# Patient Record
Sex: FEMALE | Race: WHITE | Employment: UNEMPLOYED | URBAN - METROPOLITAN AREA
[De-identification: names, ages, dates, MRNs, and addresses within clinical notes are randomized per-mention and may not be internally consistent; named-entity substitution may affect disease eponyms.]

---

## 2017-04-28 ENCOUNTER — GENERIC CONVERSION - ENCOUNTER (OUTPATIENT)
Dept: OTHER | Facility: OTHER | Age: 42
End: 2017-04-28

## 2017-08-21 ENCOUNTER — ALLSCRIPTS OFFICE VISIT (OUTPATIENT)
Dept: OTHER | Facility: OTHER | Age: 42
End: 2017-08-21

## 2017-11-30 DIAGNOSIS — E03.9 HYPOTHYROIDISM: ICD-10-CM

## 2017-11-30 DIAGNOSIS — Q90.9 DOWN'S SYNDROME: ICD-10-CM

## 2017-11-30 DIAGNOSIS — E66.9 OBESITY: ICD-10-CM

## 2017-11-30 DIAGNOSIS — E78.5 HYPERLIPIDEMIA: ICD-10-CM

## 2017-12-14 ENCOUNTER — ALLSCRIPTS OFFICE VISIT (OUTPATIENT)
Dept: OTHER | Facility: OTHER | Age: 42
End: 2017-12-14

## 2017-12-22 NOTE — PROGRESS NOTES
Assessment   1  Acute respiratory infection (519 8) (J22)    Plan   Health Maintenance    · PELVIC EXAM; Status:Permanent Deferral - Medical Deferral;     Discussion/Summary      Start zpak  of fluids and rest       Chief Complaint   Ongoing cough for the past week  History of Present Illness   HPI: 1 week of cough fever or chills robitussin      Review of Systems        Constitutional: no fever,-- not feeling poorly,-- no chills-- and-- not feeling tired  ENT: no earache,-- no nosebleeds,-- no hearing loss-- and-- no nasal discharge  Cardiovascular: the heart rate was not slow,-- no chest pain,-- the heart rate was not fast-- and-- no palpitations  Respiratory: no shortness of breath,-- no cough,-- no wheezing-- and-- no shortness of breath during exertion  Gastrointestinal: no abdominal pain,-- no nausea,-- no constipation-- and-- no diarrhea  Genitourinary: no dysuria  Musculoskeletal: no arthralgias-- and-- no myalgias  Integumentary: no rashes,-- no itching,-- no skin lesions-- and-- no skin wound  Neurological: no headache,-- no numbness,-- no confusion-- and-- no dizziness  Active Problems   1  Allergic rhinitis (477 9) (J30 9)   2  Cholelithiasis (574 20) (K80 20)   3  Dyslipidemia (272 4) (E78 5)   4  Dysphagia (787 20) (R13 10)   5  Encounter for screening mammogram for malignant neoplasm of breast (V76 12)     (Z12 31)   6  History of syncope (V15 89) (Z87 898)   7  Hypothyroidism (244 9) (E03 9)   8  Need for prophylactic vaccination and inoculation against influenza (V04 81) (Z23)   9  History of Need for vaccination for DTP (V06 1) (Z23)   10  Obesity (278 00) (E66 9)   11  Osteoporosis (733 00) (M81 0)   12  PPD screening test (V74 1) (Z11 1)   13  Screening for ischemic heart disease (V81 0) (Z13 6)   14  Trisomy 21, Down syndrome (758 0) (Q90 9)    Past Medical History   Active Problems And Past Medical History Reviewed:     The active problems and past medical history were reviewed and updated today  Surgical History   Surgical History Reviewed: The surgical history was reviewed and updated today  Social History    · Never A Smoker   · Never Drank Alcohol   · Non-smoker (V49 89) (Z78 9)  The social history was reviewed and updated today  The social history was reviewed and is unchanged  Family History   Family History Reviewed: The family history was reviewed and updated today  Current Meds    1  Acetaminophen 500 MG Oral Tablet; take 1 tablet every 8 hours prn pain; Therapy: 48WAB8031 to Recorded   2  Atorvastatin Calcium 10 MG Oral Tablet; TAKE 1 TABLET DAILY AT BEDTIME; Therapy: 68MLJ0249 to ([place]) Recorded   3  Benzonatate 200 MG Oral Capsule; TAKE 1 CAPSULE Every 6 hours; Therapy: 16EVQ6932 to Recorded   4  CertaVite/Antioxidants TABS; TAKE 1 TABLET DAILY; Therapy: (Recorded:05Jan2015) to Recorded   5  Flonase 50 MCG/ACT SUSP; INSTILL 2 SPRAY Bedtime PRN; Therapy: 27RVF4202 to (Evaluate:05May2015) Recorded   6  Levothyroxine Sodium 50 MCG Oral Tablet; TAKE 1 TABLET DAILY; Therapy: 27BGP2046 to (Evaluate:06Jan2013)  Requested for: 00GDE3575; Last     Rx:20Gjz5325 Ordered   7  Loratadine 5 MG/5ML SYRP; TAKE 1 TEASPOONFUL ONCE A DAY; Therapy: 20KBE8486 to Recorded   8  Ondansetron 4 MG Oral Tablet Disintegrating; TAKE 1 TABLET Every 8 hours PRN; Therapy: 43RPB1812 to Recorded   9  Robitussin Cold Cough+ Chest  MG/5ML Oral Liquid; TAKE 1 TEASPOONFUL     EVERY 4 TO 6 HOURS AS NEEDED; Therapy: 04FIP1402 to Recorded   10  Triamcinolone Acetonide 0 1 % External Cream; APPLY 2-3 TIMES DAILY TO AFFECTED      AREA(S); Therapy: 26PLZ6800 to Recorded   11  Vitamin D3 1000 UNIT Oral Tablet; TAKE 1 TABLET DAILY; Therapy: 23VZQ3754 to Recorded     The medication list was reviewed and updated today  Allergies   1  No Known Drug Allergies  2   Seasonal    Vitals    Recorded: 73ZOI8329 02:35PM   Temperature 98 1 F, Oral   Heart Rate 66   Systolic 112   Diastolic 64   Height Unobtainable Yes   Weight Unobtainable Yes   O2 Saturation 93     Physical Exam        Constitutional      General appearance: No acute distress, well appearing and well nourished  Eyes      Conjunctiva and lids: No swelling, erythema or discharge  Pupils and irises: Equal, round and reactive to light  Ears, Nose, Mouth, and Throat      External inspection of ears and nose: Normal        Otoscopic examination: Tympanic membranes translucent with normal light reflex  Canals patent without erythema  Nasal mucosa, septum, and turbinates: Normal without edema or erythema  Oropharynx: Normal with no erythema, edema, exudate or lesions  Pulmonary      Respiratory effort: No increased work of breathing or signs of respiratory distress  Auscultation of lungs: Clear to auscultation  Cardiovascular      Palpation of heart: Normal PMI, no thrills  Auscultation of heart: Normal rate and rhythm, normal S1 and S2, without murmurs  Examination of extremities for edema and/or varicosities: Normal        Carotid pulses: Normal        Abdomen      Abdomen: Non-tender, no masses  Liver and spleen: No hepatomegaly or splenomegaly  Lymphatic      Palpation of lymph nodes in neck: No lymphadenopathy  Musculoskeletal      Gait and station: Normal        Digits and nails: Normal without clubbing or cyanosis  Inspection/palpation of joints, bones, and muscles: Normal        Skin      Skin and subcutaneous tissue: Normal without rashes or lesions  Neurologic      Cranial nerves: Cranial nerves 2-12 intact  Reflexes: 2+ and symmetric  Sensation: No sensory loss         Psychiatric      Orientation to person, place, and time: Normal        Mood and affect: Normal           Signatures    Electronically signed by : Bisi Ford; Dec 19 2017 8:55AM EST                       (Author)     Electronically signed by : AMNA Stratton ; Dec 21 2017 10:08AM EST                       (Review)

## 2018-01-09 NOTE — PROGRESS NOTES
Assessment    1  Encounter for preventive health examination (V70 0) (Z00 00)   2  Osteoporosis (733 00) (M81 0)    Plan   Dyslipidemia, Hypothyroidism    · (1) CBC/PLT/DIFF; Status:Active; Requested for:56Vzc5628;    · (1) COMPREHENSIVE METABOLIC PANEL; Status:Active; Requested for:52Clu6020;    · (1) LIPID PANEL, FASTING; Status:Active; Requested for:85Xwt8779;    · (1) TSH; Status:Active; Requested for:19Aug2016;   PPD screening test    · PPD    * DXA BONE DENSITY SPINE HIP AND PELVIS; Status:Hold For - Scheduling; Requested for:19Aug2016;   Perform:[facility]; WGP:39MOF6988;FHBVNDV;    For:Osteoporosis; Ordered By:Reyes, Lea;      Discussion/Summary  Impression: Subsequent Annual Wellness Visit, with preventive exam as well as age and risk appropriate counseling completed  Cardiovascular screening and counseling: screening is current  Diabetes screening and counseling: due for blood glucose and Dx - V77 1 Screen for DM  Colorectal cancer screening and counseling: screening not indicated  Breast cancer screening and counseling: screening is current  Cervical cancer screening and counseling: due for a cervical pap smear  Osteoporosis screening and counseling: due for DXA axial skeleton  Abdominal aortic aneurysm screening and counseling: screening not indicated  Glaucoma screening and counseling: screening not indicated  HIV screening and counseling: screening is current  Immunizations: influenza vaccination is recommended annually and Tdap vaccination up to date  Patient Discussion: plan discussed with the patient, follow-up visit needed in one year  Chief Complaint  Patient presents today for a Medicare wellness  History of Present Illness  HPI: Last pap Dec 2013  [facility]   Welcome to [employer] and Wellness Visits: The patient is being seen for the subsequent annual wellness visit     Medicare Screening and Risk Factors   Hospitalizations: she has been previously hospitalizied  Once per lifetime medicare screening tests: ECG has not been done  Medicare Screening Tests Risk Questions   Abdominal aortic aneurysm risk assessment: none indicated  Osteoporosis risk assessment:  and female gender  HIV risk assessment: none indicated  Drug and Alcohol Use: The patient has never smoked cigarettes  The patient reports never drinking alcohol  Diet and Physical Activity: Current diet includes ? 1800 jennyfer daily  The patient does not exercise  Mood Disorder and Cognitive Impairment Screening: (cannot assess depression, anxiety due to Down Syndrome  + cognitive impairment)   Functional Ability/Level of Safety: Hearing is normal bilaterally and a hearing aid is not used  The patient is currently able to do activities of daily living with limitations, able to do instrumental activities of daily living with limitations, able to participate in social activities with limitations and unable to drive  Activities of daily living details: needs help using the phone, transportation help needed, needs help shopping, meal preparation help needed, needs help doing housework, needs help doing laundry, needs help managing medications and needs help managing money  Fall risk factors:  cognitive impairment, but The patient fell 0 times in the past 12 months , no polypharmacy, no alcohol use, no mobility impairment, no antidepressant use, no deconditioning, no postural hypotension, no sedative use, no visual impairment, no urinary incontinence, no antihypertensive use, up and go test was normal and no previous fall  Home safety risk factors:  no unfamiliar surroundings, no loose rugs, no poor household lighting, no uneven floors, no household clutter and handrails on the stairs  Advance Directives: Advance directives: no living will     Co-Managers and Medical Equipment/Suppliers: See Patient Care Team      Patient Care Team    Care Team Member Role Specialty Office Number   [facility]   [phone]         [address] [place] [address]  Obstetrics/Gynecology      Review of Systems  UNOBTAINABLE        Active Problems    1  Allergic rhinitis (477 9) (J30 9)   2  Cholelithiasis (574 20) (K80 20)   3  Dyslipidemia (272 4) (E78 5)   4  Dysphagia (787 20) (R13 10)   5  History of syncope (V15 89) (Z87 898)   6  Hypothyroidism (244 9) (E03 9)   7  Need for prophylactic vaccination and inoculation against influenza (V04 81) (Z23)   8  History of Need for vaccination for DTP (V06 1) (Z23)   9  Obesity (278 00) (E66 9)   10  PPD screening test (V74 1) (Z11 1)   11  Screening for ischemic heart disease (V81 0) (Z13 6)   12  Trisomy 21, Down syndrome (758 0) (Q90 9)    Past Medical History    · History of Acute suppurative otitis media without spontaneous rupture of ear drum,  unspecified laterality   · History of Community acquired pneumonia (486) (J18 9)   · History of Cough (786 2) (R05)   · History of dermatitis (V13 3) (Z87 2)   · History of exposure (V15 9) (Z91 89)   · History of fever (V13 89) (B16 634)   · History of influenza (V12 09) (Z87 09)   · History of nausea (V12 79) (S31 333)   · History of syncope (V15 89) (A40 139)   · History of upper respiratory infection (V12 09) (Z87 09)   · History of viral gastroenteritis (V12 09) (Z86 19)   · History of Infected insect bite (919 5,E906 4) (W57 XXXA)   · History of Leukopenia (288 50) (D72 819)   · Need for prophylactic vaccination and inoculation against influenza (V04 81) (Z23)   · History of Need for vaccination for DTP (V06 1) (Z23)   · History of Pain (780 96) (R52)   · History of Pneumonia (V12 61)    The active problems and past medical history were reviewed and updated today  Surgical History    · History of Tonsillectomy With Adenoidectomy    The surgical history was reviewed and updated today         Family History  Family History    · Family history of History Unobtainable    The family history was reviewed and updated today  Social History    · Never A Smoker   · Never Drank Alcohol  The social history was reviewed and updated today  Current Meds   1  Acetaminophen 500 MG Oral Tablet; take 1 tablet every 8 hours prn pain; Therapy: 49IHU1773 to Recorded   2  Atorvastatin Calcium 10 MG Oral Tablet; TAKE 1 TABLET DAILY AT BEDTIME; Therapy: 83MOT5562 to ([place]) Recorded   3  Benzonatate 200 MG Oral Capsule; TAKE 1 CAPSULE Every 6 hours; Therapy: 57IFR7451 to Recorded   4  CertaVite/Antioxidants TABS; TAKE 1 TABLET DAILY; Therapy: (Recorded:05Jan2015) to Recorded   5  Flonase 50 MCG/ACT SUSP; INSTILL 2 SPRAY Bedtime PRN; Therapy: 46ZFE3796 to (Evaluate:05May2015) Recorded   6  Levothyroxine Sodium 50 MCG Oral Tablet; TAKE 1 TABLET DAILY; Therapy: 92ADC2289 to (Evaluate:06Jan2013)  Requested for: 92GLQ3194; Last   Rx:64Viq1386 Ordered   7  Loratadine 5 MG/5ML SYRP; TAKE 1 TEASPOONFUL ONCE A DAY; Therapy: 19ARL8088 to Recorded   8  Ondansetron 4 MG Oral Tablet Dispersible; TAKE 1 TABLET Every 8 hours PRN; Therapy: 74BPM8654 to Recorded   9  Robitussin Cold Cough+ Chest  MG/5ML Oral Liquid; TAKE 1 TEASPOONFUL   EVERY 4 TO 6 HOURS AS NEEDED; Therapy: 76NYJ4755 to Recorded   10  Triamcinolone Acetonide 0 1 % External Cream; APPLY 2-3 TIMES DAILY TO    AFFECTED AREA(S); Therapy: 57LKP2384 to Recorded   11  Vitamin D3 1000 UNIT Oral Tablet; TAKE 1 TABLET DAILY; Therapy: 74WXC0269 to Recorded    The medication list was reviewed and updated today  Allergies    1  No Known Drug Allergies    2   Seasonal    Immunizations   1 2 3 4    Influenza  25-Oct-2012 05-Oct-2013 04-Oct-2014 16-Oct-2015    PPD  31-Jul-2012 29-Jul-2014      Tdap  13-Dec-2012        Vitals  Signs    Systolic: 592  Diastolic: 62  Heart Rate: 85  Respiration: 16  Temperature: 98 3 F  O2 Saturation: 95  Height: 4 ft 6 2 in  Weight: 181 lb 0 6 oz  BMI Calculated: 43 33  BSA Calculated: 1 66    Physical Exam    Constitutional   General appearance: No acute distress, well appearing and well nourished  morbidly obese  Eyes   Conjunctiva and lids: No swelling, erythema or discharge  Ears, Nose, Mouth, and Throat   Otoscopic examination: Abnormal   The right external canal had a cerumen impaction  The left external canal had a cerumen impaction  Oropharynx: Normal with no erythema, edema, exudate or lesions  Neck   Neck: Supple, symmetric, trachea midline, no masses  Thyroid: Normal, no thyromegaly  Pulmonary   Respiratory effort: No increased work of breathing or signs of respiratory distress  Auscultation of lungs: Clear to auscultation  Cardiovascular   Auscultation of heart: Normal rate and rhythm, normal S1 and S2, no murmurs  Abdomen   Abdomen: Non-tender, no masses  Liver and spleen: No hepatomegaly or splenomegaly  Lymphatic   Palpation of lymph nodes in neck: No lymphadenopathy      Musculoskeletal   Gait and station: Normal        Future Appointments    Date/Time Provider Specialty Site   08/22/2016 03:30 PM MAB, Nurse Schedule  [facility]     Signatures   Electronically signed by : AMNA Avilez ; Aug 19 2016 11:43PM EST                       (Author)

## 2018-01-15 ENCOUNTER — ALLSCRIPTS OFFICE VISIT (OUTPATIENT)
Dept: OTHER | Facility: OTHER | Age: 43
End: 2018-01-15

## 2018-01-16 NOTE — RESULT NOTES
Verified Results  (1) LIPID PANEL, FASTING 66Rjc8507 07:39AM [facility]     Test Name Result Flag Reference   CHOLESTEROL, TOTAL 167 mg/dL  125-200   HDL CHOLESTEROL 52 mg/dL  > OR = 46   TRIGLICERIDES 389 mg/dL  <150   LDL-CHOLESTEROL 91 mg/dL (calc)  <130   Desirable range <100 mg/dL for patients with CHD or  diabetes and <70 mg/dL for diabetic patients with  known heart disease  CHOL/HDLC RATIO 3 2 (calc)  < OR = 5 0   NON HDL CHOLESTEROL 115 mg/dL (calc)     Target for non-HDL cholesterol is 30 mg/dL higher than   LDL cholesterol target  (1) COMPREHENSIVE METABOLIC PANEL 92SMJ4264 61:95WX "[employer]"     Test Name Result Flag Reference   GLUCOSE 98 mg/dL  65-99   Fasting reference interval   UREA NITROGEN (BUN) 14 mg/dL  7-25   CREATININE 0 75 mg/dL  0 50-1 10   eGFR NON-AFR   AMERICAN 98 mL/min/1 73m2  > OR = 60   eGFR AFRICAN AMERICAN 113 mL/min/1 73m2  > OR = 60   BUN/CREATININE RATIO   7-54   NOT APPLICABLE (calc)   ALT 19 U/L  6-29   ALBUMIN 3 2 g/dL L 3 6-5 1   GLOBULIN 3 6 g/dL (calc)  1 9-3 7   ALBUMIN/GLOBULIN RATIO 0 9 (calc) L 1 0-2 5   BILIRUBIN, TOTAL 0 8 mg/dL  0 2-1 2   ALKALINE PHOSPHATASE 76 U/L     AST 22 U/L  10-30   SODIUM 140 mmol/L  135-146   POTASSIUM 4 2 mmol/L  3 5-5 3   CHLORIDE 103 mmol/L     CARBON DIOXIDE 30 mmol/L  20-31   CALCIUM 8 8 mg/dL  8 6-10 2   PROTEIN, TOTAL 6 8 g/dL  6 1-8 1     (1) CBC/PLT/DIFF 52Asy8303 07:39AM "[employer]"     Test Name Result Flag Reference   WHITE BLOOD CELL COUNT 4 3 Thousand/uL  3 8-10 8   RED BLOOD CELL COUNT 4 26 Million/uL  3 80-5 10   HEMOGLOBIN 14 3 g/dL  11 7-15 5   HEMATOCRIT 44 2 %  35 0-45 0    6 fL H 80 0-100 0   MCH 33 5 pg H 27 0-33 0   EOSINOPHILS 0 5 %     BASOPHILS 0 8 %     ABSOLUTE MONOCYTES 439 cells/uL  200-950   ABSOLUTE EOSINOPHILS 22 cells/uL     ABSOLUTE BASOPHILS 34 cells/uL  0-200   NEUTROPHILS 57 0 %     LYMPHOCYTES 31 5 %     MONOCYTES 10 2 %     MCHC 32 4 g/dL  32 0-36 0   RDW 14 7 % 11 0-15 0   PLATELET COUNT 061 Thousand/uL  140-400   MPV 9 1 fL  7 5-11 5   ABSOLUTE NEUTROPHILS 2451 cells/uL  6197-2205   ABSOLUTE LYMPHOCYTES 1355 cells/uL  850-3900     (Q) TSH, 3RD GENERATION [id number] 07:39AM [facility]   REPORT COMMENT:  FASTING:YES     Test Name Result Flag Reference   TSH 2 20 mIU/L     Reference Range                         > or = 20 Years  0 40-4 50                              Pregnancy Ranges            First trimester    0 26-2 66            Second trimester   0 55-2 73            Third trimester    0 43-2 91       Plan  Dyslipidemia, Hypothyroidism    · (1) CBC/PLT/DIFF; Status:Complete;   Done: 44Mxt9728 07:39AM   · (1) COMPREHENSIVE METABOLIC PANEL; Status:Complete;   Done: 42HYC0873  07:39AM   · (1) LIPID PANEL, FASTING; Status:Complete;   Done: 19HQF7316 07:39AM   · (1) TSH; Status:Active; Requested for:44Crl3953;   PPD screening test    · PPD    Discussion/Summary   SRINIVAS BLOOD TESTS ARE WITHIN NORMAL       Signatures   Electronically signed by : AMNA Willard ; Aug 21 2016  6:05PM EST                       (Author)

## 2018-01-16 NOTE — PROGRESS NOTES
Assessment   1  Trisomy 21, Down syndrome (758 0) (Q90 9)   2  Visit for pre-operative examination (V72 84) (V50 697)    Plan   Screening for cervical cancer    · PELVIC EXAM; Status:Temporary Deferral;    1/15/2020    Discussion/Summary   Surgical Clearance: She is at a LOW risk from a cardiovascular standpoint at this time without any additional cardiac testing  Reevaluation needed, if she should present with symptoms prior to surgery/procedure  Cleared for dental cleaning  Chief Complaint   Medical clearance  History of Present Illness   Pre-Op Visit (Brief): The patient is being seen for a preoperative visit  Surgical Risk Assessment: Pertinent Past Medical History: cad stent  Lifestyle Factors: denies alcohol use  Symptoms: no symptoms  Pertinent Family History: no pertinent family history  Living Situation: home is secure and supportive  HPI: preop clearance      Review of Systems        Constitutional: No fever, no chills, feels well, no tiredness, no recent weight gain or weight loss  Eyes: No complaints of eye pain, no red eyes, no eyesight problems, no discharge, no dry eyes, no itching of eyes  ENT: no complaints of earache, no loss of hearing, no nose bleeds, no nasal discharge, no sore throat, no hoarseness  Cardiovascular: No complaints of slow heart rate, no fast heart rate, no chest pain, no palpitations, no leg claudication, no lower extremity edema  Respiratory: No complaints of shortness of breath, no wheezing, no cough, no SOB on exertion, no orthopnea, no PND  Gastrointestinal: No complaints of abdominal pain, no constipation, no nausea or vomiting, no diarrhea, no bloody stools  Integumentary: No complaints of skin rash or lesions, no itching, no skin wounds, no breast pain or lump        Neurological: No complaints of headache, no confusion, no convulsions, no numbness, no dizziness or fainting, no tingling, no limb weakness, no difficulty walking  Psychiatric: Not suicidal, no sleep disturbance, no anxiety or depression, no change in personality, no emotional problems  Endocrine: No complaints of proptosis, no hot flashes, no muscle weakness, no deepening of the voice, no feelings of weakness  Active Problems   1  Acute respiratory infection (519 8) (J22)   2  Allergic rhinitis (477 9) (J30 9)   3  Cholelithiasis (574 20) (K80 20)   4  Dyslipidemia (272 4) (E78 5)   5  Dysphagia (787 20) (R13 10)   6  Encounter for screening mammogram for malignant neoplasm of breast (V76 12)     (Z12 31)   7  History of syncope (V15 89) (Z87 898)   8  Hypothyroidism (244 9) (E03 9)   9  Need for prophylactic vaccination and inoculation against influenza (V04 81) (Z23)   10  History of Need for vaccination for DTP (V06 1) (Z23)   11  Obesity (278 00) (E66 9)   12  Osteoporosis (733 00) (M81 0)   13  PPD screening test (V74 1) (Z11 1)   14  Screening for ischemic heart disease (V81 0) (Z13 6)   15   Trisomy 21, Down syndrome (758 0) (Q90 9)    Past Medical History    · History of Acute suppurative otitis media without spontaneous rupture of ear drum,    unspecified laterality   · History of Community acquired pneumonia (486) (J18 9)   · History of Cough (786 2) (R05)   · History of dermatitis (V13 3) (Z87 2)   · History of exposure (V15 9) (Z91 89)   · History of fever (V13 89) (Z87 898)   · History of influenza (V12 09) (Z87 09)   · History of nausea (V12 79) (F32 594)   · History of syncope (V15 89) (V16 101)   · History of upper respiratory infection (V12 09) (Z87 09)   · History of viral gastroenteritis (V12 09) (Z86 19)   · History of Infected insect bite (919 5,E906 4) (W57 XXXA)   · History of Leukopenia (288 50) (D72 819)   · Need for prophylactic vaccination and inoculation against influenza (V04 81) (Z23)   · History of Need for vaccination for DTP (V06 1) (Z23)   · History of Pain (780 96) (R52)   · History of Pneumonia (V12 61) The active problems and past medical history were reviewed and updated today  Surgical History    · History of Tonsillectomy With Adenoidectomy     The surgical history was reviewed and updated today  Family History   Family History    · Family history of History Unobtainable     The family history was reviewed and updated today  Social History    · Never A Smoker   · Never Drank Alcohol   · Non-smoker (V49 89) (Z78 9)  The social history was reviewed and updated today  The social history was reviewed and is unchanged  Current Meds    1  Acetaminophen 500 MG Oral Tablet; take 1 tablet every 8 hours prn pain; Therapy: 71ZUW9816 to Recorded   2  Atorvastatin Calcium 10 MG Oral Tablet; TAKE 1 TABLET DAILY AT BEDTIME; Therapy: 75MGI5076 to (052 [id number] 46 74) Recorded   3  Benzonatate 200 MG Oral Capsule; TAKE 1 CAPSULE Every 6 hours; Therapy: 48QII0648 to Recorded   4  CertaVite/Antioxidants TABS; TAKE 1 TABLET DAILY; Therapy: (Recorded:05Jan2015) to Recorded   5  Flonase 50 MCG/ACT SUSP; INSTILL 2 SPRAY Bedtime PRN; Therapy: 65VUP1114 to (Evaluate:05May2015) Recorded   6  Levothyroxine Sodium 50 MCG Oral Tablet; TAKE 1 TABLET DAILY; Therapy: 40QSQ1452 to (Evaluate:06Jan2013)  Requested for: 69ZUV5461; Last     Rx:42Nqj9881 Ordered   7  Loratadine 5 MG/5ML SYRP; TAKE 1 TEASPOONFUL ONCE A DAY; Therapy: 64NJD4715 to Recorded   8  Ondansetron 4 MG Oral Tablet Disintegrating; TAKE 1 TABLET Every 8 hours PRN; Therapy: 36PUN8894 to Recorded   9  Robitussin Cold Cough+ Chest  MG/5ML Oral Liquid; TAKE 1 TEASPOONFUL     EVERY 4 TO 6 HOURS AS NEEDED; Therapy: 13AMF4598 to Recorded   10  Triamcinolone Acetonide 0 1 % External Cream; APPLY 2-3 TIMES DAILY TO      AFFECTED AREA(S); Therapy: 57YXY6791 to Recorded   11  Vitamin D3 1000 UNIT Oral Tablet; TAKE 1 TABLET DAILY;       Therapy: 85TSM1537 to Recorded     The medication list was reviewed and updated today  Allergies   1  No Known Drug Allergies  2  Seasonal    Vitals    Recorded: 49CMY0901 02:08PM   Temperature 98 5 F   Heart Rate 83   Respiration 16   Systolic 105   Diastolic 70   Height 4 ft 6 2 in   Weight 179 lb 0 2 oz   BMI Calculated 42 84   BSA Calculated 1 65   O2 Saturation 97     Physical Exam        Constitutional      General appearance: No acute distress, well appearing and well nourished  Eyes      Conjunctiva and lids: No swelling, erythema or discharge  Pupils and irises: Equal, round and reactive to light  Ears, Nose, Mouth, and Throat      External inspection of ears and nose: Normal        Otoscopic examination: Tympanic membranes translucent with normal light reflex  Canals patent without erythema  Oropharynx: Normal with no erythema, edema, exudate or lesions  Pulmonary      Respiratory effort: No increased work of breathing or signs of respiratory distress  Auscultation of lungs: Clear to auscultation  Cardiovascular      Auscultation of heart: Normal rate and rhythm, normal S1 and S2, without murmurs  Examination of extremities for edema and/or varicosities: Normal        Lymphatic      Palpation of lymph nodes in neck: No lymphadenopathy  Musculoskeletal      Gait and station: Normal        Digits and nails: Normal without clubbing or cyanosis  End of Encounter Meds   1  Flonase 50 MCG/ACT SUSP (Fluticasone Propionate); INSTILL 2 SPRAY Bedtime PRN; Therapy: 69EZJ0640 to (Evaluate:60Sdf9126) Recorded   2  Loratadine 5 MG/5ML SYRP; TAKE 1 TEASPOONFUL ONCE A DAY; Therapy: 78DLH9762 to Recorded  3  Atorvastatin Calcium 10 MG Oral Tablet; TAKE 1 TABLET DAILY AT BEDTIME; Therapy: 68CMA3885 to (Bonnie Odom) Recorded  4  CertaVite/Antioxidants TABS; TAKE 1 TABLET DAILY; Therapy: (Recorded:50Iwg1136) to Recorded   5  Vitamin D3 1000 UNIT Oral Tablet; TAKE 1 TABLET DAILY; Therapy: 23CCJ8551 to Recorded  6  Levothyroxine Sodium 50 MCG Oral Tablet; TAKE 1 TABLET DAILY; Therapy: 11RIA0031 to (Evaluate:06Jan2013)  Requested for: 15MDT2299; Last     Rx:04Sep2012 Ordered  7  Benzonatate 200 MG Oral Capsule; TAKE 1 CAPSULE Every 6 hours; Therapy: 05AEH1603 to Recorded  8  Ondansetron 4 MG Oral Tablet Disintegrating; TAKE 1 TABLET Every 8 hours PRN; Therapy: 64NAU9774 to Recorded  9  Acetaminophen 500 MG Oral Tablet; take 1 tablet every 8 hours prn pain; Therapy: 53ZXD7653 to Recorded  10  Robitussin Cold Cough+ Chest  MG/5ML Oral Liquid; TAKE 1 TEASPOONFUL      EVERY 4 TO 6 HOURS AS NEEDED; Therapy: 57STU2029 to Recorded   11  Triamcinolone Acetonide 0 1 % External Cream; APPLY 2-3 TIMES DAILY TO      AFFECTED AREA(S);       Therapy: 36MQU3841 to Recorded    Signatures    Electronically signed by : Dayne Silva; Jon 15 2018  4:05PM EST                       (Author)     Electronically signed by : AMNA Ferrera ; Jon 15 2018 11:43PM EST                       (Review)

## 2018-01-16 NOTE — PROGRESS NOTES
Assessment    1  Encounter for preventive health examination (V70 0) (Z00 00)   2  Trisomy 21, Down syndrome (758 0) (Q90 9)    Discussion/Summary  health maintenance visit Currently, she eats a healthy diet and eats an adequate diet  Breast cancer screening: the risks and benefits of breast cancer screening were discussed and mammogram is current  Colorectal cancer screening: colorectal cancer screening is not indicated  Osteoporosis screening: bone mineral density testing is not indicated  The immunizations are up to date  Advice and education were given regarding nutrition, aerobic exercise, calcium supplements, vitamin D supplements, self skin examination, helmet use and seat belt use  Patient discussion: discussed with the patient  Continue to eat healthy and exercise  Chief Complaint  wellness      History of Present Illness  HM, Adult Female: The patient is being seen for a health maintenance evaluation  General Health: The patient's health since the last visit is described as good  She has regular dental visits  She denies vision problems  She denies hearing loss  Immunizations status: up to date  Lifestyle:  She does not have a healthy diet  She does not have any weight concerns  She exercises regularly  She does not use tobacco  She denies alcohol use  She denies drug use  Reproductive health: the patient is premenopausal   she reports normal menses  Screening: cancer screening reviewed and current  metabolic screening reviewed and current  risk screening reviewed and current  Review of Systems    Constitutional: no fever, not feeling poorly, no chills and not feeling tired  Eyes: no eye pain, no eyesight problems, eyes not red and no purulent discharge from the eyes  ENT: no earache, no nosebleeds, no hearing loss and no nasal discharge  Cardiovascular: the heart rate was not slow, no chest pain, the heart rate was not fast and no palpitations     Respiratory: no shortness of breath, no cough, no wheezing and no shortness of breath during exertion  Gastrointestinal: no abdominal pain, no nausea, no constipation and no diarrhea  Genitourinary: no dysuria  Musculoskeletal: no arthralgias, no joint swelling, no myalgias and no joint stiffness  Integumentary: no rashes, no itching, no skin lesions and no skin wound  Neurological: no headache, no numbness, no confusion and no dizziness  Psychiatric: no anxiety, no sleep disturbances and no depression  Endocrine: no muscle weakness  Active Problems    1  Allergic rhinitis (477 9) (J30 9)   2  Cholelithiasis (574 20) (K80 20)   3  Dyslipidemia (272 4) (E78 5)   4  Dysphagia (787 20) (R13 10)   5  Encounter for screening mammogram for malignant neoplasm of breast (V76 12)   (Z12 31)   6  History of syncope (V15 89) (Z87 898)   7  Hypothyroidism (244 9) (E03 9)   8  Need for prophylactic vaccination and inoculation against influenza (V04 81) (Z23)   9  History of Need for vaccination for DTP (V06 1) (Z23)   10  Obesity (278 00) (E66 9)   11  Osteoporosis (733 00) (M81 0)   12  PPD screening test (V74 1) (Z11 1)   13  Screening for ischemic heart disease (V81 0) (Z13 6)   14   Trisomy 21, Down syndrome (758 0) (Q90 9)    Past Medical History    · History of Acute suppurative otitis media without spontaneous rupture of ear drum,  unspecified laterality   · History of Community acquired pneumonia (486) (J18 9)   · History of Cough (786 2) (R05)   · History of dermatitis (V13 3) (Z87 2)   · History of exposure (V15 9) (Z91 89)   · History of fever (V13 89) (E80 136)   · History of influenza (V12 09) (Z87 09)   · History of nausea (V12 79) (W98 132)   · History of syncope (V15 89) (L56 486)   · History of upper respiratory infection (V12 09) (Z87 09)   · History of viral gastroenteritis (V12 09) (Z86 19)   · History of Infected insect bite (919 5,E906 4) (W57 XXXA)   · History of Leukopenia (288 50) (D72 819)   · Need for prophylactic vaccination and inoculation against influenza (V04 81) (Z23)   · History of Need for vaccination for DTP (V06 1) (Z23)   · History of Pain (780 96) (R52)   · History of Pneumonia (V12 61)    Surgical History    · History of Tonsillectomy With Adenoidectomy    Family History  Family History    · Family history of History Unobtainable    Social History    · Never A Smoker   · Never Drank Alcohol   · Non-smoker (V49 89) (Z78 9)    Current Meds   1  Acetaminophen 500 MG Oral Tablet; take 1 tablet every 8 hours prn pain; Therapy: 32TKA0714 to Recorded   2  Atorvastatin Calcium 10 MG Oral Tablet; TAKE 1 TABLET DAILY AT BEDTIME; Therapy: 69YDU6042 to (052 948 46 74) Recorded   3  Benzonatate 200 MG Oral Capsule; TAKE 1 CAPSULE Every 6 hours; Therapy: 38QJP6003 to Recorded   4  CertaVite/Antioxidants TABS; TAKE 1 TABLET DAILY; Therapy: (Recorded:05Jan2015) to Recorded   5  Flonase 50 MCG/ACT SUSP; INSTILL 2 SPRAY Bedtime PRN; Therapy: 32BFG4923 to (Evaluate:05May2015) Recorded   6  Levothyroxine Sodium 50 MCG Oral Tablet; TAKE 1 TABLET DAILY; Therapy: 77PYR3390 to (Evaluate:06Jan2013)  Requested for: 90DVW1993; Last   Rx:33Wdx2288 Ordered   7  Loratadine 5 MG/5ML SYRP; TAKE 1 TEASPOONFUL ONCE A DAY; Therapy: 34EJO0050 to Recorded   8  Ondansetron 4 MG Oral Tablet Disintegrating; TAKE 1 TABLET Every 8 hours PRN; Therapy: 28BTM2396 to Recorded   9  Robitussin Cold Cough+ Chest  MG/5ML Oral Liquid; TAKE 1 TEASPOONFUL   EVERY 4 TO 6 HOURS AS NEEDED; Therapy: 22NDR0056 to Recorded   10  Triamcinolone Acetonide 0 1 % External Cream; APPLY 2-3 TIMES DAILY TO    AFFECTED AREA(S); Therapy: 38EJU3741 to Recorded   11  Vitamin D3 1000 UNIT Oral Tablet; TAKE 1 TABLET DAILY; Therapy: 98YLK7675 to Recorded    Allergies    1  No Known Drug Allergies    2   Seasonal    Vitals   Recorded: 17Lou5344 03:35PM   Heart Rate 73   Respiration 16   Systolic 959   Diastolic 72   Height 4 ft 6 2 in Weight 182 lb 0 2 oz   BMI Calculated 43 56   BSA Calculated 1 67   O2 Saturation 98     Physical Exam    Constitutional   General appearance: No acute distress, well appearing and well nourished  Eyes   Conjunctiva and lids: No swelling, erythema or discharge  Pupils and irises: Equal, round and reactive to light  Ears, Nose, Mouth, and Throat   External inspection of ears and nose: Normal     Otoscopic examination: Tympanic membranes translucent with normal light reflex  Canals patent without erythema  Oropharynx: Normal with no erythema, edema, exudate or lesions  Pulmonary   Respiratory effort: No increased work of breathing or signs of respiratory distress  Auscultation of lungs: Clear to auscultation  Cardiovascular   Palpation of heart: Normal PMI, no thrills  Auscultation of heart: Normal rate and rhythm, normal S1 and S2, without murmurs  Examination of extremities for edema and/or varicosities: Normal     Abdomen   Abdomen: Non-tender, no masses  Liver and spleen: No hepatomegaly or splenomegaly  Lymphatic   Palpation of lymph nodes in neck: No lymphadenopathy  Musculoskeletal   Gait and station: Normal     Digits and nails: Normal without clubbing or cyanosis  Inspection/palpation of joints, bones, and muscles: Normal     Skin   Skin and subcutaneous tissue: Normal without rashes or lesions      Psychiatric   Orientation to person, place, and time: Normal     Mood and affect: Normal        Signatures   Electronically signed by : Constanza Astorga; Sep 14 2017  9:07AM EST                       (Author)    Electronically signed by : AMNA Lock ; Sep 15 2017 10:10PM EST                       (Review)

## 2018-01-22 VITALS
RESPIRATION RATE: 16 BRPM | WEIGHT: 182.01 LBS | DIASTOLIC BLOOD PRESSURE: 72 MMHG | SYSTOLIC BLOOD PRESSURE: 120 MMHG | OXYGEN SATURATION: 98 % | HEIGHT: 55 IN | HEART RATE: 73 BPM | BODY MASS INDEX: 42.12 KG/M2

## 2018-01-22 VITALS
OXYGEN SATURATION: 93 % | HEART RATE: 66 BPM | SYSTOLIC BLOOD PRESSURE: 102 MMHG | TEMPERATURE: 98.1 F | DIASTOLIC BLOOD PRESSURE: 64 MMHG

## 2018-01-23 VITALS
SYSTOLIC BLOOD PRESSURE: 106 MMHG | HEIGHT: 55 IN | BODY MASS INDEX: 41.43 KG/M2 | TEMPERATURE: 98.5 F | RESPIRATION RATE: 16 BRPM | WEIGHT: 179.01 LBS | DIASTOLIC BLOOD PRESSURE: 70 MMHG | HEART RATE: 83 BPM | OXYGEN SATURATION: 97 %

## 2018-01-23 NOTE — CONSULTS
Chief Complaint  Medical clearance  History of Present Illness  Pre-Op Visit (Brief): The patient is being seen for a preoperative visit  Surgical Risk Assessment: Pertinent Past Medical History: cad stent  Lifestyle Factors: denies alcohol use  Symptoms: no symptoms  Pertinent Family History: no pertinent family history  Living Situation: home is secure and supportive  HPI: preop clearance      Review of Systems    Constitutional: No fever, no chills, feels well, no tiredness, no recent weight gain or weight loss  Eyes: No complaints of eye pain, no red eyes, no eyesight problems, no discharge, no dry eyes, no itching of eyes  ENT: no complaints of earache, no loss of hearing, no nose bleeds, no nasal discharge, no sore throat, no hoarseness  Cardiovascular: No complaints of slow heart rate, no fast heart rate, no chest pain, no palpitations, no leg claudication, no lower extremity edema  Respiratory: No complaints of shortness of breath, no wheezing, no cough, no SOB on exertion, no orthopnea, no PND  Gastrointestinal: No complaints of abdominal pain, no constipation, no nausea or vomiting, no diarrhea, no bloody stools  Integumentary: No complaints of skin rash or lesions, no itching, no skin wounds, no breast pain or lump  Neurological: No complaints of headache, no confusion, no convulsions, no numbness, no dizziness or fainting, no tingling, no limb weakness, no difficulty walking  Psychiatric: Not suicidal, no sleep disturbance, no anxiety or depression, no change in personality, no emotional problems  Endocrine: No complaints of proptosis, no hot flashes, no muscle weakness, no deepening of the voice, no feelings of weakness  Active Problems    1  Acute respiratory infection (519 8) (J22)   2  Allergic rhinitis (477 9) (J30 9)   3  Cholelithiasis (574 20) (K80 20)   4  Dyslipidemia (272 4) (E78 5)   5  Dysphagia (787 20) (R13 10)   6   Encounter for screening mammogram for malignant neoplasm of breast (V76 12)   (Z12 31)   7  History of syncope (V15 89) (Z87 898)   8  Hypothyroidism (244 9) (E03 9)   9  Need for prophylactic vaccination and inoculation against influenza (V04 81) (Z23)   10  History of Need for vaccination for DTP (V06 1) (Z23)   11  Obesity (278 00) (E66 9)   12  Osteoporosis (733 00) (M81 0)   13  PPD screening test (V74 1) (Z11 1)   14  Screening for ischemic heart disease (V81 0) (Z13 6)   15  Trisomy 21, Down syndrome (758 0) (Q90 9)    Past Medical History    · History of Acute suppurative otitis media without spontaneous rupture of ear drum,  unspecified laterality   · History of Community acquired pneumonia (486) (J18 9)   · History of Cough (786 2) (R05)   · History of dermatitis (V13 3) (Z87 2)   · History of exposure (V15 9) (Z91 89)   · History of fever (V13 89) (Q23 058)   · History of influenza (V12 09) (Z87 09)   · History of nausea (V12 79) (I80 750)   · History of syncope (V15 89) (D04 361)   · History of upper respiratory infection (V12 09) (Z87 09)   · History of viral gastroenteritis (V12 09) (Z86 19)   · History of Infected insect bite (919 5,E906 4) (W57 XXXA)   · History of Leukopenia (288 50) (D72 819)   · Need for prophylactic vaccination and inoculation against influenza (V04 81) (Z23)   · History of Need for vaccination for DTP (V06 1) (Z23)   · History of Pain (780 96) (R52)   · History of Pneumonia (V12 61)    The active problems and past medical history were reviewed and updated today  Surgical History    · History of Tonsillectomy With Adenoidectomy    The surgical history was reviewed and updated today  Family History    · Family history of History Unobtainable    The family history was reviewed and updated today  Social History    · Never A Smoker   · Never Drank Alcohol   · Non-smoker (V49 89) (Z78 9)  The social history was reviewed and updated today  The social history was reviewed and is unchanged        Current Meds   1  Acetaminophen 500 MG Oral Tablet; take 1 tablet every 8 hours prn pain; Therapy: 61RMM2796 to Recorded   2  Atorvastatin Calcium 10 MG Oral Tablet; TAKE 1 TABLET DAILY AT BEDTIME; Therapy: 23MBG2087 to (Tor Spencer) Recorded   3  Benzonatate 200 MG Oral Capsule; TAKE 1 CAPSULE Every 6 hours; Therapy: 41YKM3466 to Recorded   4  CertaVite/Antioxidants TABS; TAKE 1 TABLET DAILY; Therapy: (Recorded:05Jan2015) to Recorded   5  Flonase 50 MCG/ACT SUSP; INSTILL 2 SPRAY Bedtime PRN; Therapy: 96UAA8935 to (Evaluate:05May2015) Recorded   6  Levothyroxine Sodium 50 MCG Oral Tablet; TAKE 1 TABLET DAILY; Therapy: 01KVB0043 to (Evaluate:06Jan2013)  Requested for: 88RFG4916; Last   Rx:[id number] Ordered   7  Loratadine 5 MG/5ML SYRP; TAKE 1 TEASPOONFUL ONCE A DAY; Therapy: 18VEK2129 to Recorded   8  Ondansetron 4 MG Oral Tablet Disintegrating; TAKE 1 TABLET Every 8 hours PRN; Therapy: 40CPX9510 to Recorded   9  Robitussin Cold Cough+ Chest  MG/5ML Oral Liquid; TAKE 1 TEASPOONFUL   EVERY 4 TO 6 HOURS AS NEEDED; Therapy: 64LVV5617 to Recorded   10  Triamcinolone Acetonide 0 1 % External Cream; APPLY 2-3 TIMES DAILY TO AFFECTED    AREA(S); Therapy: 40WHE1485 to Recorded   11  Vitamin D3 1000 UNIT Oral Tablet; TAKE 1 TABLET DAILY; Therapy: 37OSN3780 to Recorded    The medication list was reviewed and updated today  Allergies    1  No Known Drug Allergies    2  Seasonal    Vitals  Signs    Temperature: 98 5 F  Heart Rate: 83  Respiration: 16  Systolic: 873  Diastolic: 70  Height: 4 ft 6 2 in  Weight: 179 lb 0 2 oz  BMI Calculated: 42 84  BSA Calculated: 1 65  O2 Saturation: 97    Physical Exam    Constitutional   General appearance: No acute distress, well appearing and well nourished  Eyes   Conjunctiva and lids: No swelling, erythema or discharge  Pupils and irises: Equal, round and reactive to light      Ears, Nose, Mouth, and Throat   External inspection of ears and nose: Normal     Otoscopic examination: Tympanic membranes translucent with normal light reflex  Canals patent without erythema  Oropharynx: Normal with no erythema, edema, exudate or lesions  Pulmonary   Respiratory effort: No increased work of breathing or signs of respiratory distress  Auscultation of lungs: Clear to auscultation  Cardiovascular   Auscultation of heart: Normal rate and rhythm, normal S1 and S2, without murmurs  Examination of extremities for edema and/or varicosities: Normal     Lymphatic   Palpation of lymph nodes in neck: No lymphadenopathy  Musculoskeletal   Gait and station: Normal     Digits and nails: Normal without clubbing or cyanosis  Assessment    1  Trisomy 21, Down syndrome (758 0) (Q90 9)   2  Visit for pre-operative examination (V72 84) (B34 391)    Plan  Screening for cervical cancer    · PELVIC EXAM; Status:Temporary Deferral;    1/15/2020    Discussion/Summary  Surgical Clearance: She is at a LOW risk from a cardiovascular standpoint at this time without any additional cardiac testing  Reevaluation needed, if she should present with symptoms prior to surgery/procedure  Cleared for dental cleaning  End of Encounter Meds    1  Flonase 50 MCG/ACT SUSP (Fluticasone Propionate); INSTILL 2 SPRAY Bedtime PRN; Therapy: 57GAZ6482 to (Evaluate:05May2015) Recorded   2  Loratadine 5 MG/5ML SYRP; TAKE 1 TEASPOONFUL ONCE A DAY; Therapy: 45UWU2851 to Recorded    3  Atorvastatin Calcium 10 MG Oral Tablet; TAKE 1 TABLET DAILY AT BEDTIME; Therapy: [id number] to ([phone]) Recorded    4  CertaVite/Antioxidants TABS; TAKE 1 TABLET DAILY; Therapy: (Recorded:05Jan2015) to Recorded   5  Vitamin D3 1000 UNIT Oral Tablet; TAKE 1 TABLET DAILY; Therapy: 36LHX7475 to Recorded    6  Levothyroxine Sodium 50 MCG Oral Tablet; TAKE 1 TABLET DAILY; Therapy: 59UOH9107 to (Evaluate:06Jan2013)  Requested for: 88AKS0884; Last   Rx:04Sep2012 Ordered    7   Benzonatate 200 MG Oral Capsule; TAKE 1 CAPSULE Every 6 hours; Therapy: 83ALS0176 to Recorded    8  Ondansetron 4 MG Oral Tablet Disintegrating; TAKE 1 TABLET Every 8 hours PRN; Therapy: 94DTB9932 to Recorded    9  Acetaminophen 500 MG Oral Tablet; take 1 tablet every 8 hours prn pain; Therapy: 62TUB6071 to Recorded    10  Robitussin Cold Cough+ Chest  MG/5ML Oral Liquid; TAKE 1 TEASPOONFUL    EVERY 4 TO 6 HOURS AS NEEDED; Therapy: 56XUC6560 to Recorded   11  Triamcinolone Acetonide 0 1 % External Cream; APPLY 2-3 TIMES DAILY TO AFFECTED    AREA(S);     Therapy: [id number] to Recorded    Signatures   Electronically signed by : Argyle Severin; Jon 15 2018  4:05PM EST                       (Author)    Electronically signed by : AMNA Yeh ; Jon 15 2018 11:43PM EST                       (Review)

## 2018-02-26 ENCOUNTER — OFFICE VISIT (OUTPATIENT)
Dept: INTERNAL MEDICINE CLINIC | Facility: CLINIC | Age: 43
End: 2018-02-26
Payer: MEDICARE

## 2018-02-26 VITALS
SYSTOLIC BLOOD PRESSURE: 120 MMHG | DIASTOLIC BLOOD PRESSURE: 82 MMHG | OXYGEN SATURATION: 99 % | HEIGHT: 55 IN | HEART RATE: 74 BPM | WEIGHT: 178.2 LBS | RESPIRATION RATE: 16 BRPM | BODY MASS INDEX: 41.24 KG/M2 | TEMPERATURE: 98.7 F

## 2018-02-26 DIAGNOSIS — J20.8 VIRAL BRONCHITIS: Primary | ICD-10-CM

## 2018-02-26 DIAGNOSIS — R05.3 COUGH, PERSISTENT: ICD-10-CM

## 2018-02-26 PROCEDURE — 99213 OFFICE O/P EST LOW 20 MIN: CPT | Performed by: NURSE PRACTITIONER

## 2018-02-26 RX ORDER — LEVOTHYROXINE SODIUM 0.05 MG/1
1 TABLET ORAL DAILY
COMMUNITY
Start: 2012-03-16 | End: 2021-11-26 | Stop reason: SDUPTHER

## 2018-02-26 RX ORDER — TRIAMCINOLONE ACETONIDE 1 MG/G
CREAM TOPICAL 3 TIMES DAILY
COMMUNITY
End: 2018-08-27 | Stop reason: SDUPTHER

## 2018-02-26 RX ORDER — ACETAMINOPHEN 500 MG
1 TABLET ORAL EVERY 8 HOURS PRN
COMMUNITY
Start: 2015-01-05 | End: 2022-05-25 | Stop reason: SDUPTHER

## 2018-02-26 RX ORDER — POTASSIUM CHLORIDE 10 MEQ
5 TABLET, EXTENDED RELEASE ORAL DAILY
COMMUNITY
Start: 2015-04-06 | End: 2018-02-26

## 2018-02-26 RX ORDER — BIOTIN 1 MG
1 TABLET ORAL DAILY
COMMUNITY
Start: 2015-01-05 | End: 2018-02-26

## 2018-02-26 RX ORDER — ATORVASTATIN CALCIUM 10 MG/1
1 TABLET, FILM COATED ORAL
COMMUNITY
Start: 2015-01-05 | End: 2021-11-26 | Stop reason: SDUPTHER

## 2018-02-26 RX ORDER — BENZONATATE 200 MG/1
1 CAPSULE ORAL EVERY 6 HOURS
COMMUNITY
Start: 2015-01-05 | End: 2019-02-25 | Stop reason: SDUPTHER

## 2018-02-26 RX ORDER — MOMETASONE FUROATE 50 UG/1
SPRAY, METERED NASAL
COMMUNITY
Start: 2010-09-01 | End: 2018-02-26

## 2018-02-26 RX ORDER — TRIAMCINOLONE ACETONIDE 1 MG/G
CREAM TOPICAL 3 TIMES DAILY
COMMUNITY
Start: 2015-01-05 | End: 2018-02-26

## 2018-02-26 RX ORDER — FLUTICASONE PROPIONATE 50 MCG
SPRAY, SUSPENSION (ML) NASAL
COMMUNITY
Start: 2013-08-05 | End: 2022-06-30 | Stop reason: SDUPTHER

## 2018-02-26 RX ORDER — ONDANSETRON 4 MG/1
1 TABLET, ORALLY DISINTEGRATING ORAL EVERY 8 HOURS PRN
COMMUNITY
Start: 2015-01-05 | End: 2021-04-23

## 2018-02-26 RX ORDER — GUAIFENESIN DEXTROMETHORPHAN HYDROBROMIDE ORAL SOLUTION 10; 100 MG/5ML; MG/5ML
5 SOLUTION ORAL
COMMUNITY
Start: 2015-01-05 | End: 2018-02-26

## 2018-02-26 RX ORDER — NYSTATIN 100000 [USP'U]/G
POWDER TOPICAL
COMMUNITY
Start: 2015-01-02 | End: 2022-05-25 | Stop reason: SDUPTHER

## 2018-02-26 RX ORDER — LORATADINE ORAL 5 MG/5ML
5 SOLUTION ORAL
COMMUNITY
End: 2022-06-30 | Stop reason: SDUPTHER

## 2018-02-26 NOTE — PROGRESS NOTES
Assessment/Plan:    Most likely upper airway cough syndrome from recent URI  Will check chest xray to rule out pneumonia  Continue Robitussin for cough  Plenty of fluids      Diagnoses and all orders for this visit:    Viral bronchitis  -     XR chest pa & lateral; Future    Cough, persistent  -     XR chest pa & lateral; Future    Other orders  -     Discontinue: Cholecalciferol (VITAMIN D3) 1000 units CAPS; Take 1 tablet by mouth daily  -     Discontinue: triamcinolone (KENALOG) 0 1 % cream; Apply topically 3 (three) times a day  -     Discontinue: dextromethorphan-guaiFENesin (ROBITUSSIN COLD COUGH+ CHEST)  mg/5 mL oral liquid; Take 5 mL by mouth  -     ondansetron (ZOFRAN-ODT) 4 mg disintegrating tablet; Take 1 tablet by mouth every 8 (eight) hours as needed  -     Discontinue: Loratadine 5 MG/5ML SOLN; Take 5 mL by mouth daily  -     levothyroxine 50 mcg tablet; Take 1 tablet by mouth daily  -     fluticasone (FLONASE) 50 mcg/act nasal spray; into each nostril  -     Discontinue: Multiple Vitamins-Minerals (CERTAVITE/ANTIOXIDANTS PO); Take 1 tablet by mouth daily  -     benzonatate (TESSALON) 200 MG capsule; Take 1 capsule by mouth every 6 (six) hours  -     atorvastatin (LIPITOR) 10 mg tablet; Take 1 tablet by mouth  -     acetaminophen (TYLENOL) 500 mg tablet; Take 1 tablet by mouth every 8 (eight) hours as needed  -     Cholecalciferol 1000 units CHEW; Chew 1 tablet  -     Dextromethorphan-Guaifenesin  MG CAPS; Take by mouth  -     loratadine (CLARITIN) 5 mg/5 mL syrup; Take 5 mg by mouth  -     Discontinue: mometasone (NASONEX) 50 mcg/act nasal spray; into each nostril  -     Multiple Vitamin (MULTIVITAMIN IRON-FREE PO); Take 1 tablet by mouth  -     nystatin (MYCOSTATIN) powder; Apply topically  -     triamcinolone (KENALOG) 0 1 % cream; Apply topically Three times a day          Subjective:      Patient ID: Piyush Davila is a 37 y o  female      Here for ongoing persistent cough  Was seen here on 12/14, Dx with URI- zpac given but it didn't help  Still having a deep cough  Taking robitussin with some improvement  Denies fever, shortness of breath, wheezing, sore throat  No hx of asthma noted            The following portions of the patient's history were reviewed and updated as appropriate: allergies, current medications, past family history, past medical history, past social history, past surgical history and problem list     Review of Systems   Constitutional: Negative  HENT: Positive for rhinorrhea and sneezing  Negative for sore throat  Eyes: Negative for discharge  Respiratory: Positive for cough  Negative for chest tightness, shortness of breath and wheezing  Cardiovascular: Negative for chest pain and leg swelling  Gastrointestinal: Negative for abdominal pain, diarrhea, nausea and vomiting  Genitourinary: Negative for difficulty urinating  Musculoskeletal: Negative for myalgias  Skin: Negative  Neurological: Negative for dizziness, light-headedness and headaches  Objective:      /82 (BP Location: Left arm, Patient Position: Sitting, Cuff Size: Large)   Pulse 74   Temp 98 7 °F (37 1 °C)   Resp 16   Ht 4' 6 2" (1 377 m)   Wt 80 8 kg (178 lb 3 2 oz)   SpO2 99%   BMI 42 65 kg/m²          Physical Exam   Constitutional: She appears well-developed and well-nourished  HENT:   Right Ear: External ear normal    Left Ear: External ear normal    Eyes: Pupils are equal, round, and reactive to light  Cardiovascular: Normal rate, regular rhythm and normal heart sounds  Pulmonary/Chest: Effort normal and breath sounds normal  No respiratory distress  She has no wheezes  Abdominal: Soft  Bowel sounds are normal    Lymphadenopathy:     She has no cervical adenopathy  Neurological: She is alert  Psychiatric: She has a normal mood and affect  Her behavior is normal    Vitals reviewed

## 2018-02-26 NOTE — PATIENT INSTRUCTIONS

## 2018-02-27 ENCOUNTER — TRANSCRIBE ORDERS (OUTPATIENT)
Dept: ADMINISTRATIVE | Age: 43
End: 2018-02-27

## 2018-02-27 ENCOUNTER — APPOINTMENT (OUTPATIENT)
Dept: RADIOLOGY | Age: 43
End: 2018-02-27
Payer: MEDICARE

## 2018-02-27 DIAGNOSIS — R05.9 COUGH: Primary | ICD-10-CM

## 2018-02-27 DIAGNOSIS — R05.9 COUGH: ICD-10-CM

## 2018-02-27 PROCEDURE — 71046 X-RAY EXAM CHEST 2 VIEWS: CPT

## 2018-03-12 ENCOUNTER — TELEPHONE (OUTPATIENT)
Dept: OTHER | Facility: HOSPITAL | Age: 43
End: 2018-03-12

## 2018-03-12 NOTE — TELEPHONE ENCOUNTER
Pt's nurse called in stating the pt has been on cough medicine since 2/21 and still has a cough - it does sound better but still bronchial; wants to know if she should keep giving her the robitussin or should she stop?  Please advise

## 2018-03-15 ENCOUNTER — OFFICE VISIT (OUTPATIENT)
Dept: INTERNAL MEDICINE CLINIC | Facility: CLINIC | Age: 43
End: 2018-03-15
Payer: MEDICARE

## 2018-03-15 VITALS
HEIGHT: 55 IN | OXYGEN SATURATION: 98 % | BODY MASS INDEX: 42.35 KG/M2 | SYSTOLIC BLOOD PRESSURE: 112 MMHG | DIASTOLIC BLOOD PRESSURE: 70 MMHG | WEIGHT: 183 LBS | HEART RATE: 68 BPM | TEMPERATURE: 98 F

## 2018-03-15 DIAGNOSIS — R05.3 CHRONIC COUGH: Primary | ICD-10-CM

## 2018-03-15 PROCEDURE — 99213 OFFICE O/P EST LOW 20 MIN: CPT | Performed by: NURSE PRACTITIONER

## 2018-03-15 RX ORDER — PREDNISONE 20 MG/1
20 TABLET ORAL DAILY
Qty: 5 TABLET | Refills: 0 | Status: SHIPPED | OUTPATIENT
Start: 2018-03-15 | End: 2019-03-19 | Stop reason: ALTCHOICE

## 2018-03-16 NOTE — PROGRESS NOTES
Assessment/Plan:       Diagnoses and all orders for this visit:    Chronic cough  -     predniSONE 20 mg tablet; Take 1 tablet (20 mg total) by mouth daily        flonase and fluids    Subjective:      Patient ID: Gentry Nassar is a 37 y o  female  Patient had a URI in December and has been having a dry cough on and off for weeks  She feels fine no other complaints        The following portions of the patient's history were reviewed and updated as appropriate: allergies, current medications, past family history, past medical history, past social history, past surgical history and problem list     Review of Systems   Constitutional: Negative  HENT: Negative  Eyes: Negative  Respiratory: Positive for cough  Cardiovascular: Negative  Gastrointestinal: Negative  Musculoskeletal: Negative  Neurological: Negative  Objective:      /70 (BP Location: Left arm, Patient Position: Sitting, Cuff Size: Large)   Pulse 68   Temp 98 °F (36 7 °C) (Oral)   Ht 4' 6 2" (1 377 m)   Wt 83 kg (183 lb)   SpO2 98%   BMI 43 80 kg/m²          Physical Exam   Constitutional: She is oriented to person, place, and time  She appears well-developed and well-nourished  HENT:   Head: Normocephalic and atraumatic  Eyes: Conjunctivae are normal  Pupils are equal, round, and reactive to light  Neck: Normal range of motion  Neck supple  Cardiovascular: Normal rate and regular rhythm  Pulmonary/Chest: Effort normal and breath sounds normal    Abdominal: Soft  Bowel sounds are normal    Musculoskeletal: Normal range of motion  Neurological: She is alert and oriented to person, place, and time  Skin: Skin is warm and dry

## 2018-08-23 ENCOUNTER — OFFICE VISIT (OUTPATIENT)
Dept: INTERNAL MEDICINE CLINIC | Facility: CLINIC | Age: 43
End: 2018-08-23
Payer: MEDICARE

## 2018-08-23 VITALS
SYSTOLIC BLOOD PRESSURE: 112 MMHG | HEART RATE: 86 BPM | WEIGHT: 191 LBS | OXYGEN SATURATION: 92 % | DIASTOLIC BLOOD PRESSURE: 70 MMHG | HEIGHT: 55 IN | BODY MASS INDEX: 44.2 KG/M2

## 2018-08-23 DIAGNOSIS — Z00.00 HEALTHCARE MAINTENANCE: Primary | ICD-10-CM

## 2018-08-23 PROCEDURE — G0439 PPPS, SUBSEQ VISIT: HCPCS | Performed by: NURSE PRACTITIONER

## 2018-08-27 ENCOUNTER — OFFICE VISIT (OUTPATIENT)
Dept: INTERNAL MEDICINE CLINIC | Facility: CLINIC | Age: 43
End: 2018-08-27
Payer: MEDICARE

## 2018-08-27 ENCOUNTER — CLINICAL SUPPORT (OUTPATIENT)
Dept: INTERNAL MEDICINE CLINIC | Facility: CLINIC | Age: 43
End: 2018-08-27
Payer: MEDICARE

## 2018-08-27 VITALS
WEIGHT: 191 LBS | HEART RATE: 91 BPM | HEIGHT: 55 IN | DIASTOLIC BLOOD PRESSURE: 80 MMHG | SYSTOLIC BLOOD PRESSURE: 118 MMHG | TEMPERATURE: 99.6 F | BODY MASS INDEX: 44.2 KG/M2 | OXYGEN SATURATION: 96 %

## 2018-08-27 DIAGNOSIS — E78.2 MIXED HYPERLIPIDEMIA: ICD-10-CM

## 2018-08-27 DIAGNOSIS — Z23 NEED FOR TUBERCULOSIS VACCINATION: Primary | ICD-10-CM

## 2018-08-27 DIAGNOSIS — Q90.9 TRISOMY 21, DOWN SYNDROME: ICD-10-CM

## 2018-08-27 DIAGNOSIS — R21 RASH: Primary | ICD-10-CM

## 2018-08-27 PROCEDURE — 86580 TB INTRADERMAL TEST: CPT

## 2018-08-27 PROCEDURE — 99213 OFFICE O/P EST LOW 20 MIN: CPT | Performed by: NURSE PRACTITIONER

## 2018-08-27 RX ORDER — TRIAMCINOLONE ACETONIDE 1 MG/G
CREAM TOPICAL 2 TIMES DAILY
Qty: 30 G | Refills: 0 | Status: SHIPPED | OUTPATIENT
Start: 2018-08-27 | End: 2019-03-19 | Stop reason: ALTCHOICE

## 2018-08-27 NOTE — PROGRESS NOTES
Assessment/Plan     Healthy female exam     1  Healthcare Maintenance    Patient Counseling:  --Nutrition: Stressed importance of moderation in sodium/caffeine intake, saturated fat and cholesterol, caloric balance, sufficient intake of fresh fruits, vegetables, fiber, calcium, iron, and 1 mg of folate supplement per day (for females capable of pregnancy)  --Exercise: Stressed the importance of regular exercise  --Injury prevention: Discussed safety belts, safety helmets, smoke detector, smoking near bedding or upholstery  --Dental health: Discussed importance of regular tooth brushing, flossing, and dental visits  --Immunizations reviewed  Discussed the patient's BMI with her caregiver  The BMI is above average; BMI management plan is completed  Follow up in one year  Subjective     Norma Navas is a 37 y o  female and is here for a comprehensive physical exam  She is here with her caregiver as she is disabled  Caregiver reports no changes in her health  She did gain weight  They have forms to be filled out  The following portions of the patient's history were reviewed and updated as appropriate: allergies, current medications, past family history, past medical history, past social history, past surgical history and problem list     Review of Systems  Do you have pain that bothers you in your daily life? no  ROS: negative  Family History   Problem Relation Age of Onset    Family history unknown: Yes     Past Medical History:   Diagnosis Date    Community acquired pneumonia     Last Assessed:1/22/2013    Leukopenia     Last Assessed:3/5/2014    Osteoporosis     Trisomy 21, Down syndrome      Social History     Social History    Marital status: Single     Spouse name: N/A    Number of children: N/A    Years of education: N/A     Occupational History    Not on file       Social History Main Topics    Smoking status: Never Smoker    Smokeless tobacco: Never Used    Alcohol use No    Drug use: Unknown    Sexual activity: Not on file     Other Topics Concern    Not on file     Social History Narrative    No narrative on file       Current Outpatient Prescriptions:     acetaminophen (TYLENOL) 500 mg tablet, Take 1 tablet by mouth every 8 (eight) hours as needed, Disp: , Rfl:     atorvastatin (LIPITOR) 10 mg tablet, Take 1 tablet by mouth, Disp: , Rfl:     benzonatate (TESSALON) 200 MG capsule, Take 1 capsule by mouth every 6 (six) hours, Disp: , Rfl:     Cholecalciferol 1000 units CHEW, Chew 1 tablet, Disp: , Rfl:     Dextromethorphan-Guaifenesin  MG CAPS, Take by mouth, Disp: , Rfl:     fluticasone (FLONASE) 50 mcg/act nasal spray, into each nostril, Disp: , Rfl:     levothyroxine 50 mcg tablet, Take 1 tablet by mouth daily, Disp: , Rfl:     loratadine (CLARITIN) 5 mg/5 mL syrup, Take 5 mg by mouth, Disp: , Rfl:     Multiple Vitamin (MULTIVITAMIN IRON-FREE PO), Take 1 tablet by mouth, Disp: , Rfl:     nystatin (MYCOSTATIN) powder, Apply topically, Disp: , Rfl:     ondansetron (ZOFRAN-ODT) 4 mg disintegrating tablet, Take 1 tablet by mouth every 8 (eight) hours as needed, Disp: , Rfl:     predniSONE 20 mg tablet, Take 1 tablet (20 mg total) by mouth daily, Disp: 5 tablet, Rfl: 0    triamcinolone (KENALOG) 0 1 % cream, Apply topically Three times a day, Disp: , Rfl:   Allergies   Allergen Reactions    Other     Pollen Extract          Objective     /70 (BP Location: Left arm, Patient Position: Sitting, Cuff Size: Large)   Pulse 86   Ht 4' 7" (1 397 m)   Wt 86 6 kg (191 lb)   SpO2 92%   BMI 44 39 kg/m²     General Appearance:    Alert, cooperative, no distress, appears stated age   Head:    Normocephalic   Eyes:    PERRL, conjunctiva/corneas clear,    Ears:    Normal TM's and external ear canals, both ears   Nose:   Nares normal, septum midline, mucosa normal, no drainage    or sinus tenderness       Neck:   Supple, symmetrical, trachea midline, no adenopathy; thyroid:     Back:     Symmetric, no curvature, ROM normal, no CVA tenderness   Lungs:     Clear to auscultation bilaterally, respirations unlabored   Chest Wall:    No tenderness or deformity    Heart:    Regular rate and rhythm, S1 and S2 normal, no murmur, rub   or gallop   Breast Exam:    No tenderness, masses, or nipple abnormality   Abdomen:     Soft, non-tender, bowel sounds active all four quadrants,     no masses, no organomegaly           Extremities:   Extremities normal, atraumatic, no cyanosis or edema   Pulses:   2+ and symmetric all extremities   Skin:   Skin color, texture, turgor normal, no rashes or lesions   Lymph nodes:   Cervical, supraclavicular, and axillary nodes normal   Neurologic:   CNII-XII intact, normal strength, sensation and reflexes     throughout

## 2018-08-29 ENCOUNTER — CLINICAL SUPPORT (OUTPATIENT)
Dept: INTERNAL MEDICINE CLINIC | Facility: CLINIC | Age: 43
End: 2018-08-29
Payer: MEDICARE

## 2018-08-29 DIAGNOSIS — Z11.1 ENCOUNTER FOR PPD SKIN TEST READING: Primary | ICD-10-CM

## 2018-08-29 PROCEDURE — 86580 TB INTRADERMAL TEST: CPT

## 2018-08-29 NOTE — PROGRESS NOTES
Assessment/Plan:    No problem-specific Assessment & Plan notes found for this encounter  Diagnoses and all orders for this visit:    Rash  -     triamcinolone (KENALOG) 0 1 % cream; Apply topically 2 (two) times a day  -     Comprehensive metabolic panel; Future  -     CBC and differential; Future  -     Lipid panel; Future    Trisomy 21, Down syndrome  -     Comprehensive metabolic panel; Future  -     CBC and differential; Future  -     Lipid panel; Future    Mixed hyperlipidemia  -     Comprehensive metabolic panel; Future  -     CBC and differential; Future  -     Lipid panel; Future          Subjective:      Patient ID: Antionette Calles is a 37 y o  female  Pt co rash of lower legs today  No itch        The following portions of the patient's history were reviewed and updated as appropriate: allergies, current medications, past family history, past medical history, past social history, past surgical history and problem list     Review of Systems    Family History   Problem Relation Age of Onset    Family history unknown: Yes     Past Medical History:   Diagnosis Date    Community acquired pneumonia     Last Assessed:1/22/2013    Leukopenia     Last Assessed:3/5/2014    Osteoporosis     Trisomy 21, Down syndrome      Social History     Social History    Marital status: Single     Spouse name: N/A    Number of children: N/A    Years of education: N/A     Occupational History    Not on file       Social History Main Topics    Smoking status: Never Smoker    Smokeless tobacco: Never Used    Alcohol use No    Drug use: Unknown    Sexual activity: Not on file     Other Topics Concern    Not on file     Social History Narrative    No narrative on file       Current Outpatient Prescriptions:     acetaminophen (TYLENOL) 500 mg tablet, Take 1 tablet by mouth every 8 (eight) hours as needed, Disp: , Rfl:     atorvastatin (LIPITOR) 10 mg tablet, Take 1 tablet by mouth, Disp: , Rfl:    benzonatate (TESSALON) 200 MG capsule, Take 1 capsule by mouth every 6 (six) hours, Disp: , Rfl:     Cholecalciferol 1000 units CHEW, Chew 1 tablet, Disp: , Rfl:     Dextromethorphan-Guaifenesin  MG CAPS, Take by mouth, Disp: , Rfl:     fluticasone (FLONASE) 50 mcg/act nasal spray, into each nostril, Disp: , Rfl:     levothyroxine 50 mcg tablet, Take 1 tablet by mouth daily, Disp: , Rfl:     loratadine (CLARITIN) 5 mg/5 mL syrup, Take 5 mg by mouth, Disp: , Rfl:     Multiple Vitamin (MULTIVITAMIN IRON-FREE PO), Take 1 tablet by mouth, Disp: , Rfl:     nystatin (MYCOSTATIN) powder, Apply topically, Disp: , Rfl:     ondansetron (ZOFRAN-ODT) 4 mg disintegrating tablet, Take 1 tablet by mouth every 8 (eight) hours as needed, Disp: , Rfl:     predniSONE 20 mg tablet, Take 1 tablet (20 mg total) by mouth daily, Disp: 5 tablet, Rfl: 0    triamcinolone (KENALOG) 0 1 % cream, Apply topically 2 (two) times a day, Disp: 30 g, Rfl: 0  Allergies   Allergen Reactions    Other     Pollen Extract          Objective:      /80   Pulse 91   Temp 99 6 °F (37 6 °C)   Ht 4' 7" (1 397 m)   Wt 86 6 kg (191 lb)   SpO2 96%   BMI 44 39 kg/m²          Physical Exam   Skin: Rash noted

## 2018-09-02 LAB
ALBUMIN SERPL-MCNC: 3.4 G/DL (ref 3.6–5.1)
ALBUMIN/GLOB SERPL: 0.9 (CALC) (ref 1–2.5)
ALP SERPL-CCNC: 86 U/L (ref 33–115)
ALT SERPL-CCNC: 19 U/L (ref 6–29)
AST SERPL-CCNC: 23 U/L (ref 10–35)
BASOPHILS # BLD AUTO: 70 CELLS/UL (ref 0–200)
BASOPHILS NFR BLD AUTO: 1.6 %
BILIRUB SERPL-MCNC: 0.6 MG/DL (ref 0.2–1.2)
BUN SERPL-MCNC: 12 MG/DL (ref 7–25)
BUN/CREAT SERPL: ABNORMAL (CALC) (ref 6–22)
CALCIUM SERPL-MCNC: 9 MG/DL (ref 8.6–10.2)
CHLORIDE SERPL-SCNC: 101 MMOL/L (ref 98–110)
CHOLEST SERPL-MCNC: 169 MG/DL
CHOLEST/HDLC SERPL: 3.2 (CALC)
CO2 SERPL-SCNC: 29 MMOL/L (ref 20–32)
CREAT SERPL-MCNC: 0.79 MG/DL (ref 0.5–1.1)
EOSINOPHIL # BLD AUTO: 22 CELLS/UL (ref 15–500)
EOSINOPHIL NFR BLD AUTO: 0.5 %
ERYTHROCYTE [DISTWIDTH] IN BLOOD BY AUTOMATED COUNT: 12.7 % (ref 11–15)
GLOBULIN SER CALC-MCNC: 3.6 G/DL (CALC) (ref 1.9–3.7)
GLUCOSE SERPL-MCNC: 110 MG/DL (ref 65–99)
HCT VFR BLD AUTO: 45.1 % (ref 35–45)
HDLC SERPL-MCNC: 53 MG/DL
HGB BLD-MCNC: 15.5 G/DL (ref 11.7–15.5)
LDLC SERPL CALC-MCNC: 94 MG/DL (CALC)
LYMPHOCYTES # BLD AUTO: 1320 CELLS/UL (ref 850–3900)
LYMPHOCYTES NFR BLD AUTO: 30 %
MCH RBC QN AUTO: 35.1 PG (ref 27–33)
MCHC RBC AUTO-ENTMCNC: 34.4 G/DL (ref 32–36)
MCV RBC AUTO: 102 FL (ref 80–100)
MONOCYTES # BLD AUTO: 427 CELLS/UL (ref 200–950)
MONOCYTES NFR BLD AUTO: 9.7 %
NEUTROPHILS # BLD AUTO: 2561 CELLS/UL (ref 1500–7800)
NEUTROPHILS NFR BLD AUTO: 58.2 %
NONHDLC SERPL-MCNC: 116 MG/DL (CALC)
PLATELET # BLD AUTO: 212 THOUSAND/UL (ref 140–400)
PMV BLD REES-ECKER: 10.4 FL (ref 7.5–12.5)
POTASSIUM SERPL-SCNC: 4.7 MMOL/L (ref 3.5–5.3)
PROT SERPL-MCNC: 7 G/DL (ref 6.1–8.1)
RBC # BLD AUTO: 4.42 MILLION/UL (ref 3.8–5.1)
SL AMB EGFR AFRICAN AMERICAN: 105 ML/MIN/1.73M2
SL AMB EGFR NON AFRICAN AMERICAN: 90 ML/MIN/1.73M2
SODIUM SERPL-SCNC: 138 MMOL/L (ref 135–146)
TRIGL SERPL-MCNC: 119 MG/DL
WBC # BLD AUTO: 4.4 THOUSAND/UL (ref 3.8–10.8)

## 2018-11-13 ENCOUNTER — IMMUNIZATION (OUTPATIENT)
Dept: INTERNAL MEDICINE CLINIC | Facility: CLINIC | Age: 43
End: 2018-11-13
Payer: MEDICARE

## 2018-11-13 DIAGNOSIS — Z23 NEED FOR INFLUENZA VACCINATION: Primary | ICD-10-CM

## 2018-11-13 PROCEDURE — G0008 ADMIN INFLUENZA VIRUS VAC: HCPCS

## 2018-11-13 PROCEDURE — 90682 RIV4 VACC RECOMBINANT DNA IM: CPT

## 2019-01-17 ENCOUNTER — OFFICE VISIT (OUTPATIENT)
Dept: INTERNAL MEDICINE CLINIC | Facility: CLINIC | Age: 44
End: 2019-01-17
Payer: MEDICARE

## 2019-01-17 VITALS
WEIGHT: 202.6 LBS | TEMPERATURE: 98.1 F | BODY MASS INDEX: 46.89 KG/M2 | SYSTOLIC BLOOD PRESSURE: 114 MMHG | DIASTOLIC BLOOD PRESSURE: 76 MMHG | HEIGHT: 55 IN

## 2019-01-17 DIAGNOSIS — R21 RASH: Primary | ICD-10-CM

## 2019-01-17 PROCEDURE — 99213 OFFICE O/P EST LOW 20 MIN: CPT | Performed by: NURSE PRACTITIONER

## 2019-01-17 RX ORDER — AMMONIUM LACTATE 12 G/100G
LOTION TOPICAL 2 TIMES DAILY PRN
Qty: 400 G | Refills: 0 | Status: SHIPPED | OUTPATIENT
Start: 2019-01-17 | End: 2019-02-13 | Stop reason: SDUPTHER

## 2019-01-17 RX ORDER — FENOPROFEN CALCIUM 200 MG
CAPSULE ORAL 2 TIMES DAILY
Qty: 118 ML | Refills: 0 | Status: SHIPPED | OUTPATIENT
Start: 2019-01-17 | End: 2020-09-03 | Stop reason: ALTCHOICE

## 2019-01-17 NOTE — PROGRESS NOTES
Assessment/Plan:     Diagnoses and all orders for this visit:    Rash  -     ammonium lactate (LAC-HYDRIN) 12 % lotion; Apply topically 2 (two) times a day as needed for dry skin  -     hydrocortisone 1 % lotion; Apply topically 2 (two) times a day          Subjective:      Patient ID: Radha Trujillo is a 53 y o  female  Three days of red irritate rash around her mouth and hands  Appears very dry    Patient has down syndrome so questions answered by care provider        The following portions of the patient's history were reviewed and updated as appropriate: allergies, current medications, past family history, past medical history, past social history, past surgical history and problem list     Review of Systems   Constitutional: Negative  HENT: Negative  Eyes: Negative  Respiratory: Negative  Cardiovascular: Negative  Gastrointestinal: Negative  Musculoskeletal: Negative  Skin: Positive for rash  Neurological: Negative  Objective:      /76   Temp 98 1 °F (36 7 °C)   Ht 4' 7" (1 397 m)   Wt 91 9 kg (202 lb 9 6 oz)   BMI 47 09 kg/m²          Physical Exam   Constitutional: She is oriented to person, place, and time  She appears well-developed and well-nourished  HENT:   Head: Normocephalic and atraumatic  Eyes: Pupils are equal, round, and reactive to light  Conjunctivae are normal    Neck: Normal range of motion  Neck supple  Cardiovascular: Normal rate and regular rhythm  Pulmonary/Chest: Effort normal and breath sounds normal    Abdominal: Soft  Bowel sounds are normal    Musculoskeletal: Normal range of motion  Neurological: She is alert and oriented to person, place, and time  Skin: Skin is warm and dry

## 2019-02-13 ENCOUNTER — TELEPHONE (OUTPATIENT)
Dept: INTERNAL MEDICINE CLINIC | Facility: CLINIC | Age: 44
End: 2019-02-13

## 2019-02-13 DIAGNOSIS — R21 RASH: ICD-10-CM

## 2019-02-13 RX ORDER — AMMONIUM LACTATE 12 G/100G
LOTION TOPICAL 2 TIMES DAILY
Qty: 400 G | Refills: 1 | Status: SHIPPED | OUTPATIENT
Start: 2019-02-13 | End: 2022-06-30 | Stop reason: SDUPTHER

## 2019-02-13 NOTE — TELEPHONE ENCOUNTER
This is one of Gena's patients  Maral Phillips from [facility] services is requesting a refill of the patient's Ammonium lactate  The patient has very dry skin on her hands and her face  The currant script is for PRN  Maral Phillips is asking that the order be made for daily usage  Please advise  Thank you  Please call Maral Phillips at [phone] to let her know the script was called in to [facility]

## 2019-02-19 ENCOUNTER — OFFICE VISIT (OUTPATIENT)
Dept: INTERNAL MEDICINE CLINIC | Facility: CLINIC | Age: 44
End: 2019-02-19
Payer: MEDICARE

## 2019-02-19 VITALS
BODY MASS INDEX: 45.96 KG/M2 | TEMPERATURE: 98.6 F | WEIGHT: 198.6 LBS | HEIGHT: 55 IN | HEART RATE: 82 BPM | OXYGEN SATURATION: 97 % | SYSTOLIC BLOOD PRESSURE: 102 MMHG | DIASTOLIC BLOOD PRESSURE: 64 MMHG

## 2019-02-19 DIAGNOSIS — J06.9 UPPER RESPIRATORY TRACT INFECTION, UNSPECIFIED TYPE: Primary | ICD-10-CM

## 2019-02-19 PROCEDURE — 99213 OFFICE O/P EST LOW 20 MIN: CPT | Performed by: NURSE PRACTITIONER

## 2019-02-19 RX ORDER — AZITHROMYCIN 250 MG/1
TABLET, FILM COATED ORAL
Qty: 6 TABLET | Refills: 0 | Status: SHIPPED | OUTPATIENT
Start: 2019-02-19 | End: 2019-02-23

## 2019-02-19 NOTE — PROGRESS NOTES
Assessment/Plan:     Diagnoses and all orders for this visit:    Upper respiratory tract infection, unspecified type  -     azithromycin (ZITHROMAX) 250 mg tablet; Take 2 tablets today then 1 tablet daily x 4 days  -     guaiFENesin (ROBITUSSIN) 100 MG/5ML oral liquid; Take 10 mL (200 mg total) by mouth every 4 (four) hours as needed for cough        Drink plenty of fluids and rest  May take over the counter mucinex and cough syrup/cough drops  Call if worsening  Subjective:      Patient ID: Murray Rondon is a 53 y o  female  Patient co cold symptoms for 2 days  Frequent cough and runny nose  Fatigue  No fever or chills    She is a special needs patient and info is gathered by her caregiver      The following portions of the patient's history were reviewed and updated as appropriate: allergies, current medications, past family history, past medical history, past social history, past surgical history and problem list     Review of Systems   Constitutional: Positive for fatigue  HENT: Positive for postnasal drip and rhinorrhea  Eyes: Negative  Respiratory: Positive for cough  Cardiovascular: Negative  Gastrointestinal: Negative  Musculoskeletal: Negative  Neurological: Negative  Objective:      /64   Pulse 82   Temp 98 6 °F (37 °C)   Ht 4' 7" (1 397 m)   Wt 90 1 kg (198 lb 9 6 oz)   SpO2 97%   BMI 46 16 kg/m²          Physical Exam   Constitutional: She is oriented to person, place, and time  She appears well-developed and well-nourished  HENT:   Head: Normocephalic and atraumatic  Eyes: Pupils are equal, round, and reactive to light  Conjunctivae are normal    Neck: Normal range of motion  Neck supple  Cardiovascular: Normal rate and regular rhythm  Pulmonary/Chest: Effort normal and breath sounds normal    Abdominal: Soft  Bowel sounds are normal    Musculoskeletal: Normal range of motion     Neurological: She is alert and oriented to person, place, and time    Skin: Skin is warm and dry

## 2019-02-25 ENCOUNTER — TELEPHONE (OUTPATIENT)
Dept: INTERNAL MEDICINE CLINIC | Facility: CLINIC | Age: 44
End: 2019-02-25

## 2019-02-25 DIAGNOSIS — R05.9 COUGH: Primary | ICD-10-CM

## 2019-02-25 RX ORDER — BENZONATATE 200 MG/1
200 CAPSULE ORAL 3 TIMES DAILY PRN
Qty: 21 CAPSULE | Refills: 0 | Status: SHIPPED | OUTPATIENT
Start: 2019-02-25 | End: 2019-03-19 | Stop reason: ALTCHOICE

## 2019-02-25 NOTE — TELEPHONE ENCOUNTER
Hanna Aviles, pt's caretaker, said that the pt saw you on 2/19 but her cough is not better  She finished the cough medicine   What do you suggest?

## 2019-03-19 ENCOUNTER — APPOINTMENT (OUTPATIENT)
Dept: RADIOLOGY | Age: 44
End: 2019-03-19
Payer: MEDICARE

## 2019-03-19 ENCOUNTER — OFFICE VISIT (OUTPATIENT)
Dept: INTERNAL MEDICINE CLINIC | Facility: CLINIC | Age: 44
End: 2019-03-19
Payer: MEDICARE

## 2019-03-19 VITALS
HEIGHT: 55 IN | WEIGHT: 194.4 LBS | DIASTOLIC BLOOD PRESSURE: 78 MMHG | RESPIRATION RATE: 16 BRPM | TEMPERATURE: 98.8 F | SYSTOLIC BLOOD PRESSURE: 118 MMHG | HEART RATE: 77 BPM | BODY MASS INDEX: 44.99 KG/M2 | OXYGEN SATURATION: 97 %

## 2019-03-19 DIAGNOSIS — M25.562 ACUTE PAIN OF LEFT KNEE: ICD-10-CM

## 2019-03-19 DIAGNOSIS — M25.562 ACUTE PAIN OF LEFT KNEE: Primary | ICD-10-CM

## 2019-03-19 PROCEDURE — 73564 X-RAY EXAM KNEE 4 OR MORE: CPT

## 2019-03-19 PROCEDURE — 99213 OFFICE O/P EST LOW 20 MIN: CPT | Performed by: NURSE PRACTITIONER

## 2019-03-19 RX ORDER — IBUPROFEN 400 MG/1
400 TABLET ORAL 2 TIMES DAILY PRN
Qty: 10 TABLET | Refills: 0 | Status: SHIPPED | OUTPATIENT
Start: 2019-03-19 | End: 2019-04-03 | Stop reason: SDUPTHER

## 2019-03-19 NOTE — PROGRESS NOTES
Assessment/Plan:    Acute pain of left knee  Start nsaid for five days with food  Xray of knee       Diagnoses and all orders for this visit:    Acute pain of left knee  -     XR knee 4+ vw left injury; Future  -     ibuprofen (MOTRIN) 400 mg tablet; Take 1 tablet (400 mg total) by mouth 2 (two) times a day as needed for mild pain    Other orders  -     Discontinue: Cholecalciferol 1000 units tablet; Take 1,000 Units by mouth          Subjective:      Patient ID: Judge Mc is a 53 y o  female  Patient is here for left knee pain on and off for a week  Worse with walking  She has down's syndrome so she is a poor historian  Caregiver says she may have fallen but nobody saw the incident    No bruising or cuts on the leg      The following portions of the patient's history were reviewed and updated as appropriate: allergies, current medications, past family history, past medical history, past social history, past surgical history and problem list     Review of Systems   Constitutional: Negative  HENT: Negative  Eyes: Negative  Respiratory: Negative  Cardiovascular: Negative  Gastrointestinal: Negative  Musculoskeletal: Positive for arthralgias  Neurological: Negative  Objective:      /78   Pulse 77   Temp 98 8 °F (37 1 °C) (Oral)   Resp 16   Ht 4' 7" (1 397 m)   Wt 88 2 kg (194 lb 6 4 oz)   SpO2 97%   BMI 45 18 kg/m²          Physical Exam   Constitutional: She is oriented to person, place, and time  She appears well-developed and well-nourished  HENT:   Head: Normocephalic and atraumatic  Eyes: Pupils are equal, round, and reactive to light  Conjunctivae are normal    Neck: Normal range of motion  Neck supple  Cardiovascular: Normal rate and regular rhythm  Pulmonary/Chest: Effort normal and breath sounds normal    Abdominal: Soft  Bowel sounds are normal    Musculoskeletal: Normal range of motion          Left knee: Normal    Neurological: She is alert and oriented to person, place, and time  Skin: Skin is warm and dry

## 2019-03-20 DIAGNOSIS — M25.562 ACUTE PAIN OF LEFT KNEE: Primary | ICD-10-CM

## 2019-03-21 ENCOUNTER — CONSULT (OUTPATIENT)
Dept: OBGYN CLINIC | Facility: OTHER | Age: 44
End: 2019-03-21
Payer: MEDICARE

## 2019-03-21 ENCOUNTER — APPOINTMENT (OUTPATIENT)
Dept: RADIOLOGY | Facility: OTHER | Age: 44
End: 2019-03-21
Payer: MEDICARE

## 2019-03-21 ENCOUNTER — HOSPITAL ENCOUNTER (OUTPATIENT)
Dept: RADIOLOGY | Age: 44
Discharge: HOME | End: 2019-03-21
Payer: MEDICARE

## 2019-03-21 VITALS — WEIGHT: 198 LBS | HEIGHT: 55 IN | BODY MASS INDEX: 45.82 KG/M2

## 2019-03-21 DIAGNOSIS — M25.562 ACUTE PAIN OF LEFT KNEE: Primary | ICD-10-CM

## 2019-03-21 DIAGNOSIS — M25.552 PAIN IN LEFT HIP: ICD-10-CM

## 2019-03-21 DIAGNOSIS — M25.562 ACUTE PAIN OF LEFT KNEE: ICD-10-CM

## 2019-03-21 PROCEDURE — 73502 X-RAY EXAM HIP UNI 2-3 VIEWS: CPT

## 2019-03-21 PROCEDURE — 73700 CT LOWER EXTREMITY W/O DYE: CPT

## 2019-03-21 PROCEDURE — 99204 OFFICE O/P NEW MOD 45 MIN: CPT | Performed by: FAMILY MEDICINE

## 2019-03-21 RX ORDER — BENZONATATE 200 MG/1
200 CAPSULE ORAL 3 TIMES DAILY PRN
COMMUNITY
End: 2022-07-19 | Stop reason: SDUPTHER

## 2019-03-21 NOTE — PROGRESS NOTES
1  Acute pain of left knee  Ambulatory referral to Orthopedic Surgery    CT knee left wo contrast    CANCELED: CT knee left wo contrast   2  Pain in left hip  XR hip/pelv 2-3 vws left if performed     Orders Placed This Encounter   Procedures    XR hip/pelv 2-3 vws left if performed    CT knee left wo contrast        Imaging Studies (I personally reviewed images in PACS and report):  X-ray left knee 03/19/2019:   Soft tissue swelling pretibial   No evidence of fracture  X-ray AP pelvis left hip 03/21/2019:  No acute osseous abnormality  Mild osteoarthritis  Evidence of pincer lesion    IMPRESSION:  Left knee pain  Poor historian secondary to history of Down syndrome  Questionable traumatic event    Differential diagnosis:  Occult fracture  Soft tissue vs bone contusion  Patellofemoral pain syndrome      Return for Follow-up after CT scan  Patient Instructions   Explained caregiver that since there is a questionable history of traumatic event I recommended stat CT scan today to be performed to evaluate for occult or hidden fracture  CHIEF COMPLAINT:  Consultation Left knee pain    HPI:  Lisa Espana is a 53 y o  female  who presents for        Visit 03/21/2019:  Consultation ordered by Sneha Iglesias left knee pain intermittent for 2 weeks  Patient points to left knee diffusely as source of intermittent pain exacerbated by walking  She does have history of Down syndrome and is a poor historian  She was recently seen by her primary care physician office had x-ray ordered which showed no acute osseous abnormality  She presents today with her caregiver who confirms her history  Review of Systems   Constitutional: Negative for chills, fever and unexpected weight change  HENT: Negative for hearing loss, nosebleeds and sore throat  Eyes: Negative for pain, redness and visual disturbance  Respiratory: Negative for cough, shortness of breath and wheezing      Cardiovascular: Negative for chest pain, palpitations and leg swelling  Gastrointestinal: Negative for abdominal distention, nausea and vomiting  Endocrine: Negative for polydipsia and polyuria  Genitourinary: Negative for dysuria and hematuria  Skin: Negative for rash and wound  Neurological: Negative for dizziness, numbness and headaches  Psychiatric/Behavioral: Negative for decreased concentration and suicidal ideas  Following history reviewed and update:    Past Medical History:   Diagnosis Date    Community acquired pneumonia     Last Assessed:1/22/2013    Leukopenia     Last Assessed:3/5/2014    Osteoporosis     Trisomy 21, Down syndrome      Past Surgical History:   Procedure Laterality Date    TONSILLECTOMY AND ADENOIDECTOMY       Social History   Social History     Substance and Sexual Activity   Alcohol Use No     Social History     Substance and Sexual Activity   Drug Use Not on file     Social History     Tobacco Use   Smoking Status Never Smoker   Smokeless Tobacco Never Used     Family History   Family history unknown: Yes     Allergies   Allergen Reactions    Other     Pollen Extract           Physical Exam  Ht 4' 7" (1 397 m)   Wt 89 8 kg (198 lb) Comment: verbal by caregiver, scale is broken  BMI 46 02 kg/m²     Constitutional:  see vital signs  Gen: well-developed, normocephalic/atraumatic, well-groomed  Eyes: No inflammation or discharge of conjunctiva or lids; sclera clear   Pharynx: no inflammation, lesion, or mass of lips  Neck: supple, no masses, non-distended  MSK: no inflammation, lesion, mass, or clubbing of nails and digits except for other than mentioned below  SKIN: no visible rashes or skin lesions  Pulmonary/Chest: Effort normal  No respiratory distress     NEURO: cranial nerves grossly intact  PSYCH:  Alert; mood normal, no depression, anxiety, or agitation    Ortho Exam    LEFT KNEE:  Erythema: no  Swelling: no  Increased Warmth: no  Tenderness: + diffuse tenderness including bony tenderness medial and lateral tibial plateau  Flexion: intact  Extension: intact  Lachman's: negative  Drawer: negative  Varus laxity: negative  Valgus laxity: negative  Ramon: negative     Left hip exam:  Logroll:  Negative  FROILAN:  Negative  Fadir:  Negative    Procedures

## 2019-03-21 NOTE — PATIENT INSTRUCTIONS
Explained caregiver that since there is a questionable history of traumatic event I recommended stat CT scan today to be performed to evaluate for occult or hidden fracture

## 2019-04-01 ENCOUNTER — TELEPHONE (OUTPATIENT)
Dept: INTERNAL MEDICINE CLINIC | Facility: CLINIC | Age: 44
End: 2019-04-01

## 2019-04-03 ENCOUNTER — OFFICE VISIT (OUTPATIENT)
Dept: INTERNAL MEDICINE CLINIC | Facility: CLINIC | Age: 44
End: 2019-04-03
Payer: MEDICARE

## 2019-04-03 VITALS
HEIGHT: 55 IN | HEART RATE: 85 BPM | DIASTOLIC BLOOD PRESSURE: 64 MMHG | RESPIRATION RATE: 18 BRPM | OXYGEN SATURATION: 99 % | SYSTOLIC BLOOD PRESSURE: 118 MMHG | WEIGHT: 196.4 LBS | BODY MASS INDEX: 45.45 KG/M2 | TEMPERATURE: 98.4 F

## 2019-04-03 DIAGNOSIS — M25.562 ACUTE PAIN OF LEFT KNEE: ICD-10-CM

## 2019-04-03 PROCEDURE — 99214 OFFICE O/P EST MOD 30 MIN: CPT | Performed by: NURSE PRACTITIONER

## 2019-04-03 RX ORDER — IBUPROFEN 400 MG/1
400 TABLET ORAL 2 TIMES DAILY
Qty: 14 TABLET | Refills: 0 | Status: SHIPPED | OUTPATIENT
Start: 2019-04-03 | End: 2019-06-24 | Stop reason: SDUPTHER

## 2019-04-04 ENCOUNTER — OFFICE VISIT (OUTPATIENT)
Dept: OBGYN CLINIC | Facility: OTHER | Age: 44
End: 2019-04-04
Payer: MEDICARE

## 2019-04-04 VITALS
HEIGHT: 55 IN | SYSTOLIC BLOOD PRESSURE: 119 MMHG | HEART RATE: 73 BPM | BODY MASS INDEX: 46.05 KG/M2 | DIASTOLIC BLOOD PRESSURE: 83 MMHG | WEIGHT: 199 LBS

## 2019-04-04 DIAGNOSIS — S83.002A PATELLAR SUBLUXATION, LEFT, INITIAL ENCOUNTER: ICD-10-CM

## 2019-04-04 DIAGNOSIS — M22.2X2 PATELLOFEMORAL PAIN SYNDROME OF LEFT KNEE: Primary | ICD-10-CM

## 2019-04-04 DIAGNOSIS — M22.42 CHONDROMALACIA, PATELLA, LEFT: ICD-10-CM

## 2019-04-04 PROCEDURE — 99214 OFFICE O/P EST MOD 30 MIN: CPT | Performed by: FAMILY MEDICINE

## 2019-04-10 ENCOUNTER — EVALUATION (OUTPATIENT)
Dept: PHYSICAL THERAPY | Age: 44
End: 2019-04-10
Payer: MEDICARE

## 2019-04-10 DIAGNOSIS — M22.2X2 PATELLOFEMORAL PAIN SYNDROME OF LEFT KNEE: ICD-10-CM

## 2019-04-10 DIAGNOSIS — S83.002A PATELLAR SUBLUXATION, LEFT, INITIAL ENCOUNTER: ICD-10-CM

## 2019-04-10 DIAGNOSIS — M22.42 CHONDROMALACIA, PATELLA, LEFT: ICD-10-CM

## 2019-04-10 PROCEDURE — 97162 PT EVAL MOD COMPLEX 30 MIN: CPT | Performed by: PHYSICAL THERAPIST

## 2019-04-15 ENCOUNTER — OFFICE VISIT (OUTPATIENT)
Dept: PHYSICAL THERAPY | Age: 44
End: 2019-04-15
Payer: MEDICARE

## 2019-04-15 DIAGNOSIS — M22.2X2 PATELLOFEMORAL PAIN SYNDROME OF LEFT KNEE: Primary | ICD-10-CM

## 2019-04-15 DIAGNOSIS — S83.002A PATELLAR SUBLUXATION, LEFT, INITIAL ENCOUNTER: ICD-10-CM

## 2019-04-15 DIAGNOSIS — M22.42 CHONDROMALACIA, PATELLA, LEFT: ICD-10-CM

## 2019-04-15 PROCEDURE — 97140 MANUAL THERAPY 1/> REGIONS: CPT | Performed by: PHYSICAL THERAPIST

## 2019-04-15 PROCEDURE — 97110 THERAPEUTIC EXERCISES: CPT | Performed by: PHYSICAL THERAPIST

## 2019-04-18 ENCOUNTER — OFFICE VISIT (OUTPATIENT)
Dept: PHYSICAL THERAPY | Age: 44
End: 2019-04-18
Payer: MEDICARE

## 2019-04-18 DIAGNOSIS — S83.002A PATELLAR SUBLUXATION, LEFT, INITIAL ENCOUNTER: ICD-10-CM

## 2019-04-18 DIAGNOSIS — M22.42 CHONDROMALACIA, PATELLA, LEFT: ICD-10-CM

## 2019-04-18 DIAGNOSIS — M22.2X2 PATELLOFEMORAL PAIN SYNDROME OF LEFT KNEE: Primary | ICD-10-CM

## 2019-04-18 PROCEDURE — 97140 MANUAL THERAPY 1/> REGIONS: CPT | Performed by: PHYSICAL THERAPIST

## 2019-04-18 PROCEDURE — 97110 THERAPEUTIC EXERCISES: CPT | Performed by: PHYSICAL THERAPIST

## 2019-04-22 ENCOUNTER — OFFICE VISIT (OUTPATIENT)
Dept: PHYSICAL THERAPY | Age: 44
End: 2019-04-22
Payer: MEDICARE

## 2019-04-22 DIAGNOSIS — S83.002A PATELLAR SUBLUXATION, LEFT, INITIAL ENCOUNTER: ICD-10-CM

## 2019-04-22 DIAGNOSIS — M22.2X2 PATELLOFEMORAL PAIN SYNDROME OF LEFT KNEE: Primary | ICD-10-CM

## 2019-04-22 DIAGNOSIS — M22.42 CHONDROMALACIA, PATELLA, LEFT: ICD-10-CM

## 2019-04-22 PROCEDURE — 97140 MANUAL THERAPY 1/> REGIONS: CPT | Performed by: PHYSICAL THERAPIST

## 2019-04-22 PROCEDURE — 97110 THERAPEUTIC EXERCISES: CPT | Performed by: PHYSICAL THERAPIST

## 2019-04-24 ENCOUNTER — OFFICE VISIT (OUTPATIENT)
Dept: PHYSICAL THERAPY | Age: 44
End: 2019-04-24
Payer: MEDICARE

## 2019-04-24 DIAGNOSIS — M22.2X2 PATELLOFEMORAL PAIN SYNDROME OF LEFT KNEE: Primary | ICD-10-CM

## 2019-04-24 PROCEDURE — 97110 THERAPEUTIC EXERCISES: CPT

## 2019-04-29 ENCOUNTER — OFFICE VISIT (OUTPATIENT)
Dept: PHYSICAL THERAPY | Age: 44
End: 2019-04-29
Payer: MEDICARE

## 2019-04-29 DIAGNOSIS — S83.002A PATELLAR SUBLUXATION, LEFT, INITIAL ENCOUNTER: ICD-10-CM

## 2019-04-29 DIAGNOSIS — M22.42 CHONDROMALACIA, PATELLA, LEFT: ICD-10-CM

## 2019-04-29 DIAGNOSIS — M22.2X2 PATELLOFEMORAL PAIN SYNDROME OF LEFT KNEE: Primary | ICD-10-CM

## 2019-04-29 PROCEDURE — 97110 THERAPEUTIC EXERCISES: CPT | Performed by: PHYSICAL THERAPIST

## 2019-04-29 PROCEDURE — 97140 MANUAL THERAPY 1/> REGIONS: CPT | Performed by: PHYSICAL THERAPIST

## 2019-06-24 ENCOUNTER — OFFICE VISIT (OUTPATIENT)
Dept: INTERNAL MEDICINE CLINIC | Facility: CLINIC | Age: 44
End: 2019-06-24
Payer: MEDICARE

## 2019-06-24 VITALS
HEIGHT: 55 IN | DIASTOLIC BLOOD PRESSURE: 70 MMHG | TEMPERATURE: 98.3 F | BODY MASS INDEX: 44.81 KG/M2 | OXYGEN SATURATION: 96 % | WEIGHT: 193.6 LBS | SYSTOLIC BLOOD PRESSURE: 120 MMHG | HEART RATE: 91 BPM

## 2019-06-24 DIAGNOSIS — M54.2 NECK PAIN: Primary | ICD-10-CM

## 2019-06-24 PROCEDURE — 99213 OFFICE O/P EST LOW 20 MIN: CPT | Performed by: NURSE PRACTITIONER

## 2019-06-24 RX ORDER — IBUPROFEN 400 MG/1
400 TABLET ORAL EVERY 8 HOURS PRN
Qty: 14 TABLET | Refills: 0 | Status: SHIPPED | OUTPATIENT
Start: 2019-06-24 | End: 2022-05-25 | Stop reason: SDUPTHER

## 2019-06-27 PROBLEM — M54.2 NECK PAIN: Status: ACTIVE | Noted: 2019-06-27

## 2019-08-28 ENCOUNTER — OFFICE VISIT (OUTPATIENT)
Dept: INTERNAL MEDICINE CLINIC | Facility: CLINIC | Age: 44
End: 2019-08-28
Payer: MEDICARE

## 2019-08-28 VITALS
DIASTOLIC BLOOD PRESSURE: 60 MMHG | SYSTOLIC BLOOD PRESSURE: 110 MMHG | RESPIRATION RATE: 18 BRPM | HEART RATE: 80 BPM | WEIGHT: 199 LBS | BODY MASS INDEX: 46.05 KG/M2 | HEIGHT: 55 IN

## 2019-08-28 DIAGNOSIS — H61.23 BILATERAL IMPACTED CERUMEN: Primary | ICD-10-CM

## 2019-08-28 DIAGNOSIS — Z00.00 MEDICARE ANNUAL WELLNESS VISIT, SUBSEQUENT: ICD-10-CM

## 2019-08-28 PROCEDURE — G0439 PPPS, SUBSEQ VISIT: HCPCS | Performed by: NURSE PRACTITIONER

## 2019-08-28 NOTE — PATIENT INSTRUCTIONS
Obesity   AMBULATORY CARE:   Obesity  is when your body mass index (BMI) is greater than 30  Your healthcare provider will use your height and weight to measure your BMI  The risks of obesity include  many health problems, such as injuries or physical disability  You may need tests to check for the following:  · Diabetes     · High blood pressure or high cholesterol     · Heart disease     · Gallbladder or liver disease     · Cancer of the colon, breast, prostate, liver, or kidney     · Sleep apnea     · Arthritis or gout  Seek care immediately if:   · You have a severe headache, confusion, or difficulty speaking  · You have weakness on one side of your body  · You have chest pain, sweating, or shortness of breath  Contact your healthcare provider if:   · You have symptoms of gallbladder or liver disease, such as pain in your upper abdomen  · You have knee or hip pain and discomfort while walking  · You have symptoms of diabetes, such as intense hunger and thirst, and frequent urination  · You have symptoms of sleep apnea, such as snoring or daytime sleepiness  · You have questions or concerns about your condition or care  Treatment for obesity  focuses on helping you lose weight to improve your health  Even a small decrease in BMI can reduce the risk for many health problems  Your healthcare provider will help you set a weight-loss goal   · Lifestyle changes  are the first step in treating obesity  These include making healthy food choices and getting regular physical activity  Your healthcare provider may suggest a weight-loss program that involves coaching, education, and therapy  · Medicine  may help you lose weight when it is used with a healthy diet and physical activity  · Surgery  can help you lose weight if you are very obese and have other health problems  There are several types of weight-loss surgery  Ask your healthcare provider for more information    Be successful losing weight:   · Set small, realistic goals  An example of a small goal is to walk for 20 minutes 5 days a week  Anther goal is to lose 5% of your body weight  · Tell friends, family members, and coworkers about your goals  and ask for their support  Ask a friend to lose weight with you, or join a weight-loss support group  · Identify foods or triggers that may cause you to overeat , and find ways to avoid them  Remove tempting high-calorie foods from your home and workplace  Place a bowl of fresh fruit on your kitchen counter  If stress causes you to eat, then find other ways to cope with stress  · Keep a diary to track what you eat and drink  Also write down how many minutes of physical activity you do each day  Weigh yourself once a week and record it in your diary  Eating changes: You will need to eat 500 to 1,000 fewer calories each day than you currently eat to lose 1 to 2 pounds a week  The following changes will help you cut calories:  · Eat smaller portions  Use small plates, no larger than 9 inches in diameter  Fill your plate half full of fruits and vegetables  Measure your food using measuring cups until you know what a serving size looks like  · Eat 3 meals and 1 or 2 snacks each day  Plan your meals in advance  [place] and eat at home most of the time  Eat slowly  · Eat fruits and vegetables at every meal   They are low in calories and high in fiber, which makes you feel full  Do not add butter, margarine, or cream sauce to vegetables  Use herbs to season steamed vegetables  · Eat less fat and fewer fried foods  Eat more baked or grilled chicken and fish  These protein sources are lower in calories and fat than red meat  Limit fast food  Dress your salads with olive oil and vinegar instead of bottled dressing  · Limit the amount of sugar you eat  Do not drink sugary beverages  Limit alcohol  Activity changes:  Physical activity is good for your body in many ways   It helps you burn calories and build strong muscles  It decreases stress and depression, and improves your mood  It can also help you sleep better  Talk to your healthcare provider before you begin an exercise program   · Exercise for at least 30 minutes 5 days a week  Start slowly  Set aside time each day for physical activity that you enjoy and that is convenient for you  It is best to do both weight training and an activity that increases your heart rate, such as walking, bicycling, or swimming  · Find ways to be more active  Do yard work and housecleaning  Walk up the stairs instead of using elevators  Spend your leisure time going to events that require walking, such as outdoor festivals or fairs  This extra physical activity can help you lose weight and keep it off  Follow up with your healthcare provider as directed: You may need to meet with a dietitian  Write down your questions so you remember to ask them during your visits  © 2017 [address] [employer] Information is for End User's use only and may not be sold, redistributed or otherwise used for commercial purposes  All illustrations and images included in CareNotes® are the copyrighted property of [employer]  or Zac Shah  The above information is an  only  It is not intended as medical advice for individual conditions or treatments  Talk to your doctor, nurse or pharmacist before following any medical regimen to see if it is safe and effective for you  Urinary Incontinence   WHAT YOU NEED TO KNOW:   What is urinary incontinence? Urinary incontinence (UI) is when you lose control of your bladder  What causes UI? UI occurs because your bladder cannot store or empty urine properly  The following are the most common types of UI:  · Stress incontinence  is when you leak urine due to increased bladder pressure  This may happen when you cough, sneeze, or exercise       · Urge incontinence  is when you feel the need to urinate right away and leak urine accidentally  · Mixed incontinence  is when you have both stress and urge UI  What are the signs and symptoms of UI?   · You feel like your bladder does not empty completely when you urinate  · You urinate often and need to urinate immediately  · You leak urine when you sleep, or you wake up with the urge to urinate  · You leak urine when you cough, sneeze, exercise, or laugh  How is UI diagnosed? Your healthcare provider will ask how often you leak urine and whether you have stress or urge symptoms  Tell him which medicines you take, how often you urinate, and how much liquid you drink each day  You may need any of the following tests:  · Urine tests  may show infection or kidney function  · A pelvic exam  may be done to check for blockages  A pelvic exam will also show if your bladder, uterus, or other organs have moved out of place  · An x-ray, ultrasound, or CT  may show problems with parts of your urinary system  You may be given contrast liquid to help your organs show up better in the pictures  Tell the healthcare provider if you have ever had an allergic reaction to contrast liquid  Do not enter the MRI room with anything metal  Metal can cause serious injury  Tell the healthcare provider if you have any metal in or on your body  · A bladder scan  will show how much urine is left in your bladder after you urinate  You will be asked to urinate and then healthcare providers will use a small ultrasound machine to check the urine left in your bladder  · Cystometry  is used to check the function of your urinary system  Your healthcare provider checks the pressure in your bladder while filling it with fluid  Your bladder pressure may also be tested when your bladder is full and while you urinate  How is UI treated? · Medicines  can help strengthen your bladder control      · Electrical stimulation  is used to send a small amount of electrical energy to your pelvic floor muscles  This helps control your bladder function  Electrodes may be placed outside your body or in your rectum  For women, the electrodes may be placed in the vagina  · A bulking agent  may be injected into the wall of your urethra to make it thicker  This helps keep your urethra closed and decreases urine leakage  · Devices  such as a clamp, pessary, or tampon may help stop urine leaks  Ask your healthcare provider for more information about these and other devices  · Surgery  may be needed if other treatments do not work  Several types of surgery can help improve your bladder control  Ask your healthcare provider for more information about the surgery you may need  How can I manage my symptoms? · Do pelvic muscle exercises often  Your pelvic muscles help you stop urinating  Squeeze these muscles tight for 5 seconds, then relax for 5 seconds  Gradually work up to squeezing for 10 seconds  Do 3 sets of 15 repetitions a day, or as directed  This will help strengthen your pelvic muscles and improve bladder control  · A catheter  may be used to help empty your bladder  A catheter is a tiny, plastic tube that is put into your bladder to drain your urine  Your healthcare provider may tell you to use a catheter to prevent your bladder from getting too full and leaking urine  · Keep a UI record  Write down how often you leak urine and how much you leak  Make a note of what you were doing when you leaked urine  · Train your bladder  Go to the bathroom at set times, such as every 2 hours, even if you do not feel the urge to go  You can also try to hold your urine when you feel the urge to go  For example, hold your urine for 5 minutes when you feel the urge to go  As that becomes easier, hold your urine for 10 minutes  · Drink liquids as directed  Ask your healthcare provider how much liquid to drink each day and which liquids are best for you   You may need to limit the amount of liquid you drink to help control your urine leakage  Limit or do not have drinks that contain caffeine or alcohol  Do not drink any liquid right before you go to bed  · Prevent constipation  Eat a variety of high-fiber foods  Good examples are high-fiber cereals, beans, vegetables, and whole-grain breads  Prune juice may help make your bowel movement softer  Walking is the best way to trigger your intestines to have a bowel movement  · Exercise regularly and maintain a healthy weight  Ask your healthcare provider how much you should weigh and about the best exercise plan for you  Weight loss and exercise will decrease pressure on your bladder and help you control your leakage  Ask him to help you create a weight loss plan if you are overweight  When should I seek immediate care? · You have severe pain  · You are confused or cannot think clearly  When should I contact my healthcare provider? · You have a fever  · You see blood in your urine  · You have pain when you urinate  · You have new or worse pain, even after treatment  · Your mouth feels dry or you have vision changes  · Your urine is cloudy or smells bad  · You have questions or concerns about your condition or care  CARE AGREEMENT:   You have the right to help plan your care  Learn about your health condition and how it may be treated  Discuss treatment options with your caregivers to decide what care you want to receive  You always have the right to refuse treatment  The above information is an  only  It is not intended as medical advice for individual conditions or treatments  Talk to your doctor, nurse or pharmacist before following any medical regimen to see if it is safe and effective for you  © 2017 [address] [employer] Information is for End User's use only and may not be sold, redistributed or otherwise used for commercial purposes   All illustrations and images included in CareNotes® are the copyrighted property of A D A M , Inc  or Zac Shah  Cigarette Smoking and Your Health   AMBULATORY CARE:   Risks to your health if you smoke:  Nicotine and other chemicals found in tobacco damage every cell in your body  Even if you are a light smoker, you have an increased risk for cancer, heart disease, and lung disease  If you are pregnant or have diabetes, smoking increases your risk for complications  Benefits to your health if you stop smoking:   · You decrease respiratory symptoms such as coughing, wheezing, and shortness of breath  · You reduce your risk for cancers of the lung, mouth, throat, kidney, bladder, pancreas, stomach, and cervix  If you already have cancer, you increase the benefits of chemotherapy  You also reduce your risk for cancer returning or a second cancer from developing  · You reduce your risk for heart disease, blood clots, heart attack, and stroke  · You reduce your risk for lung infections, and diseases such as pneumonia, asthma, chronic bronchitis, and emphysema  · Your circulation improves  More oxygen can be delivered to your body  If you have diabetes, you lower your risk for complications, such as kidney, artery, and eye diseases  You also lower your risk for nerve damage  Nerve damage can lead to amputations, poor vision, and blindness  · You improve your body's ability to heal and to fight infections  Benefits to the health of others if you stop smoking:  Tobacco is harmful to nonsmokers who breathe in your secondhand smoke  The following are ways the health of others around you may improve when you stop smoking:  · You lower the risks for lung cancer and heart disease in nonsmoking adults  · If you are pregnant, you lower the risk for miscarriage, early delivery, low birth weight, and stillbirth  You also lower your baby's risk for SIDS, obesity, developmental delay, and neurobehavioral problems, such as ADHD  · If you have children, you lower their risk for ear infections, colds, pneumonia, bronchitis, and asthma  For more information and support to stop smoking:   · [employer]  [place]  Phone: 2- 099 - 348-1188  Web Address: www smokefree  gov  Follow up with your healthcare provider as directed:  Write down your questions so you remember to ask them during your visits  © 2017 [address] [employer] Information is for End User's use only and may not be sold, redistributed or otherwise used for commercial purposes  All illustrations and images included in CareNotes® are the copyrighted property of A D A M , Inc  or Zac Shah  The above information is an  only  It is not intended as medical advice for individual conditions or treatments  Talk to your doctor, nurse or pharmacist before following any medical regimen to see if it is safe and effective for you  Fall Prevention   AMBULATORY CARE:   Fall prevention  includes ways to make your home and other areas safer  It also includes ways you can move more carefully to prevent a fall  Health conditions that cause changes in your blood pressure, vision, or muscle strength and coordination may increase your risk for falls  Medicines may also increase your risk for falls if they make you dizzy, weak, or sleepy  Call 911 or have someone else call if:   · You have fallen and are unconscious  · You have fallen and cannot move part of your body  Contact your healthcare provider if:   · You have fallen and have pain or a headache  · You have questions or concerns about your condition or care  Fall prevention tips:   · Stand or sit up slowly  This may help you keep your balance and prevent falls  · Use assistive devices as directed  Your healthcare provider may suggest that you use a cane or walker to help you keep your balance  You may need to have grab bars put in your bathroom near the toilet or in the shower      · Wear shoes that fit well and have soles that   Wear shoes both inside and outside  Use slippers with good   Do not wear shoes with high heels  · Wear a personal alarm  This is a device that allows you to call 911 if you fall and need help  Ask your healthcare provider for more information  · Stay active  Exercise can help strengthen your muscles and improve your balance  Your healthcare provider may recommend water aerobics or walking  He or she may also recommend physical therapy to improve your coordination  Never start an exercise program without talking to your healthcare provider first      · Manage your medical conditions  Keep all appointments with your healthcare providers  Visit your eye doctor as directed  Home safety tips:   · Add items to prevent falls in the bathroom  Put nonslip strips on your bath or shower floor to prevent you from slipping  Use a bath mat if you do not have carpet in the bathroom  This will prevent you from falling when you step out of the bath or shower  Use a shower seat so you do not need to stand while you shower  Sit on the toilet or a chair in your bathroom to dry yourself and put on clothing  This will prevent you from losing your balance from drying or dressing yourself while you are standing  · Keep paths clear  Remove books, shoes, and other objects from walkways and stairs  Place cords for telephones and lamps out of the way so that you do not need to walk over them  Tape them down if you cannot move them  Remove small rugs  If you cannot remove a rug, secure it with double-sided tape  This will prevent you from tripping  · Install bright lights in your home  Use night lights to help light paths to the bathroom or kitchen  Always turn on the light before you start walking  · Keep items you use often on shelves within reach  Do not use a step stool to help you reach an item  · Paint or place reflective tape on the edges of your stairs    This will help you see the stairs better  Follow up with your healthcare provider as directed:  Write down your questions so you remember to ask them during your visits  © 2017 [address] [employer] Information is for End User's use only and may not be sold, redistributed or otherwise used for commercial purposes  All illustrations and images included in CareNotes® are the copyrighted property of A [employer] M , Inc  or Zac Shah  The above information is an  only  It is not intended as medical advice for individual conditions or treatments  Talk to your doctor, nurse or pharmacist before following any medical regimen to see if it is safe and effective for you  Advance Directives   WHAT YOU NEED TO KNOW:   What are advance directives? Advance directives are legal documents that state your wishes and plans for medical care  These plans are made ahead of time in case you lose your ability to make decisions for yourself  Advance directives can apply to any medical decision, such as the treatments you want, and if you want to donate organs  What are the types of advance directives? There are many types of advance directives, and each state has rules about how to use them  You may choose a combination of any of the following:  · Living will: This is a written record of the treatment you want  You can also choose which treatments you do not want, which to limit, and which to stop at a certain time  This includes surgery, medicine, IV fluid, and tube feedings  · Durable power of  for healthcare [facility]): This is a written record that states who you want to make healthcare choices for you when you are unable to make them for yourself  This person, called a proxy, is usually a family member or a friend  You may choose more than 1 proxy  · Do not resuscitate (DNR) order:  A DNR order is used in case your heart stops beating or you stop breathing   It is a request not to have certain forms of treatment, such as CPR  A DNR order may be included in other types of advance directives  · Medical directive: This covers the care that you want if you are in a coma, near death, or unable to make decisions for yourself  You can list the treatments you want for each condition  Treatment may include pain medicine, surgery, blood transfusions, dialysis, IV or tube feedings, and a ventilator (breathing machine)  · Values history: This document has questions about your views, beliefs, and how you feel and think about life  This information can help others choose the care that you would choose  Why are advance directives important? An advance directive helps you control your care  Although spoken wishes may be used, it is better to have your wishes written down  Spoken wishes can be misunderstood, or not followed  Treatments may be given even if you do not want them  An advance directive may make it easier for your family to make difficult choices about your care  How do I decide what to put in my advance directives? · Make informed decisions:  Make sure you fully understand treatments or care you may receive  Think about the benefits and problems your decisions could cause for you or your family  Talk to healthcare providers if you have concerns or questions before you write down your wishes  You may also want to talk with your [employer] or , or a   Check your state laws to make sure that what you put in your advance directive is legal      · Sign all forms:  Sign and date your advance directive when you have finished  You may also need 2 witnesses to sign the forms  Witnesses cannot be your doctor or his staff, your spouse, heirs or beneficiaries, people you owe money to, or your chosen proxy  Talk to your family, proxy, and healthcare providers about your advance directive  Give each person a copy, and keep one for yourself in a place you can get to easily   Do not keep it hidden or locked away  · Review and revise your plans: You can revise your advance directive at any time, as long as you are able to make decisions  Review your plan every year, and when there are changes in your life, or your health  When you make changes, let your family, proxy, and healthcare providers know  Give each a new copy  Where can I find more information? · [employer]  [address] , [place] [address]  Phone: 0- [phone] - [phone]  Phone: 0- [phone] - [phone]  Web Address: http://www  aafp org  · [address] [facility])  [address] [address] [facility], [address] [facility] , [address] [place] [address]  Phone: [phone]  Phone: [phone]  Web Address: Gema rocha  CARE AGREEMENT:   You have the right to help plan your care  To help with this plan, you must learn about your health condition and treatment options  You must also learn about advance directives and how they are used  Work with your healthcare providers to decide what care will be used to treat you  You always have the right to refuse treatment  The above information is an  only  It is not intended as medical advice for individual conditions or treatments  Talk to your doctor, nurse or pharmacist before following any medical regimen to see if it is safe and effective for you  © 2017 [address]  Information is for End User's use only and may not be sold, redistributed or otherwise used for commercial purposes  All illustrations and images included in CareNotes® are the copyrighted property of A D A M , Inc  or Zac Shah  Obesity   AMBULATORY CARE:   Obesity  is when your body mass index (BMI) is greater than 30  Your healthcare provider will use your height and weight to measure your BMI  The risks of obesity include  many health problems, such as injuries or physical disability   You may need tests to check for the following:  · Diabetes     · High blood pressure or high cholesterol     · Heart disease     · Gallbladder or liver disease     · Cancer of the colon, breast, prostate, liver, or kidney     · Sleep apnea     · Arthritis or gout  Seek care immediately if:   · You have a severe headache, confusion, or difficulty speaking  · You have weakness on one side of your body  · You have chest pain, sweating, or shortness of breath  Contact your healthcare provider if:   · You have symptoms of gallbladder or liver disease, such as pain in your upper abdomen  · You have knee or hip pain and discomfort while walking  · You have symptoms of diabetes, such as intense hunger and thirst, and frequent urination  · You have symptoms of sleep apnea, such as snoring or daytime sleepiness  · You have questions or concerns about your condition or care  Treatment for obesity  focuses on helping you lose weight to improve your health  Even a small decrease in BMI can reduce the risk for many health problems  Your healthcare provider will help you set a weight-loss goal   · Lifestyle changes  are the first step in treating obesity  These include making healthy food choices and getting regular physical activity  Your healthcare provider may suggest a weight-loss program that involves coaching, education, and therapy  · Medicine  may help you lose weight when it is used with a healthy diet and physical activity  · Surgery  can help you lose weight if you are very obese and have other health problems  There are several types of weight-loss surgery  Ask your healthcare provider for more information  Be successful losing weight:   · Set small, realistic goals  An example of a small goal is to walk for 20 minutes 5 days a week  Anther goal is to lose 5% of your body weight  · Tell friends, family members, and coworkers about your goals  and ask for their support   Ask a friend to lose weight with you, or join a weight-loss support group  · Identify foods or triggers that may cause you to overeat , and find ways to avoid them  Remove tempting high-calorie foods from your home and workplace  Place a bowl of fresh fruit on your kitchen counter  If stress causes you to eat, then find other ways to cope with stress  · Keep a diary to track what you eat and drink  Also write down how many minutes of physical activity you do each day  Weigh yourself once a week and record it in your diary  Eating changes: You will need to eat 500 to 1,000 fewer calories each day than you currently eat to lose 1 to 2 pounds a week  The following changes will help you cut calories:  · Eat smaller portions  Use small plates, no larger than 9 inches in diameter  Fill your plate half full of fruits and vegetables  Measure your food using measuring cups until you know what a serving size looks like  · Eat 3 meals and 1 or 2 snacks each day  Plan your meals in advance  Clora Soares and eat at home most of the time  Eat slowly  · Eat fruits and vegetables at every meal   They are low in calories and high in fiber, which makes you feel full  Do not add butter, margarine, or cream sauce to vegetables  Use herbs to season steamed vegetables  · Eat less fat and fewer fried foods  Eat more baked or grilled chicken and fish  These protein sources are lower in calories and fat than red meat  Limit fast food  Dress your salads with olive oil and vinegar instead of bottled dressing  · Limit the amount of sugar you eat  Do not drink sugary beverages  Limit alcohol  Activity changes:  Physical activity is good for your body in many ways  It helps you burn calories and build strong muscles  It decreases stress and depression, and improves your mood  It can also help you sleep better  Talk to your healthcare provider before you begin an exercise program   · Exercise for at least 30 minutes 5 days a week  Start slowly   Set aside time each day for physical activity that you enjoy and that is convenient for you  It is best to do both weight training and an activity that increases your heart rate, such as walking, bicycling, or swimming  · Find ways to be more active  Do yard work and housecleaning  Walk up the stairs instead of using elevators  Spend your leisure time going to events that require walking, such as outdoor festivals or fairs  This extra physical activity can help you lose weight and keep it off  Follow up with your healthcare provider as directed: You may need to meet with a dietitian  Write down your questions so you remember to ask them during your visits  © 2017 [address] [employer] Information is for End User's use only and may not be sold, redistributed or otherwise used for commercial purposes  All illustrations and images included in CareNotes® are the copyrighted property of [employer]  or Zac Shah  The above information is an  only  It is not intended as medical advice for individual conditions or treatments  Talk to your doctor, nurse or pharmacist before following any medical regimen to see if it is safe and effective for you  Urinary Incontinence   WHAT YOU NEED TO KNOW:   What is urinary incontinence? Urinary incontinence (UI) is when you lose control of your bladder  What causes UI? UI occurs because your bladder cannot store or empty urine properly  The following are the most common types of UI:  · Stress incontinence  is when you leak urine due to increased bladder pressure  This may happen when you cough, sneeze, or exercise  · Urge incontinence  is when you feel the need to urinate right away and leak urine accidentally  · Mixed incontinence  is when you have both stress and urge UI  What are the signs and symptoms of UI?   · You feel like your bladder does not empty completely when you urinate  · You urinate often and need to urinate immediately      · You leak urine when you sleep, or you wake up with the urge to urinate  · You leak urine when you cough, sneeze, exercise, or laugh  How is UI diagnosed? Your healthcare provider will ask how often you leak urine and whether you have stress or urge symptoms  Tell him which medicines you take, how often you urinate, and how much liquid you drink each day  You may need any of the following tests:  · Urine tests  may show infection or kidney function  · A pelvic exam  may be done to check for blockages  A pelvic exam will also show if your bladder, uterus, or other organs have moved out of place  · An x-ray, ultrasound, or CT  may show problems with parts of your urinary system  You may be given contrast liquid to help your organs show up better in the pictures  Tell the healthcare provider if you have ever had an allergic reaction to contrast liquid  Do not enter the MRI room with anything metal  Metal can cause serious injury  Tell the healthcare provider if you have any metal in or on your body  · A bladder scan  will show how much urine is left in your bladder after you urinate  You will be asked to urinate and then healthcare providers will use a small ultrasound machine to check the urine left in your bladder  · Cystometry  is used to check the function of your urinary system  Your healthcare provider checks the pressure in your bladder while filling it with fluid  Your bladder pressure may also be tested when your bladder is full and while you urinate  How is UI treated? · Medicines  can help strengthen your bladder control  · Electrical stimulation  is used to send a small amount of electrical energy to your pelvic floor muscles  This helps control your bladder function  Electrodes may be placed outside your body or in your rectum  For women, the electrodes may be placed in the vagina  · A bulking agent  may be injected into the wall of your urethra to make it thicker   This helps keep your urethra closed and decreases urine leakage  · Devices  such as a clamp, pessary, or tampon may help stop urine leaks  Ask your healthcare provider for more information about these and other devices  · Surgery  may be needed if other treatments do not work  Several types of surgery can help improve your bladder control  Ask your healthcare provider for more information about the surgery you may need  How can I manage my symptoms? · Do pelvic muscle exercises often  Your pelvic muscles help you stop urinating  Squeeze these muscles tight for 5 seconds, then relax for 5 seconds  Gradually work up to squeezing for 10 seconds  Do 3 sets of 15 repetitions a day, or as directed  This will help strengthen your pelvic muscles and improve bladder control  · A catheter  may be used to help empty your bladder  A catheter is a tiny, plastic tube that is put into your bladder to drain your urine  Your healthcare provider may tell you to use a catheter to prevent your bladder from getting too full and leaking urine  · Keep a UI record  Write down how often you leak urine and how much you leak  Make a note of what you were doing when you leaked urine  · Train your bladder  Go to the bathroom at set times, such as every 2 hours, even if you do not feel the urge to go  You can also try to hold your urine when you feel the urge to go  For example, hold your urine for 5 minutes when you feel the urge to go  As that becomes easier, hold your urine for 10 minutes  · Drink liquids as directed  Ask your healthcare provider how much liquid to drink each day and which liquids are best for you  You may need to limit the amount of liquid you drink to help control your urine leakage  Limit or do not have drinks that contain caffeine or alcohol  Do not drink any liquid right before you go to bed  · Prevent constipation  Eat a variety of high-fiber foods   Good examples are high-fiber cereals, beans, vegetables, and whole-grain breads  Prune juice may help make your bowel movement softer  Walking is the best way to trigger your intestines to have a bowel movement  · Exercise regularly and maintain a healthy weight  Ask your healthcare provider how much you should weigh and about the best exercise plan for you  Weight loss and exercise will decrease pressure on your bladder and help you control your leakage  Ask him to help you create a weight loss plan if you are overweight  When should I seek immediate care? · You have severe pain  · You are confused or cannot think clearly  When should I contact my healthcare provider? · You have a fever  · You see blood in your urine  · You have pain when you urinate  · You have new or worse pain, even after treatment  · Your mouth feels dry or you have vision changes  · Your urine is cloudy or smells bad  · You have questions or concerns about your condition or care  CARE AGREEMENT:   You have the right to help plan your care  Learn about your health condition and how it may be treated  Discuss treatment options with your caregivers to decide what care you want to receive  You always have the right to refuse treatment  The above information is an  only  It is not intended as medical advice for individual conditions or treatments  Talk to your doctor, nurse or pharmacist before following any medical regimen to see if it is safe and effective for you  © 2017 [address]  Information is for End User's use only and may not be sold, redistributed or otherwise used for commercial purposes  All illustrations and images included in CareNotes® are the copyrighted property of [employer]  or Zac Alyssa  Cigarette Smoking and Your Health   AMBULATORY CARE:   Risks to your health if you smoke:  Nicotine and other chemicals found in tobacco damage every cell in your body   Even if you are a light smoker, you have an increased risk for cancer, heart disease, and lung disease  If you are pregnant or have diabetes, smoking increases your risk for complications  Benefits to your health if you stop smoking:   · You decrease respiratory symptoms such as coughing, wheezing, and shortness of breath  · You reduce your risk for cancers of the lung, mouth, throat, kidney, bladder, pancreas, stomach, and cervix  If you already have cancer, you increase the benefits of chemotherapy  You also reduce your risk for cancer returning or a second cancer from developing  · You reduce your risk for heart disease, blood clots, heart attack, and stroke  · You reduce your risk for lung infections, and diseases such as pneumonia, asthma, chronic bronchitis, and emphysema  · Your circulation improves  More oxygen can be delivered to your body  If you have diabetes, you lower your risk for complications, such as kidney, artery, and eye diseases  You also lower your risk for nerve damage  Nerve damage can lead to amputations, poor vision, and blindness  · You improve your body's ability to heal and to fight infections  Benefits to the health of others if you stop smoking:  Tobacco is harmful to nonsmokers who breathe in your secondhand smoke  The following are ways the health of others around you may improve when you stop smoking:  · You lower the risks for lung cancer and heart disease in nonsmoking adults  · If you are pregnant, you lower the risk for miscarriage, early delivery, low birth weight, and stillbirth  You also lower your baby's risk for SIDS, obesity, developmental delay, and neurobehavioral problems, such as ADHD  · If you have children, you lower their risk for ear infections, colds, pneumonia, bronchitis, and asthma  For more information and support to stop smoking:   · [employer]  Phone: 4- 841 - [phone]  Web Address: www [employer]  Follow up with your healthcare provider as directed: Write down your questions so you remember to ask them during your visits  © 2017 [address] Information is for End User's use only and may not be sold, redistributed or otherwise used for commercial purposes  All illustrations and images included in CareNotes® are the copyrighted property of [employer]  or Zac Shah  The above information is an  only  It is not intended as medical advice for individual conditions or treatments  Talk to your doctor, nurse or pharmacist before following any medical regimen to see if it is safe and effective for you  Fall Prevention   AMBULATORY CARE:   Fall prevention  includes ways to make your home and other areas safer  It also includes ways you can move more carefully to prevent a fall  Health conditions that cause changes in your blood pressure, vision, or muscle strength and coordination may increase your risk for falls  Medicines may also increase your risk for falls if they make you dizzy, weak, or sleepy  Call 911 or have someone else call if:   · You have fallen and are unconscious  · You have fallen and cannot move part of your body  Contact your healthcare provider if:   · You have fallen and have pain or a headache  · You have questions or concerns about your condition or care  Fall prevention tips:   · Stand or sit up slowly  This may help you keep your balance and prevent falls  · Use assistive devices as directed  Your healthcare provider may suggest that you use a cane or walker to help you keep your balance  You may need to have grab bars put in your bathroom near the toilet or in the shower  · Wear shoes that fit well and have soles that   Wear shoes both inside and outside  Use slippers with good   Do not wear shoes with high heels  · Wear a personal alarm  This is a device that allows you to call 911 if you fall and need help   Ask your healthcare provider for more information  · Stay active  Exercise can help strengthen your muscles and improve your balance  Your healthcare provider may recommend water aerobics or walking  He or she may also recommend physical therapy to improve your coordination  Never start an exercise program without talking to your healthcare provider first      · Manage your medical conditions  Keep all appointments with your healthcare providers  Visit your eye doctor as directed  Home safety tips:   · Add items to prevent falls in the bathroom  Put nonslip strips on your bath or shower floor to prevent you from slipping  Use a bath mat if you do not have carpet in the bathroom  This will prevent you from falling when you step out of the bath or shower  Use a shower seat so you do not need to stand while you shower  Sit on the toilet or a chair in your bathroom to dry yourself and put on clothing  This will prevent you from losing your balance from drying or dressing yourself while you are standing  · Keep paths clear  Remove books, shoes, and other objects from walkways and stairs  Place cords for telephones and lamps out of the way so that you do not need to walk over them  Tape them down if you cannot move them  Remove small rugs  If you cannot remove a rug, secure it with double-sided tape  This will prevent you from tripping  · Install bright lights in your home  Use night lights to help light paths to the bathroom or kitchen  Always turn on the light before you start walking  · Keep items you use often on shelves within reach  Do not use a step stool to help you reach an item  · Paint or place reflective tape on the edges of your stairs  This will help you see the stairs better  Follow up with your healthcare provider as directed:  Write down your questions so you remember to ask them during your visits     © 2017 [employer] Information is for End User's use only and may not be sold, redistributed or otherwise used for commercial purposes  All illustrations and images included in CareNotes® are the copyrighted property of [employer]  or Zac Shah  The above information is an  only  It is not intended as medical advice for individual conditions or treatments  Talk to your doctor, nurse or pharmacist before following any medical regimen to see if it is safe and effective for you  Advance Directives   WHAT YOU NEED TO KNOW:   What are advance directives? Advance directives are legal documents that state your wishes and plans for medical care  These plans are made ahead of time in case you lose your ability to make decisions for yourself  Advance directives can apply to any medical decision, such as the treatments you want, and if you want to donate organs  What are the types of advance directives? There are many types of advance directives, and each state has rules about how to use them  You may choose a combination of any of the following:  · Living will: This is a written record of the treatment you want  You can also choose which treatments you do not want, which to limit, and which to stop at a certain time  This includes surgery, medicine, IV fluid, and tube feedings  · Durable power of  for healthcare [place] [facility]): This is a written record that states who you want to make healthcare choices for you when you are unable to make them for yourself  This person, called a proxy, is usually a family member or a friend  You may choose more than 1 proxy  · Do not resuscitate (DNR) order:  A DNR order is used in case your heart stops beating or you stop breathing  It is a request not to have certain forms of treatment, such as CPR  A DNR order may be included in other types of advance directives  · Medical directive: This covers the care that you want if you are in a coma, near death, or unable to make decisions for yourself   You can list the treatments you want for each condition  Treatment may include pain medicine, surgery, blood transfusions, dialysis, IV or tube feedings, and a ventilator (breathing machine)  · Values history: This document has questions about your views, beliefs, and how you feel and think about life  This information can help others choose the care that you would choose  Why are advance directives important? An advance directive helps you control your care  Although spoken wishes may be used, it is better to have your wishes written down  Spoken wishes can be misunderstood, or not followed  Treatments may be given even if you do not want them  An advance directive may make it easier for your family to make difficult choices about your care  How do I decide what to put in my advance directives? · Make informed decisions:  Make sure you fully understand treatments or care you may receive  Think about the benefits and problems your decisions could cause for you or your family  Talk to healthcare providers if you have concerns or questions before you write down your wishes  You may also want to talk with your [employer] or , or a   Check your state laws to make sure that what you put in your advance directive is legal      · Sign all forms:  Sign and date your advance directive when you have finished  You may also need 2 witnesses to sign the forms  Witnesses cannot be your doctor or his staff, your spouse, heirs or beneficiaries, people you owe money to, or your chosen proxy  Talk to your family, proxy, and healthcare providers about your advance directive  Give each person a copy, and keep one for yourself in a place you can get to easily  Do not keep it hidden or locked away  · Review and revise your plans: You can revise your advance directive at any time, as long as you are able to make decisions  Review your plan every year, and when there are changes in your life, or your health   When you make changes, let your family, proxy, and healthcare providers know  Give each a new copy  Where can I find more information? · [employer]  [address] , [place] [address]  Phone: 2- 290 - [phone]  Phone: [phone]  Web Address: http://www  aa org  · [address] [facility])  [address], [address]  Phone: [phone]  Phone: [phone]  Web Address: Gema rocha  CARE AGREEMENT:   You have the right to help plan your care  To help with this plan, you must learn about your health condition and treatment options  You must also learn about advance directives and how they are used  Work with your healthcare providers to decide what care will be used to treat you  You always have the right to refuse treatment  The above information is an  only  It is not intended as medical advice for individual conditions or treatments  Talk to your doctor, nurse or pharmacist before following any medical regimen to see if it is safe and effective for you  © 2017 [address]  Information is for End User's use only and may not be sold, redistributed or otherwise used for commercial purposes  All illustrations and images included in CareNotes® are the copyrighted property of A [employer] M [employer] Inc  or Zac Shah

## 2019-08-28 NOTE — PROGRESS NOTES
Assessment and Plan:     Problem List Items Addressed This Visit        Nervous and Auditory    Bilateral impacted cerumen - Primary     Debrox ear drops         Relevant Medications    carbamide peroxide (DEBROX) 6 5 % otic solution      Other Visit Diagnoses     Medicare annual wellness visit, subsequent             History of Present Illness:     Patient presents for Medicare Annual Wellness visit    Patient Care Team:  Verónica andrade PCP - General (Family Medicine)     Problem List:     Patient Active Problem List   Diagnosis    Allergic rhinitis    Down syndrome    Hyperlipidemia    Hypothyroidism    Osteoporosis    Periodontitis    Trisomy 21, Down syndrome    Acute pain of left knee    Neck pain    Bilateral impacted cerumen      Past Medical and Surgical History:     Past Medical History:   Diagnosis Date    Community acquired pneumonia     Last Assessed:1/22/2013    Leukopenia     Last Assessed:3/5/2014    Osteoporosis     Trisomy 21, Down syndrome      Past Surgical History:   Procedure Laterality Date    TONSILLECTOMY AND ADENOIDECTOMY        Family History:     Family History   Family history unknown: Yes      Social History:     Social History     Tobacco Use   Smoking Status Never Smoker   Smokeless Tobacco Never Used     Social History     Substance and Sexual Activity   Alcohol Use No     Social History     Substance and Sexual Activity   Drug Use Not on file      Medications and Allergies:     Current Outpatient Medications   Medication Sig Dispense Refill    acetaminophen (TYLENOL) 500 mg tablet Take 1 tablet by mouth every 8 (eight) hours as needed      ammonium lactate (LAC-HYDRIN) 12 % lotion Apply topically 2 (two) times a day 400 g 1    atorvastatin (LIPITOR) 10 mg tablet Take 1 tablet by mouth      benzonatate (TESSALON) 200 MG capsule Take 200 mg by mouth 3 (three) times a day as needed for cough      carbamide peroxide (DEBROX) 6 5 % otic solution Administer 5 drops into both ears 2 (two) times a day 15 mL 0    Cholecalciferol 1000 units CHEW Chew 1 tablet      Elastic Bandages & Supports (KNEE WRAP/PATELLAR STABLIZING) MISC by Does not apply route daily 1 each 0    fluticasone (FLONASE) 50 mcg/act nasal spray into each nostril      guaiFENesin (ROBITUSSIN) 100 MG/5ML oral liquid Take 10 mL (200 mg total) by mouth every 4 (four) hours as needed for cough 120 mL 0    Heating Pad PADS by Does not apply route 2 (two) times a day 1 each 0    hydrocortisone 1 % lotion Apply topically 2 (two) times a day 118 mL 0    ibuprofen (MOTRIN) 400 mg tablet Take 1 tablet (400 mg total) by mouth every 8 (eight) hours as needed for mild pain (with food) 14 tablet 0    levothyroxine 50 mcg tablet Take 1 tablet by mouth daily      loratadine (CLARITIN) 5 mg/5 mL syrup Take 5 mg by mouth      Multiple Vitamin (MULTIVITAMIN IRON-FREE PO) Take 1 tablet by mouth      nystatin (MYCOSTATIN) powder Apply topically      ondansetron (ZOFRAN-ODT) 4 mg disintegrating tablet Take 1 tablet by mouth every 8 (eight) hours as needed       No current facility-administered medications for this visit  Allergies   Allergen Reactions    Other     Pollen Extract       Immunizations:     Immunization History   Administered Date(s) Administered    INFLUENZA 11/13/2018    Influenza Quadrivalent Preservative Free 3 years and older IM 10/04/2014, 10/16/2015, 11/03/2016, 11/24/2017    Influenza TIV (IM) 10/25/2012, 10/05/2013    Influenza, recombinant, quadrivalent,injectable, preservative free 11/13/2018    Tdap 12/13/2012    Tuberculin Skin Test-PPD Intradermal 07/31/2012, 07/29/2014, 08/19/2016, 08/27/2018      Medicare Screening Tests and Risk Assessments:     Jayshree Mathews is here for her Subsequent Wellness visit  Last Medicare Wellness visit information reviewed, patient interviewed, no change since last AWV  Health Risk Assessment:  Patient rates overall health as good   Patient feels that their physical health rating is Same  Eyesight was rated as Same  Hearing was rated as Same  Patient feels that their emotional and mental health rating is Same  Pain experienced by patient in the last 7 days has been None  Patient states that she has experienced no weight loss or gain in last 6 months  Emotional/Mental Health:  Patient has not been feeling nervous/anxious  PHQ-9 Depression Screening:    Frequency of the following problems over the past two weeks:      1  Little interest or pleasure in doing things: 0 - not at all      2  Feeling down, depressed, or hopeless: 0 - not at all  PHQ-2 Score: 0          Bladder/Bowel:  Patient has not leaked urine accidently in the last six months  Patient reports no loss of bowel control  Immunizations:  Patient has had a flu vaccination within the last year  Patient has not received a pneumonia shot  Patient has not received a shingles shot  Patient has received tetanus/diphtheria shot  Home Safety:  Patient does not have trouble with stairs inside or outside of their home  Patient currently reports that there are no safety hazards present in home, working smoke alarms, working carbon monoxide detectors  Preventative Screenings:   Breast cancer screening performed, no colon cancer screen completed, cholesterol screen completed, glaucoma eye exam completed,     Nutrition:  Current diet: Regular and No Added Salt with servings of the following:    Medications:  Patient is currently taking over-the-counter supplements  Patient is not able to manage medications  (Additional Comments: Managed by caregiver)Lifestyle Choices:  Patient reports no tobacco use  Patient has not smoked or used tobacco in the past   Patient reports no alcohol use  Patient does not drive a vehicle  Patient wears seat belt          Activities of Daily Living:  Can get out of bed by his or her self, able to dress self, unable to make own meals, unable to do own shopping, unable to bathe self, unable to do laundry/housekeeping, unable to manage own money and other related tasks    Previous Hospitalizations:  No hospitalization or ED visit in past 12 months        Advanced Directives:  Patient has decided on a power of   Patient has completed advanced directive  Preventative Screening/Counseling:      Cardiovascular:      General: Screening Current          Diabetes:      General: Screening Current          Colorectal Cancer:      General: Screening Not Indicated          Breast Cancer:      General: Screening Current          Cervical Cancer:      General: Screening Current          Osteoporosis:      General: Screening Not Indicated          AAA:      General: Screening Not Indicated          HIV:      General: Screening Not Indicated          Advanced Directives:   Patient has living will for healthcare, has durable POA for healthcare, patient has an advanced directive

## 2019-09-26 ENCOUNTER — OFFICE VISIT (OUTPATIENT)
Dept: INTERNAL MEDICINE CLINIC | Facility: CLINIC | Age: 44
End: 2019-09-26
Payer: MEDICARE

## 2019-09-26 VITALS
OXYGEN SATURATION: 97 % | HEART RATE: 79 BPM | TEMPERATURE: 97.8 F | RESPIRATION RATE: 16 BRPM | WEIGHT: 198 LBS | DIASTOLIC BLOOD PRESSURE: 76 MMHG | HEIGHT: 55 IN | SYSTOLIC BLOOD PRESSURE: 112 MMHG | BODY MASS INDEX: 45.82 KG/M2

## 2019-09-26 DIAGNOSIS — Z23 NEED FOR INFLUENZA VACCINATION: Primary | ICD-10-CM

## 2019-09-26 DIAGNOSIS — Z01.818 PREOPERATIVE CLEARANCE: ICD-10-CM

## 2019-09-26 DIAGNOSIS — E66.01 CLASS 3 SEVERE OBESITY DUE TO EXCESS CALORIES WITHOUT SERIOUS COMORBIDITY WITH BODY MASS INDEX (BMI) OF 40.0 TO 44.9 IN ADULT (HCC): ICD-10-CM

## 2019-09-26 DIAGNOSIS — K08.9 TOOTH DISORDER: ICD-10-CM

## 2019-09-26 PROCEDURE — 99213 OFFICE O/P EST LOW 20 MIN: CPT | Performed by: NURSE PRACTITIONER

## 2019-09-26 PROCEDURE — 90686 IIV4 VACC NO PRSV 0.5 ML IM: CPT

## 2019-09-26 PROCEDURE — G0008 ADMIN INFLUENZA VIRUS VAC: HCPCS

## 2019-09-26 RX ORDER — TRIAMCINOLONE ACETONIDE 1 MG/G
CREAM TOPICAL 3 TIMES DAILY PRN
COMMUNITY
End: 2020-09-03 | Stop reason: ALTCHOICE

## 2019-09-26 NOTE — PROGRESS NOTES
Assessment/Plan:    Preoperative clearance  Cleared for dental cleaning  No bleeding disorder not on any blood thinner    Class 3 severe obesity due to excess calories without serious comorbidity with body mass index (BMI) of 40 0 to 44 9 in adult ([facility] )  BMI Counseling: Body mass index is 46 02 kg/m²  The BMI is above normal  Nutrition recommendations include reducing portion sizes, decreasing overall calorie intake, 3-5 servings of fruits/vegetables daily and reducing fast food intake  Diagnoses and all orders for this visit:    Need for influenza vaccination  -     influenza vaccine, [id number], quadrivalent, 0 5 mL, preservative-free, for adult and pediatric patients 6 mos+ (AFLURIA, FLUARIX, FLULAVAL, FLUZONE)    Preoperative clearance    Tooth disorder    Class 3 severe obesity due to excess calories without serious comorbidity with body mass index (BMI) of 40 0 to 44 9 in adult [facility])    Other orders  -     triamcinolone (KENALOG) 0 1 % cream; Apply topically Three times daily as needed          Subjective:      Patient ID: Parker Rojas is a 53 y o  female  Patient is here for preop clearance for dental work      The following portions of the patient's history were reviewed and updated as appropriate: allergies, current medications, past family history, past medical history, past social history, past surgical history and problem list     Review of Systems   Constitutional: Negative  HENT: Negative  Eyes: Negative  Respiratory: Negative  Cardiovascular: Negative  Gastrointestinal: Negative  Musculoskeletal: Negative  Neurological: Negative  Objective:      /76   Pulse 79   Temp 97 8 °F (36 6 °C) (Tympanic)   Resp 16   Ht 4' 7" (1 397 m)   Wt 89 8 kg (198 lb)   SpO2 97%   BMI 46 02 kg/m²          Physical Exam   Constitutional: She is oriented to person, place, and time  She appears well-developed and well-nourished     HENT:   Head: Normocephalic and atraumatic  Right Ear: External ear normal    Left Ear: External ear normal    Nose: Nose normal    Mouth/Throat: Oropharynx is clear and moist    Eyes: Pupils are equal, round, and reactive to light  Conjunctivae are normal    Neck: Normal range of motion  Neck supple  Cardiovascular: Normal rate and regular rhythm  Pulmonary/Chest: Effort normal and breath sounds normal    Abdominal: Soft  Bowel sounds are normal    Musculoskeletal: Normal range of motion  Neurological: She is alert and oriented to person, place, and time  Skin: Skin is warm and dry  Nursing note and vitals reviewed

## 2019-09-29 PROBLEM — Z01.818 PREOPERATIVE CLEARANCE: Status: ACTIVE | Noted: 2019-09-29

## 2019-09-30 PROBLEM — E66.813 CLASS 3 SEVERE OBESITY DUE TO EXCESS CALORIES WITHOUT SERIOUS COMORBIDITY WITH BODY MASS INDEX (BMI) OF 40.0 TO 44.9 IN ADULT (HCC): Status: ACTIVE | Noted: 2019-09-30

## 2019-09-30 PROBLEM — E66.01 CLASS 3 SEVERE OBESITY DUE TO EXCESS CALORIES WITHOUT SERIOUS COMORBIDITY WITH BODY MASS INDEX (BMI) OF 40.0 TO 44.9 IN ADULT (HCC): Status: ACTIVE | Noted: 2019-09-30

## 2019-09-30 NOTE — ASSESSMENT & PLAN NOTE
BMI Counseling: Body mass index is 46 02 kg/m²  The BMI is above normal  Nutrition recommendations include reducing portion sizes, decreasing overall calorie intake, 3-5 servings of fruits/vegetables daily and reducing fast food intake

## 2020-09-03 ENCOUNTER — OFFICE VISIT (OUTPATIENT)
Dept: INTERNAL MEDICINE CLINIC | Facility: CLINIC | Age: 45
End: 2020-09-03
Payer: MEDICARE

## 2020-09-03 VITALS
HEIGHT: 55 IN | HEART RATE: 95 BPM | WEIGHT: 187 LBS | DIASTOLIC BLOOD PRESSURE: 78 MMHG | RESPIRATION RATE: 18 BRPM | BODY MASS INDEX: 43.28 KG/M2 | SYSTOLIC BLOOD PRESSURE: 118 MMHG | OXYGEN SATURATION: 97 %

## 2020-09-03 DIAGNOSIS — Z23 NEED FOR VACCINATION: ICD-10-CM

## 2020-09-03 DIAGNOSIS — E03.9 HYPOTHYROIDISM, UNSPECIFIED TYPE: ICD-10-CM

## 2020-09-03 DIAGNOSIS — E66.01 MORBID OBESITY WITH BODY MASS INDEX (BMI) OF 40.0 TO 49.9 (HCC): ICD-10-CM

## 2020-09-03 DIAGNOSIS — Q90.9 DOWN'S SYNDROME: ICD-10-CM

## 2020-09-03 DIAGNOSIS — Z00.00 HEALTHCARE MAINTENANCE: Primary | ICD-10-CM

## 2020-09-03 PROCEDURE — G0439 PPPS, SUBSEQ VISIT: HCPCS | Performed by: NURSE PRACTITIONER

## 2020-09-03 PROCEDURE — 90686 IIV4 VACC NO PRSV 0.5 ML IM: CPT

## 2020-09-03 PROCEDURE — G0008 ADMIN INFLUENZA VIRUS VAC: HCPCS

## 2020-09-03 NOTE — PROGRESS NOTES
Assessment and Plan:     Problem List Items Addressed This Visit        Endocrine    Hypothyroidism     Recheck tsh          Relevant Orders    TSH, 3rd generation with Free T4 reflex       Other    Morbid obesity with body mass index (BMI) of 40 0 to 49 9 ([facility])     BMI Counseling: Body mass index is 43 46 kg/m²  The BMI is above normal  Nutrition recommendations include reducing portion sizes, decreasing overall calorie intake and 3-5 servings of fruits/vegetables daily  Exercise recommendations include exercising 3-5 times per week  Relevant Orders    Comprehensive metabolic panel    CBC and differential    Lipid panel      Other Visit Diagnoses     Healthcare maintenance    -  Primary    Down's syndrome        Need for vaccination        Relevant Orders    influenza vaccine, quadrivalent, 0 5 mL, preservative-free, for adult and pediatric patients 6 mos+ (AFLURIA, FLUARIX, FLULAVAL, FLUZONE) (Completed)           Preventive health issues were discussed with patient, and age appropriate screening tests were ordered as noted in patient's After Visit Summary  Personalized health advice and appropriate referrals for health education or preventive services given if needed, as noted in patient's After Visit Summary       History of Present Illness:     Patient presents for Medicare Annual Wellness visit    Patient Care Team:  STEFANIA Roberto as PCP - General (Family Medicine)     Problem List:     Patient Active Problem List   Diagnosis    Allergic rhinitis    Down syndrome    Hyperlipidemia    Hypothyroidism    Osteoporosis    Periodontitis    Trisomy 21, Down syndrome    Acute pain of left knee    Neck pain    Bilateral impacted cerumen    Preoperative clearance    Class 3 severe obesity due to excess calories without serious comorbidity with body mass index (BMI) of 40 0 to 44 9 in adult ([facility] )    Morbid obesity with body mass index (BMI) of 40 0 to 49 9 [facility])      Past Medical and Surgical History:     Past Medical History:   Diagnosis Date    Community acquired pneumonia     Last Assessed:1/22/2013    Leukopenia     Last Assessed:3/5/2014    Osteoporosis     Trisomy 21, Down syndrome      Past Surgical History:   Procedure Laterality Date    TONSILLECTOMY AND ADENOIDECTOMY        Family History:     Family History   Family history unknown: Yes      Social History:        Social History     Socioeconomic History    Marital status: Single     Spouse name: Not on file    Number of children: Not on file    Years of education: Not on file    Highest education level: Not on file   Occupational History    Not on file   Social Needs    Financial resource strain: Not on file    Food insecurity     Worry: Not on file     Inability: Not on file    Transportation needs     Medical: Not on file     Non-medical: Not on file   Tobacco Use    Smoking status: Never Smoker    Smokeless tobacco: Never Used   Substance and Sexual Activity    Alcohol use: No    Drug use: Not on file    Sexual activity: Not on file   Lifestyle    Physical activity     Days per week: Not on file     Minutes per session: Not on file    Stress: Not on file   Relationships    Social connections     Talks on phone: Not on file     Gets together: Not on file     Attends Mosque service: Not on file     Active member of club or organization: Not on file     Attends meetings of clubs or organizations: Not on file     Relationship status: Not on file    Intimate partner violence     Fear of current or ex partner: Not on file     Emotionally abused: Not on file     Physically abused: Not on file     Forced sexual activity: Not on file   Other Topics Concern    Not on file   Social History Narrative    Not on file      Medications and Allergies:     Current Outpatient Medications   Medication Sig Dispense Refill    acetaminophen (TYLENOL) 500 mg tablet Take 1 tablet by mouth every 8 (eight) hours as needed      ammonium lactate (LAC-HYDRIN) 12 % lotion Apply topically 2 (two) times a day 400 g 1    atorvastatin (LIPITOR) 10 mg tablet Take 1 tablet by mouth      Cholecalciferol 1000 units CHEW Chew 1 tablet      fluticasone (FLONASE) 50 mcg/act nasal spray into each nostril      ibuprofen (MOTRIN) 400 mg tablet Take 1 tablet (400 mg total) by mouth every 8 (eight) hours as needed for mild pain (with food) 14 tablet 0    levothyroxine 50 mcg tablet Take 1 tablet by mouth daily      Multiple Vitamin (MULTIVITAMIN IRON-FREE PO) Take 1 tablet by mouth      nystatin (MYCOSTATIN) powder Apply topically      ondansetron (ZOFRAN-ODT) 4 mg disintegrating tablet Take 1 tablet by mouth every 8 (eight) hours as needed      benzonatate (TESSALON) 200 MG capsule Take 200 mg by mouth 3 (three) times a day as needed for cough      guaiFENesin (ROBITUSSIN) 100 MG/5ML oral liquid Take 10 mL (200 mg total) by mouth every 4 (four) hours as needed for cough (Patient not taking: Reported on 9/3/2020) 120 mL 0    Heating Pad PADS by Does not apply route 2 (two) times a day (Patient not taking: Reported on 9/3/2020) 1 each 0    loratadine (CLARITIN) 5 mg/5 mL syrup Take 5 mg by mouth       No current facility-administered medications for this visit        Allergies   Allergen Reactions    Other     Pollen Extract       Immunizations:     Immunization History   Administered Date(s) Administered    INFLUENZA 11/13/2018    Influenza Quadrivalent Preservative Free 3 years and older IM 10/04/2014, 10/16/2015, 11/03/2016, 11/24/2017    Influenza TIV (IM) 10/25/2012, 10/05/2013    Influenza, injectable, quadrivalent, preservative free 0 5 mL 09/26/2019, 09/03/2020    Influenza, recombinant, quadrivalent,injectable, preservative free 11/13/2018    Tdap 12/13/2012    Tuberculin Skin Test-PPD Intradermal 07/31/2012, 07/29/2014, 08/19/2016, 08/27/2018      Health Maintenance:         Topic Date Due    Cervical Cancer Screening 01/07/1994    MAMMOGRAM  06/10/2021    Hepatitis C Screening  Completed     There are no preventive care reminders to display for this patient  Medicare Health Risk Assessment:     /78   Pulse 95   Resp 18   Ht 4' 7" (1 397 m)   Wt 84 8 kg (187 lb)   SpO2 97%   BMI 43 46 kg/m²      Rea Mitchell is here for her Subsequent Wellness visit  Last Medicare Wellness visit information reviewed, patient interviewed, no change since last AWV  Health Risk Assessment:   Patient rates overall health as good  Patient feels that their physical health rating is same  Eyesight was rated as same  Hearing was rated as same  Patient feels that their emotional and mental health rating is same  Pain experienced in the last 7 days has been none  Patient states that she has experienced no weight loss or gain in last 6 months  Depression Screening:   PHQ-2 Score: 0      Fall Risk Screening: In the past year, patient has experienced: no history of falling in past year      Urinary Incontinence Screening:   Patient has not leaked urine accidently in the last six months  Home Safety:  Patient does not have trouble with stairs inside or outside of their home  Patient has working smoke alarms and has working carbon monoxide detector  Home safety hazards include: none  Nutrition:   Current diet is Regular  Medications:   Patient is not able to manage medications  Activities of Daily Living (ADLs)/Instrumental Activities of Daily Living (IADLs):   Walk and transfer into and out of bed and chair?: Yes  Dress and groom yourself?: Yes    Bathe or shower yourself?: Yes    Feed yourself? Yes  Do your laundry/housekeeping?: No  Manage your money, pay your bills and track your expenses?: No  Make your own meals?: No    Do your own shopping?: No    Previous Hospitalizations:   Any hospitalizations or ED visits within the last 12 months?: No      Advance Care Planning:   Living will: Yes    Durable POA for healthcare:  Yes Advanced directive: Yes      PREVENTIVE SCREENINGS      Cardiovascular Screening:    General: Screening Not Indicated and History Lipid Disorder    Due for: Lipid Panel      Diabetes Screening:       Due for: Blood Glucose      Breast Cancer Screening:     General: Screening Current      Cervical Cancer Screening:    General: Patient Declines      Osteoporosis Screening:    General: Screening Not Indicated and History Osteoporosis      Abdominal Aortic Aneurysm (AAA) Screening:        General: Screening Not Indicated      Lung Cancer Screening:     General: Screening Not Indicated      Hepatitis C Screening:    General: Screening Current      STEFANIA Roberto

## 2020-09-07 PROBLEM — E66.01 MORBID OBESITY WITH BODY MASS INDEX (BMI) OF 40.0 TO 49.9 (HCC): Status: ACTIVE | Noted: 2020-09-07

## 2020-09-07 NOTE — ASSESSMENT & PLAN NOTE
BMI Counseling: Body mass index is 43 46 kg/m²  The BMI is above normal  Nutrition recommendations include reducing portion sizes, decreasing overall calorie intake and 3-5 servings of fruits/vegetables daily  Exercise recommendations include exercising 3-5 times per week

## 2020-09-15 ENCOUNTER — CLINICAL SUPPORT (OUTPATIENT)
Dept: INTERNAL MEDICINE CLINIC | Facility: CLINIC | Age: 45
End: 2020-09-15
Payer: MEDICARE

## 2020-09-15 DIAGNOSIS — Z11.1 SCREENING FOR TUBERCULOSIS: Primary | ICD-10-CM

## 2020-09-15 PROCEDURE — 86580 TB INTRADERMAL TEST: CPT

## 2020-09-17 ENCOUNTER — CLINICAL SUPPORT (OUTPATIENT)
Dept: INTERNAL MEDICINE CLINIC | Facility: CLINIC | Age: 45
End: 2020-09-17

## 2020-09-17 DIAGNOSIS — Z11.1 SCREENING FOR TUBERCULOSIS: Primary | ICD-10-CM

## 2020-09-17 LAB
INDURATION: 0 MM
TB SKIN TEST: NEGATIVE

## 2020-09-17 NOTE — PROGRESS NOTES
The patient arrived for her PPD read  The left arm where the PPD was given has no induration or redness

## 2020-10-08 ENCOUNTER — TELEPHONE (OUTPATIENT)
Dept: INTERNAL MEDICINE CLINIC | Facility: CLINIC | Age: 45
End: 2020-10-08

## 2021-02-04 ENCOUNTER — TELEPHONE (OUTPATIENT)
Dept: INTERNAL MEDICINE CLINIC | Facility: CLINIC | Age: 46
End: 2021-02-04

## 2021-02-04 NOTE — TELEPHONE ENCOUNTER
Tino Oliver from [employer] called and said they are holding a covid vaccine clinic next week  They want to know if patient can or cannot get the vaccine  Please advise

## 2021-04-23 ENCOUNTER — TELEPHONE (OUTPATIENT)
Dept: INTERNAL MEDICINE CLINIC | Facility: CLINIC | Age: 46
End: 2021-04-23

## 2021-05-04 ENCOUNTER — APPOINTMENT (OUTPATIENT)
Dept: LAB | Facility: HOSPITAL | Age: 46
End: 2021-05-04
Payer: MEDICARE

## 2021-05-04 DIAGNOSIS — E03.9 HYPOTHYROIDISM, UNSPECIFIED TYPE: ICD-10-CM

## 2021-05-04 DIAGNOSIS — E66.01 MORBID OBESITY WITH BODY MASS INDEX (BMI) OF 40.0 TO 49.9 (HCC): ICD-10-CM

## 2021-05-04 LAB
ALBUMIN SERPL BCP-MCNC: 2.7 G/DL (ref 3.5–5)
ALP SERPL-CCNC: 110 U/L (ref 46–116)
ALT SERPL W P-5'-P-CCNC: 32 U/L (ref 12–78)
ANION GAP SERPL CALCULATED.3IONS-SCNC: 7 MMOL/L (ref 4–13)
AST SERPL W P-5'-P-CCNC: 22 U/L (ref 5–45)
BASOPHILS # BLD AUTO: 0.07 THOUSANDS/ΜL (ref 0–0.1)
BASOPHILS NFR BLD AUTO: 1 % (ref 0–1)
BILIRUB SERPL-MCNC: 0.68 MG/DL (ref 0.2–1)
BUN SERPL-MCNC: 13 MG/DL (ref 5–25)
CALCIUM ALBUM COR SERPL-MCNC: 10.2 MG/DL (ref 8.3–10.1)
CALCIUM SERPL-MCNC: 9.2 MG/DL (ref 8.3–10.1)
CHLORIDE SERPL-SCNC: 105 MMOL/L (ref 100–108)
CHOLEST SERPL-MCNC: 175 MG/DL (ref 50–200)
CO2 SERPL-SCNC: 26 MMOL/L (ref 21–32)
CREAT SERPL-MCNC: 0.71 MG/DL (ref 0.6–1.3)
EOSINOPHIL # BLD AUTO: 0.04 THOUSAND/ΜL (ref 0–0.61)
EOSINOPHIL NFR BLD AUTO: 1 % (ref 0–6)
ERYTHROCYTE [DISTWIDTH] IN BLOOD BY AUTOMATED COUNT: 14.1 % (ref 11.6–15.1)
GFR SERPL CREATININE-BSD FRML MDRD: 101 ML/MIN/1.73SQ M
GLUCOSE P FAST SERPL-MCNC: 111 MG/DL (ref 65–99)
HCT VFR BLD AUTO: 50.1 % (ref 34.8–46.1)
HDLC SERPL-MCNC: 52 MG/DL
HGB BLD-MCNC: 16.7 G/DL (ref 11.5–15.4)
IMM GRANULOCYTES # BLD AUTO: 0.02 THOUSAND/UL (ref 0–0.2)
IMM GRANULOCYTES NFR BLD AUTO: 0 % (ref 0–2)
LDLC SERPL CALC-MCNC: 93 MG/DL (ref 0–100)
LYMPHOCYTES # BLD AUTO: 1.4 THOUSANDS/ΜL (ref 0.6–4.47)
LYMPHOCYTES NFR BLD AUTO: 26 % (ref 14–44)
MCH RBC QN AUTO: 35.5 PG (ref 26.8–34.3)
MCHC RBC AUTO-ENTMCNC: 33.3 G/DL (ref 31.4–37.4)
MCV RBC AUTO: 106 FL (ref 82–98)
MONOCYTES # BLD AUTO: 0.52 THOUSAND/ΜL (ref 0.17–1.22)
MONOCYTES NFR BLD AUTO: 10 % (ref 4–12)
NEUTROPHILS # BLD AUTO: 3.3 THOUSANDS/ΜL (ref 1.85–7.62)
NEUTS SEG NFR BLD AUTO: 62 % (ref 43–75)
NONHDLC SERPL-MCNC: 123 MG/DL
NRBC BLD AUTO-RTO: 0 /100 WBCS
PLATELET # BLD AUTO: 198 THOUSANDS/UL (ref 149–390)
PMV BLD AUTO: 11 FL (ref 8.9–12.7)
POTASSIUM SERPL-SCNC: 3.8 MMOL/L (ref 3.5–5.3)
PROT SERPL-MCNC: 7.7 G/DL (ref 6.4–8.2)
RBC # BLD AUTO: 4.71 MILLION/UL (ref 3.81–5.12)
SODIUM SERPL-SCNC: 138 MMOL/L (ref 136–145)
TRIGL SERPL-MCNC: 152 MG/DL
TSH SERPL DL<=0.05 MIU/L-ACNC: 2.65 UIU/ML (ref 0.36–3.74)
WBC # BLD AUTO: 5.35 THOUSAND/UL (ref 4.31–10.16)

## 2021-05-04 PROCEDURE — 84443 ASSAY THYROID STIM HORMONE: CPT

## 2021-05-04 PROCEDURE — 80061 LIPID PANEL: CPT

## 2021-05-04 PROCEDURE — 80053 COMPREHEN METABOLIC PANEL: CPT

## 2021-05-04 PROCEDURE — 85025 COMPLETE CBC W/AUTO DIFF WBC: CPT

## 2021-05-04 PROCEDURE — 36415 COLL VENOUS BLD VENIPUNCTURE: CPT

## 2021-05-19 ENCOUNTER — CONSULT (OUTPATIENT)
Dept: INTERNAL MEDICINE CLINIC | Facility: CLINIC | Age: 46
End: 2021-05-19
Payer: MEDICARE

## 2021-05-19 VITALS
HEIGHT: 55 IN | OXYGEN SATURATION: 97 % | TEMPERATURE: 98.1 F | WEIGHT: 188.4 LBS | HEART RATE: 100 BPM | DIASTOLIC BLOOD PRESSURE: 90 MMHG | BODY MASS INDEX: 43.6 KG/M2 | SYSTOLIC BLOOD PRESSURE: 135 MMHG

## 2021-05-19 DIAGNOSIS — Z11.4 SCREENING FOR HIV (HUMAN IMMUNODEFICIENCY VIRUS): ICD-10-CM

## 2021-05-19 DIAGNOSIS — Z01.818 PREOPERATIVE CLEARANCE: ICD-10-CM

## 2021-05-19 DIAGNOSIS — F51.01 PRIMARY INSOMNIA: ICD-10-CM

## 2021-05-19 DIAGNOSIS — E55.9 VITAMIN D DEFICIENCY: ICD-10-CM

## 2021-05-19 DIAGNOSIS — R89.9 ABNORMAL LABORATORY TEST: Primary | ICD-10-CM

## 2021-05-19 DIAGNOSIS — Z12.4 SCREENING FOR CERVICAL CANCER: ICD-10-CM

## 2021-05-19 DIAGNOSIS — E45 RETARDED DEVELOPMENT FOLLOWING PROTEIN-CALORIE MALNUTRITION (HCC): ICD-10-CM

## 2021-05-19 PROCEDURE — 99213 OFFICE O/P EST LOW 20 MIN: CPT | Performed by: GENERAL ACUTE CARE HOSPITAL

## 2021-05-19 RX ORDER — LANOLIN ALCOHOL/MO/W.PET/CERES
3 CREAM (GRAM) TOPICAL
Qty: 90 TABLET | Refills: 1 | Status: SHIPPED | OUTPATIENT
Start: 2021-05-19 | End: 2021-12-10 | Stop reason: SDUPTHER

## 2021-05-19 NOTE — ASSESSMENT & PLAN NOTE
Elevated MCV, elevated Hb  Could be from sleep apnea  Will check B12 and folate  Midly elevated Ca, recheck in 3 months

## 2021-05-19 NOTE — PROGRESS NOTES
Assessment/Plan:    Preoperative clearance  Pt is at stable condition for procedure  No extra workup needed prior to procedure  Abnormal laboratory test  Elevated MCV, elevated Hb  Could be from sleep apnea  Will check B12 and folate  Midly elevated Ca, recheck in 3 months  Primary insomnia  Try melatonin at bedtime  1hour before bedtime  Diagnoses and all orders for this visit:    Abnormal laboratory test  -     Folate; Future  -     Methylmalonic acid, serum; Future  -     Vitamin D 25 hydroxy; Future  -     PTH, intact; Future  -     Calcium; Future  -     Albumin; Future    Screening for HIV (human immunodeficiency virus)    Screening for cervical cancer    Vitamin D deficiency  -     Vitamin D 25 hydroxy; Future    Primary insomnia  -     melatonin 3 mg; Take 1 tablet (3 mg total) by mouth daily at bedtime    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)   -     Folate; Future    Preoperative clearance    Other orders  -     Cancel: HIV 1/2 Antigen/Antibody (4th Generation) w Reflex [facility]; Future  -     Cancel: Ambulatory referral to Obstetrics / Gynecology; Future          Subjective:      Patient ID: Karishma Fisher is a 48 y o  female  HPI    Fore pre-op eval    Dental procedure under sedation on 5/25  Doing well  No new concern from caregiver Deanna Liza  She mentioned pt has chronic insomnia  Likely has sleep apnea but refused to wear mask  The following portions of the patient's history were reviewed and updated as appropriate: allergies, past family history, past social history and past surgical history  Review of Systems   Unable to perform ROS: Patient nonverbal   Skin: Negative for rash  Objective:      /90   Pulse 100   Temp 98 1 °F (36 7 °C) (Temporal)   Ht 4' 7" (1 397 m)   Wt 85 5 kg (188 lb 6 4 oz)   SpO2 97%   BMI 43 79 kg/m²          Physical Exam  Constitutional:       General: She is not in acute distress       Appearance: She is well-developed  HENT:      Head: Normocephalic and atraumatic  Right Ear: External ear normal       Left Ear: External ear normal    Eyes:      General: No scleral icterus  Conjunctiva/sclera: Conjunctivae normal    Neck:      Musculoskeletal: Normal range of motion and neck supple  Thyroid: No thyromegaly  Trachea: No tracheal deviation  Cardiovascular:      Rate and Rhythm: Normal rate and regular rhythm  Heart sounds: Normal heart sounds  Pulmonary:      Effort: Pulmonary effort is normal  No respiratory distress  Breath sounds: Normal breath sounds  No wheezing or rales  Abdominal:      General: Bowel sounds are normal       Palpations: Abdomen is soft  Tenderness: There is no abdominal tenderness  There is no guarding or rebound  Lymphadenopathy:      Cervical: No cervical adenopathy  Neurological:      Mental Status: She is alert and oriented to person, place, and time  Psychiatric:         Behavior: Behavior normal          Thought Content:  Thought content normal          Judgment: Judgment normal

## 2021-08-27 ENCOUNTER — TELEPHONE (OUTPATIENT)
Dept: LAB | Facility: HOSPITAL | Age: 46
End: 2021-08-27

## 2021-11-12 ENCOUNTER — TELEPHONE (OUTPATIENT)
Dept: OTHER | Facility: OTHER | Age: 46
End: 2021-11-12

## 2021-11-12 DIAGNOSIS — R30.0 DYSURIA: Primary | ICD-10-CM

## 2021-11-12 RX ORDER — CIPROFLOXACIN 500 MG/1
500 TABLET, FILM COATED ORAL EVERY 12 HOURS SCHEDULED
Qty: 14 TABLET | Refills: 0 | Status: SHIPPED | OUTPATIENT
Start: 2021-11-12 | End: 2021-11-19

## 2021-11-22 ENCOUNTER — TELEPHONE (OUTPATIENT)
Dept: INTERNAL MEDICINE CLINIC | Facility: CLINIC | Age: 46
End: 2021-11-22

## 2021-11-23 ENCOUNTER — APPOINTMENT (OUTPATIENT)
Dept: LAB | Facility: MEDICAL CENTER | Age: 46
End: 2021-11-23
Payer: MEDICARE

## 2021-11-23 ENCOUNTER — TELEPHONE (OUTPATIENT)
Dept: INTERNAL MEDICINE CLINIC | Facility: CLINIC | Age: 46
End: 2021-11-23

## 2021-11-23 DIAGNOSIS — R30.0 DYSURIA: ICD-10-CM

## 2021-11-23 LAB
BILIRUB UR QL STRIP: NEGATIVE
CLARITY UR: NORMAL
COLOR UR: YELLOW
GLUCOSE UR STRIP-MCNC: NEGATIVE MG/DL
HGB UR QL STRIP.AUTO: NEGATIVE
KETONES UR STRIP-MCNC: NEGATIVE MG/DL
LEUKOCYTE ESTERASE UR QL STRIP: NEGATIVE
NITRITE UR QL STRIP: NEGATIVE
PH UR STRIP.AUTO: 6.5 [PH]
PROT UR STRIP-MCNC: NEGATIVE MG/DL
SP GR UR STRIP.AUTO: 1.02 (ref 1–1.03)
UROBILINOGEN UR QL STRIP.AUTO: 0.2 E.U./DL

## 2021-11-23 PROCEDURE — 81003 URINALYSIS AUTO W/O SCOPE: CPT

## 2021-11-26 ENCOUNTER — TELEPHONE (OUTPATIENT)
Dept: INTERNAL MEDICINE CLINIC | Facility: CLINIC | Age: 46
End: 2021-11-26

## 2021-12-10 DIAGNOSIS — F51.01 PRIMARY INSOMNIA: ICD-10-CM

## 2021-12-10 RX ORDER — LANOLIN ALCOHOL/MO/W.PET/CERES
3 CREAM (GRAM) TOPICAL
Qty: 90 TABLET | Refills: 1 | Status: SHIPPED | OUTPATIENT
Start: 2021-12-10 | End: 2022-01-04 | Stop reason: SDUPTHER

## 2022-01-04 DIAGNOSIS — F51.01 PRIMARY INSOMNIA: ICD-10-CM

## 2022-01-04 DIAGNOSIS — E03.9 HYPOTHYROIDISM, UNSPECIFIED TYPE: ICD-10-CM

## 2022-01-04 DIAGNOSIS — E78.3 MIXED HYPERGLYCERIDEMIA: ICD-10-CM

## 2022-01-06 RX ORDER — LEVOTHYROXINE SODIUM 0.05 MG/1
50 TABLET ORAL DAILY
Qty: 31 TABLET | Refills: 5 | Status: SHIPPED | OUTPATIENT
Start: 2022-01-06 | End: 2022-06-30 | Stop reason: SDUPTHER

## 2022-01-06 RX ORDER — LANOLIN ALCOHOL/MO/W.PET/CERES
3 CREAM (GRAM) TOPICAL
Qty: 31 TABLET | Refills: 5 | Status: SHIPPED | OUTPATIENT
Start: 2022-01-06 | End: 2022-07-06 | Stop reason: SDUPTHER

## 2022-01-06 RX ORDER — ATORVASTATIN CALCIUM 10 MG/1
10 TABLET, FILM COATED ORAL DAILY
Qty: 31 TABLET | Refills: 5 | Status: SHIPPED | OUTPATIENT
Start: 2022-01-06 | End: 2022-07-06 | Stop reason: SDUPTHER

## 2022-01-31 ENCOUNTER — TELEPHONE (OUTPATIENT)
Dept: OTHER | Facility: OTHER | Age: 47
End: 2022-01-31

## 2022-01-31 DIAGNOSIS — E03.9 HYPOTHYROIDISM, UNSPECIFIED TYPE: ICD-10-CM

## 2022-01-31 DIAGNOSIS — M81.8 OTHER OSTEOPOROSIS, UNSPECIFIED PATHOLOGICAL FRACTURE PRESENCE: ICD-10-CM

## 2022-01-31 DIAGNOSIS — Q90.9 TRISOMY 21, DOWN SYNDROME: ICD-10-CM

## 2022-01-31 DIAGNOSIS — Q90.9 TRISOMY 21, DOWN SYNDROME: Primary | ICD-10-CM

## 2022-01-31 NOTE — TELEPHONE ENCOUNTER
Patient medication Multiple Vitamin (Multivitamin Iron-Free) TABS  was sent to the wrong pharmacy to needs to be sent to the [address]

## 2022-02-10 ENCOUNTER — OFFICE VISIT (OUTPATIENT)
Dept: INTERNAL MEDICINE CLINIC | Facility: CLINIC | Age: 47
End: 2022-02-10
Payer: MEDICARE

## 2022-02-10 DIAGNOSIS — Q90.9 DOWN SYNDROME: ICD-10-CM

## 2022-02-10 DIAGNOSIS — Z00.00 MEDICARE ANNUAL WELLNESS VISIT, SUBSEQUENT: ICD-10-CM

## 2022-02-10 DIAGNOSIS — R26.2 AMBULATORY DYSFUNCTION: Primary | ICD-10-CM

## 2022-02-10 PROCEDURE — G0439 PPPS, SUBSEQ VISIT: HCPCS | Performed by: NURSE PRACTITIONER

## 2022-02-10 NOTE — PROGRESS NOTES
Virtual Regular Visit    Verification of patient location:    Patient is located in the following state in which I hold an active license [place]      Assessment/Plan:    Problem List Items Addressed This Visit     None               Reason for visit is   Chief Complaint   Patient presents with   [address] Virtual Regular Visit        Encounter provider Grayling Phoenix, CRNP    Provider located at [address] [facility] [id number]      Recent Visits  No visits were found meeting these conditions  Showing recent visits within past 7 days and meeting all other requirements  Today's Visits  Date Type Provider Dept   02/10/22 Office Visit Grayling Phoenix, [place] today's visits and meeting all other requirements  Future Appointments  No visits were found meeting these conditions  Showing future appointments within next 150 days and meeting all other requirements       The patient was identified by name and date of birth  Oscar Farmer was informed that this is a telemedicine visit and that the visit is being conducted through [facility] and patient was informed this is a secure, HIPAA-complaint platform  She agrees to proceed     My office door was closed  No one else was in the room  She acknowledged consent and understanding of privacy and security of the video platform  The patient has agreed to participate and understands they can discontinue the visit at any time  Patient is aware this is a billable service  Subjective  Oscar Farmer is a 50 y o  female          Patient ishere for difficulty walking  Walks with walker and wheelchair         Past Medical History:   Diagnosis Date    Community acquired pneumonia     Last Assessed:1/22/2013    Leukopenia     Last Assessed:3/5/2014    Osteoporosis     Trisomy 21, Down syndrome        Past Surgical History:   Procedure Laterality Date    TONSILLECTOMY AND ADENOIDECTOMY         Current Outpatient Medications   Medication Sig Dispense Refill    acetaminophen (TYLENOL) 500 mg tablet Take 1 tablet by mouth every 8 (eight) hours as needed      ammonium lactate (LAC-HYDRIN) 12 % lotion Apply topically 2 (two) times a day 400 g 1    atorvastatin (LIPITOR) 10 mg tablet Take 1 tablet (10 mg total) by mouth daily 31 tablet 5    benzonatate (TESSALON) 200 MG capsule Take 200 mg by mouth 3 (three) times a day as needed for cough      Cholecalciferol 25 MCG (1000 UT) CHEW Chew 1 tablet (1,000 Units total) in the morning 100 tablet 1    fluticasone (FLONASE) 50 mcg/act nasal spray into each nostril      Heating Pad PADS by Does not apply route 2 (two) times a day (Patient not taking: Reported on 9/3/2020) 1 each 0    ibuprofen (MOTRIN) 400 mg tablet Take 1 tablet (400 mg total) by mouth every 8 (eight) hours as needed for mild pain (with food) 14 tablet 0    levothyroxine 50 mcg tablet Take 1 tablet (50 mcg total) by mouth daily 31 tablet 5    loratadine (CLARITIN) 5 mg/5 mL syrup Take 5 mg by mouth      melatonin 3 mg Take 1 tablet (3 mg total) by mouth daily at bedtime 31 tablet 5    Multiple Vitamin (Multivitamin Iron-Free) TABS Take 1 tablet by mouth in the morning 100 tablet 0    nystatin (MYCOSTATIN) powder Apply topically       No current facility-administered medications for this visit  Allergies   Allergen Reactions    Other     Pollen Extract        Review of Systems   Constitutional: Negative  HENT: Negative  Eyes: Negative  Respiratory: Negative  Cardiovascular: Negative  Gastrointestinal: Negative  Musculoskeletal: Negative  Neurological: Negative  Video Exam    There were no vitals filed for this visit  Physical Exam  Constitutional:       Appearance: Normal appearance  She is obese  Neurological:      Mental Status: She is alert            I spent 25 minutes directly with the patient during this visit    VIRTUAL VISIT DISCLAIMER      Lisa Espana verbally agrees to participate in [employer]  Pt is aware that [employer] could be limited without vital signs or the ability to perform a full hands-on physical exam  Aminta De La Torre understands she or the provider may request at any time to terminate the video visit and request the patient to seek care or treatment in person

## 2022-02-10 NOTE — PATIENT INSTRUCTIONS
Medicare Preventive Visit Patient Instructions  Thank you for completing your Welcome to Medicare Visit or Medicare Annual Wellness Visit today  Your next wellness visit will be due in one year (2/11/2023)  The screening/preventive services that you may require over the next 5-10 years are detailed below  Some tests may not apply to you based off risk factors and/or age  Screening tests ordered at today's visit but not completed yet may show as past due  Also, please note that scanned in results may not display below  Preventive Screenings:  Service Recommendations Previous Testing/Comments   Colorectal Cancer Screening  * Colonoscopy    * Fecal Occult Blood Test (FOBT)/Fecal Immunochemical Test (FIT)  * Fecal DNA/Cologuard Test  * Flexible Sigmoidoscopy Age: 54-65 years old   Colonoscopy: every 10 years (may be performed more frequently if at higher risk)  OR  FOBT/FIT: every 1 year  OR  Cologuard: every 3 years  OR  Sigmoidoscopy: every 5 years  Screening may be recommended earlier than age 48 if at higher risk for colorectal cancer  Also, an individualized decision between you and your healthcare provider will decide whether screening between the ages of 74-80 would be appropriate  Colonoscopy: Not on file  FOBT/FIT: Not on file  Cologuard: Not on file  Sigmoidoscopy: Not on file          Breast Cancer Screening Age: 36 years old  Frequency: every 1-2 years  Not required if history of left and right mastectomy Mammogram: 06/16/2021        Cervical Cancer Screening Between the ages of 21-29, pap smear recommended once every 3 years  Between the ages of 33-67, can perform pap smear with HPV co-testing every 5 years     Recommendations may differ for women with a history of total hysterectomy, cervical cancer, or abnormal pap smears in past  Pap Smear: Not on file        Hepatitis C Screening Once for adults born between [facility]  More frequently in patients at high risk for Hepatitis C Hep C Antibody: 01/23/2015        Diabetes Screening 1-2 times per year if you're at risk for diabetes or have pre-diabetes Fasting glucose: 111 mg/dL   A1C: No results in last 5 years        Cholesterol Screening Once every 5 years if you don't have a lipid disorder  May order more often based on risk factors  Lipid panel: 05/04/2021          Other Preventive Screenings Covered by Medicare:  1  Abdominal Aortic Aneurysm (AAA) Screening: covered once if your at risk  You're considered to be at risk if you have a family history of AAA  2  Lung Cancer Screening: covers low dose CT scan once per year if you meet all of the following conditions: (1) Age 50-69; (2) No signs or symptoms of lung cancer; (3) Current smoker or have quit smoking within the last 15 years; (4) You have a tobacco smoking history of at least 30 pack years (packs per day multiplied by number of years you smoked); (5) You get a written order from a healthcare provider  3  Glaucoma Screening: covered annually if you're considered high risk: (1) You have diabetes OR (2) Family history of glaucoma OR (3)  aged 48 and older OR (3)  American aged 72 and older  3  Osteoporosis Screening: covered every 2 years if you meet one of the following conditions: (1) You're estrogen deficient and at risk for osteoporosis based off medical history and other findings; (2) Have a vertebral abnormality; (3) On glucocorticoid therapy for more than 3 months; (4) Have primary hyperparathyroidism; (5) On osteoporosis medications and need to assess response to drug therapy  · Last bone density test (DXA Scan): 09/17/2013  5  HIV Screening: covered annually if you're between the age of 12-76  Also covered annually if you are younger than 13 and older than 72 with risk factors for HIV infection  For pregnant patients, it is covered up to 3 times per pregnancy      Immunizations:  Immunization Recommendations   Influenza Vaccine Annual influenza vaccination during flu season is recommended for all persons aged >= 6 months who do not have contraindications   Pneumococcal Vaccine (Prevnar and Pneumovax)  * Prevnar = PCV13  * Pneumovax = PPSV23   Adults 25-60 years old: 1-3 doses may be recommended based on certain risk factors  Adults 72 years old: Prevnar (PCV13) vaccine recommended followed by Pneumovax (PPSV23) vaccine  If already received PPSV23 since turning 65, then PCV13 recommended at least one year after PPSV23 dose  Hepatitis B Vaccine 3 dose series if at intermediate or high risk (ex: diabetes, end stage renal disease, liver disease)   Tetanus (Td) Vaccine - COST NOT COVERED BY MEDICARE PART B Following completion of primary series, a booster dose should be given every 10 years to maintain immunity against tetanus  Td may also be given as tetanus wound prophylaxis  Tdap Vaccine - COST NOT COVERED BY MEDICARE PART B Recommended at least once for all adults  For pregnant patients, recommended with each pregnancy  Shingles Vaccine (Shingrix) - COST NOT COVERED BY MEDICARE PART B  2 shot series recommended in those aged 48 and above     Health Maintenance Due:      Topic Date Due    HIV Screening  Never done    Breast Cancer Screening: Mammogram  06/16/2022    Hepatitis C Screening  Completed     Immunizations Due:      Topic Date Due    COVID-19 Vaccine (1) Never done    Influenza Vaccine (1) 09/01/2021     Advance Directives   What are advance directives? Advance directives are legal documents that state your wishes and plans for medical care  These plans are made ahead of time in case you lose your ability to make decisions for yourself  Advance directives can apply to any medical decision, such as the treatments you want, and if you want to donate organs  What are the types of advance directives? There are many types of advance directives, and each state has rules about how to use them   You may choose a combination of any of the following:  · Living will:  This is a written record of the treatment you want  You can also choose which treatments you do not want, which to limit, and which to stop at a certain time  This includes surgery, medicine, IV fluid, and tube feedings  · Durable power of  for healthcare [facility]): This is a written record that states who you want to make healthcare choices for you when you are unable to make them for yourself  This person, called a proxy, is usually a family member or a friend  You may choose more than 1 proxy  · Do not resuscitate (DNR) order:  A DNR order is used in case your heart stops beating or you stop breathing  It is a request not to have certain forms of treatment, such as CPR  A DNR order may be included in other types of advance directives  · Medical directive: This covers the care that you want if you are in a coma, near death, or unable to make decisions for yourself  You can list the treatments you want for each condition  Treatment may include pain medicine, surgery, blood transfusions, dialysis, IV or tube feedings, and a ventilator (breathing machine)  · Values history: This document has questions about your views, beliefs, and how you feel and think about life  This information can help others choose the care that you would choose  Why are advance directives important? An advance directive helps you control your care  Although spoken wishes may be used, it is better to have your wishes written down  Spoken wishes can be misunderstood, or not followed  Treatments may be given even if you do not want them  An advance directive may make it easier for your family to make difficult choices about your care  Weight Management   Why it is important to manage your weight:  Being overweight increases your risk of health conditions such as heart disease, high blood pressure, type 2 diabetes, and certain types of cancer   It can also increase your risk for osteoarthritis, sleep apnea, and other respiratory problems  Aim for a slow, steady weight loss  Even a small amount of weight loss can lower your risk of health problems  How to lose weight safely:  A safe and healthy way to lose weight is to eat fewer calories and get regular exercise  You can lose up about 1 pound a week by decreasing the number of calories you eat by 500 calories each day  Healthy meal plan for weight management:  A healthy meal plan includes a variety of foods, contains fewer calories, and helps you stay healthy  A healthy meal plan includes the following:  · Eat whole-grain foods more often  A healthy meal plan should contain fiber  Fiber is the part of grains, fruits, and vegetables that is not broken down by your body  Whole-grain foods are healthy and provide extra fiber in your diet  Some examples of whole-grain foods are whole-wheat breads and pastas, oatmeal, brown rice, and bulgur  · Eat a variety of vegetables every day  Include dark, leafy greens such as spinach, kale, akilah greens, and mustard greens  Eat yellow and orange vegetables such as carrots, sweet potatoes, and winter squash  · Eat a variety of fruits every day  Choose fresh or canned fruit (canned in its own juice or light syrup) instead of juice  Fruit juice has very little or no fiber  · Eat low-fat dairy foods  Drink fat-free (skim) milk or 1% milk  Eat fat-free yogurt and low-fat cottage cheese  Try low-fat cheeses such as mozzarella and other reduced-fat cheeses  · Choose meat and other protein foods that are low in fat  Choose beans or other legumes such as split peas or lentils  Choose fish, skinless poultry (chicken or turkey), or lean cuts of red meat (beef or pork)  Before you cook meat or poultry, cut off any visible fat  · Use less fat and oil  Try baking foods instead of frying them  Add less fat, such as margarine, sour cream, regular salad dressing and mayonnaise to foods  Eat fewer high-fat foods   Some examples of high-fat foods include french fries, doughnuts, ice cream, and cakes  · Eat fewer sweets  Limit foods and drinks that are high in sugar  This includes candy, cookies, regular soda, and sweetened drinks  Exercise:  Exercise at least 30 minutes per day on most days of the week  Some examples of exercise include walking, biking, dancing, and swimming  You can also fit in more physical activity by taking the stairs instead of the elevator or parking farther away from stores  Ask your healthcare provider about the best exercise plan for you  © Copyright [employer] 2018 Information is for End User's use only and may not be sold, redistributed or otherwise used for commercial purposes  All illustrations and images included in CareNotes® are the copyrighted property of [employer]  or [facility] Preventive Visit Patient Instructions  Thank you for completing your Welcome to Medicare Visit or Medicare Annual Wellness Visit today  Your next wellness visit will be due in one year (2/11/2023)  The screening/preventive services that you may require over the next 5-10 years are detailed below  Some tests may not apply to you based off risk factors and/or age  Screening tests ordered at today's visit but not completed yet may show as past due  Also, please note that scanned in results may not display below  Preventive Screenings:  Service Recommendations Previous Testing/Comments   Colorectal Cancer Screening  * Colonoscopy    * Fecal Occult Blood Test (FOBT)/Fecal Immunochemical Test (FIT)  * Fecal DNA/Cologuard Test  * Flexible Sigmoidoscopy Age: 54-65 years old   Colonoscopy: every 10 years (may be performed more frequently if at higher risk)  OR  FOBT/FIT: every 1 year  OR  Cologuard: every 3 years  OR  Sigmoidoscopy: every 5 years  Screening may be recommended earlier than age 48 if at higher risk for colorectal cancer   Also, an individualized decision between you and your healthcare provider will decide whether screening between the ages of 74-80 would be appropriate  Colonoscopy: Not on file  FOBT/FIT: Not on file  Cologuard: Not on file  Sigmoidoscopy: Not on file          Breast Cancer Screening Age: 36 years old  Frequency: every 1-2 years  Not required if history of left and right mastectomy Mammogram: 06/16/2021        Cervical Cancer Screening Between the ages of 21-29, pap smear recommended once every 3 years  Between the ages of 33-67, can perform pap smear with HPV co-testing every 5 years  Recommendations may differ for women with a history of total hysterectomy, cervical cancer, or abnormal pap smears in past  Pap Smear: Not on file        Hepatitis C Screening Once for adults born between 1945 and 1965  More frequently in patients at high risk for Hepatitis C Hep C Antibody: 01/23/2015        Diabetes Screening 1-2 times per year if you're at risk for diabetes or have pre-diabetes Fasting glucose: 111 mg/dL   A1C: No results in last 5 years        Cholesterol Screening Once every 5 years if you don't have a lipid disorder  May order more often based on risk factors  Lipid panel: 05/04/2021          Other Preventive Screenings Covered by Medicare:  6  Abdominal Aortic Aneurysm (AAA) Screening: covered once if your at risk  You're considered to be at risk if you have a family history of AAA  7  Lung Cancer Screening: covers low dose CT scan once per year if you meet all of the following conditions: (1) Age 50-69; (2) No signs or symptoms of lung cancer; (3) Current smoker or have quit smoking within the last 15 years; (4) You have a tobacco smoking history of at least 30 pack years (packs per day multiplied by number of years you smoked); (5) You get a written order from a healthcare provider    8  Glaucoma Screening: covered annually if you're considered high risk: (1) You have diabetes OR (2) Family history of glaucoma OR (3)  aged 48 and older OR (3)  American aged 72 and older  9  Osteoporosis Screening: covered every 2 years if you meet one of the following conditions: (1) You're estrogen deficient and at risk for osteoporosis based off medical history and other findings; (2) Have a vertebral abnormality; (3) On glucocorticoid therapy for more than 3 months; (4) Have primary hyperparathyroidism; (5) On osteoporosis medications and need to assess response to drug therapy  · Last bone density test (DXA Scan): 09/17/2013   10  HIV Screening: covered annually if you're between the age of 15-65  Also covered annually if you are younger than 13 and older than 72 with risk factors for HIV infection  For pregnant patients, it is covered up to 3 times per pregnancy  Immunizations:  Immunization Recommendations   Influenza Vaccine Annual influenza vaccination during flu season is recommended for all persons aged >= 6 months who do not have contraindications   Pneumococcal Vaccine (Prevnar and Pneumovax)  * Prevnar = PCV13  * Pneumovax = PPSV23   Adults 25-60 years old: 1-3 doses may be recommended based on certain risk factors  Adults 72 years old: Prevnar (PCV13) vaccine recommended followed by Pneumovax (PPSV23) vaccine  If already received PPSV23 since turning 65, then PCV13 recommended at least one year after PPSV23 dose  Hepatitis B Vaccine 3 dose series if at intermediate or high risk (ex: diabetes, end stage renal disease, liver disease)   Tetanus (Td) Vaccine - COST NOT COVERED BY MEDICARE PART B Following completion of primary series, a booster dose should be given every 10 years to maintain immunity against tetanus  Td may also be given as tetanus wound prophylaxis  Tdap Vaccine - COST NOT COVERED BY MEDICARE PART B Recommended at least once for all adults  For pregnant patients, recommended with each pregnancy     Shingles Vaccine (Shingrix) - COST NOT COVERED BY MEDICARE PART B  2 shot series recommended in those aged 48 and above     Health Maintenance Due:      Topic Date Due    HIV Screening  Never done    Breast Cancer Screening: Mammogram  06/16/2022    Hepatitis C Screening  Completed     Immunizations Due:      Topic Date Due    COVID-19 Vaccine (1) Never done    Influenza Vaccine (1) 09/01/2021     Advance Directives   What are advance directives? Advance directives are legal documents that state your wishes and plans for medical care  These plans are made ahead of time in case you lose your ability to make decisions for yourself  Advance directives can apply to any medical decision, such as the treatments you want, and if you want to donate organs  What are the types of advance directives? There are many types of advance directives, and each state has rules about how to use them  You may choose a combination of any of the following:  · Living will: This is a written record of the treatment you want  You can also choose which treatments you do not want, which to limit, and which to stop at a certain time  This includes surgery, medicine, IV fluid, and tube feedings  · Durable power of  for healthcare [facility]): This is a written record that states who you want to make healthcare choices for you when you are unable to make them for yourself  This person, called a proxy, is usually a family member or a friend  You may choose more than 1 proxy  · Do not resuscitate (DNR) order:  A DNR order is used in case your heart stops beating or you stop breathing  It is a request not to have certain forms of treatment, such as CPR  A DNR order may be included in other types of advance directives  · Medical directive: This covers the care that you want if you are in a coma, near death, or unable to make decisions for yourself  You can list the treatments you want for each condition  Treatment may include pain medicine, surgery, blood transfusions, dialysis, IV or tube feedings, and a ventilator (breathing machine)  · Values history:   This document has questions about your views, beliefs, and how you feel and think about life  This information can help others choose the care that you would choose  Why are advance directives important? An advance directive helps you control your care  Although spoken wishes may be used, it is better to have your wishes written down  Spoken wishes can be misunderstood, or not followed  Treatments may be given even if you do not want them  An advance directive may make it easier for your family to make difficult choices about your care  Weight Management   Why it is important to manage your weight:  Being overweight increases your risk of health conditions such as heart disease, high blood pressure, type 2 diabetes, and certain types of cancer  It can also increase your risk for osteoarthritis, sleep apnea, and other respiratory problems  Aim for a slow, steady weight loss  Even a small amount of weight loss can lower your risk of health problems  How to lose weight safely:  A safe and healthy way to lose weight is to eat fewer calories and get regular exercise  You can lose up about 1 pound a week by decreasing the number of calories you eat by 500 calories each day  Healthy meal plan for weight management:  A healthy meal plan includes a variety of foods, contains fewer calories, and helps you stay healthy  A healthy meal plan includes the following:  · Eat whole-grain foods more often  A healthy meal plan should contain fiber  Fiber is the part of grains, fruits, and vegetables that is not broken down by your body  Whole-grain foods are healthy and provide extra fiber in your diet  Some examples of whole-grain foods are whole-wheat breads and pastas, oatmeal, brown rice, and bulgur  · Eat a variety of vegetables every day  Include dark, leafy greens such as spinach, kale, akil[place] greens, and mustard greens  Eat yellow and orange vegetables such as carrots, sweet potatoes, and winter squash  · Eat a variety of fruits every day  Choose fresh or canned fruit (canned in its own juice or light syrup) instead of juice  Fruit juice has very little or no fiber  · Eat low-fat dairy foods  Drink fat-free (skim) milk or 1% milk  Eat fat-free yogurt and low-fat cottage cheese  Try low-fat cheeses such as mozzarella and other reduced-fat cheeses  · Choose meat and other protein foods that are low in fat  Choose beans or other legumes such as split peas or lentils  Choose fish, skinless poultry (chicken or turkey), or lean cuts of red meat (beef or pork)  Before you cook meat or poultry, cut off any visible fat  · Use less fat and oil  Try baking foods instead of frying them  Add less fat, such as margarine, sour cream, regular salad dressing and mayonnaise to foods  Eat fewer high-fat foods  Some examples of high-fat foods include french fries, doughnuts, ice cream, and cakes  · Eat fewer sweets  Limit foods and drinks that are high in sugar  This includes candy, cookies, regular soda, and sweetened drinks  Exercise:  Exercise at least 30 minutes per day on most days of the week  Some examples of exercise include walking, biking, dancing, and swimming  You can also fit in more physical activity by taking the stairs instead of the elevator or parking farther away from stores  Ask your healthcare provider about the best exercise plan for you  © Copyright [employer] 2018 Information is for End User's use only and may not be sold, redistributed or otherwise used for commercial purposes   All illustrations and images included in CareNotes® are the copyrighted property of [employer]  or [address] [place] [address]

## 2022-02-10 NOTE — PROGRESS NOTES
Virtual AWV Consent    Verification of patient location:    Patient is located in the following state in which I hold an active license [place]    Reason for visit is awv    Encounter provider STEFANIA De La Garza    Provider located at [address] [place] [phone]      Recent Visits  No visits were found meeting these conditions  Showing recent visits within past 7 days and meeting all other requirements  Today's Visits  Date Type Provider Dept   02/10/22 Office Visit Lidya De La Garza today's visits and meeting all other requirements  Future Appointments  No visits were found meeting these conditions  Showing future appointments within next 150 days and meeting all other requirements       After connecting through [employer], the patient was identified by name and date of birth  Marybabs Rasmussen was informed that this is a telemedicine visit and that the visit is being conducted through [address] and patient was informed this is a secure, HIPAA-complaint platform  She agrees to proceed  My office door was closed  No one else was in the room  She acknowledged consent and understanding of privacy and security of the video platform  Mary Rasmussen verbally agrees to participate in [employer]  Pt is aware that [employer] could be limited without vital signs or the ability to perform a full hands-on physical exam  Aminta De La Torre understands she or the provider may request at any time to terminate the video visit and request the patient to seek care or treatment in person  Patient is aware this is a billable service     Assessment and Plan:     Problem List Items Addressed This Visit        Other    Down syndrome    Relevant Orders    Ambulatory Referral to [address]      Other Visit Diagnoses     Ambulatory dysfunction    -  Primary    Relevant Orders    Ambulatory Referral to [address] Preventive health issues were discussed with patient, and age appropriate screening tests were ordered as noted in patient's After Visit Summary  Personalized health advice and appropriate referrals for health education or preventive services given if needed, as noted in patient's After Visit Summary  History of Present Illness:     Patient presents for Medicare Annual Wellness visit    Since her ankle injury last September she was hospitalized for 56 days and since then has been home with her sister  She has developed agoraphobia and is very nervous about leaving the home  She used to use a wheelchair but now has what joint and to a walker    She needs a renewal of physical therapy referral to start practicing stairs    Patient Care Team:  STEFANIA Lyons as PCP - General (Family Medicine)     Problem List:     Patient Active Problem List   Diagnosis    Allergic rhinitis    Down syndrome    Hyperlipidemia    Hypothyroidism    Osteoporosis    Periodontitis    Trisomy 21, Down syndrome    Acute pain of left knee    Neck pain    Bilateral impacted cerumen    Preoperative clearance    Class 3 severe obesity due to excess calories without serious comorbidity with body mass index (BMI) of 40 0 to 44 9 in adult ([address] )    Morbid obesity with body mass index (BMI) of 40 0 to 49 9 ([facility])    Abnormal laboratory test    Primary insomnia      Past Medical and Surgical History:     Past Medical History:   Diagnosis Date    Community acquired pneumonia     Last Assessed:1/22/2013    Leukopenia     Last Assessed:3/5/2014    Osteoporosis     Trisomy 21, Down syndrome      Past Surgical History:   Procedure Laterality Date    TONSILLECTOMY AND ADENOIDECTOMY        Family History:     Family History   Family history unknown: Yes      Social History:     Social History     Socioeconomic History    Marital status: Single     Spouse name: Not on file    Number of children: Not on file    Years of education: Not on file    Highest education level: Not on file   Occupational History    Not on file   Tobacco Use    Smoking status: Never Smoker    Smokeless tobacco: Never Used   Substance and Sexual Activity    Alcohol use: No    Drug use: Not on file    Sexual activity: Not on file   Other Topics Concern    Not on file   Social History Narrative    Not on file     Social Determinants of Health     Financial Resource Strain: Not on file   Food Insecurity: Not on file   Transportation Needs: Not on file   Physical Activity: Not on file   Stress: Not on file   Social Connections: Not on file   Intimate Partner Violence: Not on file   Housing Stability: Not on file      Medications and Allergies:     Current Outpatient Medications   Medication Sig Dispense Refill    acetaminophen (TYLENOL) 500 mg tablet Take 1 tablet by mouth every 8 (eight) hours as needed      ammonium lactate (LAC-HYDRIN) 12 % lotion Apply topically 2 (two) times a day 400 g 1    atorvastatin (LIPITOR) 10 mg tablet Take 1 tablet (10 mg total) by mouth daily 31 tablet 5    benzonatate (TESSALON) 200 MG capsule Take 200 mg by mouth 3 (three) times a day as needed for cough      Cholecalciferol 25 MCG (1000 UT) CHEW Chew 1 tablet (1,000 Units total) in the morning 100 tablet 1    fluticasone (FLONASE) 50 mcg/act nasal spray into each nostril      Heating Pad PADS by Does not apply route 2 (two) times a day (Patient not taking: Reported on 9/3/2020) 1 each 0    ibuprofen (MOTRIN) 400 mg tablet Take 1 tablet (400 mg total) by mouth every 8 (eight) hours as needed for mild pain (with food) 14 tablet 0    levothyroxine 50 mcg tablet Take 1 tablet (50 mcg total) by mouth daily 31 tablet 5    loratadine (CLARITIN) 5 mg/5 mL syrup Take 5 mg by mouth      melatonin 3 mg Take 1 tablet (3 mg total) by mouth daily at bedtime 31 tablet 5    Multiple Vitamin (Multivitamin Iron-Free) TABS Take 1 tablet by mouth in the morning 100 tablet 0  nystatin (MYCOSTATIN) powder Apply topically       No current facility-administered medications for this visit  Allergies   Allergen Reactions    Other     Pollen Extract       Immunizations:     Immunization History   Administered Date(s) Administered    INFLUENZA 11/13/2018    Influenza Quadrivalent Preservative Free 3 years and older IM 10/04/2014, 10/16/2015, 11/03/2016, 11/24/2017    Influenza, injectable, quadrivalent, preservative free 0 5 mL 09/26/2019, 09/03/2020    Influenza, recombinant, quadrivalent,injectable, preservative free 11/13/2018    Influenza, seasonal, injectable 10/25/2012, 10/05/2013    Tdap 12/13/2012    Tuberculin Skin Test-PPD Intradermal 07/31/2012, 07/29/2014, 08/19/2016, 08/27/2018, 09/15/2020      Health Maintenance:         Topic Date Due    HIV Screening  Never done    Breast Cancer Screening: Mammogram  06/16/2022    Hepatitis C Screening  Completed         Topic Date Due    COVID-19 Vaccine (1) Never done    Influenza Vaccine (1) 09/01/2021      Medicare Health Risk Assessment:     There were no vitals taken for this visit  Wilian is here for her Subsequent Wellness visit  Last Medicare Wellness visit information reviewed, patient interviewed, no change since last AWV  Historian  Patient cannot answer questions due to cognitive impairment, intelluctual disability, or expressive limitations  Information provided by: family  Health Risk Assessment:   Patient rates overall health as good  Patient feels that their physical health rating is same  Patient is satisfied with their life  Eyesight was rated as same  Hearing was rated as same  Patient feels that their emotional and mental health rating is same  Patients states they are never, rarely angry  Patient states they are sometimes unusually tired/fatigued  Pain experienced in the last 7 days has been some  Patient states that she has experienced no weight loss or gain in last 6 months       Fall Risk Screening: In the past year, patient has experienced: no history of falling in past year      Urinary Incontinence Screening:   Patient has leaked urine accidently in the last six months  Home Safety:  Patient has trouble with stairs inside or outside of their home  Patient has working smoke alarms and has working carbon monoxide detector  Home safety hazards include: none  Nutrition:   Current diet is Regular  Medications:   Patient is not currently taking any over-the-counter supplements  Patient is not able to manage medications  Activities of Daily Living (ADLs)/Instrumental Activities of Daily Living (IADLs):   Walk and transfer into and out of bed and chair?: Yes  Dress and groom yourself?: Yes    Bathe or shower yourself?: No    Feed yourself?  Yes  Do your laundry/housekeeping?: No  Manage your money, pay your bills and track your expenses?: No  Make your own meals?: No    Do your own shopping?: No    Previous Hospitalizations:   Any hospitalizations or ED visits within the last 12 months?: Yes    How many hospitalizations have you had in the last year?: 1-2    Advance Care Planning:   Living will: No      PREVENTIVE SCREENINGS      Cardiovascular Screening:    General: Screening Not Indicated and History Lipid Disorder      Diabetes Screening:     General: Screening Current      Breast Cancer Screening:     General: Screening Current      Osteoporosis Screening:    General: Screening Not Indicated and History Osteoporosis      Lung Cancer Screening:     General: Screening Not Indicated      Hepatitis C Screening:    General: Screening Current    Screening, Brief Intervention, and Referral to Treatment (SBIRT)    Screening      Single Item Drug Screening:  How often have you used an illegal drug (including marijuana) or a prescription medication for non-medical reasons in the past year? never    Single Item Drug Screen Score: 0  Interpretation: Negative screen for possible drug use disorder  BMI Counseling: There is no height or weight on file to calculate BMI  The BMI is above normal  Nutrition recommendations include consuming healthier snacks, decreasing soda and/or juice intake and moderation in carbohydrate intake  Exercise recommendations include exercising 3-5 times per week        STEFANIA Cadena

## 2022-02-11 ENCOUNTER — PATIENT OUTREACH (OUTPATIENT)
Dept: INTERNAL MEDICINE CLINIC | Facility: CLINIC | Age: 47
End: 2022-02-11

## 2022-02-11 NOTE — PROGRESS NOTES
Spoke with Barbara Luz and provided her phone numbers for [facility] area on aging, [address] and [employer] [employer] for covid booster vaccine  She was appreciative

## 2022-02-11 NOTE — PROGRESS NOTES
Caregiver from group home answered and reported Anselm Sandhoff is staying with her sister right now she provided phone number   Nikosrobbin Evangelina [phone]

## 2022-02-24 ENCOUNTER — TELEPHONE (OUTPATIENT)
Dept: INTERNAL MEDICINE CLINIC | Facility: CLINIC | Age: 47
End: 2022-02-24

## 2022-02-24 NOTE — TELEPHONE ENCOUNTER
Payton Alarcon patient's sister called in asked if you would be able to do her yearly physical with a form fill as virtual?     Patient has an injury/incident out and now she did not want to go out or go to doctor offices  Payton Dorian is aware that you are currently out of the office and that you will return 3/1     Please advise

## 2022-03-01 NOTE — TELEPHONE ENCOUNTER
[employer] to drop off forms, verbalized understanding  Awaiting forms to complete form fill process  Done

## 2022-03-01 NOTE — TELEPHONE ENCOUNTER
We did her medicare wellness via video 2/10 so they can just drop the form off and I can fill it out for her

## 2022-03-04 NOTE — TELEPHONE ENCOUNTER
Brother Logan Romero dropped off physical form today  Put form in PCP bin to be completed  If there are any questions with form call Claudia Jaimes ([phone])    Awaiting completed form then will call Norman to

## 2022-03-31 ENCOUNTER — TELEPHONE (OUTPATIENT)
Dept: INTERNAL MEDICINE CLINIC | Facility: CLINIC | Age: 47
End: 2022-03-31

## 2022-03-31 NOTE — TELEPHONE ENCOUNTER
Lm for the patients sister to call our office  We are unable to do the form until the patient has a in office appointment

## 2022-04-06 DIAGNOSIS — Z78.9 HEALTH MAINTENANCE ALTERATION: Primary | ICD-10-CM

## 2022-05-25 DIAGNOSIS — M54.2 NECK PAIN: ICD-10-CM

## 2022-05-25 RX ORDER — ACETAMINOPHEN 500 MG
500 TABLET ORAL EVERY 8 HOURS PRN
Qty: 30 TABLET | Refills: 0 | Status: SHIPPED | OUTPATIENT
Start: 2022-05-25 | End: 2022-06-30 | Stop reason: SDUPTHER

## 2022-05-25 RX ORDER — NYSTATIN 100000 [USP'U]/G
POWDER TOPICAL 2 TIMES DAILY
Qty: 15 G | Refills: 0 | Status: SHIPPED | OUTPATIENT
Start: 2022-05-25 | End: 2022-06-30 | Stop reason: SDUPTHER

## 2022-05-25 RX ORDER — IBUPROFEN 400 MG/1
400 TABLET ORAL EVERY 8 HOURS PRN
Qty: 14 TABLET | Refills: 0 | Status: SHIPPED | OUTPATIENT
Start: 2022-05-25

## 2022-05-25 NOTE — TELEPHONE ENCOUNTER
Please review requested medication refills, medications previously ordered by historical provider and require reorder for refill

## 2022-06-08 ENCOUNTER — TELEPHONE (OUTPATIENT)
Dept: INTERNAL MEDICINE CLINIC | Facility: CLINIC | Age: 47
End: 2022-06-08

## 2022-06-08 DIAGNOSIS — G89.29 CHRONIC PAIN OF BOTH KNEES: Primary | ICD-10-CM

## 2022-06-08 DIAGNOSIS — M25.561 CHRONIC PAIN OF BOTH KNEES: Primary | ICD-10-CM

## 2022-06-08 DIAGNOSIS — M25.562 CHRONIC PAIN OF BOTH KNEES: Primary | ICD-10-CM

## 2022-06-08 NOTE — TELEPHONE ENCOUNTER
Olga Orozco @ [facility] is requesting a script for Bilateral Knee pain      Please fax script to: [phone]  Phone: [phone]

## 2022-06-09 NOTE — TELEPHONE ENCOUNTER
Keeley Chappell calling in requesting an order for PT at home for [address]  to be entered for same diagnosis as for the Ortho, once entered please fax to number below    Fax: [phone]

## 2022-06-15 ENCOUNTER — TELEPHONE (OUTPATIENT)
Dept: OTHER | Facility: OTHER | Age: 47
End: 2022-06-15

## 2022-06-15 DIAGNOSIS — R19.7 DIARRHEA, UNSPECIFIED TYPE: ICD-10-CM

## 2022-06-15 DIAGNOSIS — G89.29 CHRONIC PAIN OF BOTH KNEES: Primary | ICD-10-CM

## 2022-06-15 DIAGNOSIS — M25.562 CHRONIC PAIN OF BOTH KNEES: Primary | ICD-10-CM

## 2022-06-15 DIAGNOSIS — M25.561 CHRONIC PAIN OF BOTH KNEES: Primary | ICD-10-CM

## 2022-06-15 RX ORDER — LOPERAMIDE HYDROCHLORIDE 2 MG/1
2 TABLET ORAL 4 TIMES DAILY PRN
Qty: 30 TABLET | Refills: 0 | Status: SHIPPED | OUTPATIENT
Start: 2022-06-15 | End: 2022-06-17 | Stop reason: SDUPTHER

## 2022-06-15 NOTE — TELEPHONE ENCOUNTER
Please send order for imodium to [facility]  Mother is asking is if Aspercreme or its equivalent can be ordered as Ant Ribera has on going knee pain

## 2022-06-15 NOTE — TELEPHONE ENCOUNTER
Rvwd recommendations with Marii Arcehr from [facility], [facility]

## 2022-06-15 NOTE — TELEPHONE ENCOUNTER
Arnulfo Carrington called back vitals are /72, Temp 97 8, O2 97, pulse 84, resp 20    Please advise

## 2022-06-15 NOTE — TELEPHONE ENCOUNTER
The patient had severe diarrhea yesterday, today it is not as bad  She will not allow them to clean her, she will not get out of bed  She states that she is in pain but cant say where  Jackeline Randhawa is calling back with a full set of vitals

## 2022-06-17 DIAGNOSIS — M25.561 CHRONIC PAIN OF BOTH KNEES: ICD-10-CM

## 2022-06-17 DIAGNOSIS — R19.7 DIARRHEA, UNSPECIFIED TYPE: ICD-10-CM

## 2022-06-17 DIAGNOSIS — G89.29 CHRONIC PAIN OF BOTH KNEES: ICD-10-CM

## 2022-06-17 DIAGNOSIS — M25.562 CHRONIC PAIN OF BOTH KNEES: ICD-10-CM

## 2022-06-17 RX ORDER — LOPERAMIDE HYDROCHLORIDE 2 MG/1
2 TABLET ORAL 4 TIMES DAILY PRN
Qty: 30 TABLET | Refills: 0 | Status: SHIPPED | OUTPATIENT
Start: 2022-06-17

## 2022-06-30 ENCOUNTER — TELEPHONE (OUTPATIENT)
Dept: INTERNAL MEDICINE CLINIC | Facility: CLINIC | Age: 47
End: 2022-06-30

## 2022-06-30 DIAGNOSIS — M54.2 NECK PAIN: ICD-10-CM

## 2022-06-30 DIAGNOSIS — J30.1 SEASONAL ALLERGIC RHINITIS DUE TO POLLEN: Primary | ICD-10-CM

## 2022-06-30 DIAGNOSIS — E03.9 HYPOTHYROIDISM, UNSPECIFIED TYPE: ICD-10-CM

## 2022-06-30 DIAGNOSIS — R21 RASH: ICD-10-CM

## 2022-06-30 RX ORDER — FLUTICASONE PROPIONATE 50 MCG
2 SPRAY, SUSPENSION (ML) NASAL DAILY
Qty: 11.1 ML | Refills: 0 | Status: SHIPPED | OUTPATIENT
Start: 2022-06-30 | End: 2022-07-11

## 2022-06-30 RX ORDER — NYSTATIN 100000 [USP'U]/G
POWDER TOPICAL 2 TIMES DAILY
Qty: 15 G | Refills: 0 | Status: SHIPPED | OUTPATIENT
Start: 2022-06-30 | End: 2022-07-19 | Stop reason: SDUPTHER

## 2022-06-30 RX ORDER — AMMONIUM LACTATE 12 G/100G
LOTION TOPICAL 2 TIMES DAILY
Qty: 400 G | Refills: 0 | Status: SHIPPED | OUTPATIENT
Start: 2022-06-30 | End: 2022-07-19 | Stop reason: SDUPTHER

## 2022-06-30 RX ORDER — LORATADINE ORAL 5 MG/5ML
5 SOLUTION ORAL DAILY
Qty: 150 ML | Refills: 0 | Status: SHIPPED | OUTPATIENT
Start: 2022-06-30 | End: 2022-07-11 | Stop reason: SDUPTHER

## 2022-06-30 RX ORDER — LEVOTHYROXINE SODIUM 0.05 MG/1
50 TABLET ORAL DAILY
Qty: 31 TABLET | Refills: 0 | Status: SHIPPED | OUTPATIENT
Start: 2022-06-30 | End: 2022-07-19 | Stop reason: SDUPTHER

## 2022-06-30 RX ORDER — ACETAMINOPHEN 500 MG
500 TABLET ORAL EVERY 8 HOURS PRN
Qty: 30 TABLET | Refills: 0 | Status: SHIPPED | OUTPATIENT
Start: 2022-06-30

## 2022-06-30 NOTE — TELEPHONE ENCOUNTER
Facility is requesting a call back to discuss a script for wipes  __________________________  Name ammonium lactate (LAC-HYDRIN) 12 % lotion  Dose/Frequency Apply topically 2 (two) times a day,  Normal  Quantity 400 g  Verified pharmacy   [x]  Verified ordering Provider   [x]  Verified enough for 3 days  [x]  __________________________________Medication Refill Request     Name fluticasone (FLONASE) 50 mcg/act nasal sprayMedication Refill Request     Name flonase  Dose/Frequency into each nostril  Quantity n/a  Verified pharmacy   [x]  Verified ordering Provider   [x]  Verified enough for 3 days  [x]  __________________________________    Dose/Frequency loratadine (CLARITIN) 5 mg/5 mL syrup  Quantity 5 mg  Verified pharmacy   [x]  Verified ordering Provider   [x]  Verified enough for 3 days  [x]    ___________________________________  Medication Refill Request     Name acetaminophen (TYLENOL) 500 mg tablet  Dose/Frequency Take 1 tablet (500 mg total) by mouth every 8 (eight) hours as needed   Quantity 30 tablets  Verified pharmacy   [x]  Verified ordering Provider   [x]  Verified enough for 3 days  [x]  ___________________________________  Medication Refill Request     Name gualifenesen syrap  Dose/Frequency When needed  Quantity unknown  Verified pharmacy   [x]  Verified ordering Provider   [x]  Verified enough for 3 days  []  -this medication is not listed in patients refill list-  ___________________________________    Medication Refill Request     Name levothyroxine 50 mcg tablet  Dose/Frequency 1 x daily  Quantity 31 tablets  Verified pharmacy   [x]  Verified ordering Provider   [x]  Verified enough for 3 days  [x]  ___________________________________  Medication Refill Request     Name nystatin (MYCOSTATIN) powder  Dose/Frequency as needed  Quantity 15 g  Verified pharmacy   [x]  Verified ordering Provider   [x]  Verified enough for 3 days  [x]

## 2022-07-06 DIAGNOSIS — E78.3 MIXED HYPERGLYCERIDEMIA: ICD-10-CM

## 2022-07-06 DIAGNOSIS — F51.01 PRIMARY INSOMNIA: ICD-10-CM

## 2022-07-06 RX ORDER — LANOLIN ALCOHOL/MO/W.PET/CERES
3 CREAM (GRAM) TOPICAL
Qty: 31 TABLET | Refills: 5 | Status: SHIPPED | OUTPATIENT
Start: 2022-07-06 | End: 2022-07-19 | Stop reason: SDUPTHER

## 2022-07-06 RX ORDER — ATORVASTATIN CALCIUM 10 MG/1
10 TABLET, FILM COATED ORAL DAILY
Qty: 31 TABLET | Refills: 0 | Status: SHIPPED | OUTPATIENT
Start: 2022-07-06 | End: 2022-07-19 | Stop reason: SDUPTHER

## 2022-07-08 ENCOUNTER — TELEPHONE (OUTPATIENT)
Dept: OBGYN CLINIC | Facility: HOSPITAL | Age: 47
End: 2022-07-08

## 2022-07-08 ENCOUNTER — TELEPHONE (OUTPATIENT)
Dept: OBGYN CLINIC | Facility: OTHER | Age: 47
End: 2022-07-08
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
## 2022-07-08 NOTE — TELEPHONE ENCOUNTER
Jennifer Vargas from [employer] called to confirm if her boss called in regards to this, advised she has and we are waiting for a return call from the office
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
## 2022-07-11 ENCOUNTER — TELEPHONE (OUTPATIENT)
Dept: OTHER | Facility: OTHER | Age: 47
End: 2022-07-11

## 2022-07-11 DIAGNOSIS — R32 URINARY INCONTINENCE, UNSPECIFIED TYPE: Primary | ICD-10-CM

## 2022-07-11 DIAGNOSIS — J30.1 SEASONAL ALLERGIC RHINITIS DUE TO POLLEN: ICD-10-CM

## 2022-07-11 RX ORDER — LORATADINE ORAL 5 MG/5ML
5 SOLUTION ORAL DAILY PRN
Qty: 150 ML | Refills: 0 | Status: SHIPPED | OUTPATIENT
Start: 2022-07-11

## 2022-07-11 RX ORDER — FLUTICASONE PROPIONATE 50 MCG
2 SPRAY, SUSPENSION (ML) NASAL DAILY PRN
Qty: 11.1 ML | Refills: 0 | Status: SHIPPED | OUTPATIENT
Start: 2022-07-11

## 2022-07-11 NOTE — TELEPHONE ENCOUNTER
She needs a order for Adult Disposable wipes sent [address] [place] [address] [phone]    Also, a previous medications requested should have been labeled PRN, but are labeled Daily        fluticasone (Flonase) 50 mcg/act nasal spray  loratadine (CLARITIN) 5 mg/5 mL syrup

## 2022-07-11 NOTE — TELEPHONE ENCOUNTER
Hi everyone  I completed a letter detailing what had occurred when the patient came in to the Ortho office last week  I am not sure who to reply to so I included everyone listed so far on this task  The letter is located in the Letters section of this patient's chart - please take a look and send a copy to the interested parties  Thank you!

## 2022-07-11 NOTE — TELEPHONE ENCOUNTER
Astrid Hankins, I created the letter format of my previous note  I'll respond in the other task so that the office staff is aware  Thanks!

## 2022-07-12 NOTE — TELEPHONE ENCOUNTER
Called and spoke to Marco Antonio Sanches @ [address] [place] [address]  Unfortunately we do not use e-mails   She provided me with a Fax# ([phone]) asked I faxed letter to her attention    Letter has been faxed to fax# provided   Attention to Marco Antonio Sanches    Confirmation that fax went through was received

## 2022-07-19 DIAGNOSIS — E03.9 HYPOTHYROIDISM, UNSPECIFIED TYPE: ICD-10-CM

## 2022-07-19 DIAGNOSIS — F51.01 PRIMARY INSOMNIA: ICD-10-CM

## 2022-07-19 DIAGNOSIS — M54.2 NECK PAIN: ICD-10-CM

## 2022-07-19 DIAGNOSIS — R05.9 COUGH: Primary | ICD-10-CM

## 2022-07-19 DIAGNOSIS — R21 RASH: ICD-10-CM

## 2022-07-19 DIAGNOSIS — E78.3 MIXED HYPERGLYCERIDEMIA: ICD-10-CM

## 2022-07-19 NOTE — TELEPHONE ENCOUNTER
Medication Refill Request     Name ammonium lactate (LAC-HYDRIN)  Dose/Frequency   12 % lotion Apply topically 2 (two) times a day       Quantity 400 g  Verified pharmacy   [x]  Verified ordering Provider   [x]  Does patient have enough for the next 3 days? Yes [] No [x]    Medication Refill Request     Name atorvastatin (LIPITOR)  Dose/Frequency   10 mg tablet Take 1 tablet (10 mg total) by mouth daily     Quantity 31  Verified pharmacy   [x]  Verified ordering Provider   [x]  Does patient have enough for the next 3 days? Yes [] No [x]    Medication Refill Request     Name  benzonatate (TESSALON)   Dose/Frequency   200 MG capsule Take 200 mg by mouth 3 (three) times a day as needed for cough       Quantity   Verified pharmacy   [x]  Verified ordering Provider   []  Does patient have enough for the next 3 days? Yes [] No [x]    Medication Refill Request     Name levothyroxine   Dose/Frequency   50 mcg tablet Take 1 tablet (50 mcg total) by mouth daily       Quantity 31  Verified pharmacy   [x]  Verified ordering Provider   [x]  Does patient have enough for the next 3 days? Yes [] No [x]    Medication Refill Request     Name melatonin  Dose/Frequency   3 mg Take 1 tablet (3 mg total) by mouth daily at bedtime     Quantity 31  Verified pharmacy   [x]  Verified ordering Provider   [x]  Does patient have enough for the next 3 days? Yes [] No [x]    Medication Refill Request     Name  Multiple Vitamin (Multivitamin Iron-Free) TABS  Dose/Frequency Take 1 tablet by mouth every morning  Quantity 31  Verified pharmacy   [x]  Verified ordering Provider   [x]  Does patient have enough for the next 3 days? Yes [] No [x]    Medication Refill Request     Name nystatin (MYCOSTATIN) powder  Dose/Frequency Apply topically 2 (two) times a day  Quantity 15g  Verified pharmacy   [x]  Verified ordering Provider   [x]  Does patient have enough for the next 3 days?  Yes [] No [x]

## 2022-07-19 NOTE — TELEPHONE ENCOUNTER
Patient needs new orders on a few of these scripts  They did mention one in particular Benzonatate 200 MG Capsule  She said it is a PRN and she needs that new order  It is a Historical Provider  Also, her Multiple  Vitamins - Minerals  Tabs are a Historical Provider  She also is taking Vitamin D3 1000 unit tablet supplement at 8am daily  It is not listed on her Medication list and she takes it  She will need a script for that also  The rest are from Dr Gustavo Hicks  Pt  Is in a group home and they need to match up all her scripts for the [facility]

## 2022-07-20 RX ORDER — LANOLIN ALCOHOL/MO/W.PET/CERES
3 CREAM (GRAM) TOPICAL
Qty: 31 TABLET | Refills: 0 | Status: SHIPPED | OUTPATIENT
Start: 2022-07-20 | End: 2022-09-02

## 2022-07-20 RX ORDER — ATORVASTATIN CALCIUM 10 MG/1
10 TABLET, FILM COATED ORAL DAILY
Qty: 31 TABLET | Refills: 0 | Status: SHIPPED | OUTPATIENT
Start: 2022-07-20 | End: 2022-09-02

## 2022-07-20 RX ORDER — MULTIVIT-MIN/FA/LYCOPEN/LUTEIN .4-300-25
1 TABLET ORAL DAILY
Qty: 31 TABLET | Refills: 0 | Status: SHIPPED | OUTPATIENT
Start: 2022-07-20 | End: 2022-09-02

## 2022-07-20 RX ORDER — NYSTATIN 100000 [USP'U]/G
POWDER TOPICAL 2 TIMES DAILY
Qty: 15 G | Refills: 0 | Status: SHIPPED | OUTPATIENT
Start: 2022-07-20 | End: 2022-09-07

## 2022-07-20 RX ORDER — BENZONATATE 200 MG/1
200 CAPSULE ORAL 3 TIMES DAILY PRN
Qty: 20 CAPSULE | Refills: 0 | Status: SHIPPED | OUTPATIENT
Start: 2022-07-20

## 2022-07-20 RX ORDER — AMMONIUM LACTATE 12 G/100G
LOTION TOPICAL 2 TIMES DAILY
Qty: 400 G | Refills: 0 | Status: SHIPPED | OUTPATIENT
Start: 2022-07-20

## 2022-07-20 RX ORDER — LEVOTHYROXINE SODIUM 0.05 MG/1
50 TABLET ORAL DAILY
Qty: 31 TABLET | Refills: 0 | Status: SHIPPED | OUTPATIENT
Start: 2022-07-20 | End: 2022-09-02

## 2022-08-02 ENCOUNTER — TELEPHONE (OUTPATIENT)
Dept: LAB | Facility: HOSPITAL | Age: 47
End: 2022-08-02

## 2022-08-02 ENCOUNTER — TELEPHONE (OUTPATIENT)
Dept: OTHER | Facility: OTHER | Age: 47
End: 2022-08-02

## 2022-08-02 DIAGNOSIS — E03.9 HYPOTHYROIDISM, UNSPECIFIED TYPE: ICD-10-CM

## 2022-08-02 DIAGNOSIS — E78.2 MIXED HYPERLIPIDEMIA: ICD-10-CM

## 2022-08-02 DIAGNOSIS — Q90.9 TRISOMY 21, DOWN SYNDROME: Primary | ICD-10-CM

## 2022-08-02 DIAGNOSIS — E66.01 CLASS 3 SEVERE OBESITY DUE TO EXCESS CALORIES WITHOUT SERIOUS COMORBIDITY WITH BODY MASS INDEX (BMI) OF 40.0 TO 44.9 IN ADULT (HCC): ICD-10-CM

## 2022-08-02 NOTE — TELEPHONE ENCOUNTER
Called spoke with Dagmar Evans, all testing is ordered, vit B and Vit D are not covered by insurance  She will contact [facility] now

## 2022-08-02 NOTE — TELEPHONE ENCOUNTER
Caregiver calling regarding order for BW; stated that patient just returned to group home and needs to have the following BW ordered and wants it to be set up through [facility] mobile       Patient needs:TSH, CBC, CMP, Lipid, Vit D and Vit B

## 2022-08-09 ENCOUNTER — APPOINTMENT (OUTPATIENT)
Dept: LAB | Facility: HOSPITAL | Age: 47
End: 2022-08-09
Attending: GENERAL ACUTE CARE HOSPITAL
Payer: MEDICARE

## 2022-08-09 DIAGNOSIS — Q90.9 TRISOMY 21, DOWN SYNDROME: ICD-10-CM

## 2022-08-09 DIAGNOSIS — M81.6 LOCALIZED OSTEOPOROSIS WITHOUT CURRENT PATHOLOGICAL FRACTURE: Primary | ICD-10-CM

## 2022-08-09 DIAGNOSIS — E03.9 HYPOTHYROIDISM, UNSPECIFIED TYPE: ICD-10-CM

## 2022-08-09 DIAGNOSIS — E78.2 MIXED HYPERLIPIDEMIA: ICD-10-CM

## 2022-08-09 DIAGNOSIS — E21.3 HYPERPARATHYROIDISM (HCC): ICD-10-CM

## 2022-08-09 DIAGNOSIS — E66.01 CLASS 3 SEVERE OBESITY DUE TO EXCESS CALORIES WITHOUT SERIOUS COMORBIDITY WITH BODY MASS INDEX (BMI) OF 40.0 TO 44.9 IN ADULT (HCC): ICD-10-CM

## 2022-08-09 LAB
ALBUMIN SERPL BCP-MCNC: 2.7 G/DL (ref 3.5–5)
ALP SERPL-CCNC: 83 U/L (ref 46–116)
ALT SERPL W P-5'-P-CCNC: 28 U/L (ref 12–78)
ANION GAP SERPL CALCULATED.3IONS-SCNC: 7 MMOL/L (ref 4–13)
AST SERPL W P-5'-P-CCNC: 28 U/L (ref 5–45)
BASOPHILS # BLD AUTO: 0.05 THOUSANDS/ΜL (ref 0–0.1)
BASOPHILS NFR BLD AUTO: 1 % (ref 0–1)
BILIRUB SERPL-MCNC: 0.65 MG/DL (ref 0.2–1)
BUN SERPL-MCNC: 9 MG/DL (ref 5–25)
CALCIUM ALBUM COR SERPL-MCNC: 10.5 MG/DL (ref 8.3–10.1)
CALCIUM SERPL-MCNC: 9.5 MG/DL (ref 8.3–10.1)
CHLORIDE SERPL-SCNC: 108 MMOL/L (ref 96–108)
CHOLEST SERPL-MCNC: 136 MG/DL
CO2 SERPL-SCNC: 25 MMOL/L (ref 21–32)
CREAT SERPL-MCNC: 0.64 MG/DL (ref 0.6–1.3)
EOSINOPHIL # BLD AUTO: 0.04 THOUSAND/ΜL (ref 0–0.61)
EOSINOPHIL NFR BLD AUTO: 1 % (ref 0–6)
ERYTHROCYTE [DISTWIDTH] IN BLOOD BY AUTOMATED COUNT: 15.1 % (ref 11.6–15.1)
GFR SERPL CREATININE-BSD FRML MDRD: 105 ML/MIN/1.73SQ M
GLUCOSE SERPL-MCNC: 86 MG/DL (ref 65–140)
HCT VFR BLD AUTO: 43.7 % (ref 34.8–46.1)
HDLC SERPL-MCNC: 51 MG/DL
HGB BLD-MCNC: 14.5 G/DL (ref 11.5–15.4)
IMM GRANULOCYTES # BLD AUTO: 0.02 THOUSAND/UL (ref 0–0.2)
IMM GRANULOCYTES NFR BLD AUTO: 0 % (ref 0–2)
LDLC SERPL CALC-MCNC: 65 MG/DL (ref 0–100)
LYMPHOCYTES # BLD AUTO: 1.44 THOUSANDS/ΜL (ref 0.6–4.47)
LYMPHOCYTES NFR BLD AUTO: 28 % (ref 14–44)
MCH RBC QN AUTO: 34.4 PG (ref 26.8–34.3)
MCHC RBC AUTO-ENTMCNC: 33.2 G/DL (ref 31.4–37.4)
MCV RBC AUTO: 104 FL (ref 82–98)
MONOCYTES # BLD AUTO: 0.52 THOUSAND/ΜL (ref 0.17–1.22)
MONOCYTES NFR BLD AUTO: 10 % (ref 4–12)
NEUTROPHILS # BLD AUTO: 3.07 THOUSANDS/ΜL (ref 1.85–7.62)
NEUTS SEG NFR BLD AUTO: 60 % (ref 43–75)
NRBC BLD AUTO-RTO: 0 /100 WBCS
PLATELET # BLD AUTO: 184 THOUSANDS/UL (ref 149–390)
PMV BLD AUTO: 11.3 FL (ref 8.9–12.7)
POTASSIUM SERPL-SCNC: 3.6 MMOL/L (ref 3.5–5.3)
PROT SERPL-MCNC: 6.8 G/DL (ref 6.4–8.4)
RBC # BLD AUTO: 4.21 MILLION/UL (ref 3.81–5.12)
SODIUM SERPL-SCNC: 140 MMOL/L (ref 135–147)
TRIGL SERPL-MCNC: 99 MG/DL
TSH SERPL DL<=0.05 MIU/L-ACNC: 1.32 UIU/ML (ref 0.45–4.5)
WBC # BLD AUTO: 5.14 THOUSAND/UL (ref 4.31–10.16)

## 2022-08-09 PROCEDURE — 80061 LIPID PANEL: CPT

## 2022-08-09 PROCEDURE — 36415 COLL VENOUS BLD VENIPUNCTURE: CPT

## 2022-08-09 PROCEDURE — 85025 COMPLETE CBC W/AUTO DIFF WBC: CPT

## 2022-08-09 PROCEDURE — 80053 COMPREHEN METABOLIC PANEL: CPT

## 2022-08-09 PROCEDURE — 84443 ASSAY THYROID STIM HORMONE: CPT

## 2022-08-09 NOTE — PROGRESS NOTES
Mildly hypercalcemia with normal PTH, indicating primary hyperparathyroidism  On chart review, a previous DEXA showed osteoporosis, not on treatment  Refer to endo for further management

## 2022-08-12 ENCOUNTER — HOSPITAL ENCOUNTER (OUTPATIENT)
Dept: RADIOLOGY | Age: 47
Discharge: HOME | End: 2022-08-12
Payer: MEDICARE

## 2022-08-12 VITALS — WEIGHT: 188 LBS | BODY MASS INDEX: 43.51 KG/M2 | HEIGHT: 55 IN

## 2022-08-12 DIAGNOSIS — Z12.31 ENCOUNTER FOR SCREENING MAMMOGRAM FOR MALIGNANT NEOPLASM OF BREAST: ICD-10-CM

## 2022-08-12 PROCEDURE — 77067 SCR MAMMO BI INCL CAD: CPT

## 2022-08-15 ENCOUNTER — TELEPHONE (OUTPATIENT)
Dept: OTHER | Facility: OTHER | Age: 47
End: 2022-08-15

## 2022-08-15 NOTE — TELEPHONE ENCOUNTER
Patient's caretaker calling in regarding call from lab results on 8/9  Informed her of result note by Dr Anuj Fuentes on 8/9 and provided her with follow up phone number for endocrinologist who she is scheduled for in January   She verbalized understanding and would like to know if a copy of the results could be sent to her home at   [address] [facility]

## 2022-08-18 ENCOUNTER — TELEMEDICINE (OUTPATIENT)
Dept: INTERNAL MEDICINE CLINIC | Facility: CLINIC | Age: 47
End: 2022-08-18
Payer: MEDICARE

## 2022-08-18 DIAGNOSIS — F43.24 ADJUSTMENT DISORDER WITH DISTURBANCE OF CONDUCT: ICD-10-CM

## 2022-08-18 DIAGNOSIS — Z78.9 DEFICIT IN ACTIVITIES OF DAILY LIVING (ADL): ICD-10-CM

## 2022-08-18 DIAGNOSIS — Q90.9 TRISOMY 21, DOWN SYNDROME: Primary | ICD-10-CM

## 2022-08-18 DIAGNOSIS — F69 BEHAVIOR CONCERN IN ADULT: ICD-10-CM

## 2022-08-18 PROCEDURE — 99213 OFFICE O/P EST LOW 20 MIN: CPT | Performed by: NURSE PRACTITIONER

## 2022-08-18 NOTE — PROGRESS NOTES
Virtual Regular Visit    Verification of patient location:    Patient is located in the following state in which I hold an active license [place]      Assessment/Plan:    Problem List Items Addressed This Visit        Other    Trisomy 21, Down syndrome - Primary      Other Visit Diagnoses     Behavior concern in adult        Lives in group home        Deficit in activities of daily living (ADL)        Adjustment disorder with disturbance of conduct            The case discussed with patient's family and care team using patient centered shared decision making  The patient was counseled regarding instructions for management,-- risk factor reductions,-- prognosis,-- impressions,-- risks and benefits of treatment options,-- importance of compliance with treatment  I have reviewed the instructions with the patient, answering all questions to her satisfaction  Agree with psychiatry appointment  Cont to encourage patient's participation in her care  Cont to encourage patient's participation in outside activities, physcial therapy etc  unfortunately if continues to decline and is unable to meet own needs, may need higher level of care  Treatment plan to be determined  Will follow closely       Reason for visit is   Chief Complaint   Patient presents with    Virtual Regular Visit     Depression - does not want to get out of bed or leave her room  6/1/22 she returned to the group home  At times will not get out of bed to use the bathroom and is incontinent   Virtual Regular Visit        Encounter provider Angie Mas [facility]    Provider located at [address] [place] [phone]      Recent Visits  No visits were found meeting these conditions    Showing recent visits within past 7 days and meeting all other requirements  Today's Visits  Date Type Provider Dept   08/18/22 Telemedicine Lidya Mas today's visits and meeting all other requirements  Future Appointments  No visits were found meeting these conditions  Showing future appointments within next 150 days and meeting all other requirements       The patient was identified by name and date of birth   Jesusita was informed that this is a telemedicine visit and that the visit is being conducted through [address] [employer] and patient was informed that this is a secure, HIPAA-compliant platform  She agrees to proceed     My office door was closed  No one else was in the room  She acknowledged consent and understanding of privacy and security of the video platform  The patient has agreed to participate and understands they can discontinue the visit at any time  Patient is aware this is a billable service  Subjective   Jesusita is a 50 y o  female presents for follow up  She is a resident of a group home  She has downs syndrome and is non verbal  Visit facilitated by caregiver, Julieta Roche  Sister Farheen Orosco present during call  Patient resting comfortably in bed during televisit  She waved to me when I addressed her   Chief concern of family/group home staff is progressive functional decline since hospitalization in Sept 2021  Patient spent extended period 9/1-10/29/21 at [facility] for orthoped problem, ambulatory dysfunction  Per sister, she required physical restraints at time while in the hospital d/t acting out  hospitalization and associated experience has led to Rolando Stewart becoming fearful, distrustful of any caregiver, health care facility  She was discharge to family  Family declined recommended STR  She continued to decline int  care of family  Refused to leave home  Stay in her bedroom primarily  Stopped care for herself  Began having fecal and urinary incontinence  She returned to group home on June 1st  Staff reports same behaviors  She refuses to leave room   She refuses to care for herself or participates in ADLs, PT, etc  Given regulations, she will need a higher level of care (SNF) if behaviors do not improve       She has an appointment with Leann Cui is now    HPI     Past Medical History:   Diagnosis Date    Community acquired pneumonia     Last Assessed:1/22/2013    Leukopenia     Last Assessed:3/5/2014    Osteoporosis     Trisomy 21, Down syndrome        Past Surgical History:   Procedure Laterality Date    TONSILLECTOMY AND ADENOIDECTOMY         Current Outpatient Medications   Medication Sig Dispense Refill    acetaminophen (TYLENOL) 500 mg tablet Take 1 tablet (500 mg total) by mouth every 8 (eight) hours as needed for mild pain 30 tablet 0    ammonium lactate (LAC-HYDRIN) 12 % lotion Apply topically 2 (two) times a day 400 g 0    atorvastatin (LIPITOR) 10 mg tablet Take 1 tablet (10 mg total) by mouth daily 31 tablet 0    Cholecalciferol 25 MCG (1000 UT) CHEW Chew 1 tablet (1,000 Units total) in the morning 100 tablet 1    Cholecalciferol 25 MCG (1000 UT) tablet Take 1 tablet (1,000 Units total) by mouth daily 31 tablet 5    Diclofenac Sodium (VOLTAREN) 1 % Apply 2 g topically 4 (four) times a day 150 g 2    fluticasone (Flonase) 50 mcg/act nasal spray 2 sprays into each nostril daily as needed for rhinitis 11 1 mL 0    ibuprofen (MOTRIN) 400 mg tablet Take 1 tablet (400 mg total) by mouth every 8 (eight) hours as needed for mild pain (with food) 14 tablet 0    Incontinence Supply Disposable (RA Adult Wipes) MISC Use daily 128 each 2    levothyroxine 50 mcg tablet Take 1 tablet (50 mcg total) by mouth daily 31 tablet 0    loratadine (CLARITIN) 5 mg/5 mL syrup Take 5 mL (5 mg total) by mouth daily as needed for allergies 150 mL 0    melatonin 3 mg Take 1 tablet (3 mg total) by mouth daily at bedtime 31 tablet 0    Multiple Vitamin (Multivitamin Iron-Free) TABS Take 1 tablet by mouth every morning 31 tablet 5    Multiple Vitamins-Minerals (CertaVite Senior/Antioxidant) TABS Take 1 tablet by mouth in the morning 31 tablet 0    nystatin (MYCOSTATIN) powder Apply topically 2 (two) times a day 15 g 0    senna-docusate sodium (SENOKOT S) 8 6-50 mg per tablet Take 1 tablet by mouth every evening      benzonatate (TESSALON) 200 MG capsule Take 1 capsule (200 mg total) by mouth 3 (three) times a day as needed for cough (Patient not taking: Reported on 8/18/2022) 20 capsule 0    Heating Pad PADS by Does not apply route 2 (two) times a day (Patient not taking: Reported on 8/18/2022) 1 each 0    loperamide (IMODIUM A-D) 2 MG tablet Take 1 tablet (2 mg total) by mouth 4 (four) times a day as needed for diarrhea (Patient not taking: Reported on 8/18/2022) 30 tablet 0    loperamide (IMODIUM) 2 mg capsule  (Patient not taking: Reported on 8/18/2022)       No current facility-administered medications for this visit  Allergies   Allergen Reactions    Pollen Extract Sneezing       Review of Systems   Unable to perform ROS: Patient nonverbal (refer to hpi)   Psychiatric/Behavioral: Positive for behavioral problems  Video Exam    There were no vitals filed for this visit  Physical Exam  Constitutional:       General: She is not in acute distress  Appearance: Normal appearance  Neurological:      Mental Status: She is alert            I spent 20 minutes directly with the patient during this visit

## 2022-08-23 ENCOUNTER — TELEPHONE (OUTPATIENT)
Dept: OTHER | Facility: OTHER | Age: 47
End: 2022-08-23

## 2022-08-23 DIAGNOSIS — R32 URINARY INCONTINENCE, UNSPECIFIED TYPE: ICD-10-CM

## 2022-08-23 NOTE — TELEPHONE ENCOUNTER
Received call from Darren Devlin, caregiver from [employer] reporting a problem with adult wipes since they are using larger amount of quantity  [facility] had packages with 100 count and patient currently receives packages of  48 count  Is it possible to get new prescription for her adult wipes through [facility] pharmacy? Please follow up with Darren Devlin or her agency  Also, is it possible to update her chart for current provider?

## 2022-09-02 DIAGNOSIS — F51.01 PRIMARY INSOMNIA: ICD-10-CM

## 2022-09-02 DIAGNOSIS — E78.3 MIXED HYPERGLYCERIDEMIA: ICD-10-CM

## 2022-09-02 DIAGNOSIS — E03.9 HYPOTHYROIDISM, UNSPECIFIED TYPE: ICD-10-CM

## 2022-09-02 RX ORDER — LEVOTHYROXINE SODIUM 0.05 MG/1
TABLET ORAL
Qty: 30 TABLET | Refills: 0 | Status: SHIPPED | OUTPATIENT
Start: 2022-09-02 | End: 2022-10-24 | Stop reason: SDUPTHER

## 2022-09-02 RX ORDER — LANOLIN ALCOHOL/MO/W.PET/CERES
CREAM (GRAM) TOPICAL
Qty: 30 TABLET | Refills: 0 | Status: SHIPPED | OUTPATIENT
Start: 2022-09-02 | End: 2022-10-24 | Stop reason: SDUPTHER

## 2022-09-02 RX ORDER — ATORVASTATIN CALCIUM 10 MG/1
TABLET, FILM COATED ORAL
Qty: 30 TABLET | Refills: 0 | Status: SHIPPED | OUTPATIENT
Start: 2022-09-02 | End: 2022-10-24 | Stop reason: SDUPTHER

## 2022-09-02 RX ORDER — MULTIVIT-MIN/FA/LYCOPEN/LUTEIN .4-300-25
TABLET ORAL
Qty: 30 TABLET | Refills: 0 | Status: SHIPPED | OUTPATIENT
Start: 2022-09-02 | End: 2022-10-24 | Stop reason: SDUPTHER

## 2022-09-07 DIAGNOSIS — M54.2 NECK PAIN: ICD-10-CM

## 2022-09-07 RX ORDER — NYSTATIN 100000 [USP'U]/G
POWDER TOPICAL
Qty: 15 G | Refills: 0 | Status: SHIPPED | OUTPATIENT
Start: 2022-09-07

## 2022-09-19 ENCOUNTER — TELEMEDICINE (OUTPATIENT)
Dept: INTERNAL MEDICINE CLINIC | Facility: CLINIC | Age: 47
End: 2022-09-19
Payer: MEDICARE

## 2022-09-19 DIAGNOSIS — Q90.9 TRISOMY 21, DOWN SYNDROME: ICD-10-CM

## 2022-09-19 DIAGNOSIS — Z20.822 CLOSE EXPOSURE TO COVID-19 VIRUS: ICD-10-CM

## 2022-09-19 DIAGNOSIS — R19.7 DIARRHEA, UNSPECIFIED TYPE: Primary | ICD-10-CM

## 2022-09-19 PROCEDURE — 99213 OFFICE O/P EST LOW 20 MIN: CPT | Performed by: NURSE PRACTITIONER

## 2022-09-19 NOTE — PROGRESS NOTES
COVID-19 Outpatient Progress Note    Assessment/Plan:    Problem List Items Addressed This Visit        Other    Trisomy 24, Down syndrome      Other Visit Diagnoses     Diarrhea, unspecified type    -  Primary    Close exposure to COVID-19 virus        Lives in group home             Disposition:     Discussed vitamin D, vitamin C, and/or zinc supplementation with patient  Suspect covid infection  Patient refusing swabbing/testing per staff  House has been under isolation  Staff will cont supportive care-call if s/s worsen or new symptoms develop    I have spent 10 minutes directly with the patient  Greater than 50% of this time was spent in counseling/coordination of care regarding: diagnostic results, prognosis, instructions for management, patient and family education, importance of treatment compliance, risk factor reductions and impressions  Depression Screening: Unable to be performed due to medical contraindication  Encounter provider: Ro Bergman [address] [place] [address]     Provider located at: [address] [place] [phone]     Recent Visits  No visits were found meeting these conditions  Showing recent visits within past 7 days and meeting all other requirements  Future Appointments  No visits were found meeting these conditions  Showing future appointments within next 150 days and meeting all other requirements     This virtual check-in was done via [employer] and patient was informed that this is a secure, HIPAA-compliant platform  She agrees to proceed  This virtual check-in was done via Visit facilitated by caregiver, Karina Cervantes  Pt non verbal and patient was informed that this is not a secure, HIPAA-compliant platform  She agrees to proceed  Patient agrees to participate in a virtual check in via telephone or video visit instead of presenting to the office to address urgent/immediate medical needs   Patient is aware this is a billable service  She acknowledged consent and understanding of privacy and security of the video platform  The patient has agreed to participate and understands they can discontinue the visit at any time  After connecting through [facility], the patient was identified by name and date of birth  Negin Chapman was informed that this was a telemedicine visit and that the exam was being conducted confidentially over secure lines  My office door was closed  No one else was in the room  Negin Chapman acknowledged consent and understanding of privacy and security of the telemedicine visit  I informed the patient that I have reviewed her record in Epic and presented the opportunity for her to ask any questions regarding the visit today  The patient agreed to participate  Verification of patient location:  Patient is located in the following state in which I hold an active license: [place]    Subjective:   Negin Chapman is a 50 y o  female who is concerned about COVID-19  Patient's symptoms include diarrhea   Patient denies fever, chills, malaise, congestion, rhinorrhea, cough, shortness of breath, abdominal pain and vomiting      - Date of symptom onset: 9/18/2022  - Date of exposure: 9/14/2022      COVID-19 vaccination status: Fully vaccinated (primary series)    Exposure:   Contact with a person who is under investigation (PUI) for or who is positive for COVID-19 within the last 14 days?: Yes    Hospitalized recently for fever and/or lower respiratory symptoms?: No      Currently a healthcare worker that is involved in direct patient care?: No      Works in a special setting where the risk of COVID-19 transmission may be high? (this may include long-term care, correctional and penitentiary facilities; homeless shelters; assisted-living facilities and group homes ): No      Resident in a special setting where the risk of COVID-19 transmission may be high? (this may include long-term care, correctional and penitentiary facilities; homeless shelters; assisted-living facilities and group homes ): No      House mate positive for covid  Staff unable to swab her as she was very poorly cooperative    Diarrhea only symptoms  Sleeping well  Good appetite    Lab Results   Component Value Date    [facility] Not Detected 10/28/2021       Review of Systems   Unable to perform ROS: Patient nonverbal (ROS per caregiver)   Constitutional: Negative for chills and fever  HENT: Negative for congestion and rhinorrhea  Respiratory: Negative for cough and shortness of breath  Gastrointestinal: Positive for diarrhea  Negative for abdominal pain and vomiting       Current Outpatient Medications on File Prior to Visit   Medication Sig    acetaminophen (TYLENOL) 500 mg tablet Take 1 tablet (500 mg total) by mouth every 8 (eight) hours as needed for mild pain    ammonium lactate (LAC-HYDRIN) 12 % lotion Apply topically 2 (two) times a day    atorvastatin (LIPITOR) 10 mg tablet TAKE 1 TABLET BY MOUTH DAILY @ BEDTIME AT 8P (CHOLESTEROL)    benzonatate (TESSALON) 200 MG capsule Take 1 capsule (200 mg total) by mouth 3 (three) times a day as needed for cough (Patient not taking: Reported on 8/18/2022)    Cholecalciferol 25 MCG (1000 UT) CHEW Chew 1 tablet (1,000 Units total) in the morning    Cholecalciferol 25 MCG (1000 UT) tablet Take 1 tablet (1,000 Units total) by mouth daily    Diclofenac Sodium (VOLTAREN) 1 % Apply 2 g topically 4 (four) times a day    fluticasone (Flonase) 50 mcg/act nasal spray 2 sprays into each nostril daily as needed for rhinitis    Heating Pad PADS by Does not apply route 2 (two) times a day (Patient not taking: Reported on 8/18/2022)    ibuprofen (MOTRIN) 400 mg tablet Take 1 tablet (400 mg total) by mouth every 8 (eight) hours as needed for mild pain (with food)    Incontinence Supply Disposable (RA Adult Wipes) MISC Use daily    levothyroxine 50 mcg tablet TAKE 1 TABLET BY MOUTH DAILY @ 7A (HYPOTHYROIDISM)  loperamide (IMODIUM A-D) 2 MG tablet Take 1 tablet (2 mg total) by mouth 4 (four) times a day as needed for diarrhea (Patient not taking: Reported on 8/18/2022)    loperamide (IMODIUM) 2 mg capsule  (Patient not taking: Reported on 8/18/2022)    loratadine (CLARITIN) 5 mg/5 mL syrup Take 5 mL (5 mg total) by mouth daily as needed for allergies    melatonin 3 mg TAKE 1 TABLET BY MOUTH ONCE DAILY AT BEDTIME AT 8PM (INSOMNIA)    Multiple Vitamin (Multivitamin Iron-Free) TABS Take 1 tablet by mouth every morning    Multiple Vitamins-Minerals (CertaVite Senior/Antioxidant) TABS TAKE 1 TABLET BY MOUTH DAILY @ 8A (SUPPLEMENT)    nystatin powder APPLY TOP  TO AFF  AREA(S) 2X DAILY @ 8AM & 8PM (INFECTION)    senna-docusate sodium (SENOKOT S) 8 6-50 mg per tablet Take 1 tablet by mouth every evening       Objective: There were no vitals taken for this visit  Physical Exam  Constitutional:       General: She is not in acute distress  Appearance: Normal appearance  Neurological:      Mental Status: She is alert         Abigail Schrader, [address] [place] [address]

## 2022-09-22 ENCOUNTER — TELEMEDICINE (OUTPATIENT)
Dept: INTERNAL MEDICINE CLINIC | Facility: CLINIC | Age: 47
End: 2022-09-22
Payer: MEDICARE

## 2022-09-22 DIAGNOSIS — S31.109A WOUND, OPEN, ABDOMINAL WALL, ANTERIOR, INITIAL ENCOUNTER: ICD-10-CM

## 2022-09-22 DIAGNOSIS — E65 ABDOMINAL PANNUS: ICD-10-CM

## 2022-09-22 DIAGNOSIS — Q90.9 DOWN SYNDROME: ICD-10-CM

## 2022-09-22 DIAGNOSIS — Z78.9 DEFICIT IN ACTIVITIES OF DAILY LIVING (ADL): ICD-10-CM

## 2022-09-22 DIAGNOSIS — R23.8 SKIN BREAKDOWN: Primary | ICD-10-CM

## 2022-09-22 PROCEDURE — 99213 OFFICE O/P EST LOW 20 MIN: CPT | Performed by: NURSE PRACTITIONER

## 2022-09-22 RX ORDER — LORAZEPAM 0.5 MG/1
TABLET ORAL
COMMUNITY
Start: 2022-08-30

## 2022-09-22 NOTE — PROGRESS NOTES
Virtual Regular Visit    Verification of patient location:    Patient is located in the following state in which I hold an active license [place]      Assessment/Plan:    Problem List Items Addressed This Visit        Other    Down syndrome    Relevant Orders    [facility]      Other Visit Diagnoses     Skin breakdown    -  Primary    Relevant Orders    Referral to [address] [place] [address]'s A    Wound, open, abdominal wall, anterior, initial encounter        Relevant Orders    Referral to [address] [place], [place] [address]A    Abdominal pannus        Relevant Orders    Referral to [address] [place] [address]    Deficit in activities of daily living (ADL)        Relevant Orders    Referral to [address] [place] [address]    Lives in group home        Relevant Orders    Referral to [address] [place], [place] [address]      The case discussed with patient's caregiver using patient centered shared decision making  The patient was counseled regarding instructions for management,-- risk factor reductions,-- prognosis,-- impressions,-- risks and benefits of treatment options,-- importance of compliance with treatment  I have reviewed the instructions with the patient, answering all questions to her satisfaction  had lengthy discussion with Melody Gan  Patient requires a proper wound exam, culture, etc be it at the house with [facility], in our office, or at wound center  She refuses to leave house/room  I am concerned that wound will progress  Need proper care  I place referral to [facility]NA for wound care, home safety eval  It would be in the patient's best interest to consider higher level of care as current staff are unable to meet her medical needs by virtue of their training  Will contact Melody Gan at group home once wound evaluated to determine next step            Reason for visit is   Chief Complaint   Patient presents with    Virtual Regular Visit        Encounter provider STEFANIA Gipson    Provider located at [address] [place] [phone]      Recent Visits  Date Type Provider Dept   09/19/22 Telemedicine Lidya Lombardi recent visits within past 7 days and meeting all other requirements  Today's Visits  Date Type Provider Dept   09/22/22 Telemedicine Lidya Lombardi today's visits and meeting all other requirements  Future Appointments  No visits were found meeting these conditions  Showing future appointments within next 150 days and meeting all other requirements       The patient was identified by name and date of birth  Nino Burgos was informed that this is a telemedicine visit and that the visit is being conducted through [address] [place] [address] Now and patient was informed that this is a secure, HIPAA-compliant platform  She agrees to proceed     My office door was closed  No one else was in the room  She acknowledged consent and understanding of privacy and security of the video platform  The patient has agreed to participate and understands they can discontinue the visit at any time  Patient is aware this is a billable service       Subjective  Nino Burgos is a 50 y o  female presents for wound eval        Pt seen by video visit  Visit facilitated by group home caregiver, Efrain Warren    History obtained from caregiver and unobtainable from patient due to non verbal     Pt is intellectually disabled  Refuses to leave bed/bedroom  Staff unable to take pt out of home for same reason    Staff is not trained to perform wound care         Past Medical History:   Diagnosis Date    Community acquired pneumonia     Last Assessed:1/22/2013    Leukopenia     Last Assessed:3/5/2014    Osteoporosis     Trisomy 21, Down syndrome        Past Surgical History:   Procedure Laterality Date    TONSILLECTOMY AND ADENOIDECTOMY         Current Outpatient Medications   Medication Sig Dispense Refill    LORazepam (ATIVAN) 0 5 mg tablet Take 1/2 of a pill as needed for anxiety daily 30 minutes prior to appointments        acetaminophen (TYLENOL) 500 mg tablet Take 1 tablet (500 mg total) by mouth every 8 (eight) hours as needed for mild pain 30 tablet 0    ammonium lactate (LAC-HYDRIN) 12 % lotion Apply topically 2 (two) times a day 400 g 0    atorvastatin (LIPITOR) 10 mg tablet TAKE 1 TABLET BY MOUTH DAILY @ BEDTIME AT 8P (CHOLESTEROL) 30 tablet 0    benzonatate (TESSALON) 200 MG capsule Take 1 capsule (200 mg total) by mouth 3 (three) times a day as needed for cough (Patient not taking: Reported on 8/18/2022) 20 capsule 0    Cholecalciferol 25 MCG (1000 UT) CHEW Chew 1 tablet (1,000 Units total) in the morning 100 tablet 1    Cholecalciferol 25 MCG (1000 UT) tablet Take 1 tablet (1,000 Units total) by mouth daily 31 tablet 5    Diclofenac Sodium (VOLTAREN) 1 % Apply 2 g topically 4 (four) times a day 150 g 2    fluticasone (Flonase) 50 mcg/act nasal spray 2 sprays into each nostril daily as needed for rhinitis 11 1 mL 0    Heating Pad PADS by Does not apply route 2 (two) times a day (Patient not taking: Reported on 8/18/2022) 1 each 0    ibuprofen (MOTRIN) 400 mg tablet Take 1 tablet (400 mg total) by mouth every 8 (eight) hours as needed for mild pain (with food) 14 tablet 0    Incontinence Supply Disposable (RA Adult Wipes) MISC Use daily 200 each 2    levothyroxine 50 mcg tablet TAKE 1 TABLET BY MOUTH DAILY @ 7A (HYPOTHYROIDISM) 30 tablet 0    loperamide (IMODIUM A-D) 2 MG tablet Take 1 tablet (2 mg total) by mouth 4 (four) times a day as needed for diarrhea (Patient not taking: Reported on 8/18/2022) 30 tablet 0    loperamide (IMODIUM) 2 mg capsule  (Patient not taking: Reported on 8/18/2022)      loratadine (CLARITIN) 5 mg/5 mL syrup Take 5 mL (5 mg total) by mouth daily as needed for allergies 150 mL 0    melatonin 3 mg TAKE 1 TABLET BY MOUTH ONCE DAILY AT BEDTIME AT 8PM (INSOMNIA) 30 tablet 0    Multiple Vitamin (Multivitamin Iron-Free) TABS Take 1 tablet by mouth every morning 31 tablet 5    Multiple Vitamins-Minerals (CertaVite Senior/Antioxidant) TABS TAKE 1 TABLET BY MOUTH DAILY @ 8A (SUPPLEMENT) 30 tablet 0    nystatin powder APPLY TOP  TO AFF  AREA(S) 2X DAILY @ 8AM & 8PM (INFECTION) 15 g 0    senna-docusate sodium (SENOKOT S) 8 6-50 mg per tablet Take 1 tablet by mouth every evening       No current facility-administered medications for this visit  Allergies   Allergen Reactions    Pollen Extract Sneezing       Review of Systems   Constitutional: Negative for fever  Skin: Positive for wound  Video Exam    There were no vitals filed for this visit  Physical Exam  Constitutional:       Appearance: Normal appearance  Abdominal:          Comments: Staff exposed wounds by lifting pannus  Very difficult to assess d/t poor video quality    Pt poorly cooperative during exam   Neurological:      Mental Status: She is alert            I spent 15 minutes directly with the patient during this visit

## 2022-09-23 ENCOUNTER — HOME HEALTH ADMISSION (OUTPATIENT)
Dept: HOME HEALTH SERVICES | Facility: HOME HEALTHCARE | Age: 47
End: 2022-09-23

## 2022-09-26 ENCOUNTER — HOME CARE VISIT (OUTPATIENT)
Dept: HOME HEALTH SERVICES | Facility: HOME HEALTHCARE | Age: 47
End: 2022-09-26

## 2022-09-26 NOTE — CASE COMMUNICATION
FYI only, Delay in [facility] due to nurse availabilty   New M102 date 9/27/22

## 2022-09-27 ENCOUNTER — TELEPHONE (OUTPATIENT)
Dept: INTERNAL MEDICINE CLINIC | Facility: CLINIC | Age: 47
End: 2022-09-27

## 2022-09-27 ENCOUNTER — HOME CARE VISIT (OUTPATIENT)
Dept: HOME HEALTH SERVICES | Facility: HOME HEALTHCARE | Age: 47
End: 2022-09-27

## 2022-09-27 DIAGNOSIS — Z78.9 DEFICIT IN ACTIVITIES OF DAILY LIVING (ADL): ICD-10-CM

## 2022-09-27 DIAGNOSIS — R23.8 SKIN IRRITATION: Primary | ICD-10-CM

## 2022-09-27 DIAGNOSIS — Q90.9 DOWN SYNDROME: ICD-10-CM

## 2022-09-27 NOTE — CASE COMMUNICATION
Notification of Assess not [address] CORNELL has assessed your patient for Home Health services and has determined the patient is not eligible for service due to the following patient with no skilled service needs  No wounds noted on abdomen folds or buttock  However patient needs script for desitin or barrier cream sent so group home can apply to body folds PRN  Also if  at office can assit facility with placement to  skilled nursing  Per staff at group home she is aging and getting too advanced for their level of care  Per staff they feel family isnt willing to listen to their concerns about this and wont listen to what they feel she needs right now  Please advise if  can assist  Carol Ann steele at your office and left message about same listed above       Thank you  Juan M HADLEYA

## 2022-09-27 NOTE — TELEPHONE ENCOUNTER
[facility] calling in, pt has no open wounds - they will not be signing her on to their care    she will need a script for a barrier cream such as Desitin to apply to any folds on her body      Also asking if you are able to put in a referral to our social workers, she needs a higher level of care than the group home is able to provide, pt's family not being receptive dry

## 2022-09-28 DIAGNOSIS — R23.8 SKIN IRRITATION: ICD-10-CM

## 2022-09-30 ENCOUNTER — PATIENT OUTREACH (OUTPATIENT)
Dept: INTERNAL MEDICINE CLINIC | Facility: CLINIC | Age: 47
End: 2022-09-30

## 2022-09-30 NOTE — PROGRESS NOTES
OPCM SW received referral to assist patient in receiving additional care  Chart review completed and phone call placed to patients caregiver Deanna Bhat at the group home  SW was asked to call caregiver at  [phone]  Message left, awaiting call back to discuss patient needs further

## 2022-10-06 ENCOUNTER — PATIENT OUTREACH (OUTPATIENT)
Dept: INTERNAL MEDICINE CLINIC | Facility: CLINIC | Age: 47
End: 2022-10-06

## 2022-10-06 NOTE — PROGRESS NOTES
OPCM SW placed 2nd outreach phone call  Patient's caregiver is not available, SW was able to speak with Pebbleskatya Marquis at the group home  Patient is a 50yo female with downs syndrome  Patient is reported to be non-verbal       Katerina Xavier reports that patient used to take showers and dress self  Patient enjoyed washing her own hair  Patient would be walking around the home, turning lights on and off, and being a "little busy body"  Patient was interactive with staff and physically active  Katerina Xavier also reports that patient is deaf  Others will say that she is hard of hearing or it is selective  Patient was hospitalized after hurting herself last September  Since returning from the hospital and rehab, and then the sister's home, Katerina Xavier reports that patient has not been the same  Patient now does not get out of bed  Patient is overweight which makes bathing and dressing patient more difficult as she no longer assists with this care  Patient does get up to walk with the Behavioral Specialist   Patient does not get out of med to ambulate at any other time  Katerina Xavier reports that patient can walk, however, chooses not to  Patient sits in her bed all day  Katerina Xavier believes that patient needs more therapy that is being offered by the home health care  Family is involved and visits with patient regularly  Phone call placed to patient's brotherRichard  Brother was not available and voicemail left with reason for call and contact information

## 2022-10-10 ENCOUNTER — PATIENT OUTREACH (OUTPATIENT)
Dept: INTERNAL MEDICINE CLINIC | Facility: CLINIC | Age: 47
End: 2022-10-10

## 2022-10-10 NOTE — PROGRESS NOTES
[facility] SW received return call from brother, Kristin Pichardo, who reports that at time of d/c from hospital, inpatient rehab was suggested  Family had taken patient to sister's home on d/c with home PT/OT as patient was not able to return to the group home until more interdependent  Patient did well while at sister's home  When returning to the group home, with the same PT/OT agency, patient seemed to regress with her progress  When a new therapist comes, patient does not do as well as they are not able to build enough rapport with patient for her to be comfortable with that person  Brother reports that patient is becoming weak due to being in bed most of the time and acknowledges the need for some kind of continued rehab  Patient does use a rolling walker, wheelchair, bedside commode, and has a tub/shower bench  Brother does acknowledge that patient was physically active prior to her fall and hospitalization  Brother reports that patient seems to recognize her bed as a safe place and doesn't even leave her room if patient does get up on her feet  Patient does now have a behavioral specialist working with her 1 to 2 times a week  Patient has also had a virtual visit with a psychiatrist who has added Ativan for her potential anxiety in going out  Patient did have a dentist appointment today and group home staff were able to get her into the office before going into "panic mode" as the brother describes  There is a follow up psychiatrist appointment scheduled for this Thursday  Brother reports that patient has been able to shower  He believe that staff takes her to the shower in a wheelchair  Brother reports that patient is hesitant to leave her room  Patient has a  through [employer]       It seems that much of patient's barrier is mental health related due to the fall, fear for falling again, and identifying her bed as the safe place      SW did discuss referral to [facility] for continued PT in the group home as patient has anxiety with new places  Brother to discuss with family and let this SW know if they area in agreement  Brother is skeptical about sending patient to a facility due to her anxiety with new buildings and people  Awaiting call back from brother

## 2022-10-11 ENCOUNTER — PATIENT OUTREACH (OUTPATIENT)
Dept: INTERNAL MEDICINE CLINIC | Facility: CLINIC | Age: 47
End: 2022-10-11

## 2022-10-11 ENCOUNTER — TELEPHONE (OUTPATIENT)
Dept: INTERNAL MEDICINE CLINIC | Facility: CLINIC | Age: 47
End: 2022-10-11

## 2022-10-11 DIAGNOSIS — R26.2 AMBULATORY DYSFUNCTION: ICD-10-CM

## 2022-10-11 DIAGNOSIS — Q90.9 DOWN SYNDROME: ICD-10-CM

## 2022-10-11 DIAGNOSIS — Z78.9 DEFICIT IN ACTIVITIES OF DAILY LIVING (ADL): Primary | ICD-10-CM

## 2022-10-11 NOTE — PROGRESS NOTES
OPCM SW received phone call from patient's brother, reviewed [facility] referral   Brother and rest of family are agreeable  Patient is now working with a behavioral health specialist and psychiatrist   This in conjunction with PT/OT may increase patient's success in getting back to baseline  InBasket message to Jenna Carrington for order for same  Referral to be placed in [facility] when order received  Phone call placed to group home, spoke with Иван Gordon and advised of [address] referral to be placed  Иван Gordon believes this will be beneficial as well and will inform Charlotte Washington to reach out to this          Phone call placed to [employer], message left with this SW contact information for return call

## 2022-10-13 DIAGNOSIS — Z78.9 HEALTH MAINTENANCE ALTERATION: ICD-10-CM

## 2022-10-14 ENCOUNTER — PATIENT OUTREACH (OUTPATIENT)
Dept: INTERNAL MEDICINE CLINIC | Facility: CLINIC | Age: 47
End: 2022-10-14

## 2022-10-14 NOTE — PROGRESS NOTES
OPCM SW received response form Fadia at [facility]  Referral received and will take care of setting up the services

## 2022-10-24 ENCOUNTER — TELEPHONE (OUTPATIENT)
Dept: OTHER | Facility: OTHER | Age: 47
End: 2022-10-24

## 2022-10-24 DIAGNOSIS — E03.9 HYPOTHYROIDISM, UNSPECIFIED TYPE: ICD-10-CM

## 2022-10-24 DIAGNOSIS — R32 URINARY INCONTINENCE, UNSPECIFIED TYPE: ICD-10-CM

## 2022-10-24 DIAGNOSIS — Z78.9 HEALTH MAINTENANCE ALTERATION: ICD-10-CM

## 2022-10-24 DIAGNOSIS — F51.01 PRIMARY INSOMNIA: ICD-10-CM

## 2022-10-24 DIAGNOSIS — E78.3 MIXED HYPERGLYCERIDEMIA: ICD-10-CM

## 2022-10-24 RX ORDER — MULTIVIT-MIN/FA/LYCOPEN/LUTEIN .4-300-25
1 TABLET ORAL DAILY
Qty: 30 TABLET | Refills: 5 | Status: SHIPPED | OUTPATIENT
Start: 2022-10-24

## 2022-10-24 RX ORDER — ATORVASTATIN CALCIUM 10 MG/1
10 TABLET, FILM COATED ORAL DAILY
Qty: 30 TABLET | Refills: 5 | Status: SHIPPED | OUTPATIENT
Start: 2022-10-24

## 2022-10-24 RX ORDER — LANOLIN ALCOHOL/MO/W.PET/CERES
3 CREAM (GRAM) TOPICAL
Qty: 30 TABLET | Refills: 5 | Status: SHIPPED | OUTPATIENT
Start: 2022-10-24

## 2022-10-24 RX ORDER — LEVOTHYROXINE SODIUM 0.05 MG/1
50 TABLET ORAL DAILY
Qty: 30 TABLET | Refills: 5 | Status: SHIPPED | OUTPATIENT
Start: 2022-10-24

## 2022-10-24 NOTE — TELEPHONE ENCOUNTER
Patients group [place] is requesting an urgent call back from the office to discuss her medication  Please call back

## 2022-10-24 NOTE — TELEPHONE ENCOUNTER
I have an open chart for this patient  According to IT one of the MA must sign the order to close  Please sign order    It goes back to 09/08/2020    Thank You

## 2022-11-02 ENCOUNTER — PATIENT OUTREACH (OUTPATIENT)
Dept: INTERNAL MEDICINE CLINIC | Facility: CLINIC | Age: 47
End: 2022-11-02

## 2022-11-02 NOTE — PROGRESS NOTES
OPCM MARJAN placed phone call to patient's home  MARJAN spoke with Lyly Cueto who reports Xu Montaño does have PT/OT working with patient  PT was able to have patient walk out of room into living room  The Behavioral Health Specialist (BHS) continues to work with patient and was able to meet the OT today  OT is having a hard time engaging patient  Brother is visiting patient during MARJAN phone call  MARJAN will outreach brother at a later time for his thoughts

## 2022-11-04 ENCOUNTER — TELEMEDICINE (OUTPATIENT)
Dept: INTERNAL MEDICINE CLINIC | Facility: CLINIC | Age: 47
End: 2022-11-04

## 2022-11-04 VITALS — TEMPERATURE: 98.4 F

## 2022-11-04 DIAGNOSIS — Z78.9 DEFICIT IN ACTIVITIES OF DAILY LIVING (ADL): ICD-10-CM

## 2022-11-04 DIAGNOSIS — R23.8 SKIN IRRITATION: ICD-10-CM

## 2022-11-04 DIAGNOSIS — S31.109A WOUND, OPEN, ABDOMINAL WALL, ANTERIOR, INITIAL ENCOUNTER: Primary | ICD-10-CM

## 2022-11-04 DIAGNOSIS — R32 URINARY INCONTINENCE, UNSPECIFIED TYPE: ICD-10-CM

## 2022-11-04 NOTE — PROGRESS NOTES
Virtual Regular Visit    Verification of patient location:    Patient is located in the following state in which I hold an active license [place]      Assessment/Plan:    Problem List Items Addressed This Visit    None     Visit Diagnoses     Wound, open, abdominal wall, anterior, initial encounter    -  Primary    Skin irritation        Urinary incontinence, unspecified type        Deficit in activities of daily living (ADL)        Lives in group home          The case discussed with patient's caregiver using patient centered shared decision making  The patient was counseled regarding instructions for management,-- risk factor reductions,-- prognosis,-- impressions,-- risks and benefits of treatment options,-- importance of compliance with treatment  I have reviewed the instructions with the patient, answering all questions to her satisfaction  had lengthy discussion with Ariadna Sears  Plan: resume use of desitin  Will call if problem progresses at which time will consult [facility] for wound care    Unmet care needs due to group home regulations continues to be an issue  It would be in the patient's best interest to consider higher level of care as current staff are unable to meet her medical needs by virtue of their training  Ariadna Sears to call with progression of problem PRN  Reason for visit is   Chief Complaint   Patient presents with   • Wound Check        Encounter provider Naomi Ramírez [address] [place] [address]    Provider located at [address] [place] [id number]      Recent Visits  No visits were found meeting these conditions  Showing recent visits within past 7 days and meeting all other requirements  Today's Visits  Date Type Provider Dept   11/04/22 Telemedicine Lidya Zhao today's visits and meeting all other requirements  Future Appointments  No visits were found meeting these conditions    Showing future appointments within next 150 days and meeting all other requirements       The patient was identified by name and date of birth  Syed Gross was informed that this is a telemedicine visit and that the visit is being conducted through [address] Now and patient was informed that this is a secure, HIPAA-compliant platform  She agrees to proceed     My office door was closed  No one else was in the room  She acknowledged consent and understanding of privacy and security of the video platform  The patient has agreed to participate and understands they can discontinue the visit at any time  Patient is aware this is a billable service  Subjective  Syed Gross is a 50 y o  female presents for wound eval        Pt seen by video visit  Visit facilitated by group home caregiver, Sultana Pandey    History obtained from caregiver and unobtainable from patient due to non verbal     Reason for visit is recurrence of skin breakdown in fold of pannus  Pt morbidly obese  Had similar last month   Resolved with application of desitin    Pt is intellectually disabled  Refuses to leave bed/bedroom  She is often uncooperative with staff  She is incontinent of b/b    Staff is not trained to perform wound care         Past Medical History:   Diagnosis Date   • Allergic    • Community acquired pneumonia     Last Assessed:1/22/2013   • Leukopenia     Last Assessed:3/5/2014   • Osteoporosis    • Trisomy 24, Down syndrome        Past Surgical History:   Procedure Laterality Date   • TONSILLECTOMY AND ADENOIDECTOMY         Current Outpatient Medications   Medication Sig Dispense Refill   • acetaminophen (TYLENOL) 500 mg tablet Take 1 tablet (500 mg total) by mouth every 8 (eight) hours as needed for mild pain 30 tablet 0   • ammonium lactate (LAC-HYDRIN) 12 % lotion Apply topically 2 (two) times a day 400 g 0   • atorvastatin (LIPITOR) 10 mg tablet Take 1 tablet (10 mg total) by mouth daily 30 tablet 5   • Cholecalciferol 25 MCG (1000 UT) tablet Take 1 tablet (1,000 Units total) by mouth daily 31 tablet 5   • Diclofenac Sodium (VOLTAREN) 1 % Apply 2 g topically 4 (four) times a day 150 g 2   • fluticasone (Flonase) 50 mcg/act nasal spray 2 sprays into each nostril daily as needed for rhinitis 11 1 mL 0   • Heating Pad PADS by Does not apply route 2 (two) times a day 1 each 0   • ibuprofen (MOTRIN) 400 mg tablet Take 1 tablet (400 mg total) by mouth every 8 (eight) hours as needed for mild pain (with food) 14 tablet 0   • Incontinence Supply Disposable (RA Adult Wipes) MISC Use daily 200 each 2   • levothyroxine 50 mcg tablet Take 1 tablet (50 mcg total) by mouth daily 30 tablet 5   • loratadine (CLARITIN) 5 mg/5 mL syrup Take 5 mL (5 mg total) by mouth daily as needed for allergies 150 mL 0   • melatonin 3 mg Take 1 tablet (3 mg total) by mouth daily at bedtime 30 tablet 5   • Multiple Vitamins-Minerals (CertaVite Senior/Antioxidant) TABS Take 1 tablet by mouth daily 30 tablet 5   • nystatin powder APPLY TOP  TO AFF  AREA(S) 2X DAILY @ 8AM & 8PM (INFECTION) 15 g 0   • zinc oxide (DESITIN) 13 % cream Apply topically in the morning To inflamed skin folds as needed 454 g 0   • benzonatate (TESSALON) 200 MG capsule Take 1 capsule (200 mg total) by mouth 3 (three) times a day as needed for cough (Patient not taking: Reported on 11/4/2022) 20 capsule 0   • loperamide (IMODIUM A-D) 2 MG tablet Take 1 tablet (2 mg total) by mouth 4 (four) times a day as needed for diarrhea (Patient not taking: No sig reported) 30 tablet 0   • loperamide (IMODIUM) 2 mg capsule  (Patient not taking: No sig reported)     • LORazepam (ATIVAN) 0 5 mg tablet Take 1/2 of a pill as needed for anxiety daily 30 minutes prior to appointments  (Patient not taking: Reported on 11/4/2022)     • Multiple Vitamin (Multivitamin Iron-Free) TABS Take 1 tablet by mouth every morning (Patient not taking: Reported on 11/4/2022) 31 tablet 0     No current facility-administered medications for this visit  Allergies   Allergen Reactions   • Pollen Extract Sneezing       Review of Systems   Constitutional: Negative for fever  Skin: Positive for wound  Video Exam    Vitals:    11/04/22 1116   Temp: 98 4 °F (36 9 °C)   TempSrc: Tympanic Core       Physical Exam  Constitutional:       Appearance: Normal appearance  Abdominal:          Comments: Staff exposed wounds by lifting pannus  Very difficult to assess d/t poor video quality    Pt minimally cooperative during exam   Neurological:      Mental Status: She is alert            I spent 15 minutes directly with the patient during this visit

## 2022-11-07 ENCOUNTER — TELEPHONE (OUTPATIENT)
Dept: INTERNAL MEDICINE CLINIC | Facility: CLINIC | Age: 47
End: 2022-11-07

## 2022-11-07 NOTE — TELEPHONE ENCOUNTER
Patient's caregiver states patient is due for her Tetanus and TB vaccines  Please advise if it is ok to schedule

## 2022-11-29 DIAGNOSIS — R05.1 ACUTE COUGH: Primary | ICD-10-CM

## 2022-11-30 RX ORDER — DEXTROMETHORPHAN HYDROBROMIDE AND GUAIFENESIN 10; 100 MG/5ML; MG/5ML
5 SOLUTION ORAL AS NEEDED
Qty: 473 ML | Refills: 0 | Status: SHIPPED | OUTPATIENT
Start: 2022-11-30

## 2022-12-27 ENCOUNTER — PATIENT OUTREACH (OUTPATIENT)
Dept: INTERNAL MEDICINE CLINIC | Facility: CLINIC | Age: 47
End: 2022-12-27

## 2022-12-27 NOTE — PROGRESS NOTES
OPCM SW placed follow up phone call to patient's brother for status of patient's physical therapy and behavioral health services  Brother was not available and message left with this SW contact information and reason for phone call  Awaiting brother's return call

## 2023-01-10 ENCOUNTER — PATIENT OUTREACH (OUTPATIENT)
Dept: INTERNAL MEDICINE CLINIC | Facility: CLINIC | Age: 48
End: 2023-01-10

## 2023-01-17 ENCOUNTER — TELEMEDICINE (OUTPATIENT)
Dept: INTERNAL MEDICINE CLINIC | Facility: CLINIC | Age: 48
End: 2023-01-17

## 2023-01-17 DIAGNOSIS — E78.2 MIXED HYPERLIPIDEMIA: ICD-10-CM

## 2023-01-17 DIAGNOSIS — Z13.1 SCREENING FOR DIABETES MELLITUS: ICD-10-CM

## 2023-01-17 DIAGNOSIS — R41.0 DISORIENTATION: ICD-10-CM

## 2023-01-17 DIAGNOSIS — Q90.9 DOWN SYNDROME: ICD-10-CM

## 2023-01-17 DIAGNOSIS — E03.9 HYPOTHYROIDISM, UNSPECIFIED TYPE: ICD-10-CM

## 2023-01-17 DIAGNOSIS — Z79.899 HIGH RISK MEDICATION USE: ICD-10-CM

## 2023-01-17 DIAGNOSIS — F69 BEHAVIOR PROBLEM, ADULT: ICD-10-CM

## 2023-01-17 DIAGNOSIS — Z13.0 SCREENING, DEFICIENCY ANEMIA, IRON: ICD-10-CM

## 2023-01-17 DIAGNOSIS — Z72.4 EATING PROBLEM: Primary | ICD-10-CM

## 2023-01-17 NOTE — PROGRESS NOTES
Virtual Regular Visit    Verification of patient location:    Patient is located in the following state in which I hold an active license [place]      Assessment/Plan:    Problem List Items Addressed This Visit        Endocrine    Hypothyroidism    Relevant Orders    TSH, 3rd generation with Free T4 reflex       Other    Down syndrome    Hyperlipidemia    Relevant Orders    Lipid panel   Other Visit Diagnoses     Eating problem    -  Primary    Behavior problem, adult        Disorientation        Screening for diabetes mellitus        Relevant Orders    Comprehensive metabolic panel    Screening, deficiency anemia, iron        Relevant Orders    CBC and differential    High risk medication use        Relevant Orders    CBC and differential    Comprehensive metabolic panel    TSH, 3rd generation with Free T4 reflex    Lipid panel      The case discussed with patient's caregiver using patient centered shared decision making  The patient was counseled regarding instructions for management,-- risk factor reductions,-- prognosis,-- impressions,-- risks and benefits of treatment options,-- importance of compliance with treatment  I have reviewed the instructions with the patient, answering all questions to her satisfaction  had lengthy discussion with Britney Masters  Plan: I recommend family establish patient with provider who can do in home visits/exams  Patient has not had a proper exam since 9/2021 (approximately) this lends to missed conditions, diagnoses  I have personally never met this pt in person  Alternative is family and staff can bring her to office for exam  Staff with whom I spoke felt that the patient would not cooperate with the same  Unmet care needs due to group home regulations continues to be an issue  It would be in the patient's best interest for family to consider higher level of care as current staff are unable to meet her medical needs by virtue of their training  For the time being,    1  Feeding problem  -staff supervision with meals  -social cues-  Aspiration precautions  Provision of small amts of food at a time  Call if not improving or if there is s/s choking, aspiration  Swallow eval may be helpful but patient unlikely to cooperate    2  New confusion/disorientation  Query early dementia in the Downs syndrome pt  Labs per orders  Pt staff, family refusing to discuss  Can consider eval with neurology        David Kim to call with progression of problem PRN  Reason for visit is   No chief complaint on file  Encounter provider Faith Perez [address] [place] [address]    Provider located at [address] [place] [id number]      Recent Visits  No visits were found meeting these conditions  Showing recent visits within past 7 days and meeting all other requirements  Today's Visits  Date Type Provider Dept   01/17/23 Telemedicine Lidya Louis today's visits and meeting all other requirements  Future Appointments  No visits were found meeting these conditions  Showing future appointments within next 150 days and meeting all other requirements       The patient was identified by name and date of birth  Gladis Acosta was informed that this is a telemedicine visit and that the visit is being conducted through [address] [place] [address] Now and patient was informed that this is a secure, HIPAA-compliant platform  She agrees to proceed     My office door was closed  No one else was in the room  She acknowledged consent and understanding of privacy and security of the video platform  The patient has agreed to participate and understands they can discontinue the visit at any time  Patient is aware this is a billable service       Subjective  Gladis Acosta is a 46 y o  female presents for wound eval        Pt not available for visit  Visit facilitated by group home caregiver, David Kim    History obtained from caregiver and unobtainable from patient due to non verbal     Reason for visit is new feeding problem, behavior problem    Patient is observed eating large quantities rapidly-this presents a choking risk    Additionally staff has observed pt acting confused  Does not appear to know how to do task which she was previously able to do    She is in a group home  Patient needs a higher level of care than staff can provide  Pt was traumatized during hospital stay last year  She is uncooperative when going to a medical appointment per staff "Hits, kicks, spits   Refuses exam" she has not been examined by a health care provider since 9/2021when in the hospital    Family uncooperative and poorly responsive to staff and  social workers when recommendations made for higher level of care       Past Medical History:   Diagnosis Date   • Allergic    • Community acquired pneumonia     Last Assessed:1/22/2013   • Leukopenia     Last Assessed:3/5/2014   • Osteoporosis    • Trisomy 24, Down syndrome        Past Surgical History:   Procedure Laterality Date   • TONSILLECTOMY AND ADENOIDECTOMY         Current Outpatient Medications   Medication Sig Dispense Refill   • acetaminophen (TYLENOL) 500 mg tablet Take 1 tablet (500 mg total) by mouth every 8 (eight) hours as needed for mild pain 30 tablet 0   • ammonium lactate (LAC-HYDRIN) 12 % lotion Apply topically 2 (two) times a day 400 g 0   • atorvastatin (LIPITOR) 10 mg tablet Take 1 tablet (10 mg total) by mouth daily 30 tablet 5   • benzonatate (TESSALON) 200 MG capsule Take 1 capsule (200 mg total) by mouth 3 (three) times a day as needed for cough (Patient not taking: Reported on 11/4/2022) 20 capsule 0   • Cholecalciferol 25 MCG (1000 UT) tablet Take 1 tablet (1,000 Units total) by mouth daily 31 tablet 5   • dextromethorphan-guaiFENesin (Robitussin Cold Cough+ Chest)  mg/5 mL oral liquid Take 5 mL by mouth if needed for cough 473 mL 0   • Diclofenac Sodium (VOLTAREN) 1 % Apply 2 g topically 4 (four) times a day 150 g 2   • fluticasone (Flonase) 50 mcg/act nasal spray 2 sprays into each nostril daily as needed for rhinitis 11 1 mL 0   • Heating Pad PADS by Does not apply route 2 (two) times a day 1 each 0   • ibuprofen (MOTRIN) 400 mg tablet Take 1 tablet (400 mg total) by mouth every 8 (eight) hours as needed for mild pain (with food) 14 tablet 0   • Incontinence Supply Disposable (RA Adult Wipes) MISC Use daily 200 each 2   • levothyroxine 50 mcg tablet Take 1 tablet (50 mcg total) by mouth daily 30 tablet 5   • loperamide (IMODIUM A-D) 2 MG tablet Take 1 tablet (2 mg total) by mouth 4 (four) times a day as needed for diarrhea (Patient not taking: No sig reported) 30 tablet 0   • loperamide (IMODIUM) 2 mg capsule  (Patient not taking: No sig reported)     • loratadine (CLARITIN) 5 mg/5 mL syrup Take 5 mL (5 mg total) by mouth daily as needed for allergies 150 mL 0   • LORazepam (ATIVAN) 0 5 mg tablet Take 1/2 of a pill as needed for anxiety daily 30 minutes prior to appointments  (Patient not taking: Reported on 11/4/2022)     • melatonin 3 mg Take 1 tablet (3 mg total) by mouth daily at bedtime 30 tablet 5   • Multiple Vitamin (Multivitamin Iron-Free) TABS Take 1 tablet by mouth every morning (Patient not taking: Reported on 11/4/2022) 31 tablet 0   • Multiple Vitamins-Minerals (CertaVite Senior/Antioxidant) TABS Take 1 tablet by mouth daily 30 tablet 5   • nystatin powder APPLY TOP  TO AFF  AREA(S) 2X DAILY @ 8AM & 8PM (INFECTION) 15 g 0   • zinc oxide (DESITIN) 13 % cream Apply topically in the morning To inflamed skin folds as needed 454 g 0     No current facility-administered medications for this visit  Allergies   Allergen Reactions   • Pollen Extract Sneezing       Review of Systems   Constitutional: Negative for fever  Psychiatric/Behavioral: Positive for behavioral problems and confusion  Video Exam    There were no vitals filed for this visit      Physical Exam  Constitutional: Appearance: Normal appearance  Neurological:      Mental Status: She is alert            I spent 15 minutes directly with the patient during this visit

## 2023-02-01 ENCOUNTER — PATIENT OUTREACH (OUTPATIENT)
Dept: INTERNAL MEDICINE CLINIC | Facility: CLINIC | Age: 48
End: 2023-02-01

## 2023-02-01 NOTE — PROGRESS NOTES
Phone call to group [facility] to speak with caregiver to determine what needs exceed the group homes abilities  Message left for caregiver to return call  SW placed phone call placed to brother to discuss patient needs  Message left with this  contact number  [facility]   SC is out until Monday  Supervisor is Eula Brown [phone]  Phone call placed to [employer]  Message left with reason for phone call and this  contact number

## 2023-02-02 ENCOUNTER — PATIENT OUTREACH (OUTPATIENT)
Dept: INTERNAL MEDICINE CLINIC | Facility: CLINIC | Age: 48
End: 2023-02-02

## 2023-02-02 NOTE — PROGRESS NOTES
Received return call from [facility] Jonh Goodman  Supervisor spoke with supports coordinator who visited patient int he group home and reports that she is not sure the family's perspective is as accurate as the situation is  Patient was in soiled clothing during the visit and did try to leave the home in a think nightgown  Supervisor is concerned that patient is showing signs of dementia  Supervisor reports that it seems that patient is more cooperative when family is engaged and present  Supervisor reports that the agency has been searching for another group home and have not been able to locate same  Supervisor feels that patient may need NH placement  Patient needs to be evaluated by a physician for a LOC assessment  [facility]el[facility] would make referrals when LOC assessment is completed  SW will research if there are any doctors who are doing home visits and contact Serjio with this information

## 2023-02-02 NOTE — PROGRESS NOTES
OPCM SW received return call from Brnadi Jesus at the [facility]  Brandi Jesus reports that PT has d/c patient  S is still providing services  Brandi Jesus reports that patient tried to walk out of the home with her nightgown on stating that she was going home  The group home has been patient's home for 30 years  Patient is reported to be walking in circles  Patient is also eating meals very fast   Staff believe patient may be showing signes of dementia  Access Services gave 45 day notice to the family that they could no longer safely service patient and meet State regulations  This 45 day deadline  in 2022  Patient is incontinent in bed and in the living room, sometimes refusing to get off the couch to be cleaned  Staff cannot have patient sitting in urine for her own safety as well as the safety of the other members of the home  There is now a plan in place to reach out to family when not able to get her up and if family is unable to come staff is to call 911  Patient will not leave the group home for appointments  Not having medical care puts group home out of compliance  Patient has a foot doctor that comes to the home and patient is fine during exam as she is familiar surroundings  Patient will refuse to shower and will require staff to provide sponge baths or clean with wipes  Patient has had skin breakdown in skin layers on 2 occasions  The concerns and reason for the notice are due to health and safety issue  The group home is not in compliance with state regulations as patient does not comply with fire drills and repeatedly fails them   had found 1 or 2 group homes that would accept patient; however, family did not follow through  Director of [employer] has also been involved, and witnessed Buck Yanes try to leave the home  Patient needs a handicap accessible bathroom  Brandi Jesus reports there is not guardianship, no POA, no Medical POA for patient       Elenaor File continues to await return call form  supervisor

## 2023-02-06 ENCOUNTER — OFFICE VISIT (OUTPATIENT)
Dept: INTERNAL MEDICINE CLINIC | Facility: CLINIC | Age: 48
End: 2023-02-06

## 2023-02-06 VITALS — OXYGEN SATURATION: 99 % | DIASTOLIC BLOOD PRESSURE: 68 MMHG | SYSTOLIC BLOOD PRESSURE: 124 MMHG | HEART RATE: 91 BPM

## 2023-02-06 DIAGNOSIS — F69 BEHAVIOR PROBLEM, ADULT: ICD-10-CM

## 2023-02-06 DIAGNOSIS — F39 MOOD DISORDER (HCC): ICD-10-CM

## 2023-02-06 DIAGNOSIS — R41.89 COGNITIVE DECLINE: Primary | ICD-10-CM

## 2023-02-07 ENCOUNTER — TELEPHONE (OUTPATIENT)
Dept: INTERNAL MEDICINE CLINIC | Facility: CLINIC | Age: 48
End: 2023-02-07

## 2023-02-07 ENCOUNTER — PATIENT OUTREACH (OUTPATIENT)
Dept: INTERNAL MEDICINE CLINIC | Facility: CLINIC | Age: 48
End: 2023-02-07

## 2023-02-07 PROBLEM — R41.89 COGNITIVE DECLINE: Status: ACTIVE | Noted: 2023-02-07

## 2023-02-07 PROBLEM — F39 MOOD DISORDER (HCC): Status: RESOLVED | Noted: 2023-02-07 | Resolved: 2023-02-07

## 2023-02-07 PROBLEM — F90.9 BEHAVIOR HYPERACTIVE: Status: RESOLVED | Noted: 2023-02-07 | Resolved: 2023-02-07

## 2023-02-07 PROBLEM — F39 MOOD DISORDER (HCC): Status: ACTIVE | Noted: 2023-02-07

## 2023-02-07 PROBLEM — F90.9 BEHAVIOR HYPERACTIVE: Status: ACTIVE | Noted: 2023-02-07

## 2023-02-07 PROBLEM — F69 BEHAVIOR PROBLEM, ADULT: Status: ACTIVE | Noted: 2023-02-07

## 2023-02-07 NOTE — PROGRESS NOTES
Name: Jerson Torres      : 1975      MRN: [id number]  Encounter Provider: Susanne Carty MD  Encounter Date: 2023   Encounter department: [facility]    Assessment & Plan     1  Cognitive decline  Assessment & Plan:  Progressive cognitive decline since   Previous notes reviewed  Pt would benefit from higher level care to receive proper medical care  Follow up with psychiatry  Blood work was ordered recently by pt's PCP to rule out other medical causes of cognitive declined  Needs to be done  DDx also includes dementia  On chart review neurology eval was mentioned previously  I will place a referral     I will complete her med eval form  Orders:  -     Ambulatory Referral to Neurology; Future    2  Mood disorder [facility])  Assessment & Plan:  F/u with psych  Has ativan prn ordered  3  Behavior problem, adult  Assessment & Plan:  F/u with psych  Ordered ativan prn  Subjective      HPI   I have not met Judge Man in person since 2011  On chart review she has had progressive functional decline since hospitalization in 2021  Function decline, urinary and stool incontinence, refusing care, refusing going to appointments, lie in bed most of the time, stay in her own room not participating in activities, episodes of agitation  Not coorperating with fire drill  They are here today to complete form for MA-[facility] med eval in order for pt to receive higher level of care  Pt sees psychiatry has ativan prn ordered  Care giver mentioned neurology eval was mentioned before but family not interested       Review of Systems    Current Outpatient Medications on File Prior to Visit   Medication Sig   • acetaminophen (TYLENOL) 500 mg tablet Take 1 tablet (500 mg total) by mouth every 8 (eight) hours as needed for mild pain   • ammonium lactate (LAC-HYDRIN) 12 % lotion Apply topically 2 (two) times a day   • atorvastatin (LIPITOR) 10 mg tablet Take 1 tablet (10 mg total) by mouth daily   • Cholecalciferol 25 MCG (1000 UT) tablet Take 1 tablet (1,000 Units total) by mouth daily   • Diclofenac Sodium (VOLTAREN) 1 % Apply 2 g topically 4 (four) times a day   • fluticasone (Flonase) 50 mcg/act nasal spray 2 sprays into each nostril daily as needed for rhinitis   • Heating Pad PADS by Does not apply route 2 (two) times a day   • ibuprofen (MOTRIN) 400 mg tablet Take 1 tablet (400 mg total) by mouth every 8 (eight) hours as needed for mild pain (with food)   • Incontinence Supply Disposable (RA Adult Wipes) MISC Use daily   • levothyroxine 50 mcg tablet Take 1 tablet (50 mcg total) by mouth daily   • LORazepam (ATIVAN) 0 5 mg tablet    • melatonin 3 mg Take 1 tablet (3 mg total) by mouth daily at bedtime   • Multiple Vitamin (Multivitamin Iron-Free) TABS Take 1 tablet by mouth every morning   • nystatin powder APPLY TOP  TO AFF  AREA(S) 2X DAILY @ 8AM & 8PM (INFECTION)   • benzonatate (TESSALON) 200 MG capsule Take 1 capsule (200 mg total) by mouth 3 (three) times a day as needed for cough (Patient not taking: Reported on 11/4/2022)   • dextromethorphan-guaiFENesin (Robitussin Cold Cough+ Chest)  mg/5 mL oral liquid Take 5 mL by mouth if needed for cough (Patient not taking: Reported on 2/6/2023)   • loperamide (IMODIUM A-D) 2 MG tablet Take 1 tablet (2 mg total) by mouth 4 (four) times a day as needed for diarrhea (Patient not taking: Reported on 8/18/2022)   • loperamide (IMODIUM) 2 mg capsule  (Patient not taking: Reported on 8/18/2022)   • loratadine (CLARITIN) 5 mg/5 mL syrup Take 5 mL (5 mg total) by mouth daily as needed for allergies (Patient not taking: Reported on 2/6/2023)   • Multiple Vitamins-Minerals (CertaVite Senior/Antioxidant) TABS Take 1 tablet by mouth daily (Patient not taking: Reported on 2/6/2023)   • zinc oxide (DESITIN) 13 % cream Apply topically in the morning To inflamed skin folds as needed (Patient not taking: Reported on 2/6/2023)       Objective /68   Pulse 91   SpO2 99%     Physical Exam  Nanci Jesus MD

## 2023-02-07 NOTE — PROGRESS NOTES
SW received phone call from patient's sister Rut Arguello asking if SW received feed back from caregiver or   MARJAN advised that concerns at the home revolve around regulatory compliance such as patient not being able to participate in fire drills, not getting to medical appointments for follow up, being soiled  Sister reports that patient has an occasional accident  Sister states that the [place] and [place] have been involved in the past       Sister reports that patient has been out of the house for breast exam, dental appointments, etc       Sister reports that patient has succeeded in all goals that the service has set for patient and when review comes the goals are moved  Sister does report that family is trying to keep patient in the home with her house mates/friends  Sister reports that the instance when patient left the home in the nightgown was when sister was there and patient wanted to leave with sister        Sister provided with [facility] level [facility] phone number and the [place] level number  Sister is aware of the letter from [facility] to terminate services in August   Sister is unsure why [facility] wants patient removed from home  Sister states that family does not think patient is snf or SNF appropriate  MARJAN referred family to  for more information

## 2023-02-07 NOTE — ASSESSMENT & PLAN NOTE
Progressive cognitive decline since 2021  Previous notes reviewed  Pt would benefit from higher level care to receive proper medical care  Follow up with psychiatry  Blood work was ordered recently by pt's PCP to rule out other medical causes of cognitive declined  Needs to be done  DDx also includes dementia  On chart review neurology eval was mentioned previously  I will place a referral     I will complete her med eval form

## 2023-02-07 NOTE — TELEPHONE ENCOUNTER
Completed  Put it in my bin  Please let Do Pimentel know I also entered a neurology referral for evaluation of her cognitive decline

## 2023-02-14 ENCOUNTER — PATIENT OUTREACH (OUTPATIENT)
Dept: INTERNAL MEDICINE CLINIC | Facility: CLINIC | Age: 48
End: 2023-02-14

## 2023-02-14 NOTE — PROGRESS NOTES
SW received telephone call from sister Galina Mayfield who reports that patient was taken to the hospital after a possible seizure this morning  Galina Mayfield reports that [facility] is reporting that [employer] will not take patient back to the home  SW attempted to educate sister that if staff and administration feel that patient is above their care abilities, they have a right to recommend a higher level of care and can possibly refuse to take patient back if patient is not appropriate for that level of care  SW explained how administration reported that they are out of compliance due to certain care needs of patient  Sister became tearful and is upset that they would remove her from the home after being there for 31 years with her house mates  Sister denies that patient needs any more care than the other house mates  MARJAN advised that the situation can only be evaluated by the information received by each person involved  Sister is asking what she can do  SW provided that sister can call the [facility] for assistance  Sister can call the administrative office of [facility] as well

## 2023-02-23 ENCOUNTER — PATIENT OUTREACH (OUTPATIENT)
Dept: INTERNAL MEDICINE CLINIC | Facility: CLINIC | Age: 48
End: 2023-02-23

## 2023-02-23 NOTE — PROGRESS NOTES
Chart review completed  Patient d/c from hospital to sister's home while  searched for placement in the appropriate level of care for patient  Patient has assistance from  and Yuki PHILLIP will close referral at this time as goal has been met to SW ability  SW remains available

## 2023-03-01 DIAGNOSIS — R21 RASH: ICD-10-CM

## 2023-03-01 DIAGNOSIS — M54.2 NECK PAIN: ICD-10-CM

## 2023-03-01 RX ORDER — NYSTATIN 100000 [USP'U]/G
POWDER TOPICAL
Qty: 60 G | Refills: 3 | Status: SHIPPED | OUTPATIENT
Start: 2023-03-01

## 2023-03-01 RX ORDER — AMMONIUM LACTATE 12 G/100G
LOTION TOPICAL 2 TIMES DAILY
Qty: 567 G | Refills: 3 | Status: SHIPPED | OUTPATIENT
Start: 2023-03-01

## 2023-03-18 ENCOUNTER — TELEPHONE (OUTPATIENT)
Dept: NEUROLOGY | Facility: CLINIC | Age: 48
End: 2023-03-18

## 2023-03-18 NOTE — TELEPHONE ENCOUNTER
1st attempt,     Sending a [employer] message for pt to call us back and start the triage process      Thank you,     John Wallace

## 2023-05-02 ENCOUNTER — TELEPHONE (OUTPATIENT)
Dept: OTHER | Facility: OTHER | Age: 48
End: 2023-05-02

## 2023-05-02 NOTE — TELEPHONE ENCOUNTER
Received call from [address] to follow up on form faxed over on 4/22 and 4/7 for OT plan for care post initial evaluation and requested if PCP has received and reviewed plan and is signing off on plan  Please follow up with Norma Stern

## 2023-05-25 ENCOUNTER — TELEPHONE (OUTPATIENT)
Dept: FAMILY MEDICINE CLINIC | Facility: CLINIC | Age: 48
End: 2023-05-25

## 2023-05-25 NOTE — TELEPHONE ENCOUNTER
Received fax from Hannah Sosa for evaluation for occupational therapy  Signed and faxed   Scanned to chart

## 2023-07-07 DIAGNOSIS — M54.2 NECK PAIN: ICD-10-CM

## 2023-07-07 DIAGNOSIS — M25.561 CHRONIC PAIN OF BOTH KNEES: ICD-10-CM

## 2023-07-07 DIAGNOSIS — G89.29 CHRONIC PAIN OF BOTH KNEES: ICD-10-CM

## 2023-07-07 DIAGNOSIS — M25.562 CHRONIC PAIN OF BOTH KNEES: ICD-10-CM

## 2023-07-07 RX ORDER — ACETAMINOPHEN 500 MG
500 TABLET ORAL EVERY 8 HOURS PRN
Qty: 30 TABLET | Refills: 5 | Status: SHIPPED | OUTPATIENT
Start: 2023-07-07

## 2023-07-17 ENCOUNTER — TELEPHONE (OUTPATIENT)
Dept: FAMILY MEDICINE CLINIC | Facility: CLINIC | Age: 48
End: 2023-07-17

## 2023-07-17 NOTE — TELEPHONE ENCOUNTER
Good morning. This is Benjiman Severs calling from [facility]. I'm calling in regarding mutual patient of Doctor Pretty Lowe and the patient's name is Melita Murphy with the date of birth January 7, 1975 For this patient, we have faxed over physical therapy order on July 12 and we also faxed to the correct number fax number [phone]. The physical therapy order on this number on July 14, could you please confirm the receipt of physical therapy order receipt and give us a call back to confirm that? Our call back number is [phone], option #2, again [phone], option #2. The fax number is [phone]. Thank you. Jada.

## 2023-07-19 ENCOUNTER — TELEPHONE (OUTPATIENT)
Dept: FAMILY MEDICINE CLINIC | Facility: CLINIC | Age: 48
End: 2023-07-19

## 2023-07-19 DIAGNOSIS — F51.01 PRIMARY INSOMNIA: ICD-10-CM

## 2023-07-19 DIAGNOSIS — M54.2 NECK PAIN: ICD-10-CM

## 2023-07-19 RX ORDER — LANOLIN ALCOHOL/MO/W.PET/CERES
3 CREAM (GRAM) TOPICAL
Qty: 30 TABLET | Refills: 5 | Status: SHIPPED | OUTPATIENT
Start: 2023-07-19

## 2023-07-19 RX ORDER — IBUPROFEN 400 MG/1
400 TABLET ORAL EVERY 8 HOURS PRN
Qty: 14 TABLET | Refills: 0 | Status: SHIPPED | OUTPATIENT
Start: 2023-07-19

## 2023-07-19 NOTE — TELEPHONE ENCOUNTER
[address] called in and requested refills for;    ibuprofen (MOTRIN) 400 mg tablet    melatonin 3 mg

## 2023-08-14 ENCOUNTER — TELEPHONE (OUTPATIENT)
Dept: FAMILY MEDICINE CLINIC | Facility: CLINIC | Age: 48
End: 2023-08-14

## 2023-08-14 NOTE — TELEPHONE ENCOUNTER
[facility] called to verify fax number and inform us they are sending over physical therapy eval paperwork that will need to be signed by PCP.

## 2023-09-15 ENCOUNTER — TELEPHONE (OUTPATIENT)
Dept: OTHER | Facility: HOSPITAL | Age: 48
End: 2023-09-15

## 2023-09-15 NOTE — TELEPHONE ENCOUNTER
[phone]    Aminta's sister, Krystina Gomez is calling stating she needs to get Lydia Pa a physical to get her into [facility] living facility. I do not see any apts for this office before October 1st.  Can you double check that there is nothing and maybe overbook?   Thank you

## 2023-09-19 ENCOUNTER — TELEPHONE (OUTPATIENT)
Age: 48
End: 2023-09-19

## 2023-09-19 NOTE — TELEPHONE ENCOUNTER
Pt's sister Teofilo Gómez is following up on getting patient in for a physical prior to 10/1. Spoke to Terri Marc, she is aware, pt's sister does not want to switch practices or go to urgent care. Pt has down syndrome and would not feel comfortable with another practice.

## 2023-09-19 NOTE — TELEPHONE ENCOUNTER
So this message was taken on 9/15 and was never sent to us. Pt needs PE ASAP to get into a group home. Are you able to squeeze or should I put her with Dr. Miguel Desir?

## 2023-09-19 NOTE — TELEPHONE ENCOUNTER
Spoke to Meche, will see Dr. Stanley Anand and resident on 9/26/23 at [address] [place] [address].

## 2023-09-26 ENCOUNTER — OFFICE VISIT (OUTPATIENT)
Dept: INTERNAL MEDICINE CLINIC | Facility: CLINIC | Age: 48
End: 2023-09-26
Payer: MEDICARE

## 2023-09-26 ENCOUNTER — TELEPHONE (OUTPATIENT)
Age: 48
End: 2023-09-26

## 2023-09-26 VITALS
SYSTOLIC BLOOD PRESSURE: 124 MMHG | DIASTOLIC BLOOD PRESSURE: 74 MMHG | BODY MASS INDEX: 37.77 KG/M2 | WEIGHT: 163.2 LBS | HEIGHT: 55 IN

## 2023-09-26 DIAGNOSIS — Z11.1 ENCOUNTER FOR PPD TEST: ICD-10-CM

## 2023-09-26 DIAGNOSIS — Z11.4 SCREENING FOR HIV (HUMAN IMMUNODEFICIENCY VIRUS): ICD-10-CM

## 2023-09-26 DIAGNOSIS — Z12.31 ENCOUNTER FOR SCREENING MAMMOGRAM FOR BREAST CANCER: ICD-10-CM

## 2023-09-26 DIAGNOSIS — Z23 ENCOUNTER FOR VACCINATION: ICD-10-CM

## 2023-09-26 DIAGNOSIS — Z00.00 ENCOUNTER FOR MEDICARE ANNUAL WELLNESS EXAM: Primary | ICD-10-CM

## 2023-09-26 DIAGNOSIS — Z00.00 MEDICARE ANNUAL WELLNESS VISIT, SUBSEQUENT: ICD-10-CM

## 2023-09-26 DIAGNOSIS — R41.89 COGNITIVE DECLINE: ICD-10-CM

## 2023-09-26 DIAGNOSIS — S90.811A ABRASION, RIGHT FOOT, INITIAL ENCOUNTER: ICD-10-CM

## 2023-09-26 PROCEDURE — 90471 IMMUNIZATION ADMIN: CPT

## 2023-09-26 PROCEDURE — G0439 PPPS, SUBSEQ VISIT: HCPCS | Performed by: GENERAL ACUTE CARE HOSPITAL

## 2023-09-26 PROCEDURE — 90715 TDAP VACCINE 7 YRS/> IM: CPT

## 2023-09-26 PROCEDURE — 86580 TB INTRADERMAL TEST: CPT

## 2023-09-26 RX ORDER — LAMOTRIGINE 25 MG/1
TABLET ORAL
COMMUNITY
Start: 2023-06-30 | End: 2023-09-26

## 2023-09-26 RX ORDER — CITALOPRAM HYDROBROMIDE 10 MG/1
1 TABLET ORAL EVERY EVENING
COMMUNITY
Start: 2023-08-22

## 2023-09-26 RX ORDER — DIVALPROEX SODIUM 125 MG/1
TABLET, DELAYED RELEASE ORAL
COMMUNITY
Start: 2023-08-22 | End: 2023-09-26

## 2023-09-26 NOTE — ASSESSMENT & PLAN NOTE
-Medicare annual wellness examination overall the patient is clinically stable and doing well. We have reviewed the patient's vaccines and have made recommendations for updates.   -Patient is here to fill out her physical form that is required for her community services group placement. Patient's optometry and audiology reports are still pending from last visit which will be attached once available. Asked the patient's family to fax them over to the clinic.   -Diagnosed with moderate ID and DS as per her Behavior specialists Arturo Dyer and Cassidy Danielle.   -Otherwise physical examination was limited as patient was not very cooperative. At this visit we gave her PPD test and Tdap. Will need to follow-up PPD test in 2 days.

## 2023-09-26 NOTE — TELEPHONE ENCOUNTER
There is a physical form patient needs filled out and Jose Miguel Russo will have it in her email, and was wondering if there was a way to print it out to the office. I did say it could be uploaded via [employer] as well.

## 2023-09-26 NOTE — ASSESSMENT & PLAN NOTE
Patient currently being followed by psychiatrist.  Family stated they will follow-up with neurology soon after her placement in the community services group. Otherwise patient doing well with no changes since last visit.

## 2023-09-26 NOTE — PROGRESS NOTES
Assessment and Plan:    Encounter for PPD test  Follow-up with PPD test in 2 days 9/28 and fill out the paperwork for [facility]    Cognitive decline  Patient currently being followed by psychiatrist.  Family stated they will follow-up with neurology soon after her placement in the [facility]. Otherwise patient doing well with no changes since last visit. Encounter for Medicare annual wellness exam  -Medicare annual wellness examination overall the patient is clinically stable and doing well. We have reviewed the patient's vaccines and have made recommendations for updates.   -Patient is here to fill out her physical form that is required for her [facility] placement. Patient's optometry and audiology reports are still pending from last visit which will be attached once available. Asked the patient's family to fax them over to the clinic.   -Diagnosed with moderate ID and DS as per her Behavior specialists Stanford Hernandez and Amanda Tarango.   -Otherwise physical examination was limited as patient was not very cooperative. At this visit we gave her PPD test and Tdap. Will need to follow-up PPD test in 2 days. Subjective:     Patient ID: Indio Yanez is a 46 y.o. female. WALTER Hurtadojohn Soto is a 12-year-old female who came to the clinic for follow-up for her annual wellness visit and to fill out her paperwork for placement in [facility]. She is overall doing well with no acute active complaints. She is tolerating her medications well with no new side effects. Her brother and sister were also in the room accompanying her. She is able to carry out her basic daily ADLs with a diagnosis of moderate intellectual disability as per her behavioral specialist.  Otherwise she is doing well with no active complaints at this time. She is here to get her physical form filled for [facility].   Certain aspects of the form were not able to be filled as we need prior records from her previous visits from optometrist and audiology. Also most of physical exam was limited as she was not cooperative and not very attentive. Advised the family to reach out to her optometrist and audiology to have them fax the notes from the previous visits which can be attached to her physical exam form along with her last OB/GYN visit. Asked the patient and the family to come back in 2 days for evaluation of her PPD so her paperwork can be signed and completed. Patient and family has no questions at this time. Review of Systems   Constitutional: Negative for chills and fever. HENT: Negative for ear pain and sore throat. Eyes: Negative for pain and visual disturbance. Respiratory: Negative for cough and shortness of breath. Cardiovascular: Negative for chest pain and palpitations. Gastrointestinal: Negative for abdominal pain and vomiting. Genitourinary: Negative for dysuria and hematuria. Musculoskeletal: Negative for arthralgias and back pain. Skin: Negative for color change and rash. Neurological: Negative for seizures and syncope. All other systems reviewed and are negative.        Patient Active Problem List   Diagnosis   • Allergic rhinitis   • Down syndrome   • Hyperlipidemia   • Hypothyroidism   • Osteoporosis   • Periodontitis   • Trisomy 21, Down syndrome   • Acute pain of left knee   • Neck pain   • Bilateral impacted cerumen   • Preoperative clearance   • Class 3 severe obesity due to excess calories without serious comorbidity with body mass index (BMI) of 40.0 to 44.9 in adult [facility])   • Morbid obesity with body mass index (BMI) of 40.0 to 49.9 ([facility])   • Abnormal laboratory test   • Primary insomnia   • Cognitive decline   • Behavior problem, adult   • Encounter for PPD test   • Encounter for vaccination   • Encounter for Medicare annual wellness exam        Current Outpatient Medications   Medication Sig Dispense Refill   • citalopram (CeleXA) 10 mg tablet Take 1 tablet by mouth every evening     • acetaminophen (TYLENOL) 500 mg tablet Take 1 tablet (500 mg total) by mouth every 8 (eight) hours as needed for mild pain 30 tablet 5   • ammonium lactate (LAC-HYDRIN) 12 % lotion Apply topically 2 (two) times a day 567 g 3   • atorvastatin (LIPITOR) 10 mg tablet Take 1 tablet (10 mg total) by mouth daily 30 tablet 6   • benzonatate (TESSALON) 200 MG capsule Take 1 capsule (200 mg total) by mouth 3 (three) times a day as needed for cough (Patient not taking: Reported on 11/4/2022) 20 capsule 0   • Cholecalciferol 25 MCG (1000 UT) tablet Take 1 tablet (1,000 Units total) by mouth daily 31 tablet 5   • dextromethorphan-guaiFENesin (Robitussin Cold Cough+ Chest)  mg/5 mL oral liquid Take 5 mL by mouth if needed for cough (Patient not taking: Reported on 2/6/2023) 473 mL 0   • Diclofenac Sodium (VOLTAREN) 1 % Apply 2 g topically 4 (four) times a day 350 g 5   • fluticasone (Flonase) 50 mcg/act nasal spray 2 sprays into each nostril daily as needed for rhinitis 11.1 mL 0   • Heating Pad PADS by Does not apply route 2 (two) times a day 1 each 0   • ibuprofen (MOTRIN) 400 mg tablet Take 1 tablet (400 mg total) by mouth every 8 (eight) hours as needed for mild pain (with food) 14 tablet 0   • Incontinence Supply Disposable (RA Adult Wipes) MISC Use daily 200 each 2   • levothyroxine 50 mcg tablet Take 1 tablet (50 mcg total) by mouth daily 30 tablet 6   • loperamide (IMODIUM A-D) 2 MG tablet Take 1 tablet (2 mg total) by mouth 4 (four) times a day as needed for diarrhea (Patient not taking: Reported on 8/18/2022) 30 tablet 0   • loperamide (IMODIUM) 2 mg capsule  (Patient not taking: Reported on 8/18/2022)     • loratadine (CLARITIN) 5 mg/5 mL syrup Take 5 mL (5 mg total) by mouth daily as needed for allergies (Patient not taking: Reported on 2/6/2023) 150 mL 0   • melatonin 3 mg Take 1 tablet (3 mg total) by mouth daily at bedtime 30 tablet 5   • Multiple Vitamin (Multivitamin Iron-Free) TABS Take 1 tablet by mouth every morning 31 tablet 0   • Multiple Vitamins-Minerals (CertaVite Senior/Antioxidant) TABS Take 1 tablet by mouth daily (Patient not taking: Reported on 2/6/2023) 30 tablet 5   • nystatin powder APPLY TO AFFECTED AREA(S) 2X DAILY @8AM & 8PM (INFECTION) 60 g 3   • zinc oxide (DESITIN) 13 % cream Apply topically in the morning To inflamed skin folds as needed (Patient not taking: Reported on 2/6/2023) 454 g 0     No current facility-administered medications for this visit. Allergies   Allergen Reactions   • Pollen Extract Sneezing        The following portions of the patient's history were reviewed and updated as appropriate: allergies, current medications, past family history, past medical history, past social history, past surgical history and problem list.          Objective:    /74 (BP Location: Left arm, Patient Position: Sitting, Cuff Size: Standard)   Ht 4' 7" (1.397 m)   Wt 74 kg (163 lb 3.2 oz)   BMI 37.93 kg/m²      Body mass index is 37.93 kg/m². Physical Exam  Vitals and nursing note reviewed. Constitutional:       General: She is not in acute distress. Appearance: She is well-developed. HENT:      Head: Normocephalic and atraumatic. Right Ear: There is impacted cerumen. Left Ear: There is impacted cerumen. Mouth/Throat:      Comments: Poor dentition  Eyes:      Conjunctiva/sclera: Conjunctivae normal.   Cardiovascular:      Rate and Rhythm: Normal rate and regular rhythm. Heart sounds: No murmur heard. Pulmonary:      Effort: Pulmonary effort is normal. No respiratory distress. Breath sounds: Normal breath sounds. Abdominal:      Palpations: Abdomen is soft. Tenderness: There is no abdominal tenderness. Musculoskeletal:         General: No swelling. Cervical back: Neck supple. Skin:     General: Skin is warm and dry. Capillary Refill: Capillary refill takes less than 2 seconds. Neurological:      Mental Status: She is alert.    Psychiatric:         Mood and Affect: Mood normal.      Comments: Not very attentive and very hard to follow commands

## 2023-09-27 ENCOUNTER — TELEPHONE (OUTPATIENT)
Dept: OTHER | Facility: HOSPITAL | Age: 48
End: 2023-09-27

## 2023-09-27 NOTE — PROGRESS NOTES
Assessment and Plan:     Problem List Items Addressed This Visit        Other    Cognitive decline     Patient currently being followed by psychiatrist.  Family stated they will follow-up with neurology soon after her placement in the [facility]. Otherwise patient doing well with no changes since last visit. Encounter for PPD test     Follow-up with PPD test in 2 days 9/28 and fill out the paperwork for CSG         Relevant Orders    TB Skin Test (Completed)    Encounter for Medicare annual wellness exam - Primary     -Medicare annual wellness examination overall the patient is clinically stable and doing well. We have reviewed the patient's vaccines and have made recommendations for updates.   -Patient is here to fill out her physical form that is required for her [facility] placement. Patient's optometry and audiology reports are still pending from last visit which will be attached once available. Asked the patient's family to fax them over to the clinic.   -Diagnosed with moderate ID and DS as per her Behavior specialists Marcus Mercer and Albert Vargas.   -Otherwise physical examination was limited as patient was not very cooperative. At this visit we gave her PPD test and Tdap. Will need to follow-up PPD test in 2 days. RESOLVED: Annual physical exam   Other Visit Diagnoses     Screening for HIV (human immunodeficiency virus)        Encounter for screening mammogram for breast cancer        Abrasion, right foot, initial encounter        Relevant Orders    TDAP VACCINE GREATER THAN OR EQUAL TO 8YO IM (Completed)           Preventive health issues were discussed with patient, and age appropriate screening tests were ordered as noted in patient's After Visit Summary. Personalized health advice and appropriate referrals for health education or preventive services given if needed, as noted in patient's After Visit Summary.      History of Present Illness:     Patient presents for a Medicare Wellness Visit    HPI   Patient Care Team:  Henrietta Mcghee MD as PCP - General (Internal Medicine)     Review of Systems:     Review of Systems     Problem List:     Patient Active Problem List   Diagnosis   • Allergic rhinitis   • Down syndrome   • Hyperlipidemia   • Hypothyroidism   • Osteoporosis   • Periodontitis   • Trisomy 21, Down syndrome   • Acute pain of left knee   • Neck pain   • Bilateral impacted cerumen   • Preoperative clearance   • Class 3 severe obesity due to excess calories without serious comorbidity with body mass index (BMI) of 40.0 to 44.9 in adult [facility])   • Morbid obesity with body mass index (BMI) of 40.0 to 49.9 ([facility])   • Abnormal laboratory test   • Primary insomnia   • Cognitive decline   • Behavior problem, adult   • Encounter for PPD test   • Encounter for vaccination   • Encounter for Medicare annual wellness exam      Past Medical and Surgical History:     Past Medical History:   Diagnosis Date   • Allergic    • Community acquired pneumonia     Last Assessed:1/22/2013   • Leukopenia     Last Assessed:3/5/2014   • Osteoporosis    • Trisomy 24, Down syndrome      Past Surgical History:   Procedure Laterality Date   • TONSILLECTOMY AND ADENOIDECTOMY        Family History:     Family History   Problem Relation Age of Onset   • Cancer Mother    • No Known Problems Sister    • No Known Problems Brother    • No Known Problems Brother       Social History:     Social History     Socioeconomic History   • Marital status: Single     Spouse name: None   • Number of children: None   • Years of education: None   • Highest education level: None   Occupational History   • None   Tobacco Use   • Smoking status: Never   • Smokeless tobacco: Never   Vaping Use   • Vaping Use: Never used   Substance and Sexual Activity   • Alcohol use: No   • Drug use: Never   • Sexual activity: Never   Other Topics Concern   • None   Social History Narrative   • None     Social Determinants of Health     Financial Resource Strain: Not on file   Food Insecurity: Not on file   Transportation Needs: Not on file   Physical Activity: Not on file   Stress: Not on file   Social Connections: Not on file   Intimate Partner Violence: Not on file   Housing Stability: Not on file      Medications and Allergies:     Current Outpatient Medications   Medication Sig Dispense Refill   • citalopram (CeleXA) 10 mg tablet Take 1 tablet by mouth every evening     • acetaminophen (TYLENOL) 500 mg tablet Take 1 tablet (500 mg total) by mouth every 8 (eight) hours as needed for mild pain 30 tablet 5   • ammonium lactate (LAC-HYDRIN) 12 % lotion Apply topically 2 (two) times a day 567 g 3   • atorvastatin (LIPITOR) 10 mg tablet Take 1 tablet (10 mg total) by mouth daily 30 tablet 6   • benzonatate (TESSALON) 200 MG capsule Take 1 capsule (200 mg total) by mouth 3 (three) times a day as needed for cough (Patient not taking: Reported on 11/4/2022) 20 capsule 0   • Cholecalciferol 25 MCG (1000 UT) tablet Take 1 tablet (1,000 Units total) by mouth daily 31 tablet 5   • dextromethorphan-guaiFENesin (Robitussin Cold Cough+ Chest)  mg/5 mL oral liquid Take 5 mL by mouth if needed for cough (Patient not taking: Reported on 2/6/2023) 473 mL 0   • Diclofenac Sodium (VOLTAREN) 1 % Apply 2 g topically 4 (four) times a day 350 g 5   • fluticasone (Flonase) 50 mcg/act nasal spray 2 sprays into each nostril daily as needed for rhinitis 11.1 mL 0   • Heating Pad PADS by Does not apply route 2 (two) times a day 1 each 0   • ibuprofen (MOTRIN) 400 mg tablet Take 1 tablet (400 mg total) by mouth every 8 (eight) hours as needed for mild pain (with food) 14 tablet 0   • Incontinence Supply Disposable (RA Adult Wipes) MISC Use daily 200 each 2   • levothyroxine 50 mcg tablet Take 1 tablet (50 mcg total) by mouth daily 30 tablet 6   • loperamide (IMODIUM A-D) 2 MG tablet Take 1 tablet (2 mg total) by mouth 4 (four) times a day as needed for diarrhea (Patient not taking: Reported on 8/18/2022) 30 tablet 0   • loperamide (IMODIUM) 2 mg capsule  (Patient not taking: Reported on 8/18/2022)     • loratadine (CLARITIN) 5 mg/5 mL syrup Take 5 mL (5 mg total) by mouth daily as needed for allergies (Patient not taking: Reported on 2/6/2023) 150 mL 0   • melatonin 3 mg Take 1 tablet (3 mg total) by mouth daily at bedtime 30 tablet 5   • Multiple Vitamin (Multivitamin Iron-Free) TABS Take 1 tablet by mouth every morning 31 tablet 0   • Multiple Vitamins-Minerals (CertaVite Senior/Antioxidant) TABS Take 1 tablet by mouth daily (Patient not taking: Reported on 2/6/2023) 30 tablet 5   • nystatin powder APPLY TO AFFECTED AREA(S) 2X DAILY @8AM & 8PM (INFECTION) 60 g 3   • zinc oxide (DESITIN) 13 % cream Apply topically in the morning To inflamed skin folds as needed (Patient not taking: Reported on 2/6/2023) 454 g 0     No current facility-administered medications for this visit.      Allergies   Allergen Reactions   • Pollen Extract Sneezing      Immunizations:     Immunization History   Administered Date(s) Administered   • INFLUENZA 11/13/2018   • Influenza Quadrivalent Preservative Free 3 years and older IM 10/04/2014, 10/16/2015, 11/03/2016, 11/24/2017   • Influenza, injectable, quadrivalent, preservative free 0.5 mL 09/26/2019, 09/03/2020   • Influenza, recombinant, quadrivalent,injectable, preservative free 11/13/2018   • Influenza, seasonal, injectable 10/25/2012, 10/05/2013   • Tdap 12/13/2012, 09/26/2023   • Tuberculin Skin Test-PPD Intradermal 07/31/2012, 07/29/2014, 08/19/2016, 08/27/2018, 09/15/2020, 09/26/2023      Health Maintenance:         Topic Date Due   • HIV Screening  Never done   • Colorectal Cancer Screening  Never done   • Breast Cancer Screening: Mammogram  08/12/2023   • Hepatitis C Screening  Completed         Topic Date Due   • COVID-19 Vaccine (1) Never done   • Influenza Vaccine (1) 09/01/2023      Medicare Screening Tests and Risk Assessments:     Hi Ortiz is here for her Subsequent Wellness visit. Health Risk Assessment:   Patient rates overall health as good. Patient feels that their physical health rating is slightly better. Patient is satisfied with their life. Eyesight was rated as same. Hearing was rated as same. Patient feels that their emotional and mental health rating is much better. Patients states they are sometimes angry. Patient states they are often unusually tired/fatigued. Pain experienced in the last 7 days has been some. Patient's pain rating has been 5/10. Patient states that she has experienced no weight loss or gain in last 6 months. Fall Risk Screening: In the past year, patient has experienced: history of falling in past year    Number of falls: 2 or more  Injured during fall?: No    Feels unsteady when standing or walking?: No    Worried about falling?: No      Urinary Incontinence Screening:   Patient has not leaked urine accidently in the last six months. Home Safety:  Patient has trouble with stairs inside or outside of their home. Patient has working smoke alarms and has working carbon monoxide detector. Home safety hazards include: none. Nutrition:   Current diet is Regular. Medications:   Patient is not currently taking any over-the-counter supplements. Patient is not able to manage medications. Activities of Daily Living (ADLs)/Instrumental Activities of Daily Living (IADLs):   Walk and transfer into and out of bed and chair?: Yes  Dress and groom yourself?: Yes    Bathe or shower yourself?: No    Feed yourself?  Yes  Do your laundry/housekeeping?: No  Manage your money, pay your bills and track your expenses?: No  Make your own meals?: No    Do your own shopping?: No    Previous Hospitalizations:   Any hospitalizations or ED visits within the last 12 months?: Yes    How many hospitalizations have you had in the last year?: 1-2    Advance Care Planning:   Living will: No    Durable POA for healthcare: No    Advanced directive: No      PREVENTIVE SCREENINGS      Cardiovascular Screening:    General: Screening Not Indicated and History Lipid Disorder      Breast Cancer Screening:     General: Screening Current      Osteoporosis Screening:    General: Screening Not Indicated and History Osteoporosis      Lung Cancer Screening:     General: Screening Not Indicated      Hepatitis C Screening:    General: Screening Current    Screening, Brief Intervention, and Referral to Treatment (SBIRT)    Screening      Single Item Drug Screening:  How often have you used an illegal drug (including marijuana) or a prescription medication for non-medical reasons in the past year? never    Single Item Drug Screen Score: 0  Interpretation: Negative screen for possible drug use disorder    No results found.      Physical Exam:     /74 (BP Location: Left arm, Patient Position: Sitting, Cuff Size: Standard)   Ht 4' 7" (1.397 m)   Wt 74 kg (163 lb 3.2 oz)   BMI 37.93 kg/m²     Physical Exam     Treva Araya MD

## 2023-09-27 NOTE — PATIENT INSTRUCTIONS
Medicare Preventive Visit Patient Instructions  Thank you for completing your Welcome to Medicare Visit or Medicare Annual Wellness Visit today. Your next wellness visit will be due in one year (9/27/2024). The screening/preventive services that you may require over the next 5-10 years are detailed below. Some tests may not apply to you based off risk factors and/or age. Screening tests ordered at today's visit but not completed yet may show as past due. Also, please note that scanned in results may not display below. Preventive Screenings:  Service Recommendations Previous Testing/Comments   Colorectal Cancer Screening  * Colonoscopy    * Fecal Occult Blood Test (FOBT)/Fecal Immunochemical Test (FIT)  * Fecal DNA/Cologuard Test  * Flexible Sigmoidoscopy Age: 43-73 years old   Colonoscopy: every 10 years (may be performed more frequently if at higher risk)  OR  FOBT/FIT: every 1 year  OR  Cologuard: every 3 years  OR  Sigmoidoscopy: every 5 years  Screening may be recommended earlier than age 39 if at higher risk for colorectal cancer. Also, an individualized decision between you and your healthcare provider will decide whether screening between the ages of 77-80 would be appropriate. Colonoscopy: Not on file  FOBT/FIT: Not on file  Cologuard: Not on file  Sigmoidoscopy: Not on file          Breast Cancer Screening Age: 36 years old  Frequency: every 1-2 years  Not required if history of left and right mastectomy Mammogram: 08/12/2022        Cervical Cancer Screening Between the ages of 21-29, pap smear recommended once every 3 years. Between the ages of 32-69, can perform pap smear with HPV co-testing every 5 years.    Recommendations may differ for women with a history of total hysterectomy, cervical cancer, or abnormal pap smears in past. Pap Smear: Not on file        Hepatitis C Screening Once for adults born between [address] [place] [address]  More frequently in patients at high risk for Hepatitis C Hep C Antibody: 01/23/2015        Diabetes Screening 1-2 times per year if you're at risk for diabetes or have pre-diabetes Fasting glucose: 111 mg/dL (5/4/2021)  A1C: No results in last 5 years (No results in last 5 years)      Cholesterol Screening Once every 5 years if you don't have a lipid disorder. May order more often based on risk factors. Lipid panel: 08/09/2022          Other Preventive Screenings Covered by Medicare:  1. Abdominal Aortic Aneurysm (AAA) Screening: covered once if your at risk. You're considered to be at risk if you have a family history of AAA. 2. Lung Cancer Screening: covers low dose CT scan once per year if you meet all of the following conditions: (1) Age 48-67; (2) No signs or symptoms of lung cancer; (3) Current smoker or have quit smoking within the last 15 years; (4) You have a tobacco smoking history of at least 20 pack years (packs per day multiplied by number of years you smoked); (5) You get a written order from a healthcare provider. 3. Glaucoma Screening: covered annually if you're considered high risk: (1) You have diabetes OR (2) Family history of glaucoma OR (3)  aged 48 and older OR (3)  American aged 72 and older  3. Osteoporosis Screening: covered every 2 years if you meet one of the following conditions: (1) You're estrogen deficient and at risk for osteoporosis based off medical history and other findings; (2) Have a vertebral abnormality; (3) On glucocorticoid therapy for more than 3 months; (4) Have primary hyperparathyroidism; (5) On osteoporosis medications and need to assess response to drug therapy. · Last bone density test (DXA Scan): 09/17/2013. 5. HIV Screening: covered annually if you're between the age of 14-79. Also covered annually if you are younger than 13 and older than 72 with risk factors for HIV infection. For pregnant patients, it is covered up to 3 times per pregnancy.     Immunizations:  Immunization Recommendations   Influenza Vaccine Annual influenza vaccination during flu season is recommended for all persons aged >= 6 months who do not have contraindications   Pneumococcal Vaccine   * Pneumococcal conjugate vaccine = PCV13 (Prevnar 13), PCV15 (Vaxneuvance), PCV20 (Prevnar 20)  * Pneumococcal polysaccharide vaccine = PPSV23 (Pneumovax) Adults 20-63 years old: 1-3 doses may be recommended based on certain risk factors  Adults 72 years old: 1-2 doses may be recommended based off what pneumonia vaccine you previously received   Hepatitis B Vaccine 3 dose series if at intermediate or high risk (ex: diabetes, end stage renal disease, liver disease)   Tetanus (Td) Vaccine - COST NOT COVERED BY MEDICARE PART B Following completion of primary series, a booster dose should be given every 10 years to maintain immunity against tetanus. Td may also be given as tetanus wound prophylaxis. Tdap Vaccine - COST NOT COVERED BY MEDICARE PART B Recommended at least once for all adults. For pregnant patients, recommended with each pregnancy. Shingles Vaccine (Shingrix) - COST NOT COVERED BY MEDICARE PART B  2 shot series recommended in those aged 48 and above     Health Maintenance Due:      Topic Date Due   • HIV Screening  Never done   • Colorectal Cancer Screening  Never done   • Breast Cancer Screening: Mammogram  08/12/2023   • Hepatitis C Screening  Completed     Immunizations Due:      Topic Date Due   • COVID-19 Vaccine (1) Never done   • Influenza Vaccine (1) 09/01/2023     Advance Directives   What are advance directives? Advance directives are legal documents that state your wishes and plans for medical care. These plans are made ahead of time in case you lose your ability to make decisions for yourself. Advance directives can apply to any medical decision, such as the treatments you want, and if you want to donate organs. What are the types of advance directives?   There are many types of advance directives, and each state has rules about how to use them. You may choose a combination of any of the following:  · Living will: This is a written record of the treatment you want. You can also choose which treatments you do not want, which to limit, and which to stop at a certain time. This includes surgery, medicine, IV fluid, and tube feedings. · Durable power of  for [facility]): This is a written record that states who you want to make healthcare choices for you when you are unable to make them for yourself. This person, called a proxy, is usually a family member or a friend. You may choose more than 1 proxy. · Do not resuscitate (DNR) order:  A DNR order is used in case your heart stops beating or you stop breathing. It is a request not to have certain forms of treatment, such as CPR. A DNR order may be included in other types of advance directives. · Medical directive: This covers the care that you want if you are in a coma, near death, or unable to make decisions for yourself. You can list the treatments you want for each condition. Treatment may include pain medicine, surgery, blood transfusions, dialysis, IV or tube feedings, and a ventilator (breathing machine). · Values history: This document has questions about your views, beliefs, and how you feel and think about life. This information can help others choose the care that you would choose. Why are advance directives important? An advance directive helps you control your care. Although spoken wishes may be used, it is better to have your wishes written down. Spoken wishes can be misunderstood, or not followed. Treatments may be given even if you do not want them. An advance directive may make it easier for your family to make difficult choices about your care.    Weight Management   Why it is important to manage your weight:  Being overweight increases your risk of health conditions such as heart disease, high blood pressure, type 2 diabetes, and certain types of cancer. It can also increase your risk for osteoarthritis, sleep apnea, and other respiratory problems. Aim for a slow, steady weight loss. Even a small amount of weight loss can lower your risk of health problems. How to lose weight safely:  A safe and healthy way to lose weight is to eat fewer calories and get regular exercise. You can lose up about 1 pound a week by decreasing the number of calories you eat by 500 calories each day. Healthy meal plan for weight management:  A healthy meal plan includes a variety of foods, contains fewer calories, and helps you stay healthy. A healthy meal plan includes the following:  · Eat whole-grain foods more often. A healthy meal plan should contain fiber. Fiber is the part of grains, fruits, and vegetables that is not broken down by your body. Whole-grain foods are healthy and provide extra fiber in your diet. Some examples of whole-grain foods are whole-wheat breads and pastas, oatmeal, brown rice, and bulgur. · Eat a variety of vegetables every day. Include dark, leafy greens such as spinach, kale, akilah greens, and mustard greens. Eat yellow and orange vegetables such as carrots, sweet potatoes, and winter squash. · Eat a variety of fruits every day. Choose fresh or canned fruit (canned in its own juice or light syrup) instead of juice. Fruit juice has very little or no fiber. · Eat low-fat dairy foods. Drink fat-free (skim) milk or 1% milk. Eat fat-free yogurt and low-fat cottage cheese. Try low-fat cheeses such as mozzarella and other reduced-fat cheeses. · Choose meat and other protein foods that are low in fat. Choose beans or other legumes such as split peas or lentils. Choose fish, skinless poultry (chicken or turkey), or lean cuts of red meat (beef or pork). Before you cook meat or poultry, cut off any visible fat. · Use less fat and oil. Try baking foods instead of frying them.  Add less fat, such as margarine, sour cream, regular salad dressing and mayonnaise to foods. Eat fewer high-fat foods. Some examples of high-fat foods include french fries, doughnuts, ice cream, and cakes. · Eat fewer sweets. Limit foods and drinks that are high in sugar. This includes candy, cookies, regular soda, and sweetened drinks. Exercise:  Exercise at least 30 minutes per day on most days of the week. Some examples of exercise include walking, biking, dancing, and swimming. You can also fit in more physical activity by taking the stairs instead of the elevator or parking farther away from stores. Ask your healthcare provider about the best exercise plan for you. © Copyright [employer] 2018 Information is for End User's use only and may not be sold, redistributed or otherwise used for commercial purposes.  All illustrations and images included in CareNotes® are the copyrighted property of [employer] Inc[employer] or [address] [place] [address]

## 2023-09-27 NOTE — TELEPHONE ENCOUNTER
Patient's sister Nigel Santos called. Patient was in yesterday for a phyiscal and provider needed most recent eye care visit and recently hearing test visit to complete physical form. Patient's sister said those two visits will be faxed of to office today. Patient's sister would like a return call when both are received by office.

## 2023-09-28 ENCOUNTER — CLINICAL SUPPORT (OUTPATIENT)
Dept: INTERNAL MEDICINE CLINIC | Facility: CLINIC | Age: 48
End: 2023-09-28
Payer: MEDICARE

## 2023-09-28 DIAGNOSIS — Z23 NEED FOR VACCINATION: Primary | ICD-10-CM

## 2023-09-28 LAB
INDURATION: 0 MM
TB SKIN TEST: NEGATIVE

## 2023-09-28 PROCEDURE — G0008 ADMIN INFLUENZA VIRUS VAC: HCPCS

## 2023-09-28 PROCEDURE — 90686 IIV4 VACC NO PRSV 0.5 ML IM: CPT

## 2023-10-03 ENCOUNTER — TELEPHONE (OUTPATIENT)
Age: 48
End: 2023-10-03

## 2023-10-03 NOTE — TELEPHONE ENCOUNTER
Patients family member called stating it was her sister not on the contact list or the communication consent form that was filled out July 2022. She was talking about a form that was supposed to be completely filled out for a new group home that the patient is going to. The sister said she would have her brother bring the form by the office and to update the communication consent.

## 2023-10-03 NOTE — TELEPHONE ENCOUNTER
I believe the form was picked up last Hank Jacinto when pt came for PPD read.    If not There is a copy available scanned to chart under media on 9/29 could print for them if needed

## 2023-10-03 NOTE — TELEPHONE ENCOUNTER
Raulito Butler called again just to confirm we dont need a new copy of the form or will we just be adding info to the one we have. If a new one is needed please call Joshua Galeana. They do need this form competed today.

## 2023-10-03 NOTE — TELEPHONE ENCOUNTER
Sabiha Avery, patient's sister, called to follow up on the physical forms that were dropped off to be completed for the patient. Shannon Galeas stated, their brother, Franci Hooper, patient's emergency contact, will be able to  the forms after work today. The patient is moving into a new group home and the home is requiring the forms to be completed and received as soon as possible. They are asking that any blocks that don't apply to the patient are marked with "N/A" and for information regarding the most recent tetanus shot to be included on the forms. Jose Torres can be reached at [phone] once the forms are ready for .

## 2023-10-04 NOTE — TELEPHONE ENCOUNTER
Patient sister coming to get a copy. Please make sure this is printed with vaccines and anything else that needs to be attached. Thank you. Please double check everything is filled out because patient stated we did not call her back if it was done.

## 2023-10-06 ENCOUNTER — TELEPHONE (OUTPATIENT)
Age: 48
End: 2023-10-06

## 2023-10-06 NOTE — TELEPHONE ENCOUNTER
Benito Calderon from [address] [place] [address] called in to have the pt's medication list faxed to her at [phone].

## 2023-10-07 DIAGNOSIS — E03.9 HYPOTHYROIDISM, UNSPECIFIED TYPE: ICD-10-CM

## 2023-10-09 RX ORDER — LEVOTHYROXINE SODIUM 0.05 MG/1
TABLET ORAL
Qty: 10 TABLET | Refills: 0 | Status: SHIPPED | OUTPATIENT
Start: 2023-10-09 | End: 2023-10-09

## 2023-10-12 ENCOUNTER — HOSPITAL ENCOUNTER (OUTPATIENT)
Facility: HOSPITAL | Age: 48
Setting detail: OBSERVATION
Discharge: HOME | End: 2023-10-13
Attending: EMERGENCY MEDICINE | Admitting: PSYCHIATRY & NEUROLOGY
Payer: MEDICARE

## 2023-10-12 ENCOUNTER — APPOINTMENT (EMERGENCY)
Dept: RADIOLOGY | Facility: HOSPITAL | Age: 48
End: 2023-10-12
Payer: MEDICARE

## 2023-10-12 DIAGNOSIS — G40.909 RECURRENT SEIZURES (HCC): ICD-10-CM

## 2023-10-12 DIAGNOSIS — R56.9 SEIZURE-LIKE ACTIVITY (HCC): Primary | ICD-10-CM

## 2023-10-12 DIAGNOSIS — G40.802 REFLEX EPILEPSY (HCC): ICD-10-CM

## 2023-10-12 DIAGNOSIS — E53.8 VITAMIN B12 DEFICIENCY: ICD-10-CM

## 2023-10-12 PROBLEM — R21 RASH: Status: ACTIVE | Noted: 2023-10-12

## 2023-10-12 PROBLEM — E55.9 VITAMIN D DEFICIENCY: Status: ACTIVE | Noted: 2023-10-12

## 2023-10-12 LAB
ANION GAP SERPL CALCULATED.3IONS-SCNC: 7 MMOL/L
BASOPHILS # BLD AUTO: 0.06 THOUSANDS/ÂΜL (ref 0–0.1)
BASOPHILS NFR BLD AUTO: 1 % (ref 0–1)
BUN SERPL-MCNC: 11 MG/DL (ref 5–25)
CALCIUM SERPL-MCNC: 8.8 MG/DL (ref 8.4–10.2)
CHLORIDE SERPL-SCNC: 106 MMOL/L (ref 96–108)
CO2 SERPL-SCNC: 25 MMOL/L (ref 21–32)
CREAT SERPL-MCNC: 0.67 MG/DL (ref 0.6–1.3)
EOSINOPHIL # BLD AUTO: 0.02 THOUSAND/ÂΜL (ref 0–0.61)
EOSINOPHIL NFR BLD AUTO: 0 % (ref 0–6)
ERYTHROCYTE [DISTWIDTH] IN BLOOD BY AUTOMATED COUNT: 13.9 % (ref 11.6–15.1)
GFR SERPL CREATININE-BSD FRML MDRD: 102 ML/MIN/1.73SQ M
GLUCOSE SERPL-MCNC: 98 MG/DL (ref 65–140)
HCT VFR BLD AUTO: 45.6 % (ref 34.8–46.1)
HGB BLD-MCNC: 15.6 G/DL (ref 11.5–15.4)
IMM GRANULOCYTES # BLD AUTO: 0.02 THOUSAND/UL (ref 0–0.2)
IMM GRANULOCYTES NFR BLD AUTO: 0 % (ref 0–2)
LYMPHOCYTES # BLD AUTO: 1.68 THOUSANDS/ÂΜL (ref 0.6–4.47)
LYMPHOCYTES NFR BLD AUTO: 21 % (ref 14–44)
MCH RBC QN AUTO: 35.4 PG (ref 26.8–34.3)
MCHC RBC AUTO-ENTMCNC: 34.2 G/DL (ref 31.4–37.4)
MCV RBC AUTO: 103 FL (ref 82–98)
MONOCYTES # BLD AUTO: 0.67 THOUSAND/ÂΜL (ref 0.17–1.22)
MONOCYTES NFR BLD AUTO: 9 % (ref 4–12)
NEUTROPHILS # BLD AUTO: 5.45 THOUSANDS/ÂΜL (ref 1.85–7.62)
NEUTS SEG NFR BLD AUTO: 69 % (ref 43–75)
NRBC BLD AUTO-RTO: 0 /100 WBCS
PLATELET # BLD AUTO: 194 THOUSANDS/UL (ref 149–390)
PMV BLD AUTO: 10.4 FL (ref 8.9–12.7)
POTASSIUM SERPL-SCNC: 4.3 MMOL/L (ref 3.5–5.3)
RBC # BLD AUTO: 4.41 MILLION/UL (ref 3.81–5.12)
SODIUM SERPL-SCNC: 138 MMOL/L (ref 135–147)
WBC # BLD AUTO: 7.9 THOUSAND/UL (ref 4.31–10.16)

## 2023-10-12 PROCEDURE — 93005 ELECTROCARDIOGRAM TRACING: CPT

## 2023-10-12 PROCEDURE — 36415 COLL VENOUS BLD VENIPUNCTURE: CPT

## 2023-10-12 PROCEDURE — 72125 CT NECK SPINE W/O DYE: CPT

## 2023-10-12 PROCEDURE — 84443 ASSAY THYROID STIM HORMONE: CPT | Performed by: PSYCHIATRY & NEUROLOGY

## 2023-10-12 PROCEDURE — 82746 ASSAY OF FOLIC ACID SERUM: CPT | Performed by: PSYCHIATRY & NEUROLOGY

## 2023-10-12 PROCEDURE — G1004 CDSM NDSC: HCPCS

## 2023-10-12 PROCEDURE — 80048 BASIC METABOLIC PNL TOTAL CA: CPT

## 2023-10-12 PROCEDURE — 82607 VITAMIN B-12: CPT | Performed by: PSYCHIATRY & NEUROLOGY

## 2023-10-12 PROCEDURE — 85025 COMPLETE CBC W/AUTO DIFF WBC: CPT

## 2023-10-12 PROCEDURE — 70450 CT HEAD/BRAIN W/O DYE: CPT

## 2023-10-12 PROCEDURE — 99285 EMERGENCY DEPT VISIT HI MDM: CPT | Performed by: EMERGENCY MEDICINE

## 2023-10-12 PROCEDURE — 99285 EMERGENCY DEPT VISIT HI MDM: CPT

## 2023-10-12 RX ORDER — LACOSAMIDE 100 MG/1
100 TABLET ORAL EVERY 12 HOURS SCHEDULED
Status: DISCONTINUED | OUTPATIENT
Start: 2023-11-16 | End: 2023-10-13 | Stop reason: HOSPADM

## 2023-10-12 RX ORDER — MIDAZOLAM HYDROCHLORIDE 2 MG/2ML
2 INJECTION, SOLUTION INTRAMUSCULAR; INTRAVENOUS ONCE
Status: DISCONTINUED | OUTPATIENT
Start: 2023-10-12 | End: 2023-10-12

## 2023-10-12 RX ORDER — LORAZEPAM 2 MG/ML
2 INJECTION INTRAMUSCULAR EVERY 8 HOURS PRN
Status: DISCONTINUED | OUTPATIENT
Start: 2023-10-12 | End: 2023-10-13 | Stop reason: HOSPADM

## 2023-10-12 RX ORDER — ATORVASTATIN CALCIUM 10 MG/1
10 TABLET, FILM COATED ORAL
Status: DISCONTINUED | OUTPATIENT
Start: 2023-10-12 | End: 2023-10-13 | Stop reason: HOSPADM

## 2023-10-12 RX ORDER — LACOSAMIDE 50 MG/1
50 TABLET ORAL
Status: DISCONTINUED | OUTPATIENT
Start: 2023-10-12 | End: 2023-10-13 | Stop reason: HOSPADM

## 2023-10-12 RX ORDER — AMMONIUM LACTATE 12 G/100G
LOTION TOPICAL 2 TIMES DAILY
Status: DISCONTINUED | OUTPATIENT
Start: 2023-10-13 | End: 2023-10-13 | Stop reason: HOSPADM

## 2023-10-12 RX ORDER — ACETAMINOPHEN 325 MG/1
650 TABLET ORAL EVERY 6 HOURS PRN
Status: DISCONTINUED | OUTPATIENT
Start: 2023-10-12 | End: 2023-10-13 | Stop reason: HOSPADM

## 2023-10-12 RX ORDER — MELATONIN
1000 DAILY
Status: DISCONTINUED | OUTPATIENT
Start: 2023-10-13 | End: 2023-10-13 | Stop reason: HOSPADM

## 2023-10-12 RX ORDER — ENOXAPARIN SODIUM 100 MG/ML
40 INJECTION SUBCUTANEOUS DAILY
Status: DISCONTINUED | OUTPATIENT
Start: 2023-10-13 | End: 2023-10-13 | Stop reason: HOSPADM

## 2023-10-12 RX ORDER — LACOSAMIDE 150 MG/1
75 TABLET ORAL EVERY 12 HOURS SCHEDULED
Status: DISCONTINUED | OUTPATIENT
Start: 2023-11-02 | End: 2023-10-13 | Stop reason: HOSPADM

## 2023-10-12 RX ORDER — MIDAZOLAM HYDROCHLORIDE 1 MG/ML
2 INJECTION INTRAMUSCULAR; INTRAVENOUS ONCE
Status: COMPLETED | OUTPATIENT
Start: 2023-10-12 | End: 2023-10-12

## 2023-10-12 RX ORDER — LACOSAMIDE 50 MG/1
50 TABLET ORAL EVERY 12 HOURS SCHEDULED
Status: DISCONTINUED | OUTPATIENT
Start: 2023-10-19 | End: 2023-10-13 | Stop reason: HOSPADM

## 2023-10-12 RX ORDER — CITALOPRAM HYDROBROMIDE 10 MG/1
10 TABLET ORAL DAILY
Status: DISCONTINUED | OUTPATIENT
Start: 2023-10-13 | End: 2023-10-13 | Stop reason: HOSPADM

## 2023-10-12 RX ORDER — LEVOTHYROXINE SODIUM 0.05 MG/1
50 TABLET ORAL
Status: DISCONTINUED | OUTPATIENT
Start: 2023-10-13 | End: 2023-10-13 | Stop reason: HOSPADM

## 2023-10-12 RX ORDER — CALCIUM CARBONATE 500 MG/1
1000 TABLET, CHEWABLE ORAL DAILY PRN
Status: DISCONTINUED | OUTPATIENT
Start: 2023-10-12 | End: 2023-10-13 | Stop reason: HOSPADM

## 2023-10-12 RX ORDER — LANOLIN ALCOHOL/MO/W.PET/CERES
3 CREAM (GRAM) TOPICAL
Status: DISCONTINUED | OUTPATIENT
Start: 2023-10-12 | End: 2023-10-13 | Stop reason: HOSPADM

## 2023-10-12 RX ADMIN — ATORVASTATIN CALCIUM 10 MG: 10 TABLET, FILM COATED ORAL at 23:57

## 2023-10-12 RX ADMIN — Medication 3 MG: at 23:56

## 2023-10-12 NOTE — ED PROVIDER NOTES
History  Chief Complaint   Patient presents with    Seizure - Prior Hx Of     Pt was in a group home and had a witnessed seizure by staff. Staff denies head strike. Per EMS, pt was actively seizing on their arrival.     Patient is a 47-year-old female with a significant past medical history of Down syndrome, hypothyroidism, seizure disorder, presenting for evaluation of seizure-like activity. She was reportedly in the bathroom having a bowel movements when staff from her group home heard her fall and found her on the ground having some full body shaking that they were concerned was seizure-like activity. This resolved, and then shortly thereafter she had another episode of ongoing seizure activity for several minutes prior to resolution. EMS was called and on arrival the patient did have some abnormal movements for which they gave 2 mg of IV Versed. Patient has reportedly had some seizure like activity in the past, for which she has seen an outside neurologist for. All of these episodes have occurred when she was on the toilet having a bowel movement. She has previously been on Keppra as well as Lamictal for her symptoms, however did not tolerate either of these medications. In June of this year she was started on Depakote, but was having some poor tolerance of this medication secondary to lethargy, and the patient has not been taking it since. Prior to Admission Medications   Prescriptions Last Dose Informant Patient Reported? Taking?    Cholecalciferol 25 MCG (1000 UT) tablet 10/12/2023  No Yes   Sig: Take 1 tablet (1,000 Units total) by mouth daily   Heating Pad PADS Not Taking  No No   Sig: by Does not apply route 2 (two) times a day   Patient not taking: Reported on 10/12/2023   Incontinence Supply Disposable (RA Adult Wipes) MISC Not Taking  No No   Sig: Use daily   Patient not taking: Reported on 10/12/2023   acetaminophen (TYLENOL) 500 mg tablet Unknown  No No   Sig: Take 1 tablet (500 mg total) by mouth every 8 (eight) hours as needed for mild pain   ammonium lactate (LAC-HYDRIN) 12 % lotion 10/12/2023  No Yes   Sig: Apply topically 2 (two) times a day   atorvastatin (LIPITOR) 10 mg tablet 10/11/2023  No Yes   Sig: Take 1 tablet (10 mg total) by mouth daily   citalopram (CeleXA) 10 mg tablet 10/12/2023  Yes Yes   Sig: Take 1 tablet by mouth every evening   levothyroxine 50 mcg tablet 10/12/2023  No Yes   Sig: TAKE ONE TABLET BY MOUTH AT 7 AM   melatonin 3 mg 10/11/2023  No Yes   Sig: Take 1 tablet (3 mg total) by mouth daily at bedtime      Facility-Administered Medications: None       Past Medical History:   Diagnosis Date    Allergic     Community acquired pneumonia     Last Assessed:1/22/2013    Leukopenia     Last Assessed:3/5/2014    Osteoporosis     Trisomy 24, Down syndrome        Past Surgical History:   Procedure Laterality Date    TONSILLECTOMY AND ADENOIDECTOMY         Family History   Problem Relation Age of Onset    Cancer Mother     No Known Problems Sister     No Known Problems Brother     No Known Problems Brother      I have reviewed and agree with the history as documented.     E-Cigarette/Vaping    E-Cigarette Use Never User      E-Cigarette/Vaping Substances    Nicotine No     THC No     CBD No     Flavoring No     Other No     Unknown No      Social History     Tobacco Use    Smoking status: Never    Smokeless tobacco: Never   Vaping Use    Vaping Use: Never used   Substance Use Topics    Alcohol use: No    Drug use: Never        Review of Systems   Unable to perform ROS: Other (Baseline mentation)       Physical Exam  ED Triage Vitals   Temperature Pulse Respirations Blood Pressure SpO2   10/12/23 1427 10/12/23 1423 10/12/23 1423 10/12/23 1423 10/12/23 1423   97.5 °F (36.4 °C) 88 20 (!) 210/135 98 %      Temp Source Heart Rate Source Patient Position - Orthostatic VS BP Location FiO2 (%)   10/12/23 1427 10/12/23 1423 10/12/23 1427 10/12/23 1427 --   Axillary Monitor Lying Right arm Pain Score       10/12/23 1845       No Pain             Orthostatic Vital Signs  Vitals:    10/12/23 1423 10/12/23 1426 10/12/23 1427 10/12/23 2149   BP: (!) 210/135 119/62 119/62 131/52   Pulse: 88  78 80   Patient Position - Orthostatic VS:   Lying Lying       Physical Exam  Vitals and nursing note reviewed. Constitutional:       General: She is not in acute distress. Appearance: Normal appearance. She is not ill-appearing or toxic-appearing. HENT:      Head: Normocephalic and atraumatic. Right Ear: External ear normal.      Left Ear: External ear normal.      Nose: Nose normal.   Eyes:      General: No scleral icterus. Right eye: No discharge. Left eye: No discharge. Extraocular Movements: Extraocular movements intact. Conjunctiva/sclera: Conjunctivae normal.   Neck:      Comments: Midline cervical tenderness. No step-offs or deformities. Full range of motion of the neck. Cardiovascular:      Rate and Rhythm: Normal rate. Heart sounds: Normal heart sounds. No murmur heard. No friction rub. No gallop. Pulmonary:      Effort: Pulmonary effort is normal. No respiratory distress. Breath sounds: Normal breath sounds. Abdominal:      General: Abdomen is flat. There is no distension. Palpations: Abdomen is soft. There is no mass. Tenderness: There is no abdominal tenderness. Genitourinary:     Comments: Deferred  Musculoskeletal:      Cervical back: Tenderness present. Skin:     General: Skin is warm and dry. Neurological:      General: No focal deficit present. Mental Status: She is alert. Comments: Patient not cooperative for neurological exam, however following basic commands. She is moving all 4 extremities purposefully. No obvious neurological deficits. Has returned to baseline as per family who is bedside.          ED Medications  Medications   midazolam (FOR EMS ONLY) (VERSED) 2 mg/2 mL injection 4 mg (0 mg Does not apply Given to EMS 10/12/23 1422)       Diagnostic Studies  Results Reviewed       Procedure Component Value Units Date/Time    CBC and differential [[id number]]  (Abnormal) Collected: 10/12/23 1720    Lab Status: Final result Specimen: Blood from Arm, Left Updated: 10/12/23 1734     WBC 7.90 Thousand/uL      RBC 4.41 Million/uL      Hemoglobin 15.6 g/dL      Hematocrit 45.6 %       fL      MCH 35.4 pg      MCHC 34.2 g/dL      RDW 13.9 %      MPV 10.4 fL      Platelets 163 Thousands/uL      nRBC 0 /100 WBCs      Neutrophils Relative 69 %      Immat GRANS % 0 %      Lymphocytes Relative 21 %      Monocytes Relative 9 %      Eosinophils Relative 0 %      Basophils Relative 1 %      Neutrophils Absolute 5.45 Thousands/µL      Immature Grans Absolute 0.02 Thousand/uL      Lymphocytes Absolute 1.68 Thousands/µL      Monocytes Absolute 0.67 Thousand/µL      Eosinophils Absolute 0.02 Thousand/µL      Basophils Absolute 0.06 Thousands/µL     Basic metabolic panel [[id number]] Collected: 10/12/23 1535    Lab Status: Final result Specimen: Blood from Arm, Left Updated: 10/12/23 1603     Sodium 138 mmol/L      Potassium 4.3 mmol/L      Chloride 106 mmol/L      CO2 25 mmol/L      ANION GAP 7 mmol/L      BUN 11 mg/dL      Creatinine 0.67 mg/dL      Glucose 98 mg/dL      Calcium 8.8 mg/dL      eGFR 102 ml/min/1.73sq m     Narrative:      [facility] guidelines for Chronic Kidney Disease (CKD):     Stage 1 with normal or high GFR (GFR > 90 mL/min/1.73 square meters)    Stage 2 Mild CKD (GFR = 60-89 mL/min/1.73 square meters)    Stage 3A Moderate CKD (GFR = 45-59 mL/min/1.73 square meters)    Stage 3B Moderate CKD (GFR = 30-44 mL/min/1.73 square meters)    Stage 4 Severe CKD (GFR = 15-29 mL/min/1.73 square meters)    Stage 5 End Stage CKD (GFR <15 mL/min/1.73 square meters)  Note: GFR calculation is accurate only with a steady state creatinine                   CT head without contrast   Final Result by Josemanuel Shi MD (10/12 1809)      No acute intracranial abnormality. Workstation performed: [id number]         CT spine cervical without contrast   Final Result by Josemanuel Shi MD (10/12 1822)      No acute cervical spine fracture or traumatic malalignment. Workstation performed: [id number]               Procedures  Procedures      ED Course  ED Course as of 10/12/23 2226   Thu Oct 12, 2023   1706 Procedure Note: EKG  Date/Time: 10/12/23 5:06 PM   Interpreted by: Stella Cuello   Indications / Diagnosis: seizure  ECG reviewed by me, the ED Provider: yes   The EKG demonstrates:  Rhythm: normal sinus  Intervals: normal intervals  Axis: normal axis  QRS/Blocks: normal QRS  ST Changes: No acute ST Changes, no STD/GUSTAVO. Medical Decision Making  Patient is a 49-year-old female presenting for evaluation of seizure-like activity. Based on history evaluation, differential diagnosis includes but is not limited to: Breakthrough seizure, syncope with convulsion, acute intracranial abnormality, cervical fracture arrhythmia. Plan: CBC, BMP, ECG, CT head, CT cervical spine, reassessment    Labs unremarkable. ECG without active ischemia, STEMI, or concerning arrhythmia. CT head and CT cervical spine without acute emergent abnormalities. On reassessment, patient continues to be at her baseline. Given patient's poor tolerance of several antiepileptics in the past, I have as well as some questionable repeat seizure activity without complete return to baseline as per nursing staff, will likely admit to neurology pending their evaluation. Patient signed out to next shift pending neurology evaluation and recommendations. This plan was explained to the patient's family as well as group home staff who seems to understand and is agreeable. All questions answered. Patient signed out to next shift.     Amount and/or Complexity of Data Reviewed  Labs: ordered. Radiology: ordered. Risk  Prescription drug management. Decision regarding hospitalization. Disposition  Final diagnoses:   Seizure-like activity ([address])   Recurrent seizures ([address])     Time reflects when diagnosis was documented in both MDM as applicable and the Disposition within this note       Time User Action Codes Description Comment    10/12/2023  6:52 PM Jun Green Add [R56.9] Seizure-like activity ([address])     10/12/2023 10:15 PM Sterling Marshall Add [G40.919] Breakthrough seizure ([address])     10/12/2023 10:16 PM Nona Marshall Remove [G40.919] Breakthrough seizure ([address])     10/12/2023 10:16 PM Sterling Marshall Add [G40.909] Recurrent seizures [facility])           ED Disposition       ED Disposition   Admit    Condition   Stable    Date/Time   Thu Oct 12, 2023 10:15 PM    Comment   --             Follow-up Information    None         Patient's Medications   Discharge Prescriptions    No medications on file     No discharge procedures on file. PDMP Review       None             ED Provider  Attending physically available and evaluated Tatiana Keenan. I managed the patient along with the ED Attending.     Electronically Signed by           Hi Lockhart DO  10/12/23 8712

## 2023-10-12 NOTE — ED ATTENDING ATTESTATION
10/12/2023  IMadyson, DO, saw and evaluated the patient. I have discussed the patient with the resident/non-physician practitioner and agree with the resident's/non-physician practitioner's findings, Plan of Care, and MDM as documented in the resident's/non-physician practitioner's note, except where noted. All available labs and Radiology studies were reviewed. I was present for key portions of any procedure(s) performed by the resident/non-physician practitioner and I was immediately available to provide assistance. At this point I agree with the current assessment done in the Emergency Department. I have conducted an independent evaluation of this patient a history and physical is as follows:    Patient is a 67-year-old female with a history of Down syndrome, hyperlipidemia, hypothyroidism, accompanied by her sister and brother. The patient has a history of seizure disorder diagnosed in March 2023, follows at [facility] neurology. Per the daughter, the patient was started on Keppra, did not tolerate that due to lethargy, then physician to Lamictal which gave a rash, then transition to Depakote. The sister thought the patient was more somnolent on Depakote so she decreased the dose in half, then stopped it in August.  Per family and EMS and facility staff, the patient was sitting on the toilet today, staff heard a thud, went and saw her on the ground shaking having tonic-clonic seizure activity, she then stopped after several minutes but did not fully recover back to her baseline, seemed little confused, then had another several minutes of tonic-clonic activity. EMS administered 2 mg of midazolam, activity stopped, patient was somnolent. The sister indicates just prior to my assessment after they had arrived in the ED the patient seemed somewhat "hyper" and more agitated, on my assessment she is now essentially back to her baseline per the sister.   There has been no noted illnesses, coughs or other change in her baseline medical status. Patient reportedly had an EEG which was unremarkable back in March 2023, then another one in August 2023. She had a total of 3 seizure episodes in the past.  She does not drive or have a 's license. General:  Patient is well-appearing  Head:  Atraumatic  Eyes:  Conjunctiva pink, PERRL, she will not follow formal commands to allow for formal extraocular muscle assessment but her eyes do look around the room to voice  ENT:  Mucous membranes are moist  Neck:  Supple, no cervical spine tenderness or step-off or deformity  Cardiac:  S1-S2, without murmurs  Lungs:  Clear to auscultation bilaterally  Abdomen:  Soft, nontender, normal bowel sounds, no CVA tenderness, no tympany, no rigidity, no guarding  Extremities: No signs of trauma, some slight increased muscular hypertonicity which daughter says is normal, no bony tenderness to the arms or legs  Neurologic:  Awake, speech is somewhat soft and little difficult to understand but sister says she is speaking normally. She is able to verbalize the name of her sister and her brother who are at bedside, knows her name but cannot give me a fluent history of present illness or have a meaningful conversation with me. Patient is oriented to person only. Skin:  Pink warm and dry, no rash            ED Course  ED Course as of 10/12/23 1558   Thu Oct 12, 2023   1553 ECG interpreted by me, sinus rhythm, rate of 62, no acute ischemic or infarctive changes,, normal intervals, no acute change from November 1, 2014     CT head without contrast   Final Result      No acute intracranial abnormality. Workstation performed: [id number]         CT spine cervical without contrast   Final Result      No acute cervical spine fracture or traumatic malalignment.                   Workstation performed: [id number]         CTA head and neck w wo contrast    (Results Pending)       Labs Reviewed   CBC AND DIFFERENTIAL - Abnormal       Result Value Ref Range Status    WBC 7.90  4.31 - 10.16 Thousand/uL Final    RBC 4.41  3.81 - 5.12 Million/uL Final    Hemoglobin 15.6 (*) 11.5 - 15.4 g/dL Final    Hematocrit 45.6  34.8 - 46.1 % Final     (*) 82 - 98 fL Final    MCH 35.4 (*) 26.8 - 34.3 pg Final    MCHC 34.2  31.4 - 37.4 g/dL Final    RDW 13.9  11.6 - 15.1 % Final    MPV 10.4  8.9 - 12.7 fL Final    Platelets 359  817 - 390 Thousands/uL Final    nRBC 0  /100 WBCs Final    Neutrophils Relative 69  43 - 75 % Final    Immat GRANS % 0  0 - 2 % Final    Lymphocytes Relative 21  14 - 44 % Final    Monocytes Relative 9  4 - 12 % Final    Eosinophils Relative 0  0 - 6 % Final    Basophils Relative 1  0 - 1 % Final    Neutrophils Absolute 5.45  1.85 - 7.62 Thousands/µL Final    Immature Grans Absolute 0.02  0.00 - 0.20 Thousand/uL Final    Lymphocytes Absolute 1.68  0.60 - 4.47 Thousands/µL Final    Monocytes Absolute 0.67  0.17 - 1.22 Thousand/µL Final    Eosinophils Absolute 0.02  0.00 - 0.61 Thousand/µL Final    Basophils Absolute 0.06  0.00 - 0.10 Thousands/µL Final   BASIC METABOLIC PANEL    Sodium 189  135 - 147 mmol/L Final    Potassium 4.3  3.5 - 5.3 mmol/L Final    Chloride 106  96 - 108 mmol/L Final    CO2 25  21 - 32 mmol/L Final    ANION GAP 7  mmol/L Final    BUN 11  5 - 25 mg/dL Final    Creatinine 0.67  0.60 - 1.30 mg/dL Final    Comment: Standardized to IDMS reference method    Glucose 98  65 - 140 mg/dL Final    Comment: If the patient is fasting, the ADA then defines impaired fasting glucose as > 100 mg/dL and diabetes as > or equal to 123 mg/dL.     Calcium 8.8  8.4 - 10.2 mg/dL Final    eGFR 102  ml/min/1.73sq m Final    Narrative:     [facility] guidelines for [facility] (CKD):     Stage 1 with normal or high GFR (GFR > 90 mL/min/1.73 square meters)    Stage 2 Mild CKD (GFR = 60-89 mL/min/1.73 square meters)    Stage 3A Moderate CKD (GFR = 45-59 mL/min/1.73 square meters)    Stage 3B Moderate CKD (GFR = 30-44 mL/min/1.73 square meters)    Stage 4 Severe CKD (GFR = 15-29 mL/min/1.73 square meters)    Stage 5 End Stage CKD (GFR <15 mL/min/1.73 square meters)  Note: GFR calculation is accurate only with a steady state creatinine       Patient reassessed several times, at neurologic baseline per family. Given the reports, sounds like patient had status epilepticus earlier, terminated with midazolam.  Work-up shows no evidence of intracranial hemorrhage or life-threatening condition such as severe hyponatremia or hypoglycemia. Case discussed with neurology service who excepted the patient for admission to their service for further management. The patient was evaluated for sepsis in the emergency department. After that assessment, at the time of admission, no sepsis, severe sepsis, or septic shock was found. DIAGNOSIS:  Acute recurrent seizure, history of Down syndrome    MEDICAL DECISION MAKING CODING      Patient presents with acute new problem with:  Threat to life or bodily function      Chronic conditions affecting care: As per HPI    COLLECTION AND INTERPRETATION OF DATA  Additional history obtained from: EMS and family  I reviewed prior external notes, including previous ECG as noted above    I ordered each unique test  Tests reviewed personally by me:  ECG: See my ED course  Labs: See above  Imaging: I independently reviewed the head CT and found no acute intracranial pathology.     Discussion with other providers: Admitting neurology physicians    RISK    Treatment:  Patient admitted      Surgery  -I considered surgery may be necessary prior to completion of the work up but afterwards there is no indication for immediate surgery    Social Determinants of Health:  Presentation to ED outside of business hours or on night shift    Critical Care Time  Procedures

## 2023-10-13 ENCOUNTER — APPOINTMENT (OUTPATIENT)
Dept: RADIOLOGY | Facility: HOSPITAL | Age: 48
End: 2023-10-13
Payer: MEDICARE

## 2023-10-13 VITALS
RESPIRATION RATE: 20 BRPM | OXYGEN SATURATION: 98 % | HEART RATE: 80 BPM | HEIGHT: 55 IN | SYSTOLIC BLOOD PRESSURE: 131 MMHG | TEMPERATURE: 98.5 F | BODY MASS INDEX: 38.42 KG/M2 | WEIGHT: 166.01 LBS | DIASTOLIC BLOOD PRESSURE: 52 MMHG

## 2023-10-13 PROBLEM — E53.8 VITAMIN B12 DEFICIENCY: Status: ACTIVE | Noted: 2023-10-13

## 2023-10-13 PROBLEM — G40.802: Status: ACTIVE | Noted: 2023-10-13

## 2023-10-13 LAB
ALBUMIN SERPL BCP-MCNC: 3.4 G/DL (ref 3.5–5)
ALP SERPL-CCNC: 103 U/L (ref 34–104)
ALT SERPL W P-5'-P-CCNC: 20 U/L (ref 7–52)
ANION GAP SERPL CALCULATED.3IONS-SCNC: 5 MMOL/L
AST SERPL W P-5'-P-CCNC: 22 U/L (ref 13–39)
ATRIAL RATE: 62 BPM
BASOPHILS # BLD AUTO: 0.08 THOUSANDS/ÂΜL (ref 0–0.1)
BASOPHILS NFR BLD AUTO: 1 % (ref 0–1)
BILIRUB SERPL-MCNC: 0.99 MG/DL (ref 0.2–1)
BUN SERPL-MCNC: 8 MG/DL (ref 5–25)
CALCIUM ALBUM COR SERPL-MCNC: 9.4 MG/DL (ref 8.3–10.1)
CALCIUM SERPL-MCNC: 8.9 MG/DL (ref 8.4–10.2)
CHLORIDE SERPL-SCNC: 105 MMOL/L (ref 96–108)
CO2 SERPL-SCNC: 27 MMOL/L (ref 21–32)
CREAT SERPL-MCNC: 0.66 MG/DL (ref 0.6–1.3)
EOSINOPHIL # BLD AUTO: 0.02 THOUSAND/ÂΜL (ref 0–0.61)
EOSINOPHIL NFR BLD AUTO: 0 % (ref 0–6)
ERYTHROCYTE [DISTWIDTH] IN BLOOD BY AUTOMATED COUNT: 13.8 % (ref 11.6–15.1)
FOLATE SERPL-MCNC: 13.5 NG/ML
GFR SERPL CREATININE-BSD FRML MDRD: 103 ML/MIN/1.73SQ M
GLUCOSE P FAST SERPL-MCNC: 112 MG/DL (ref 65–99)
GLUCOSE SERPL-MCNC: 112 MG/DL (ref 65–140)
HCT VFR BLD AUTO: 45.7 % (ref 34.8–46.1)
HGB BLD-MCNC: 15.7 G/DL (ref 11.5–15.4)
IMM GRANULOCYTES # BLD AUTO: 0.02 THOUSAND/UL (ref 0–0.2)
IMM GRANULOCYTES NFR BLD AUTO: 0 % (ref 0–2)
LYMPHOCYTES # BLD AUTO: 1.42 THOUSANDS/ÂΜL (ref 0.6–4.47)
LYMPHOCYTES NFR BLD AUTO: 23 % (ref 14–44)
MCH RBC QN AUTO: 36 PG (ref 26.8–34.3)
MCHC RBC AUTO-ENTMCNC: 34.4 G/DL (ref 31.4–37.4)
MCV RBC AUTO: 105 FL (ref 82–98)
MONOCYTES # BLD AUTO: 0.72 THOUSAND/ÂΜL (ref 0.17–1.22)
MONOCYTES NFR BLD AUTO: 11 % (ref 4–12)
NEUTROPHILS # BLD AUTO: 4.04 THOUSANDS/ÂΜL (ref 1.85–7.62)
NEUTS SEG NFR BLD AUTO: 65 % (ref 43–75)
NRBC BLD AUTO-RTO: 0 /100 WBCS
P AXIS: 44 DEGREES
PLATELET # BLD AUTO: 190 THOUSANDS/UL (ref 149–390)
PMV BLD AUTO: 10.6 FL (ref 8.9–12.7)
POTASSIUM SERPL-SCNC: 4.5 MMOL/L (ref 3.5–5.3)
PR INTERVAL: 164 MS
PROT SERPL-MCNC: 7.2 G/DL (ref 6.4–8.4)
QRS AXIS: 54 DEGREES
QRSD INTERVAL: 84 MS
QT INTERVAL: 408 MS
QTC INTERVAL: 414 MS
RBC # BLD AUTO: 4.36 MILLION/UL (ref 3.81–5.12)
SODIUM SERPL-SCNC: 137 MMOL/L (ref 135–147)
T WAVE AXIS: 14 DEGREES
TSH SERPL DL<=0.05 MIU/L-ACNC: 0.95 UIU/ML (ref 0.45–4.5)
VENTRICULAR RATE: 62 BPM
VIT B12 SERPL-MCNC: 263 PG/ML (ref 180–914)
WBC # BLD AUTO: 6.3 THOUSAND/UL (ref 4.31–10.16)

## 2023-10-13 PROCEDURE — NC001 PR NO CHARGE: Performed by: STUDENT IN AN ORGANIZED HEALTH CARE EDUCATION/TRAINING PROGRAM

## 2023-10-13 PROCEDURE — 93010 ELECTROCARDIOGRAM REPORT: CPT | Performed by: INTERNAL MEDICINE

## 2023-10-13 PROCEDURE — 80053 COMPREHEN METABOLIC PANEL: CPT | Performed by: PSYCHIATRY & NEUROLOGY

## 2023-10-13 PROCEDURE — 99222 1ST HOSP IP/OBS MODERATE 55: CPT | Performed by: STUDENT IN AN ORGANIZED HEALTH CARE EDUCATION/TRAINING PROGRAM

## 2023-10-13 PROCEDURE — 85025 COMPLETE CBC W/AUTO DIFF WBC: CPT | Performed by: PSYCHIATRY & NEUROLOGY

## 2023-10-13 RX ORDER — LACOSAMIDE 150 MG/1
75 TABLET ORAL EVERY 12 HOURS SCHEDULED
Qty: 14 TABLET | Refills: 0 | Status: SHIPPED | OUTPATIENT
Start: 2023-11-02 | End: 2023-11-16

## 2023-10-13 RX ORDER — CYANOCOBALAMIN 1000 UG/ML
1000 INJECTION, SOLUTION INTRAMUSCULAR; SUBCUTANEOUS WEEKLY
Status: DISCONTINUED | OUTPATIENT
Start: 2023-10-13 | End: 2023-10-13 | Stop reason: HOSPADM

## 2023-10-13 RX ORDER — LACOSAMIDE 100 MG/1
100 TABLET ORAL EVERY 12 HOURS SCHEDULED
Qty: 20 TABLET | Refills: 0 | Status: SHIPPED | OUTPATIENT
Start: 2023-11-16 | End: 2023-11-26

## 2023-10-13 RX ORDER — NYSTATIN 100000 [USP'U]/G
POWDER TOPICAL 2 TIMES DAILY
Status: DISCONTINUED | OUTPATIENT
Start: 2023-10-13 | End: 2023-10-13 | Stop reason: HOSPADM

## 2023-10-13 RX ORDER — CYANOCOBALAMIN 1000 UG/ML
1000 INJECTION, SOLUTION INTRAMUSCULAR; SUBCUTANEOUS WEEKLY
Qty: 1 ML | Refills: 0 | Status: SHIPPED | OUTPATIENT
Start: 2023-10-20 | End: 2023-11-04

## 2023-10-13 RX ORDER — LACOSAMIDE 50 MG/1
50 TABLET ORAL
Qty: 6 TABLET | Refills: 0 | Status: SHIPPED | OUTPATIENT
Start: 2023-10-13 | End: 2023-10-19

## 2023-10-13 RX ORDER — LACOSAMIDE 50 MG/1
50 TABLET ORAL EVERY 12 HOURS SCHEDULED
Qty: 28 TABLET | Refills: 0 | Status: SHIPPED | OUTPATIENT
Start: 2023-10-19 | End: 2023-11-02

## 2023-10-13 RX ADMIN — LACOSAMIDE 50 MG: 50 TABLET, FILM COATED ORAL at 00:07

## 2023-10-13 RX ADMIN — LEVOTHYROXINE SODIUM 50 MCG: 50 TABLET ORAL at 05:57

## 2023-10-13 RX ADMIN — CYANOCOBALAMIN 1000 MCG: 1000 INJECTION, SOLUTION INTRAMUSCULAR at 14:14

## 2023-10-13 RX ADMIN — NYSTATIN: 100000 POWDER TOPICAL at 14:14

## 2023-10-13 RX ADMIN — Medication 1000 UNITS: at 09:40

## 2023-10-13 RX ADMIN — ENOXAPARIN SODIUM 40 MG: 40 INJECTION SUBCUTANEOUS at 09:40

## 2023-10-13 RX ADMIN — Medication: at 09:40

## 2023-10-13 NOTE — QUICK NOTE
NEUROLOGY RESIDENCY - OVERNIGHT QUICK NOTE     Name: Telly Bello   Age & Sex: 46 y.o. female   MRN: [id number]  Unit/Bed#: ED 27   Encounter: [id number]    ASSESSMENT & PLAN     *Seizure-like activity  Telly Bello is a 46 y.o. female  w/ PMH Down's syndrome, hypothyroidism, HLD, who p/w convulsive syncope versus seizure of unclear primary etiology. Patient has experienced 3 similar episodes all related to bowel movements over the past year and has no history of prior seizures. Given the patient's baseline complexity and intolerance of multiple ASDs in the past, would complete seizure and cardiac workup and possibly trial a new medication  Semiology/presentation: whole body shaking, LOC, all episodes while having BM and one just after waking while walking to bathroom, up to 3 min duration, possible post-episode confusion  Admission: 3rd episode, possible additional episode of shaking with EMS treated with Versed but per d/w Group home staff who was with her, she was just fidgeting her legs a lot in a way she does at baseline   Imaging:   CTH: chronic atrophy w/ concordant ventriculomegaly and precocious microangiopathic changes  EEG (6/6/23): continuous, generalized slowing suggestive of severe, diffuse cortical dysfunction more indicative of global encephalopathic process  Labs: macrocytosis    Plan:  Routine EEG if able  ASDs/Sedation:   Trial lacosamide with very slow up-titration  Ativan 2 mg prn for convulsive seizure activity lasting more than 1 minute  MRI w/wo seizure protocol if tolerated  LP not indicated  Seizure precautions  Metabolic and infectious workup as appropriate  Plan d/w attending.  See attestation for additional or updated recommendations      Vitamin D deficiency  Assessment & Plan  Currently stable  Continue home meds (vitamin d 3 1000 units qd)    Rash  Assessment & Plan  Currently stable  Continue home meds (ammonium lactate topical 12%)    Behavior problem, adult  Assessment & Plan  Currently stable  Continue home meds (citalopram 10 mg qd)    Primary insomnia  Assessment & Plan  Currently stable  Continue home meds (melatonin 3 mg qhs)      Hypothyroidism  Assessment & Plan  Currently stable  Continue home meds (50 mcg daily)    Hyperlipidemia  Assessment & Plan  Currently stable  Continue home meds (atorvastatin 10 mg qhs)    HISTORY OF PRESENT ILLNESS     Henderson Aschoff is a 46 y.o. female with pertinent history of Down syndrome, hypothyroidism, HLD, prior seizure-like episodes, who presented for witnessed seizure-like episode while on the toilet. Per caregiver, patient was at her normal baseline, and was straining on the toilet to have a BM, after which she lost consciousness, fell off the toilet, had whole body shaking. Entire episode lasted approximately 3 minutes. Patient may not have been at baseline immediately following the episode. EMS reported leg shaking which caregiver reports is normal for her but she was administered Versed en route to the hospital.  Patient has had 2 prior episodes similar to this 1 shortly after waking while walking to the bathroom and the other while in the bathroom as well. Thus far EEG, CTh, have all been remarkable only for a global cerebral dysfunction consistent with her existing Down syndrome. Cardiac work-up has been unremarkable. OBJECTIVE     Vitals:    10/12/23 1423 10/12/23 1426 10/12/23 1427 10/12/23 1533   BP: (!) 210/135 119/62 119/62    BP Location:   Right arm    Pulse: 88  78    Resp: 20      Temp:   97.5 °F (36.4 °C) 98.5 °F (36.9 °C)   TempSrc:   Axillary Oral   SpO2: 98%      Weight: 75.3 kg (166 lb 0.1 oz)         Physical Exam  Vitals reviewed. Constitutional:    Not in acute distress. Syndromic appearance. Not ill-appearing, toxic-appearing or diaphoretic. HENT:   syndromic facies, atraumatic. External ear normal b/l. Nose normal. No congestion or rhinorrhea. Mucous membranes are moist.  Oropharynx is clear.  No oropharyngeal exudate or posterior oropharyngeal erythema. Eyes:    No scleral icterus. No discharge b/l. Conjunctivae normal.   Cardiovascular: RRR, S1, S2 normal. No murmurs, rubs, gallops  Pulmonary:  CTAB, Pulmonary effort is normal. No respiratory distress. No rhonchi, rales, rubs  GI: abdomen is soft, nontender, non-distended. Bowel sounds present  Musculoskeletal: no gross deformities  Skin:    Skin is not pale. Neurologic: Mental Status: Alert and responsive to environment. Minimally verbal (baseline). Attention is decreased. Cranial Nerves: blink to threat intact b/l. EOMI. Facial expression full, symmetric. Tongue symmetric w/o atrophy or fasciculations. Motor Exam: Muscle bulk grossly normal. Strength intact and symmetric BUE/BLE Sensory Exam : response to tactile stimuli symmetric BUE/BLE Gait, Coordination, and Reflexes : no significant tremor observed.  Reflexes difficult to elicit w/ resistance to exam. No clonus or Valdez's.         ~~~~~~~~~~~~~~~~~~~  Corby Carranza MD  Neurology Resident, PGY-4  [address] [facility]

## 2023-10-13 NOTE — DISCHARGE SUMMARY
[place] [address] [facility]    [address]    [facility] [address]    Phone:  [phone]    Fax:  [phone]                                       After Visit Summary   9/19/2017    Nicole Reyes    MRN: [id number]           After Visit Summary Signature Page     I have received my discharge instructions, and my questions have been answered. I have discussed any challenges I see with this plan with the nurse or doctor.    ..........................................................................................................................................  Patient/Patient Representative Signature      ..........................................................................................................................................  Patient Representative Print Name and Relationship to Patient    ..................................................               ................................................  Date                                            Time    ..........................................................................................................................................  Reviewed by Signature/Title    ...................................................              ..............................................  Date                                                            Time           NEUROLOGY RESIDENCY - DISCHARGE SUMMARY     Name: April Baker   Age & Sex: 46 y.o. female   MRN: [id number]  [facility]   Encounter: [id number]    Discharging Resident Physician: Asha Arana DO  Attending: Adelaida Hale MD  PCP: Berenice Souza MD  Admission Date: 10/12/2023  Discharge Date: 10/13/23    April Baker will need follow up in in 4 weeks with epilepsy or general attending/AP/resident . She will not require outpatient neurological testing. ASSESSMENT & PLAN     * Seizure-like activity [facility])  Assessment & Plan  April Baker is a 46 y.o. female  w/ PMH Down's syndrome, hypothyroidism, HLD, who p/w convulsive syncope versus seizure of unclear primary etiology. Patient has experienced 3 similar episodes over the past year (2 of which was going to the bathroom and 1 instance of attempting to have a BM) and has no history of prior seizures. Given the patient's baseline complexity and intolerance of multiple ASDs in the past, would complete seizure and cardiac workup and possibly trial a new medication. Semiology/presentation: whole body shaking, LOC, all episodes while having BM and one just after waking while walking to bathroom, up to 3 min duration, possible post-episode confusion  Admission: 3rd episode, possible additional episode of shaking with EMS treated with Versed but per d/w Group home staff who was with her, she was just fidgeting her legs a lot in a way she does at baseline   Imaging:   CTH: chronic atrophy w/ concordant ventriculomegaly and precocious microangiopathic changes  EEG (6/6/23): continuous, generalized slowing suggestive of severe, diffuse cortical dysfunction more indicative of global encephalopathic process  Labs: macrocytosis    Impression: 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     Plan:  Routine EEG if able - patient did not tolerate  ASDs/Sedation:   Trial Vimpat uptitration: Vimpat 50mg QHS x 7 days, followed by 50mg BID x 14 days, 75mg BID x 14 days, and then 100mg BID. Ativan 2 mg prn for convulsive seizure activity lasting more than 1 minute  Defer on further neuroimaging at this time  LP not indicated at this time  Seizure precautions  Patient stable for discharge. OP follow up with epilepsy attending/AP in 4-6 weeks. No further OP neuroimaging indicated at this time. Vitamin D deficiency  Assessment & Plan  Currently stable  Continue home meds (vitamin d 3 1000 units qd)    Rash  Assessment & Plan  Currently stable  Continue home meds (ammonium lactate topical 12%)    Behavior problem, adult  Assessment & Plan  Currently stable  Continue home meds (citalopram 10 mg qd)    Primary insomnia  Assessment & Plan  Currently stable  Continue home meds (melatonin 3 mg qhs)      Hypothyroidism  Assessment & Plan  Currently stable  Continue home meds (50 mcg daily)    Hyperlipidemia  Assessment & Plan  Currently stable  Continue home meds (atorvastatin 10 mg qhs)             Disposition:     Group Home     Reason for Admission: seizure-like activity    Consultations During Hospital Stay:  None    Procedures Performed:     None    Significant Findings / Test Results:     CT head without contrast   Final Result by Gopi Darling MD (10/12 2679)      No acute intracranial abnormality. Workstation performed: [id number]         CT spine cervical without contrast   Final Result by Gopi Darling MD (10/12 6136)      No acute cervical spine fracture or traumatic malalignment. Workstation performed: [id number]               Incidental Findings:   None     Test Results Pending at Discharge (will require follow up):    None     Outpatient Tests Requested:  None    Complications:  None    Hospital Course:     Dorene Vázquez is a 46 y.o. female with pertinent history of Down syndrome, hypothyroidism, HLD, prior seizure-like episodes, who presented on 10/12 for witnessed seizure-like episode while on the toilet. Per caregiver, patient was at her normal baseline, and was straining on the toilet to have a BM, after which she lost consciousness, fell off the toilet, had whole body shaking. Entire episode lasted approximately 3 minutes. Patient may not have been at baseline immediately following the episode. EMS reported leg shaking which caregiver reports is normal for her but she was administered Versed en route to the hospital.  Patient has had 2 prior episodes similar to this 1 shortly after waking while walking to the bathroom and the other while in the bathroom as well. Thus far EEG, CTh, have all been remarkable only for a global cerebral dysfunction consistent with her existing Down syndrome. Cardiac work-up has been unremarkable. CTH and CT c-spine were unremarkable. 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 Patient was started on Vimpat with plans for uptitration as follows: Vimpat 50mg QHS x 7 days, followed by 50mg BID x 14 days, 75mg BID x 14 days, and then 100mg BID. No further seizure-like activity during hospital course. Patient reportedly more fatigued than usual but otherwise back at baseline. B12 level 263 with recommended goal of 400+; started on B12 injections. Patient was discharged back to her group home with the following instructions:  Notable Medication Adjustments -   Start Vimpat (Lacosamide) 50mg daily at bedtime x 7 days (began on Oct 12, 2023), followed by 50mg twice a day x 14 days, then 75mg twice a day x 14 days, and then 100mg BID. Testing Required after Discharge -   Recommend follow up with primary care provider (PCP) for IM B12 injections and to recheck vitamin B12 level. B12 goal >400. Important follow up information -   Follow up with PCP within 1 week of hospital discharge. Schedule an appointment with neurology (epilepsy specialist) within 4 weeks.   Other Instructions -   Recommend IM B12 1000mcg injections for another 3 weeks - can arrange with PCP; discuss with PCP when [facility] should continue oral B12 supplementation. Condition at Discharge: stable     Discharge Day Visit / Exam:     Subjective: Does not appear to be in acute distress. Patient's sister and brother at bedside report she does appear more tired than usual, but otherwise, is at her baseline. Vitals: Blood Pressure: 131/52 (10/12/23 2149)  Pulse: 80 (10/12/23 2149)  Temperature: 98.5 °F (36.9 °C) (10/12/23 1533)  Temp Source: Oral (10/12/23 1533)  Respirations: 20 (10/12/23 2149)  Height: 4' 7" (139.7 cm) (10/12/23 2319)  Weight - Scale: 75.3 kg (166 lb 0.1 oz) (10/12/23 2319)  SpO2: 98 % (10/12/23 2149)    Exam:     Physical Exam  Vitals and nursing note reviewed. Constitutional:       General: She is not in acute distress. Appearance: She is not ill-appearing, toxic-appearing or diaphoretic. Comments: Syndromic appearance. Appeared tired. HENT:      Head: Normocephalic and atraumatic. Right Ear: External ear normal.      Left Ear: External ear normal.      Nose: Nose normal.      Mouth/Throat:      Mouth: Mucous membranes are moist.   Eyes:      Extraocular Movements: Extraocular movements intact and EOM normal.      Conjunctiva/sclera: Conjunctivae normal.      Pupils: Pupils are equal, round, and reactive to light. Cardiovascular:      Rate and Rhythm: Normal rate and regular rhythm. Pulses: Normal pulses. Heart sounds: Normal heart sounds. Pulmonary:      Effort: Pulmonary effort is normal. No respiratory distress. Breath sounds: Normal breath sounds. Abdominal:      General: Bowel sounds are normal. There is no distension. Musculoskeletal:         General: Normal range of motion. Cervical back: No tenderness. Right lower leg: No edema. Left lower leg: No edema. Skin:     General: Skin is warm and dry. Neurological:      Mental Status: She is alert.       Deep Tendon Reflexes:      Reflex Scores:       Tricep reflexes are 2+ on the right side and 2+ on the left side. Bicep reflexes are 2+ on the right side and 2+ on the left side. Brachioradialis reflexes are 2+ on the right side and 2+ on the left side. Patellar reflexes are 2+ on the right side and 2+ on the left side. Achilles reflexes are 2+ on the right side and 2+ on the left side. Psychiatric:         Mood and Affect: Mood normal.        Vitals reviewed. Constitutional:    Not in acute distress. Syndromic appearance. Not ill-appearing, toxic-appearing or diaphoretic. HENT:   syndromic facies, atraumatic. External ear normal b/l. Nose normal. No congestion or rhinorrhea. Mucous membranes are moist.  Oropharynx is clear. No oropharyngeal exudate or posterior oropharyngeal erythema. Eyes:    No scleral icterus. No discharge b/l. Conjunctivae normal.   Cardiovascular: RRR, S1, S2 normal. No murmurs, rubs, gallops  Pulmonary:  CTAB, Pulmonary effort is normal. No respiratory distress. No rhonchi, rales, rubs  GI: abdomen is soft, nontender, non-distended. Bowel sounds present  Musculoskeletal: no gross deformities  Skin:    Skin is not pale. Neurologic: Mental Status: Alert and responsive to environment. Minimally verbal (baseline). Attention is decreased. Cranial Nerves: blink to threat intact b/l. EOMI. Facial expression full, symmetric. Tongue symmetric w/o atrophy or fasciculations. Motor Exam: Muscle bulk grossly normal. Strength intact and symmetric BUE/BLE Sensory Exam : response to tactile stimuli symmetric BUE/BLE Gait, Coordination, and Reflexes : no significant tremor observed. Reflexes difficult to elicit w/ resistance to exam. No clonus or Valdez's. Neurologic Exam      Mental Status   Follows commands: Did not follow commands. Attention: decreased. Concentration: decreased. Speech: (Able to state "yeah" but otherwise, minimally verbal)  Level of consciousness: alert  Abnormal comprehension.       Cranial Nerves      CN II   Visual fields full to confrontation. CN III, IV, VI   Pupils are equal, round, and reactive to light. Extraocular motions are normal.      CN VII   Facial expression full, symmetric. CN XII   CN XII normal.      Motor Exam   Muscle bulk: normal  Overall muscle tone: normal     Strength   Right biceps: 5/5  Left biceps: 5/5  Right triceps: 5/5  Left triceps: 5/5  Able to move lower extremities against gravity, appeared symmetric. Sensory Exam      Responsive to tactile stimuli, symmetric. Gait, Coordination, and Reflexes      Reflexes   Right brachioradialis: 2+  Left brachioradialis: 2+  Right biceps: 2+  Left biceps: 2+  Right triceps: 2+  Left triceps: 2+  Right patellar: 2+  Left patellar: 2+  Right achilles: 2+  Left achilles: 2+  Right plantar: normal  Left plantar: normal  Right Valdez: absent  Left Valdez: absent  Right ankle clonus: absent  Left ankle clonus: absent    Discussion with Family: Patient's sister and brother at bedside    Discharge instructions/Information to patient and family:   See after visit summary for information provided to patient and family. Provisions for Follow-Up Care:  See after visit summary for information related to follow-up care and any pertinent home health orders. Planned Readmission: None    Discharge Statement:  I spent 30 minutes discharging the patient. This time was spent on the day of discharge. I had direct contact with the patient on the day of discharge. Greater than 50% of the total time was spent examining patient, answering all patient questions, arranging and discussing plan of care with patient as well as directly providing post-discharge instructions. Additional time then spent on discharge activities. Discharge Medications:  See after visit summary for reconciled discharge medications provided to patient and family.       ** Please Note: This note has been constructed using a voice recognition system **      ======    I have discussed the patient's history, physical exam findings, assessment, and plan in detail with attending, Dr. Michael Steven    Thank you for allowing me to participate in the care of your patient, Dorene Lou     DO Carly Hicks [facility] Neurology Residency, PGY-2

## 2023-10-13 NOTE — DISCHARGE INSTR - AVS FIRST PAGE
Dear Indio Yanez,     It was our pleasure to care for you here at [facility]. It is our hope that we were always able to exceed the expected standards for your care during your stay. You were hospitalized due to reflex epilepsy (seizure). You were cared for on the 7th floor by Anni Chappell DO under the service of Serina Anne MD with the [facility] who covers for your primary care physician (PCP), Tamy Duran MD, while you were hospitalized. If you have any questions or concerns related to this hospitalization, you may contact us at [phone]. For follow up as well as any medication refills, we recommend that you follow up with your primary care physician. A registered nurse will reach out to you by phone within a few days after your discharge to answer any additional questions that you may have after going home. However, at this time we provide for you here, the most important instructions / recommendations at discharge:     Notable Medication Adjustments -   Start Vimpat (Lacosamide) 50mg daily at bedtime x 7 days (began on Oct 12, 2023), followed by 50mg twice a day x 14 days, then 75mg twice a day x 14 days, and then 100mg BID. Testing Required after Discharge -   Recommend follow up with primary care provider (PCP) for IM B12 injections and to recheck vitamin B12 level. B12 goal >400. Important follow up information -   Follow up with PCP within 1 week of hospital discharge. Schedule an appointment with neurology (epilepsy specialist) within 4 weeks. Other Instructions -   Recommend IM B12 1000mcg injections for another 3 weeks - can arrange with PCP; discuss with PCP when [facility] should continue oral B12 supplementation.   Please review this entire after visit summary as additional general instructions including medication list, appointments, activity, diet, any pertinent wound care, and other additional recommendations from your care team that may be provided for you.       Sincerely,     Cindi Araiza, DO

## 2023-10-13 NOTE — CASE MANAGEMENT
Case Management Discharge Planning Note    Patient name Helder Beal  Location [address] [place] [address]/[facility] 544-73 MRN [id number]  : 1975 Date 10/13/2023       Current Admission Date: 10/12/2023  Current Admission Diagnosis:Seizure-like activity [facility])   Patient Active Problem List    Diagnosis Date Noted    Seizure-like activity ([address]) 10/12/2023    Rash 10/12/2023    Vitamin D deficiency 10/12/2023    Encounter for PPD test 2023    Encounter for vaccination 2023    Encounter for Medicare annual wellness exam 2023    Cognitive decline 2023    Behavior problem, adult 2023    Abnormal laboratory test 2021    Primary insomnia 2021    Morbid obesity with body mass index (BMI) of 40.0 to 49.9 ([address]) 2020    Class 3 severe obesity due to excess calories without serious comorbidity with body mass index (BMI) of 40.0 to 44.9 in adult [facility]) 2019    Preoperative clearance 2019    Bilateral impacted cerumen 2019    Neck pain 2019    Acute pain of left knee 2019    Down syndrome 10/06/2016    Hyperlipidemia 10/06/2016    Periodontitis 2016    Osteoporosis 2016    Trisomy 21, Down syndrome 2013    Hypothyroidism 2012    Allergic rhinitis 2012      LOS (days): 0  Geometric Mean LOS (GMLOS) (days):   Days to GMLOS:     OBJECTIVE:            Current admission status: Observation   Preferred Pharmacy:   [address] [place] [address]  Phone: [phone] Fax: [phone] [id number] [place] [address], [address] [place] [address]  Phone: [phone] Fax: [phone]    Primary Care Provider: Betzy Charles MD    Primary Insurance: MEDICARE  Secondary Insurance: [place] [employer]    DISCHARGE DETAILS:          Family notified[de-identified] CM met with family in pt room.  CM provided with a document for provider to complete. Pt is at a group home. Document must be provided prior to discharge. CM notofoed provider that family would like to speak with them.   Additional Comments: Phone call to Rodolfo Kimball [phone] to determine where completed doc needs to be sent to and if they are able to transport over the weekend

## 2023-10-13 NOTE — PLAN OF CARE
Problem: Potential for Falls  Goal: Patient will remain free of falls  Description: INTERVENTIONS:  - Educate patient/family on patient safety including physical limitations  - Instruct patient to call for assistance with activity   - Consult OT/PT to assist with strengthening/mobility   - Keep Call bell within reach  - Keep bed low and locked with side rails adjusted as appropriate  - Keep care items and personal belongings within reach  - Initiate and maintain comfort rounds  - Make Fall Risk Sign visible to staff  - Offer Toileting every 2 Hours, in advance of need  - Initiate/Maintain bed alarm  - Obtain necessary fall risk management equipment:    Problem: MOBILITY - ADULT  Goal: Maintain or return to baseline ADL function  Description: INTERVENTIONS:  -  Assess patient's ability to carry out ADLs; assess patient's baseline for ADL function and identify physical deficits which impact ability to perform ADLs (bathing, care of mouth/teeth, toileting, grooming, dressing, etc.)  - Assess/evaluate cause of self-care deficits   - Assess range of motion  - Assess patient's mobility; develop plan if impaired  - Assess patient's need for assistive devices and provide as appropriate  - Encourage maximum independence but intervene and supervise when necessary  - Involve family in performance of ADLs  - Assess for home care needs following discharge   - Consider OT consult to assist with ADL evaluation and planning for discharge  - Provide patient education as appropriate  Outcome: Progressing  Goal: Maintains/Returns to pre admission functional level  Description: INTERVENTIONS:  - Perform BMAT or MOVE assessment daily.   - Set and communicate daily mobility goal to care team and patient/family/caregiver. - Collaborate with rehabilitation services on mobility goals if consulted  - Perform Range of Motion   times a day. - Reposition patient every 2 hours.   - Dangle patient 3 times a day  - Stand patient 3 times a day  - Ambulate patient 3 times a day  - Out of bed to chair 3 times a day   - Out of bed for meals 3 times a day  - Out of bed for toileting  - Record patient progress and toleration of activity level   Outcome: Progressing     Problem: NEUROSENSORY - ADULT  Goal: Remains free of injury related to seizures activity  Description: INTERVENTIONS  - Maintain airway, patient safety  and administer oxygen as ordered  - Monitor patient for seizure activity, document and report duration and description of seizure to physician/advanced practitioner  - If seizure occurs,  ensure patient safety during seizure  - Reorient patient post seizure  - Seizure pads on all 4 side rails  - Instruct patient/family to notify RN of any seizure activity including if an aura is experienced  - Instruct patient/family to call for assistance with activity based on nursing assessment  - Administer anti-seizure medications if ordered    Outcome: Progressing     Problem: SAFETY ADULT  Goal: Patient will remain free of falls  Description: INTERVENTIONS:  - Educate patient/family on patient safety including physical limitations  - Instruct patient to call for assistance with activity   - Consult OT/PT to assist with strengthening/mobility   - Keep Call bell within reach  - Keep bed low and locked with side rails adjusted as appropriate  - Keep care items and personal belongings within reach  - Initiate and maintain comfort rounds  - Make Fall Risk Sign visible to staff  - Offer Toileting every 2 Hours, in advance of need  - Initiate/Maintain bed alarm  - Obtain necessary fall risk management equipment:    - Apply yellow socks and bracelet for high fall risk patients  - Consider moving patient to room near nurses station  Outcome: Progressing     Problem: DISCHARGE PLANNING  Goal: Discharge to home or other facility with appropriate resources  Description: INTERVENTIONS:  - Identify barriers to discharge w/patient and caregiver  - Arrange for needed discharge resources and transportation as appropriate  - Identify discharge learning needs (meds, wound care, etc.)  - Arrange for interpretive services to assist at discharge as needed  - Refer to Case Management Department for coordinating discharge planning if the patient needs post-hospital services based on physician/advanced practitioner order or complex needs related to functional status, cognitive ability, or social support system  Outcome: Progressing     Problem: Knowledge Deficit  Goal: Patient/family/caregiver demonstrates understanding of disease process, treatment plan, medications, and discharge instructions  Description: Complete learning assessment and assess knowledge base.   Interventions:  - Provide teaching at level of understanding  - Provide teaching via preferred learning methods  Outcome: Progressing     - Apply yellow socks and bracelet for high fall risk patients  - Consider moving patient to room near nurses station  Outcome: Progressing

## 2023-10-13 NOTE — PLAN OF CARE
Problem: Potential for Falls  Goal: Patient will remain free of falls  Description: INTERVENTIONS:  - Educate patient/family on patient safety including physical limitations  - Instruct patient to call for assistance with activity   - Consult OT/PT to assist with strengthening/mobility   - Keep Call bell within reach  - Keep bed low and locked with side rails adjusted as appropriate  - Keep care items and personal belongings within reach  - Initiate and maintain comfort rounds  - Make Fall Risk Sign visible to staff  - Offer Toileting every 2 Hours, in advance of need  - Initiate/Maintain bed alarm  - Obtain necessary fall risk management equipment:   - Apply yellow socks and bracelet for high fall risk patients  - Consider moving patient to room near nurses station  Outcome: Progressing     Problem: NEUROSENSORY - ADULT  Goal: Remains free of injury related to seizures activity  Description: INTERVENTIONS  - Maintain airway, patient safety  and administer oxygen as ordered  - Monitor patient for seizure activity, document and report duration and description of seizure to physician/advanced practitioner  - If seizure occurs,  ensure patient safety during seizure  - Reorient patient post seizure  - Seizure pads on all 4 side rails  - Instruct patient/family to notify RN of any seizure activity including if an aura is experienced  - Instruct patient/family to call for assistance with activity based on nursing assessment  - Administer anti-seizure medications if ordered    Outcome: Progressing     Problem: SAFETY ADULT  Goal: Patient will remain free of falls  Description: INTERVENTIONS:  - Educate patient/family on patient safety including physical limitations  - Instruct patient to call for assistance with activity   - Consult OT/PT to assist with strengthening/mobility   - Keep Call bell within reach  - Keep bed low and locked with side rails adjusted as appropriate  - Keep care items and personal belongings within reach  - Initiate and maintain comfort rounds  - Make Fall Risk Sign visible to staff  - Offer Toileting every 2 Hours, in advance of need  - Initiate/Maintain bed alarm  - Obtain necessary fall risk management equipment:   - Apply yellow socks and bracelet for high fall risk patients  - Consider moving patient to room near nurses station  Outcome: Progressing     Problem: Knowledge Deficit  Goal: Patient/family/caregiver demonstrates understanding of disease process, treatment plan, medications, and discharge instructions  Description: Complete learning assessment and assess knowledge base.   Interventions:  - Provide teaching at level of understanding  - Provide teaching via preferred learning methods  Outcome: Progressing

## 2023-10-13 NOTE — H&P
NEUROLOGY RESIDENCY - ADMISSION H&P NOTE     Name: Yariel Nichols   Age & Sex: 46 y.o. female   MRN: [id number]  Unit/Bed#: [facility] 724-01   Encounter: [id number]    Anticipated Length of Stay:  Patient will be admitted on an Observation basis with an anticipated length of stay of  less than 2 midnights. Justification for Hospital Stay: seizure    Yariel Nichols will need follow up in in 4 weeks with epilepsy or general attending/AP/resident . She will not require outpatient neurological testing. Stable for discharge. ASSESSMENT & PLAN     * Seizure-like activity [facility])  Assessment & Plan  Yariel Nichols is a 46 y.o. female  w/ PMH Down's syndrome, hypothyroidism, HLD, who p/w convulsive syncope versus seizure of unclear primary etiology. Patient has experienced 3 similar episodes over the past year (2 of which was going to the bathroom and 1 instance of attempting to have a BM) and has no history of prior seizures. Given the patient's baseline complexity and intolerance of multiple ASDs in the past, would complete seizure and cardiac workup and possibly trial a new medication. Semiology/presentation: whole body shaking, LOC, all episodes while having BM and one just after waking while walking to bathroom, up to 3 min duration, possible post-episode confusion  Admission: 3rd episode, possible additional episode of shaking with EMS treated with Versed but per d/w Group home staff who was with her, she was just fidgeting her legs a lot in a way she does at baseline   Imaging:   CTH: chronic atrophy w/ concordant ventriculomegaly and precocious microangiopathic changes  EEG (6/6/23): continuous, generalized slowing suggestive of severe, diffuse cortical dysfunction more indicative of global encephalopathic process  Labs: macrocytosis    Impression: 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     Plan:  Routine EEG if able - patient did not tolerate  ASDs/Sedation:   Trial Vimpat uptitration: Vimpat 50mg QHS x 7 days, followed by 50mg BID x 14 days, 75mg BID x 14 days, and then 100mg BID. Ativan 2 mg prn for convulsive seizure activity lasting more than 1 minute  Defer on further neuroimaging at this time  LP not indicated at this time  Seizure precautions  Patient stable for discharge. OP follow up with epilepsy attending/AP in 4-6 weeks. No further OP neuroimaging indicated at this time. Vitamin D deficiency  Assessment & Plan  Currently stable  Continue home meds (vitamin d 3 1000 units qd)    Rash  Assessment & Plan  Currently stable  Continue home meds (ammonium lactate topical 12%)    Behavior problem, adult  Assessment & Plan  Currently stable  Continue home meds (citalopram 10 mg qd)    Primary insomnia  Assessment & Plan  Currently stable  Continue home meds (melatonin 3 mg qhs)      Hypothyroidism  Assessment & Plan  Currently stable  Continue home meds (50 mcg daily)    Hyperlipidemia  Assessment & Plan  Currently stable  Continue home meds (atorvastatin 10 mg qhs)      VTE Prophylaxis: Enoxaparin (Lovenox)  / sequential compression device   Code Status: Level 1 Full Code  POLST: POLST form is not discussed and not completed at this time. CHIEF COMPLAINT     Chief Complaint   Patient presents with    Seizure - Prior Hx Of     Pt was in a group home and had a witnessed seizure by staff. Staff denies head strike. Per EMS, pt was actively seizing on their arrival.        Freddy Cruz is a 46 y.o. female with pertinent history of Down syndrome, hypothyroidism, HLD, prior seizure-like episodes, who presented for witnessed seizure-like episode while on the toilet. Per caregiver, patient was at her normal baseline, and was straining on the toilet to have a BM, after which she lost consciousness, fell off the toilet, had whole body shaking. Entire episode lasted approximately 3 minutes.   Patient may not have been at baseline immediately following the episode. EMS reported leg shaking which caregiver reports is normal for her but she was administered Versed en route to the hospital.  Patient has had 2 prior episodes similar to this 1 shortly after waking while walking to the bathroom and the other while in the bathroom as well. Thus far EEG, CTh, have all been remarkable only for a global cerebral dysfunction consistent with her existing Down syndrome. Cardiac work-up has been unremarkable. Pertinent Negatives include: numbness, weakness, speech or visual changes     REVIEW OF SYSTEMS     Review of Systems   Unable to perform ROS: Other (Limited given h/o intellectual disability)     Does not appear to be in acute distress. Patient's sister and brother at bedside report she does appear more tired than usual, but otherwise, is at her baseline. PAST MEDICAL HISTORY     Past Medical History:   Diagnosis Date    Allergic     Community acquired pneumonia     Last Assessed:1/22/2013    Leukopenia     Last Assessed:3/5/2014    Osteoporosis     Trisomy 21, Down syndrome        Allergies:    Allergies   Allergen Reactions    Keppra [Levetiracetam] Drowsiness     And disorientation    Depakote [Valproic Acid] Drowsiness    Lamictal [Lamotrigine] Rash    Pollen Extract Sneezing       PAST SURGICAL HISTORY     Past Surgical History:   Procedure Laterality Date    TONSILLECTOMY AND ADENOIDECTOMY         SOCIAL & FAMILY HISTORY     Social History     Substance and Sexual Activity   Alcohol Use Not Currently    Comment: PATIENT DOES NOT DRINK       Social History     Substance and Sexual Activity   Drug Use Never     Social History     Tobacco Use   Smoking Status Never   Smokeless Tobacco Never       Marital Status: Single   Occupation: none  Patient Pre-hospital Living Situation: group home  Patient Pre-hospital Level of Mobility: walks with help of staff   Patient Pre-hospital Diet Restrictions: none    Family History:  Family History   Problem Relation Age of Onset    Cancer Mother     No Known Problems Sister     No Known Problems Brother     No Known Problems Brother      OBJECTIVE     Vitals:    10/12/23 1427 10/12/23 1533 10/12/23 2149 10/12/23 2319   BP: 119/62  131/52    BP Location: Right arm  Right arm    Pulse: 78  80    Resp:   20    Temp: 97.5 °F (36.4 °C) 98.5 °F (36.9 °C)     TempSrc: Axillary Oral     SpO2:   98%    Weight:    75.3 kg (166 lb 0.1 oz)   Height:    4' 7" (1.397 m)        Temperature:   No data recorded. Temperature: 98.5 °F (36.9 °C)    Intake & Output:  I/O       None            Weights:   IBW (Ideal Body Weight): 34 kg    Body mass index is 38.58 kg/m². Weight (last 2 days) before discharge       Date/Time Weight    10/12/23 2319 75.3 (166.01)    10/12/23 1423 75.3 (166.01)            Physical Exam  Vitals and nursing note reviewed. Constitutional:       General: She is not in acute distress. Appearance: She is not ill-appearing, toxic-appearing or diaphoretic. Comments: Syndromic appearance. Appeared tired. HENT:      Head: Normocephalic and atraumatic. Right Ear: External ear normal.      Left Ear: External ear normal.      Nose: Nose normal.      Mouth/Throat:      Mouth: Mucous membranes are moist.   Eyes:      Extraocular Movements: Extraocular movements intact and EOM normal.      Conjunctiva/sclera: Conjunctivae normal.      Pupils: Pupils are equal, round, and reactive to light. Cardiovascular:      Rate and Rhythm: Normal rate and regular rhythm. Pulses: Normal pulses. Heart sounds: Normal heart sounds. Pulmonary:      Effort: Pulmonary effort is normal. No respiratory distress. Breath sounds: Normal breath sounds. Abdominal:      General: Bowel sounds are normal. There is no distension. Musculoskeletal:         General: Normal range of motion. Cervical back: No tenderness. Right lower leg: No edema. Left lower leg: No edema.    Skin:     General: Skin is warm and dry. Neurological:      Mental Status: She is alert. Deep Tendon Reflexes:      Reflex Scores:       Tricep reflexes are 2+ on the right side and 2+ on the left side. Bicep reflexes are 2+ on the right side and 2+ on the left side. Brachioradialis reflexes are 2+ on the right side and 2+ on the left side. Patellar reflexes are 2+ on the right side and 2+ on the left side. Achilles reflexes are 2+ on the right side and 2+ on the left side. Psychiatric:         Mood and Affect: Mood normal.       Neurologic Exam     Mental Status   Follows commands: Did not follow commands. Attention: decreased. Concentration: decreased. Speech: (Able to state "yeah" but otherwise, minimally verbal)  Level of consciousness: alert  Abnormal comprehension. Cranial Nerves     CN II   Visual fields full to confrontation. CN III, IV, VI   Pupils are equal, round, and reactive to light. Extraocular motions are normal.     CN VII   Facial expression full, symmetric. CN XII   CN XII normal.     Motor Exam   Muscle bulk: normal  Overall muscle tone: normal    Strength   Right biceps: 5/5  Left biceps: 5/5  Right triceps: 5/5  Left triceps: 5/5  Able to move lower extremities against gravity, appeared symmetric. Sensory Exam     Responsive to tactile stimuli, symmetric. Gait, Coordination, and Reflexes     Reflexes   Right brachioradialis: 2+  Left brachioradialis: 2+  Right biceps: 2+  Left biceps: 2+  Right triceps: 2+  Left triceps: 2+  Right patellar: 2+  Left patellar: 2+  Right achilles: 2+  Left achilles: 2+  Right plantar: normal  Left plantar: normal  Right Valdez: absent  Left Valdez: absent  Right ankle clonus: absent  Left ankle clonus: absent       LABORATORY DATA     Labs: I have personally reviewed pertinent reports.     Results from last 7 days   Lab Units 10/13/23  0551 10/12/23  1720   WBC Thousand/uL 6.30 7.90   HEMOGLOBIN g/dL 15.7* 15.6*   HEMATOCRIT % 45.7 45.6   PLATELETS Thousands/uL 190 194   NEUTROS PCT % 65 69   MONOS PCT % 11 9   EOS PCT % 0 0      Results from last 7 days   Lab Units 10/13/23  0551 10/12/23  1535   SODIUM mmol/L 137 138   POTASSIUM mmol/L 4.5 4.3   CHLORIDE mmol/L 105 106   CO2 mmol/L 27 25   BUN mg/dL 8 11   CREATININE mg/dL 0.66 0.67   CALCIUM mg/dL 8.9 8.8   ALK PHOS U/L 103  --    ALT U/L 20  --    AST U/L 22  --                             Micro:  No results found for: "BLOODCX", "Venora Ponto", "WOUNDCULT", "SPUTUMCULTUR"    IMAGING & DIAGNOSTIC TESTING     Radiology Results: I have personally reviewed pertinent reports. and I have personally reviewed pertinent films in PACS    CT head without contrast   Final Result by Tung Velasco MD (10/12 1809)      No acute intracranial abnormality. Workstation performed: [id number]         CT spine cervical without contrast   Final Result by Tung Velasco MD (10/12 1822)      No acute cervical spine fracture or traumatic malalignment. Workstation performed: [id number]             Other Diagnostic Testing: I have personally reviewed pertinent reports.       ACTIVE MEDICATIONS     Current Facility-Administered Medications   Medication Dose Route Frequency    acetaminophen (TYLENOL) tablet 650 mg  650 mg Oral Q6H PRN    ammonium lactate (LAC-HYDRIN) 12 % lotion   Topical BID    atorvastatin (LIPITOR) tablet 10 mg  10 mg Oral HS    calcium carbonate (TUMS) chewable tablet 1,000 mg  1,000 mg Oral Daily PRN    cholecalciferol (VITAMIN D3) tablet 1,000 Units  1,000 Units Oral Daily    citalopram (CeleXA) tablet 10 mg  10 mg Oral Daily    cyanocobalamin injection 1,000 mcg  1,000 mcg Intramuscular Weekly    enoxaparin (LOVENOX) subcutaneous injection 40 mg  40 mg Subcutaneous Daily    lacosamide (VIMPAT) tablet 50 mg  50 mg Oral HS    Followed by    Fernando Kelley ON 10/19/2023] lacosamide (VIMPAT) tablet 50 mg  50 mg Oral Q12H [address]    Followed by    Fernando Beam ON 11/2/2023] lacosamide (VIMPAT) tablet 75 mg  75 mg Oral Q12H [address]    Followed by    Javier Beaulieu ON 11/16/2023] lacosamide (VIMPAT) tablet 100 mg  100 mg Oral Q12H [address]    levothyroxine tablet 50 mcg  50 mcg Oral Early Morning    LORazepam (ATIVAN) injection 2 mg  2 mg Intravenous Q8H PRN    melatonin tablet 3 mg  3 mg Oral HS    nystatin (MYCOSTATIN) powder   Topical BID         HOME MEDICATIONS     Prior to Admission medications    Medication Sig Start Date End Date Taking?  Authorizing Provider   acetaminophen (TYLENOL) 500 mg tablet Take 1 tablet (500 mg total) by mouth every 8 (eight) hours as needed for mild pain 7/7/23   STEFANIA Guaman   ammonium lactate (LAC-HYDRIN) 12 % lotion Apply topically 2 (two) times a day 3/1/23   STEFANIA Guaman   atorvastatin (LIPITOR) 10 mg tablet Take 1 tablet (10 mg total) by mouth daily 4/10/23 5/10/23  STEFANIA Guaman   benzonatate (TESSALON) 200 MG capsule Take 1 capsule (200 mg total) by mouth 3 (three) times a day as needed for cough  Patient not taking: Reported on 11/4/2022 7/20/22   Aldo Frazier MD   Cholecalciferol 25 MCG (1000 UT) tablet Take 1 tablet (1,000 Units total) by mouth daily 10/24/22   STEFANIA Guaman   citalopram (CeleXA) 10 mg tablet Take 1 tablet by mouth every evening 8/22/23   Historical Provider, MD   dextromethorphan-guaiFENesin (Robitussin Cold Cough+ Chest)  mg/5 mL oral liquid Take 5 mL by mouth if needed for cough  Patient not taking: Reported on 2/6/2023 11/30/22   STEFANIA Guaman   Diclofenac Sodium (VOLTAREN) 1 % Apply 2 g topically 4 (four) times a day 7/7/23   STEFANIA Guaman   fluticasone (Flonase) 50 mcg/act nasal spray 2 sprays into each nostril daily as needed for rhinitis 7/11/22   STEFANIA Guardado   Heating Pad PADS by Does not apply route 2 (two) times a day 6/24/19   STEFANIA Hagan   ibuprofen (MOTRIN) 400 mg tablet Take 1 tablet (400 mg total) by mouth every 8 (eight) hours as needed for mild pain (with food) 7/19/23   STEFANIA Leung   Incontinence Supply Disposable (RA Adult Wipes) MISC Use daily 10/24/22   SETFANIA Leung   levothyroxine 50 mcg tablet TAKE ONE TABLET BY MOUTH AT 7 AM 10/9/23   STEFANIA Leung   loperamide (IMODIUM A-D) 2 MG tablet Take 1 tablet (2 mg total) by mouth 4 (four) times a day as needed for diarrhea  Patient not taking: Reported on 8/18/2022 6/17/22   STEFANIA Roper   loperamide (IMODIUM) 2 mg capsule  6/17/22   Historical Provider, MD   loratadine (CLARITIN) 5 mg/5 mL syrup Take 5 mL (5 mg total) by mouth daily as needed for allergies  Patient not taking: Reported on 2/6/2023 7/11/22   STEFANIA Roper   melatonin 3 mg Take 1 tablet (3 mg total) by mouth daily at bedtime 7/19/23   STEFANIA Leung   Multiple Vitamin (Multivitamin Iron-Free) TABS Take 1 tablet by mouth every morning 10/13/22   STEFANIA Leung   Multiple Vitamins-Minerals (CertaVite Senior/Antioxidant) TABS Take 1 tablet by mouth daily  Patient not taking: Reported on 2/6/2023 10/24/22   STEFANIA Leung   nystatin powder APPLY TO AFFECTED AREA(S) 2X DAILY @8AM & 8PM (INFECTION) 3/1/23   STEFANIA Leung   zinc oxide (DESITIN) 13 % cream Apply topically in the morning To inflamed skin folds as needed  Patient not taking: Reported on 2/6/2023 9/28/22   Deepali Ortega, 0 [facility]    ======    I have discussed the patient's history, physical exam findings, assessment, and plan in detail with attending, Dr. Yisel Combs    Thank you for allowing me to participate in the care of your patient, Juju Kimball.     Ruma Mccain DO  [facility] Neurology Residency, PGY-2

## 2023-10-13 NOTE — ASSESSMENT & PLAN NOTE
Judy Malone is a 46 y.o. female  w/ PMH Down's syndrome, hypothyroidism, HLD, who p/w convulsive syncope versus seizure of unclear primary etiology. Patient has experienced 3 similar episodes over the past year (2 of which was going to the bathroom and 1 instance of attempting to have a BM) and has no history of prior seizures. Given the patient's baseline complexity and intolerance of multiple ASDs in the past, would complete seizure and cardiac workup and possibly trial a new medication. Semiology/presentation: whole body shaking, LOC, all episodes while having BM and one just after waking while walking to bathroom, up to 3 min duration, possible post-episode confusion  Admission: 3rd episode, possible additional episode of shaking with EMS treated with Versed but per d/w Group home staff who was with her, she was just fidgeting her legs a lot in a way she does at baseline   Imaging:   CTH: chronic atrophy w/ concordant ventriculomegaly and precocious microangiopathic changes  EEG (6/6/23): continuous, generalized slowing suggestive of severe, diffuse cortical dysfunction more indicative of global encephalopathic process  Labs: macrocytosis    Impression: 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 Plan:  Routine EEG if able - patient did not tolerate  ASDs/Sedation:   Trial Vimpat uptitration: Vimpat 50mg QHS x 7 days, followed by 50mg BID x 14 days, 75mg BID x 14 days, and then 100mg BID. Ativan 2 mg prn for convulsive seizure activity lasting more than 1 minute  Defer on further neuroimaging at this time  LP not indicated at this time  Seizure precautions  Patient stable for discharge. OP follow up with epilepsy attending/AP in 4-6 weeks. No further OP neuroimaging indicated at this time.

## 2023-10-16 ENCOUNTER — TELEPHONE (OUTPATIENT)
Dept: NEUROLOGY | Facility: CLINIC | Age: 48
End: 2023-10-16

## 2023-10-16 NOTE — TELEPHONE ENCOUNTER
1ST ATTEMPT,     Called pt no answer, LMOM. Group home #[phone] , Spoke to [place] Who asked to call the  Claudia Quach at [phone], na lmom. Patient would like [facility]A [facility] from [facility]    HFU/ [address]- 10/13/2023    Tatiana Keenan will need follow up in in 4 weeks with epilepsy or general attending/AP/resident . She will not require outpatient neurological testing. Aura Charles DO  P Neurology [facility]! Please schedule new patient appointment with epileptologist attending/AP within 4 weeks. Patient has history of Down's Syndrome; will require contacting either group home or siblings to schedule appointment. Thank you!   Aura Charles DO  PGY2, Neurology

## 2023-10-17 ENCOUNTER — TELEPHONE (OUTPATIENT)
Dept: NEUROLOGY | Facility: CLINIC | Age: 48
End: 2023-10-17

## 2023-10-17 NOTE — TELEPHONE ENCOUNTER
10/13/23 at 3:54    [facility] calling regarding a prescription that we received for [facility], date of birth, [birth year] came over cyanocobalmin 1000 injection. Says to dispense one ml but she was suppose to do it for 3 doses within the minimum of 3 ml or multi dose vial of 10 ml. Pls send that prescription or call us. Phone number [phone]. Thank you. Chart reviewed  We have not seen this pt yet  Pt has upcoming HFU appt w/ Noelle Ying 11/14/23 at 0945. [facility], spoke to pharmacist and made aware of the above. Advised that pt was last seen by [facility] neurology. Can call [phone] or contact PCP. He verbalized understanding.      humaira

## 2023-10-18 ENCOUNTER — OFFICE VISIT (OUTPATIENT)
Dept: INTERNAL MEDICINE CLINIC | Facility: CLINIC | Age: 48
End: 2023-10-18
Payer: MEDICARE

## 2023-10-18 ENCOUNTER — TELEPHONE (OUTPATIENT)
Age: 48
End: 2023-10-18

## 2023-10-18 VITALS
BODY MASS INDEX: 38.42 KG/M2 | SYSTOLIC BLOOD PRESSURE: 118 MMHG | HEIGHT: 55 IN | HEART RATE: 72 BPM | WEIGHT: 166 LBS | OXYGEN SATURATION: 100 % | DIASTOLIC BLOOD PRESSURE: 74 MMHG

## 2023-10-18 DIAGNOSIS — R68.2 DRY MOUTH: ICD-10-CM

## 2023-10-18 DIAGNOSIS — R05.1 ACUTE COUGH: ICD-10-CM

## 2023-10-18 DIAGNOSIS — H61.23 BILATERAL IMPACTED CERUMEN: ICD-10-CM

## 2023-10-18 DIAGNOSIS — R05.1 ACUTE COUGH: Primary | ICD-10-CM

## 2023-10-18 DIAGNOSIS — J06.9 ACUTE URI: ICD-10-CM

## 2023-10-18 DIAGNOSIS — E53.8 B12 DEFICIENCY: ICD-10-CM

## 2023-10-18 DIAGNOSIS — E66.01 MORBID OBESITY WITH BODY MASS INDEX (BMI) OF 40.0 TO 49.9 (HCC): ICD-10-CM

## 2023-10-18 DIAGNOSIS — R56.9 SEIZURE-LIKE ACTIVITY (HCC): Primary | ICD-10-CM

## 2023-10-18 DIAGNOSIS — Q90.9 DOWN SYNDROME: ICD-10-CM

## 2023-10-18 LAB
SARS-COV-2 AG UPPER RESP QL IA: NEGATIVE
VALID CONTROL: NORMAL

## 2023-10-18 PROCEDURE — 99214 OFFICE O/P EST MOD 30 MIN: CPT | Performed by: INTERNAL MEDICINE

## 2023-10-18 PROCEDURE — 87811 SARS-COV-2 COVID19 W/OPTIC: CPT | Performed by: INTERNAL MEDICINE

## 2023-10-18 RX ORDER — GUAIFENESIN/DEXTROMETHORPHAN 100-10MG/5
5 SYRUP ORAL 3 TIMES DAILY PRN
Qty: 237 ML | Refills: 0 | Status: SHIPPED | OUTPATIENT
Start: 2023-10-18

## 2023-10-18 RX ORDER — GUAIFENESIN/DEXTROMETHORPHAN 100-10MG/5
5 SYRUP ORAL 3 TIMES DAILY PRN
Qty: 237 ML | Refills: 0 | Status: SHIPPED | OUTPATIENT
Start: 2023-10-18 | End: 2023-10-18 | Stop reason: SDUPTHER

## 2023-10-18 RX ORDER — CYANOCOBALAMIN 1000 UG/ML
1000 INJECTION, SOLUTION INTRAMUSCULAR; SUBCUTANEOUS WEEKLY
Status: SHIPPED | OUTPATIENT
Start: 2023-10-20 | End: 2023-11-10

## 2023-10-18 RX ORDER — FLUORIDE TOOTHPASTE
1 TOOTHPASTE DENTAL 2 TIMES DAILY PRN
Qty: 237 ML | Refills: 0 | Status: SHIPPED | OUTPATIENT
Start: 2023-10-18

## 2023-10-18 NOTE — PROGRESS NOTES
Name: Michelle Germain      : 1975      MRN: [id number]  Encounter Provider: Ruby Thomas MD  Encounter Date: 10/18/2023   Encounter department: [facility]     1. Seizure-like activity ([address])  Assessment & Plan:  -Convulsive syncope versus seizure  -Continue Vimpat titration outlined by neurology and follow-up in 1 month        2. B12 deficiency  Assessment & Plan:  -B12 IM injection scheduled  -Plan to check B12 level after last IM dose    Orders:  -     cyanocobalamin injection 1,000 mcg  -     Vitamin B12; Future; Expected date: 2023    3. Acute URI  Assessment & Plan:  -COVID swab negative  -Use Saline nasal spray prn     Orders:  -     POCT Rapid Covid Ag  -     sodium chloride (OCEAN) 0.65 % nasal spray; 2 sprays into each nostril as needed for congestion or rhinitis    4. Bilateral impacted cerumen  Assessment & Plan:  -Referral made to ENT    Orders:  -     Ambulatory Referral to Otolaryngology; Future    5. Dry mouth  Assessment & Plan:  -Due to mouth breathing from sinus congestion   -Use Biotene swabs BID prn     Orders:  -     Mouthwashes (Biotene Dry Mouth) LIQD; Apply 1 Swab to the mouth or throat 2 (two) times a day as needed (dry mouth) Use 5ml of solution with each swab    6. Down syndrome  -     Durable Medical Equipment    7. Morbid obesity with body mass index (BMI) of 40.0 to 49.9 ([address])           Subjective      HPI  Michelle Germain is a 46 y.o. female with pertinent history of Down syndrome, hypothyroidism, HLD, prior seizure-like episodes, who presented on 10/12 for witnessed seizure-like episode while on the toilet. As part of her work-up a CT of the head and EEG were performed and notable only for global cerebral dysfunction consistent with her existing Down syndrome. Her cardiac work-up was unremarkable. Neurology was consulted and felt her seizure-like activity was either secondary to convulsive syncope versus seizure. Prior to discharge there were no additional seizure-like events. She was started on Vimpat with instructions for titration schedule to follow-up with neurology in 1 month. She was also noted to be vitamin B12 deficient. It was recommended that she receive an additional 3 weeks of IM B12 with a goal B12 level of greater than 400. Today the patient is accompanied by her caregivers. They report that starting on Sunday the patient began experiencing sinus congestion. They also report the patient has been significant to them that her throat is sore. They deny any cough or fevers. All other systems negative except for pertinent findings noted in HPI. Current Outpatient Medications on File Prior to Visit   Medication Sig   • acetaminophen (TYLENOL) 500 mg tablet Take 1 tablet (500 mg total) by mouth every 8 (eight) hours as needed for mild pain   • Cholecalciferol 25 MCG (1000 UT) tablet Take 1 tablet (1,000 Units total) by mouth daily   • citalopram (CeleXA) 10 mg tablet Take 1 tablet by mouth every evening   • [START ON 10/20/2023] cyanocobalamin 1,000 mcg/mL Inject 1 mL (1,000 mcg total) into a muscle once a week for 3 doses Do not start before October 20, 2023. • [START ON 11/16/2023] lacosamide (VIMPAT) 100 mg tablet Take 1 tablet (100 mg total) by mouth every 12 (twelve) hours for 10 days Do not start before November 16, 2023. • [START ON 11/2/2023] lacosamide (VIMPAT) 150 mg tablet Take 0.5 tablets (75 mg total) by mouth every 12 (twelve) hours for 28 doses Do not start before November 2, 2023. • lacosamide (VIMPAT) 50 mg Take 1 tablet (50 mg total) by mouth daily at bedtime for 6 doses   • [START ON 10/19/2023] lacosamide (VIMPAT) 50 mg Take 1 tablet (50 mg total) by mouth every 12 (twelve) hours for 28 doses Do not start before October 19, 2023.    • levothyroxine 50 mcg tablet TAKE ONE TABLET BY MOUTH AT 7 AM   • melatonin 3 mg Take 1 tablet (3 mg total) by mouth daily at bedtime   • [DISCONTINUED] ammonium lactate (LAC-HYDRIN) 12 % lotion Apply topically 2 (two) times a day   • atorvastatin (LIPITOR) 10 mg tablet Take 1 tablet (10 mg total) by mouth daily   • Heating Pad PADS by Does not apply route 2 (two) times a day (Patient not taking: Reported on 10/12/2023)   • Incontinence Supply Disposable (RA Adult Wipes) MISC Use daily (Patient not taking: Reported on 10/12/2023)       Objective     /74   Pulse 72   Ht 4' 7" (1.397 m)   Wt 75.3 kg (166 lb)   LMP  (LMP Unknown)   SpO2 100%   BMI 38.58 kg/m²     BP Readings from Last 3 Encounters:   10/18/23 118/74   10/12/23 131/52   09/26/23 124/74        Wt Readings from Last 3 Encounters:   10/18/23 75.3 kg (166 lb)   10/12/23 75.3 kg (166 lb 0.1 oz)   09/26/23 74 kg (163 lb 3.2 oz)       Physical Exam    General: NAD, General Malaise   HEENT: NCAT, EOMI, normal conjunctiva, bilateral cerumen impaction   Cardiovascular: RRR, normal S1 and S2, no m/r/g  Pulmonary: Normal respiratory effort, no wheezes, rales or rhonchi  Musculoskeletal: Normal bulk and tone  Neuro: Non-focal, ambulating without difficulty, non-antalgic gait  Extremities: No lower extremity edema     Wiley Garcia MD

## 2023-10-18 NOTE — TELEPHONE ENCOUNTER
Pts sister called to say just the cough medicine went to the wrong pharmacy. Please send it to [facility] as soon as possible.

## 2023-10-18 NOTE — PROGRESS NOTES
Assessment & Plan     1. Seizure-like activity ([address])    2. B12 deficiency  -     cyanocobalamin injection 1,000 mcg  -     Vitamin B12; Future; Expected date: 11/18/2023    3. Acute URI  -     POCT Rapid Covid Ag  -     sodium chloride (OCEAN) 0.65 % nasal spray; 2 sprays into each nostril as needed for congestion or rhinitis    4. Bilateral impacted cerumen  -     Ambulatory Referral to Otolaryngology; Future    5. Dry mouth  -     Mouthwashes (Biotene Dry Mouth) LIQD; Apply 1 Swab to the mouth or throat 2 (two) times a day as needed (dry mouth) Use 5ml of solution with each swab    6. Down syndrome  -     Durable Medical Equipment    7. Morbid obesity with body mass index (BMI) of 40.0 to 49.9 [facility])       Subjective     Transitional Care Management Review:   Jeanna Edwards is a 46 y.o. female here for TCM follow up. During the TCM phone call patient stated:  TCM Call     None      TCM Call     None        HPI        Review of Systems  All other systems negative except for pertinent findings noted in HPI.      Objective     /74   Pulse 72   Ht 4' 7" (1.397 m)   Wt 75.3 kg (166 lb)   LMP  (LMP Unknown)   SpO2 100%   BMI 38.58 kg/m²      Physical Exam  General: NAD, General Malaise   HEENT: NCAT, EOMI, normal conjunctiva, bilateral cerumen impaction   Cardiovascular: RRR, normal S1 and S2, no m/r/g  Pulmonary: Normal respiratory effort, no wheezes, rales or rhonchi  Musculoskeletal: Normal bulk and tone  Neuro: Non-focal, ambulating without difficulty, non-antalgic gait  Extremities: No lower extremity edema        Medications have been reviewed by provider in current encounter    Emily Dockery MD

## 2023-10-18 NOTE — ASSESSMENT & PLAN NOTE
-Convulsive syncope versus seizure  -Continue Vimpat titration outlined by neurology and follow-up in 1 month

## 2023-10-18 NOTE — TELEPHONE ENCOUNTER
Patients was seen in the office today. Sister called and stated she complained of a cough at the visit. Can cough medication be called into the pharmacy.

## 2023-10-20 ENCOUNTER — CLINICAL SUPPORT (OUTPATIENT)
Dept: INTERNAL MEDICINE CLINIC | Facility: CLINIC | Age: 48
End: 2023-10-20

## 2023-10-20 DIAGNOSIS — E53.8 B12 DEFICIENCY: Primary | ICD-10-CM

## 2023-10-20 RX ADMIN — CYANOCOBALAMIN 1000 MCG: 1000 INJECTION, SOLUTION INTRAMUSCULAR; SUBCUTANEOUS at 09:42

## 2023-10-31 ENCOUNTER — HOSPITAL ENCOUNTER (EMERGENCY)
Facility: HOSPITAL | Age: 48
Discharge: HOME | End: 2023-10-31
Attending: EMERGENCY MEDICINE
Payer: MEDICARE

## 2023-10-31 ENCOUNTER — TELEPHONE (OUTPATIENT)
Dept: OTHER | Facility: OTHER | Age: 48
End: 2023-10-31

## 2023-10-31 ENCOUNTER — TELEPHONE (OUTPATIENT)
Age: 48
End: 2023-10-31

## 2023-10-31 ENCOUNTER — NURSE TRIAGE (OUTPATIENT)
Age: 48
End: 2023-10-31

## 2023-10-31 VITALS
SYSTOLIC BLOOD PRESSURE: 118 MMHG | DIASTOLIC BLOOD PRESSURE: 70 MMHG | TEMPERATURE: 98.1 F | RESPIRATION RATE: 22 BRPM | OXYGEN SATURATION: 98 % | HEART RATE: 74 BPM

## 2023-10-31 DIAGNOSIS — R56.9 SEIZURE-LIKE ACTIVITY (HCC): ICD-10-CM

## 2023-10-31 DIAGNOSIS — G40.802 REFLEX EPILEPSY (HCC): ICD-10-CM

## 2023-10-31 DIAGNOSIS — R40.0 DROWSINESS: Primary | ICD-10-CM

## 2023-10-31 DIAGNOSIS — R41.82 ALTERED MENTAL STATUS: ICD-10-CM

## 2023-10-31 LAB
ALBUMIN SERPL BCP-MCNC: 2.9 G/DL (ref 3.5–5)
ALP SERPL-CCNC: 108 U/L (ref 34–104)
ALT SERPL W P-5'-P-CCNC: 14 U/L (ref 7–52)
ANION GAP SERPL CALCULATED.3IONS-SCNC: 8 MMOL/L
AST SERPL W P-5'-P-CCNC: 25 U/L (ref 13–39)
ATRIAL RATE: 76 BPM
BASOPHILS # BLD AUTO: 0.06 THOUSANDS/ÂΜL (ref 0–0.1)
BASOPHILS NFR BLD AUTO: 1 % (ref 0–1)
BILIRUB SERPL-MCNC: 0.7 MG/DL (ref 0.2–1)
BILIRUB UR QL STRIP: NEGATIVE
BUN SERPL-MCNC: 9 MG/DL (ref 5–25)
CALCIUM ALBUM COR SERPL-MCNC: 9.4 MG/DL (ref 8.3–10.1)
CALCIUM SERPL-MCNC: 8.5 MG/DL (ref 8.4–10.2)
CHLORIDE SERPL-SCNC: 103 MMOL/L (ref 96–108)
CLARITY UR: CLEAR
CO2 SERPL-SCNC: 24 MMOL/L (ref 21–32)
COLOR UR: COLORLESS
CREAT SERPL-MCNC: 0.57 MG/DL (ref 0.6–1.3)
EOSINOPHIL # BLD AUTO: 0.02 THOUSAND/ÂΜL (ref 0–0.61)
EOSINOPHIL NFR BLD AUTO: 0 % (ref 0–6)
ERYTHROCYTE [DISTWIDTH] IN BLOOD BY AUTOMATED COUNT: 13.6 % (ref 11.6–15.1)
GFR SERPL CREATININE-BSD FRML MDRD: 108 ML/MIN/1.73SQ M
GLUCOSE SERPL-MCNC: 141 MG/DL (ref 65–140)
GLUCOSE UR STRIP-MCNC: NEGATIVE MG/DL
HCT VFR BLD AUTO: 44.6 % (ref 34.8–46.1)
HGB BLD-MCNC: 15.2 G/DL (ref 11.5–15.4)
HGB UR QL STRIP.AUTO: NEGATIVE
IMM GRANULOCYTES # BLD AUTO: 0.03 THOUSAND/UL (ref 0–0.2)
IMM GRANULOCYTES NFR BLD AUTO: 0 % (ref 0–2)
KETONES UR STRIP-MCNC: NEGATIVE MG/DL
LEUKOCYTE ESTERASE UR QL STRIP: NEGATIVE
LYMPHOCYTES # BLD AUTO: 1.25 THOUSANDS/ÂΜL (ref 0.6–4.47)
LYMPHOCYTES NFR BLD AUTO: 17 % (ref 14–44)
MCH RBC QN AUTO: 35.5 PG (ref 26.8–34.3)
MCHC RBC AUTO-ENTMCNC: 34.1 G/DL (ref 31.4–37.4)
MCV RBC AUTO: 104 FL (ref 82–98)
MONOCYTES # BLD AUTO: 0.57 THOUSAND/ÂΜL (ref 0.17–1.22)
MONOCYTES NFR BLD AUTO: 8 % (ref 4–12)
NEUTROPHILS # BLD AUTO: 5.35 THOUSANDS/ÂΜL (ref 1.85–7.62)
NEUTS SEG NFR BLD AUTO: 74 % (ref 43–75)
NITRITE UR QL STRIP: NEGATIVE
NRBC BLD AUTO-RTO: 0 /100 WBCS
P AXIS: 49 DEGREES
PH UR STRIP.AUTO: 7 [PH]
PLATELET # BLD AUTO: 234 THOUSANDS/UL (ref 149–390)
PMV BLD AUTO: 10.4 FL (ref 8.9–12.7)
POTASSIUM SERPL-SCNC: 4.6 MMOL/L (ref 3.5–5.3)
PR INTERVAL: 150 MS
PROT SERPL-MCNC: 6.7 G/DL (ref 6.4–8.4)
PROT UR STRIP-MCNC: NEGATIVE MG/DL
QRS AXIS: 125 DEGREES
QRSD INTERVAL: 80 MS
QT INTERVAL: 380 MS
QTC INTERVAL: 427 MS
RBC # BLD AUTO: 4.28 MILLION/UL (ref 3.81–5.12)
SODIUM SERPL-SCNC: 135 MMOL/L (ref 135–147)
SP GR UR STRIP.AUTO: 1.01 (ref 1–1.03)
T WAVE AXIS: 22 DEGREES
TSH SERPL DL<=0.05 MIU/L-ACNC: 1.22 UIU/ML (ref 0.45–4.5)
UROBILINOGEN UR STRIP-ACNC: <2 MG/DL
VENTRICULAR RATE: 76 BPM
WBC # BLD AUTO: 7.28 THOUSAND/UL (ref 4.31–10.16)

## 2023-10-31 PROCEDURE — 96361 HYDRATE IV INFUSION ADD-ON: CPT

## 2023-10-31 PROCEDURE — 96360 HYDRATION IV INFUSION INIT: CPT

## 2023-10-31 PROCEDURE — 85025 COMPLETE CBC W/AUTO DIFF WBC: CPT

## 2023-10-31 PROCEDURE — 36415 COLL VENOUS BLD VENIPUNCTURE: CPT

## 2023-10-31 PROCEDURE — 99285 EMERGENCY DEPT VISIT HI MDM: CPT

## 2023-10-31 PROCEDURE — 80053 COMPREHEN METABOLIC PANEL: CPT

## 2023-10-31 PROCEDURE — 93010 ELECTROCARDIOGRAM REPORT: CPT | Performed by: INTERNAL MEDICINE

## 2023-10-31 PROCEDURE — 84443 ASSAY THYROID STIM HORMONE: CPT

## 2023-10-31 PROCEDURE — 93005 ELECTROCARDIOGRAM TRACING: CPT

## 2023-10-31 PROCEDURE — 81003 URINALYSIS AUTO W/O SCOPE: CPT

## 2023-10-31 PROCEDURE — 99284 EMERGENCY DEPT VISIT MOD MDM: CPT | Performed by: EMERGENCY MEDICINE

## 2023-10-31 RX ORDER — LACOSAMIDE 50 MG/1
50 TABLET ORAL EVERY 12 HOURS SCHEDULED
Qty: 4 TABLET | Refills: 0 | Status: SHIPPED | OUTPATIENT
Start: 2023-10-31 | End: 2023-11-02

## 2023-10-31 RX ADMIN — SODIUM CHLORIDE 1000 ML: 0.9 INJECTION, SOLUTION INTRAVENOUS at 15:12

## 2023-10-31 NOTE — TELEPHONE ENCOUNTER
Bruno Hall calling - patient increasingly lethargic & minimally responsive. She states she has been more sleepy than usual since increasing her seizure medications 9 days ago. She was able to drink water this morning but has been minimally responsive since 11am. Staff denies any seizure like activity today. Encouraged staff to have patient evaluated in the ED for altered mental status. Reason for Disposition  • Difficult to awaken or acting confused (disoriented, slurred speech) and new-onset    Additional Information  • Confusion, disorientation, or hallucinations is main symptom    Answer Assessment - Initial Assessment Questions  1. SYMPTOM: "What is the main symptom you are concerned about?" (e.g., weakness, numbness)      Change in MS   2. ONSET: "When did this start?" (minutes, hours, days; while sleeping)      11 am  3.  LAST NORMAL: "When was the last time you were normal (no symptoms)?"      11 am    Protocols used: Neurologic Deficit-ADULT-[place], Confusion - Delirium-ADULT-[place]

## 2023-10-31 NOTE — TELEPHONE ENCOUNTER
Spoke to Cl and advised that the patient needs her B12 level checked per Dr. Buzz Jo before another B12 injection can be given. Cl agreed and verbalized understanding and will check labs for the patient.

## 2023-10-31 NOTE — TELEPHONE ENCOUNTER
Lindsay Bergman from patients group home and would liek to schedule patients B12 shot. Please call Lindsay Bergman to schedule.   [phone]

## 2023-10-31 NOTE — ED ATTENDING ATTESTATION
10/31/2023  IEmeka DO, saw and evaluated the patient. I have discussed the patient with the resident/non-physician practitioner and agree with the resident's/non-physician practitioner's findings, Plan of Care, and MDM as documented in the resident's/non-physician practitioner's note, except where noted. All available labs and Radiology studies were reviewed. I was present for key portions of any procedure(s) performed by the resident/non-physician practitioner and I was immediately available to provide assistance. At this point I agree with the current assessment done in the Emergency Department. I have conducted an independent evaluation of this patient a history and physical is as follows:        ED Course     46 yof with down syndrome, recently started on vimpat for seizure vs convulsive syncope p/w AMS. Slightly less communicative or active according to family and group home. Patient cannot provide hx. No recent trauma. Did receive increased (planned) dose of vimpat today. No fevers. Ros difficult given patient cannot give a history. Exam non focal, normal vitals. Moving all extremitis, no trauma. Looks dry, tongue and MM.      Plan UA, labs for jak, metabolic derangement, could be all secondary to increased dose of vimpat    Critical Care Time  Procedures

## 2023-10-31 NOTE — TELEPHONE ENCOUNTER
Jorge Hong from patients group home calling for a refill of    Lacosamide(Vimpat) 75mg    She states that she is at 50mg now and next dose is 75mg.     [address] in [facility]

## 2023-10-31 NOTE — DISCHARGE INSTRUCTIONS
Seen today for drowsiness and altered mental status that we believe is related to your new prescription of Vimpat. Your blood work did not show any evidence of blood infection or urinary tract infection. Reach out to neurology regarding further management of seizure medications. You been given outpatient referral to neurology for [facility]. Schedule an appointment this week. Please return to the ER if probably has worsening changes in mental status, drowsiness, fevers, chills, seizure-like activity, or focal neurologic deficits. It appears that [facility] already has 2 referrals to neurology in place. Please follow-up with these referrals for further management. Call [facility] in order to schedule these appointments.

## 2023-10-31 NOTE — TELEPHONE ENCOUNTER
Steff Preston, the pharmacist from [facility] called to clarify if the Rx Lacosamide 50 mg twice a day, 4 doses till 11/2/23 is correct. Confirmed with the pharmacist that Rx was written by Dr. Alyson Ramirez stating 50 mg twice a day, 4 doses till 11/2/23. Starting 11/2/23, Rx Lacosamide is written by Dr. Mohammed Sandifer the neurologist. Pharmacist stated that they don't have any further questions.

## 2023-10-31 NOTE — ED PROVIDER NOTES
History  Chief Complaint   Patient presents with    Fatigue     Per EMS pt is coming from a [facility] and has had reported increased fatigue and gen weakness since 1100 today. Pt also reported to have increased urinary incontinence. WALTER Avendano is a 46 y.o. male past medical history Down syndrome, hypothyroidism, osteoporosis, seizure-like activity recently evaluated inpatient by neurology with EEG presenting for weakness and altered mental status since 10 AM this morning. Patient lives in a group home where staff noticed that Nathalia Kingsley is becoming less responsive and verbally interactive starting at 10 AM this morning with progressive decline. They have not noticed any fevers, chills, evidence of infection such as nausea vomiting or diarrhea. The only change in note is that patient recently started taking Vimpat for seizure-like activity. On chart review patient has had reaction of drowsiness to both Keppra and Depakote. On arrival, patient's vital signs are stable she is afebrile. Point-of-care glucose is 174. Unable to solicit history from the patient, however she is alert looking in the room. Sister present at bedside and states that patient typically is more verbally responsive and attempts to communicate. Review of previous records, Patient was evaluated by neurology for seizure-like activity and was decided to try Vimpat 50 mg nightly for 7 days followed by 50 mg twice daily for 14 days eventually increasing to 100 mg twice daily. Today was patient's first dose of increased Vimpat from 50 once daily to 50 twice daily    Prior to Admission Medications   Prescriptions Last Dose Informant Patient Reported? Taking?    Cholecalciferol 25 MCG (1000 UT) tablet   No No   Sig: Take 1 tablet (1,000 Units total) by mouth daily   Heating Pad PADS   No No   Sig: by Does not apply route 2 (two) times a day   Patient not taking: Reported on 10/12/2023   Incontinence Supply Disposable (RA Adult Wipes) MISC   No No   Sig: Use daily   Patient not taking: Reported on 10/12/2023   Mouthwashes (Biotene Dry Mouth) LIQD   No No   Sig: Apply 1 Swab to the mouth or throat 2 (two) times a day as needed (dry mouth) Use 5ml of solution with each swab   acetaminophen (TYLENOL) 500 mg tablet   No No   Sig: Take 1 tablet (500 mg total) by mouth every 8 (eight) hours as needed for mild pain   atorvastatin (LIPITOR) 10 mg tablet   No No   Sig: Take 1 tablet (10 mg total) by mouth daily   citalopram (CeleXA) 10 mg tablet   Yes No   Sig: Take 1 tablet by mouth every evening   cyanocobalamin 1,000 mcg/mL   No No   Sig: Inject 1 mL (1,000 mcg total) into a muscle once a week for 3 doses Do not start before 2023. dextromethorphan-guaiFENesin (ROBITUSSIN DM)  mg/5 mL syrup   No No   Sig: Take 5 mL by mouth 3 (three) times a day as needed for cough or congestion   lacosamide (VIMPAT) 100 mg tablet   No No   Sig: Take 1 tablet (100 mg total) by mouth every 12 (twelve) hours for 10 days Do not start before 2023. lacosamide (VIMPAT) 150 mg tablet   No No   Sig: Take 0.5 tablets (75 mg total) by mouth every 12 (twelve) hours for 28 doses Do not start before 2023.    lacosamide (VIMPAT) 50 mg   No No   Sig: Take 1 tablet (50 mg total) by mouth daily at bedtime for 6 doses   lacosamide (VIMPAT) 50 mg   No No   Sig: Take 1 tablet (50 mg total) by mouth every 12 (twelve) hours for 4 doses   levothyroxine 50 mcg tablet   No No   Sig: TAKE ONE TABLET BY MOUTH AT 7 AM   melatonin 3 mg   No No   Sig: Take 1 tablet (3 mg total) by mouth daily at bedtime   sodium chloride (OCEAN) 0.65 % nasal spray   No No   Si sprays into each nostril as needed for congestion or rhinitis      Facility-Administered Medications Last Administration Doses Remaining   cyanocobalamin injection 1,000 mcg 10/20/2023  9:42 AM 2          Past Medical History:   Diagnosis Date    Allergic     Community acquired pneumonia Last Assessed:1/22/2013    Leukopenia     Last Assessed:3/5/2014    Osteoporosis     Trisomy 21, Down syndrome        Past Surgical History:   Procedure Laterality Date    TONSILLECTOMY AND ADENOIDECTOMY         Family History   Problem Relation Age of Onset    Cancer Mother     No Known Problems Sister     No Known Problems Brother     No Known Problems Brother      I have reviewed and agree with the history as documented. E-Cigarette/Vaping    E-Cigarette Use Never User      E-Cigarette/Vaping Substances    Nicotine No     THC No     CBD No     Flavoring No     Other No     Unknown No      Social History     Tobacco Use    Smoking status: Never    Smokeless tobacco: Never   Vaping Use    Vaping Use: Never used   Substance Use Topics    Alcohol use: Not Currently     Comment: PATIENT DOES NOT DRINK    Drug use: Never        Review of Systems   Reason unable to perform ROS: Down syndrome, ID. Physical Exam  ED Triage Vitals [10/31/23 1504]   Temperature Pulse Respirations Blood Pressure SpO2   98.1 °F (36.7 °C) 74 22 118/70 98 %      Temp Source Heart Rate Source Patient Position - Orthostatic VS BP Location FiO2 (%)   Oral Monitor Sitting Left arm --      Pain Score       --             Orthostatic Vital Signs  Vitals:    10/31/23 1504   BP: 118/70   Pulse: 74   Patient Position - Orthostatic VS: Sitting       Physical Exam  Constitutional:       Appearance: Normal appearance. HENT:      Head: Normocephalic and atraumatic. Nose: Nose normal.   Eyes:      Extraocular Movements: Extraocular movements intact. Pupils: Pupils are equal, round, and reactive to light. Cardiovascular:      Rate and Rhythm: Normal rate and regular rhythm. Pulses: Normal pulses. Heart sounds: Normal heart sounds. Pulmonary:      Effort: Pulmonary effort is normal.      Breath sounds: Normal breath sounds. Abdominal:      General: Abdomen is flat. Palpations: Abdomen is soft.       Tenderness: There is no abdominal tenderness. Musculoskeletal:      Right lower leg: No edema. Left lower leg: No edema. Skin:     General: Skin is warm and dry. Neurological:      Mental Status: She is alert.          ED Medications  Medications   sodium chloride 0.9 % bolus 1,000 mL (0 mL Intravenous Stopped 10/31/23 1817)       Diagnostic Studies  Results Reviewed       Procedure Component Value Units Date/Time    TSH [[id number]]  (Normal) Collected: 10/31/23 1655    Lab Status: Final result Specimen: Blood from Arm, Right Updated: 10/31/23 1757     TSH 3RD GENERATON 1.223 uIU/mL     Comprehensive metabolic panel [[id number]]  (Abnormal) Collected: 10/31/23 1655    Lab Status: Final result Specimen: Blood from Arm, Right Updated: 10/31/23 1751     Sodium 135 mmol/L      Potassium 4.6 mmol/L      Chloride 103 mmol/L      CO2 24 mmol/L      ANION GAP 8 mmol/L      BUN 9 mg/dL      Creatinine 0.57 mg/dL      Glucose 141 mg/dL      Calcium 8.5 mg/dL      Corrected Calcium 9.4 mg/dL      AST 25 U/L      ALT 14 U/L      Alkaline Phosphatase 108 U/L      Total Protein 6.7 g/dL      Albumin 2.9 g/dL      Total Bilirubin 0.70 mg/dL      eGFR 108 ml/min/1.73sq m     Narrative:      [place] guidelines for Chronic Kidney Disease (CKD):     Stage 1 with normal or high GFR (GFR > 90 mL/min/1.73 square meters)    Stage 2 Mild CKD (GFR = 60-89 mL/min/1.73 square meters)    Stage 3A Moderate CKD (GFR = 45-59 mL/min/1.73 square meters)    Stage 3B Moderate CKD (GFR = 30-44 mL/min/1.73 square meters)    Stage 4 Severe CKD (GFR = 15-29 mL/min/1.73 square meters)    Stage 5 End Stage CKD (GFR <15 mL/min/1.73 square meters)  Note: GFR calculation is accurate only with a steady state creatinine    UA w Reflex to Microscopic w Reflex to Culture [[id number]] Collected: 10/31/23 1707    Lab Status: Final result Specimen: Urine, Straight Cath Updated: 10/31/23 1731     Color, UA Colorless     Clarity, UA Clear Specific Gravity, UA 1.006     pH, UA 7.0     Leukocytes, UA Negative     Nitrite, UA Negative     Protein, UA Negative mg/dl      Glucose, UA Negative mg/dl      Ketones, UA Negative mg/dl      Urobilinogen, UA <2.0 mg/dl      Bilirubin, UA Negative     Occult Blood, UA Negative    CBC and differential [[id number]]  (Abnormal) Collected: 10/31/23 1512    Lab Status: Final result Specimen: Blood from Arm, Right Updated: 10/31/23 1520     WBC 7.28 Thousand/uL      RBC 4.28 Million/uL      Hemoglobin 15.2 g/dL      Hematocrit 44.6 %       fL      MCH 35.5 pg      MCHC 34.1 g/dL      RDW 13.6 %      MPV 10.4 fL      Platelets 616 Thousands/uL      nRBC 0 /100 WBCs      Neutrophils Relative 74 %      Immat GRANS % 0 %      Lymphocytes Relative 17 %      Monocytes Relative 8 %      Eosinophils Relative 0 %      Basophils Relative 1 %      Neutrophils Absolute 5.35 Thousands/µL      Immature Grans Absolute 0.03 Thousand/uL      Lymphocytes Absolute 1.25 Thousands/µL      Monocytes Absolute 0.57 Thousand/µL      Eosinophils Absolute 0.02 Thousand/µL      Basophils Absolute 0.06 Thousands/µL                    No orders to display         Procedures  Procedures      ED Course  ED Course as of 10/31/23 1903   Tue Oct 31, 2023   1523 WBC: 7.28   1706 Patient reassessed-  currently being straight cathed for sterile urine collection   1834 Leukocytes, UA: Negative   1834 Nitrite, UA: Negative   1834 TSH 3RD GENERATON: 1.223                                       Medical Decision Making  Amount and/or Complexity of Data Reviewed  Labs: ordered. Decision-making details documented in ED Course. Patient is a 46 y.o. female  who presents to the ED with altered mental status, decreased responsiveness. Vital signs stable. Exam as listed above    Differential diagnosis includes but is not limited to UTI, occult infection, viral illness, adverse reaction to Vimpat.   Patient does have documented history of some post ictal confusion. Patient may be have been postictal today if seizure medication is not appropriate. Plan BC, CMP, TSH, UA with reflex. Fever cough to indicate pneumonia. No new focal neurologic deficits, history of falls reported by staff, or evidence of head trauma on exam to warrant CT head. View ED course above for further discussion on patient workup. On review of previous records she was discharged 10/13/2023 after being evaluated by neurology for seizure-like activity. At that time global slowing was noteduringd concerning for a global encephalopathic process. Patient was decided to try Vimpat 50 mg nightly for 7 days followed by 50 mg twice daily for 14 days eventually increasing to 100 mg twice daily. Today was patient's first dose of increased Vimpat from 50 once daily to 50 twice daily. All labs reviewed and utilized in the medical decision making process  All radiology studies independently viewed by me and interpreted by the radiologist.  I reviewed all testing with the patient. Upon re-evaluation vital signs stable, all blood work WNL with no evidence of infection. Drowsiness most likely due to increased vimpat dosage- instructed to hold vimpat at previously tolerated dose and follow up with neurology outpatient for continued management. Disposition  Final diagnoses:   Drowsiness   Altered mental status     Time reflects when diagnosis was documented in both MDM as applicable and the Disposition within this note       Time User Action Codes Description Comment    10/31/2023  6:41 PM Deonte Rodríguez [R40.0] Drowsiness     10/31/2023  6:41 PM Peg Bach [R41.82] Altered mental status           ED Disposition       ED Disposition   Discharge    Condition   Stable    Date/Time   Tue Oct 31, 2023  6:41 PM    [address] [place] [address] discharge to home/self care.                    Follow-up Information    None         Patient's Medications   Discharge Prescriptions    No medications on file     No discharge procedures on file. PDMP Review       None             ED Provider  Attending physically available and evaluated Ananth Posadas. I managed the patient along with the ED Attending.     Electronically Signed by           Hector Conn MD  10/31/23 2871

## 2023-11-02 NOTE — TELEPHONE ENCOUNTER
Jonelle Dorsey from patient's group home advise merytient went to the emergency room and will need a new scrip with for Lacosamide 50 to be at once day at bed time, since twice at day is too much for patient.

## 2023-11-03 ENCOUNTER — TELEPHONE (OUTPATIENT)
Dept: NEUROLOGY | Facility: CLINIC | Age: 48
End: 2023-11-03

## 2023-11-03 NOTE — TELEPHONE ENCOUNTER
Recd enmanuel 11/1 taken off 11/3    Yes, I'm calling on behalf of Gena Xavier, date of birth, [birth year]. She was seen in the hospital yesterday due to some side effects from the medication that she was prescribed. We need a new script for her to go back to the one time of day, 50 milligrams vimpat. If you could give us a phone call back at [phone]   It would be greatly appreciated. Thank you.    __________________  patient s/p ED . Lu's visit 10/31 seen for drowsiness/fatigue  Last visit was with [facility] 7/6/2023  HFU from admission 10/12 scheduled with [facility] 11/14, however patient not seen in office yet. ED Note documents: On review of previous records she was discharged 10/13/2023 after being evaluated by neurology for seizure-like activity. At that time global slowing was noteduringd concerning for a global encephalopathic process. Patient was decided to try Vimpat 50 mg nightly for 7 days followed by 50 mg twice daily for 14 days eventually increasing to 100 mg twice daily. Today was patient's first dose of increased Vimpat from 50 once daily to 50 twice daily. Upon re-evaluation vital signs stable, all blood work WNL with no evidence of infection. Drowsiness most likely due to increased vimpat dosage- instructed to hold vimpat at previously tolerated dose and follow up with neurology outpatient for continued management. I returned call to suggest calling [facility] for recommendation regarding vimpat dosing. Reached , unable to leave message as mailbox full.

## 2023-11-03 NOTE — TELEPHONE ENCOUNTER
I have never seen this patient. She has not yet been seen by our office in the outpatient setting. She is already established with [facility] neurology, last seen July 2023. Should contact her neurologist at [facility]. Also would confirm if she plans to transition care to , or if she is staying with [facility]. If staying with [facility], there is no need for an appt with our office.

## 2023-11-07 ENCOUNTER — TELEPHONE (OUTPATIENT)
Age: 48
End: 2023-11-07

## 2023-11-07 DIAGNOSIS — R56.9 SEIZURE-LIKE ACTIVITY (HCC): ICD-10-CM

## 2023-11-07 DIAGNOSIS — G40.802 REFLEX EPILEPSY (HCC): ICD-10-CM

## 2023-11-07 RX ORDER — LACOSAMIDE 50 MG/1
50 TABLET ORAL
Qty: 30 TABLET | Refills: 0 | Status: SHIPPED | OUTPATIENT
Start: 2023-11-07 | End: 2023-12-07

## 2023-11-07 NOTE — TELEPHONE ENCOUNTER
Arnaldo Orozco () called and states patient needs to have Lacosamide (vimpat) 50 mg  [facility]. Patient is unable to take 100 mg BID, had to call paramedics due to patient being unresponsive, patient can not tolerate twice daily.  Any questions, Arnaldo Orozco can be reached at [phone]

## 2023-11-07 NOTE — TELEPHONE ENCOUNTER
This is a seizure medication I am not familiar with and is usually managed by neurology. I will send a 30-day  supply.    Once she establishes care with neurology on 11/14, they will take over future prescription

## 2023-11-08 NOTE — TELEPHONE ENCOUNTER
Spoke [employer] (caregiver). She states pt was only seen by  Neurology as f/u to an ED visit in June 2023 for seizure. They would like to transfer care to [facility]. Patient has HFU appt with Ham Greenwood on 11/14/23.       Ham gerardo

## 2023-11-09 ENCOUNTER — RA CDI HCC (OUTPATIENT)
Dept: OTHER | Facility: HOSPITAL | Age: 48
End: 2023-11-09

## 2023-11-10 ENCOUNTER — TELEPHONE (OUTPATIENT)
Dept: NEUROLOGY | Facility: CLINIC | Age: 48
End: 2023-11-10

## 2023-11-10 NOTE — TELEPHONE ENCOUNTER
Called and spoke to patient. Patient confirmed upcoming apt with Jabier Iqbal PA-C on 11/14/23 @ 9:45 am in the [facility].

## 2023-11-14 ENCOUNTER — OFFICE VISIT (OUTPATIENT)
Dept: NEUROLOGY | Facility: CLINIC | Age: 48
End: 2023-11-14
Payer: MEDICARE

## 2023-11-14 VITALS
TEMPERATURE: 97.9 F | WEIGHT: 162 LBS | RESPIRATION RATE: 16 BRPM | HEART RATE: 84 BPM | OXYGEN SATURATION: 98 % | HEIGHT: 55 IN | BODY MASS INDEX: 37.49 KG/M2

## 2023-11-14 DIAGNOSIS — R56.9 SEIZURE-LIKE ACTIVITY (HCC): Primary | ICD-10-CM

## 2023-11-14 DIAGNOSIS — R55 SYNCOPE: ICD-10-CM

## 2023-11-14 PROCEDURE — 99214 OFFICE O/P EST MOD 30 MIN: CPT | Performed by: PHYSICIAN ASSISTANT

## 2023-11-14 NOTE — PROGRESS NOTES
Patient ID: Raeanne Essex is a 46 y.o. female with pertinent history of Down syndrome, hypothyroidism, HLD, prior seizure-like episodes vs syncope, who presents today for a hospital follow up. Assessment:  Patient previously evaluated in our practice in 2014/2015 for episodes of syncope. At the time, seizures were felt to be less likely and no medications were started. Since the beginning of 2023, she has had 4 episodes of LOC with generalized convulsions, followed by possible post-ictal fatigue. Interestingly, all of these episodes have occurred either while on the toilet straining to have a bowel movement, or ambulating to the bathroom. Earlier this year she was seen at [facility] several times for these episodes, as well as by outpatient neurology there, and was started on AED in case of seizure. She was unable to tolerate low doses of levetiracetam, lamotrigine, divalproex, and now on lacosamide following recent admission at [facility]. She could not tolerate increasing lacosamide past 50mg daily. Prior routine EEGs with no seizures or epileptiform discharges. Brain MRI has not been completed. Discussed with patient's caregiver and sister today. Unclear if she is having seizures vs convulsive syncope. She has been unable to tolerate even very low doses of multiple AEDs. Additional workup including brain MRI and ambulatory EEG will be helpful. MRI will need to be done under anesthesia, and sister is agreeable. Caregiver says they will attempt an ambulatory EEG. If spells continue without definitive diagnosis, admission to the epilepsy monitoring until will be advised to try and capture an event on video EEG. Will also refer to cardiology in case of cardiac arrhythmia causing syncope. This was advised back in 2014/2015 when she was having these events as well. We reviewed seizure safety and first aid today. Precautions discussed.   Patient is always supervised (lives in group home) and does not drive. Plan:  - Continue Lacosamide 50 mg at bedtime  - MRI brain with seizure protocol with and without contrast with anesthesia  - 24-hour ambulatory EEG  - Cardiology referral for workup of syncope  - Patient's caregiver/family advised to call the office if any further events of LOC/convulsions  - Follow-up with epilepsy attending physician after additional workup is completed       Diagnoses and all orders for this visit:    Seizure-like activity ([address])  -     MRI brain seizure wo and w contrast; Future  -     Ambulatory EEG 24 Hours; Standing    Syncope  -     MRI brain seizure wo and w contrast; Future  -     Ambulatory EEG 24 Hours; Standing  -     Ambulatory Referral to Cardiology; Future           Subjective:    WALTER Santiago is a 46 y.o. female with pertinent history of Down syndrome, hypothyroidism, HLD, prior seizure-like episodes vs syncope, who presents today for a hospital follow up. Patient presented to the ED on  for witnessed seizure-like episode while on the toilet. Per caregiver, patient was at her normal baseline, and was straining on the toilet to have a BM, after which she lost consciousness, fell off the toilet, had whole body shaking. Entire episode lasted approximately 3 minutes. Patient may not have been at baseline immediately following the episode. EMS reported leg shaking which caregiver reports is normal for her but she was administered Versed en route to the hospital. Patient has had 2 prior episodes similar to this 1 shortly after waking while walking to the bathroom and the other while in the bathroom as well. Thus far EEG, CTh, have all been remarkable only for a global cerebral dysfunction consistent with her existing Down syndrome. Cardiac work-up has been unremarkable. Interval History 11/14/23:  She presents today with Asim Bermudez,  of her group home [facility]). Asim Bermudez witnessed the event on 10/13.   Patient had just moved into their group home that week and she was unaware of any history of syncope or seizures. Arnie Orozco was on the toilet straining to have a bowel movement. She was having a hard time with this, so they decided to give her some privacy to continue trying to have a BM. They then heard a "crash" and when they went into the bathroom, Nathalia Kingsley was on the floor having some shaking activity in bilateral upper and lower extremities. This lasted a few min (maybe 3), and then she was somewhat confused after--just looking around, not really speaking. EMS was called and she went to the hospital.  She was started on lacosamide. She was seen in the ED on 10/31/23 due to drowsiness and generalized weakness. This was the first day she took Vimpat in the AM, had been taking 1 at night for the past 7 days. ED told her family/caretakers just to go back to what dose she was tolerating, which was 50mg HS. Matthias Bloch says she was completely out of it with the BID dosing, non-functional.  She is doing ok with the lacosamide 50mg HS and has not had any events of LOC or seizure-like activity since the event on 10/13. Matthias Bloch does say she sees patient "staring off" or "spacing out" 2-3 times a day, and sometimes she does not always respond right away when her name is called or when they try to get her attention. She sees a psychiatrist.  She does not sleep that well. She is restless when she sleeps and is tired during the day, sleeping a lot during the day too. She currently is not in a day program due to being new to the group home. 1 sister and 2 brothers are very involved. I was able to speak with her sister Lore Edmondson on the phone today. She says she has had multiple episodes of "passing out" in the past, but never with "seizure-like activity". She did not recall patient seeing Dr. Lee Dubois in 2014/2015 or the details of those events. She believes prior episodes were just passing out, but she is unsure.   She has not tolerated any of the AEDs tried over the last several months. No family history of seizure/epilepsy. Prior History:  From [facility] outpatient neurology note dated 7/6/2023 written by Christopher AGUIAR  Overview History:   Dorene Vázquez is a 46 y.o. Right-handed woman with PMHx significant for Down syndrome, hyperlipidemia, hypothyroidism, and seizure-like episodes on 2/13 and 3/17. She presented with a seizure-like episode (third such episode) on 6/5/2023 12:21 PM. Neurology is being consulted for above. Patient got up today morning at around 10:45 AM. She got out of bed and was walking to her bathroom at around 11 AM (with some assistance from caregiver), when she suddenly fell to the ground and had jerking movements bilaterally in the upper and lower extremities. Caregiver was able to brace her head as she fell, no head strike on fall. Jerking lasted for about 5 minutes. During the episode, she was not responding to any questions. Family unable to comment on whether eyes were rolled back. After cessation of jerking, patient was sitting on the floor, eyes open, not responding to questions. Took about 10 minutes for her to return to baseline. Patient has not had any further episodes. She received Versed intranasal 5 Mg in the ED along with 1 L NS bolus. At the ED, she was noted to be hypertensive with systolics in the 856F, heart rates in the 50s, saturating well. CXR clear. Labs remarkable for elevated lactate of 2.7. Glucose 149, sodium 134, no other electrolyte abnormalities, hemoglobin baseline, no leukocytosis. CT head was attempted, but patient unfortunately did not tolerate. Physical exam limited due to patient cooperation. Patient did mention possible scraping of left lateral tongue to family, could not examine. Patient was also noted to be wet (loss of bladder control) after episode, however family notes that she wakes up wet every morning.     Patient has had 2 prior similar episodes-in 2/13 and 3/17. The episode in February also involves jerking movements of bilateral upper and lower extremities that lasted for 2 minutes accompanied by eyes rolling backwards. CXR showed possible PNA. Neurology was consulted at the time. EEG at the time was nonspecific for seizures. CT did not show any structural abnormalities. She was not started on antiepileptic medications. This plan for follow-up, potentially getting MRI in an outpatient setting. Patient presented in March with very similar symptoms, fell after waking up and going to the bathroom and had same semiology that lasted for 2 minutes followed by 10-minute period before reaching baseline. Patient was not admitted and was discharged home during this ED visit. Patient was initially living in a group home. However after her episode in February, she moved to live with her sister along with a caregiver. She is able to ambulate without a walker/cane, with minimal assistance from the caregiver. She is able to feed, clean, and dress herself. She is not very verbal, says couple of words from time to time. Is able to read lips, has difficulty hearing at baseline. Knows some sign language which she also uses to communicate with family. Interval History:  Patient is accompanied by her sister, brother and caretaker. Sister reports that the patient has had 3 total seizure episodes (January, March, and late June) per sister's report. She was initially started on Levetiracetam but the Levetiracetam made her extremely lethargic. She was subsequently started on Lamictal and increased the dose to 50-50 about three days ago and the following day, she developed a rash localized to her left foot. Sister immediately stopped the Lamictal at the onset of this rash. Today, the rash is improving. I advised to stop the Lamictal, follow up with PCP regarding rash on foot and ER precautions if symptoms worsen or fail to improve.      Sister describes these three seizure episodes as the following: She fell to the ground, had full body convulsions and her breathing was labored. The episode lasted about 2-3 minutes. Afterwards she was "a little bit off" and drowsy for about 1-2 hours until she returned to her baseline. Since discharge, patient has not had any recurrent seizure like activity or any new focal neurological deficits. Patient has also been seen in our office by Dr. Peter Garcia in  for “syncope vs seizure”. There is 1 note I was able to access, which is a follow up visit dated 2015. 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 She briefly appeared as if getting sick and pale prior to her most recent event in 2014. There have been no additional events since last visit. Her examination is consistent with Down syndrome, but is otherwise nonfocal. 3 hour EEG was unrevealing. A cardiac cause is possible. A Holter was recently completed and she may have seen Cardiology, but I do not have records. Her caregivers will f/u the Holter results and discuss with PCP and/or Cardiology. Additional suspicion for seizure would prompt brain MRI. Follow up will be as needed. AED/side effects/compliance:  Lacosamide 50mg daily (could not tolerate further titration due to drowsiness)     Event/Seizure semiology:  Prior episodes of syncope documented in  (looked like she was getting sick, pale and then with LOC). LOC with stiffening, shaking. Events occurred in the context of having a bowel movement (10/2023 event occurred while straining to have a BM), or when ambulating to the bathroom. Special Features  Status epilepticus: No  Self Injury Seizures: No  Precipitating Factors: most events have occurred during straining to have a bowel movement, or on her way to the bathroom or from the bathroom.        Epilepsy Risk Factors:  Abnormal pregnancy: No  Abnormal birth/: Born late 1-2 weeks, otherwise uncomplicated, per sister  Abnormal Development: Down syndrome   Febrile seizures, simple: No  Febrile seizures, complex: No  CNS infection: No  Cerebral palsy: No  Head injury (moderate/severe): No  CNS neoplasm: No  CNS malformation: No  Neurosurgical procedure: No  Stroke: No  Alcohol abuse: No  Drug abuse: No  Family history Sz/epilepsy: No     Prior AEDs:  Levetiracetam - mood changes, "zombie", non-functional   Lamotrigine - rash on foot  Divalproex - "zombie", non-functional     Psychiatric History:  Sees psychiatry for depression and behavioral issues     Social History:  Lives with: Group home[facility]--started living there in Oct 2023. Previously has lived in other group home environments or with family   Driving: no     Prior workup:    10/12/2023 CTH head wo contrast  No acute abnormalities. Enlargement of the ventricles and sulci secondary to chronic volume loss     6/6/2023 routine EEG [facility])  The continuous generalized slowing seen throughout the record suggests the presence of a severe diffuse cortical dysfunction. This is nonspecific but suggests a global encephalopathic process of most any etiology, including toxic/metabolic/anoxic/hypoxic. 2/13/2023 routine EEG ([facility])  Moderate bilateral slowing. No focal slowing or epileptiform abnormalities were seen. These findings suggest moderate bilateral cerebral dysfunction. The following portions of the patient's history were reviewed and updated as appropriate: current medications, past family history, past medical history, past social history, past surgical history, and problem list.         Objective:    Pulse 84, temperature 97.9 °F (36.6 °C), temperature source Temporal, resp. rate 16, height 4' 7" (1.397 m), weight 73.5 kg (162 lb), SpO2 98%. Physical Exam  Constitutional:       Comments: Facial features of trisomy 21    Eyes:      Extraocular Movements: EOM normal.      Pupils: Pupils are equal, round, and reactive to light.    Neurological: Mental Status: She is alert. Deep Tendon Reflexes: Reflexes are normal and symmetric. Psychiatric:      Comments: Calm, cooperative          Neurological Exam  Mental Status  Alert. Minimally verbal, difficulty following commands. .    Cranial Nerves  CN II: Could not follow instructions for VF testing. CN III, IV, VI: Extraocular movements intact bilaterally. Pupils equal round and reactive to light bilaterally. CN VII: Muscles of facial expression are symmetric . Motor   No abnormal involuntary movements. Difficulty following commands for confrontational testing. Noted to be moving all 4 limbs symmetrically against gravity, no lateralizing weakness appreciated . Sensory  Light touch is normal in upper and lower extremities. Reflexes  Deep tendon reflexes are 2+ and symmetric in all four extremities. Gait    Gait was somewhat unsteady. ROS:    Review of Systems   Constitutional:  Negative for chills and fever. HENT:  Negative for ear pain and sore throat. Eyes:  Negative for pain and visual disturbance. Respiratory:  Negative for cough and shortness of breath. Cardiovascular:  Negative for chest pain and palpitations. Gastrointestinal:  Negative for abdominal pain and vomiting. Genitourinary:  Negative for dysuria and hematuria. Musculoskeletal:  Negative for arthralgias and back pain. Skin:  Negative for color change and rash. Neurological:  Positive for seizures (last month). Negative for syncope. All other systems reviewed and are negative.     I personally reviewed and updated the ROS as appropriate

## 2023-11-14 NOTE — PROGRESS NOTES
Name: Juju Kimball      : 1975      MRN: [id number]  Encounter Provider: Dariel Faustin MD  Encounter Date: 11/15/2023   Enc[facility] department: [facility]    Assessment & Plan     1. Preop examination  Assessment & Plan:  Pt is at stable condition for procedure. No extra workup needed prior to procedure. 2. Rash  -     nystatin (MYCOSTATIN) powder; Apply topically 3 (three) times a day as needed (rash)    3. Ambulatory dysfunction  -     Wheelchair    4. Urinary incontinence, unspecified type  -     Incontinence Supply Disposable (Ultra-Soft Plus Briefs XXL) MISC; Use if needed (incontinence)           Subjective      HPI  Preop evaluation for dental clearance on   Dentist office fax: [phone]  Review of Systems    Current Outpatient Medications on File Prior to Visit   Medication Sig    acetaminophen (TYLENOL) 500 mg tablet Take 1 tablet (500 mg total) by mouth every 8 (eight) hours as needed for mild pain    carbamide peroxide (DEBROX) 6.5 % otic solution Use 4 gtts to both ears bid x 10 days prior to office visit to edvin olivarez.     Cholecalciferol 25 MCG (1000 UT) tablet Take 1 tablet (1,000 Units total) by mouth daily    citalopram (CeleXA) 10 mg tablet Take 1 tablet by mouth every evening    dextromethorphan-guaiFENesin (ROBITUSSIN DM)  mg/5 mL syrup Take 5 mL by mouth 3 (three) times a day as needed for cough or congestion    lacosamide (VIMPAT) 50 mg Take 1 tablet (50 mg total) by mouth daily at bedtime for 30 doses    levothyroxine 50 mcg tablet TAKE ONE TABLET BY MOUTH AT 7 AM    melatonin 3 mg Take 1 tablet (3 mg total) by mouth daily at bedtime    Mouthwashes (Biotene Dry Mouth) LIQD Apply 1 Swab to the mouth or throat 2 (two) times a day as needed (dry mouth) Use 5ml of solution with each swab    sodium chloride (OCEAN) 0.65 % nasal spray 2 sprays into each nostril as needed for congestion or rhinitis    atorvastatin (LIPITOR) 10 mg tablet Take 1 tablet (10 mg total) by mouth daily    cyanocobalamin 1,000 mcg/mL Inject 1 mL (1,000 mcg total) into a muscle once a week for 3 doses Do not start before October 20, 2023.     Heating Pad PADS by Does not apply route 2 (two) times a day (Patient not taking: Reported on 10/12/2023)    Incontinence Supply Disposable (RA Adult Wipes) MISC Use daily (Patient not taking: Reported on 10/12/2023)       Objective     /74 (BP Location: Left arm, Patient Position: Sitting, Cuff Size: Large)   Ht 4' 7" (1.397 m)   Wt 73.5 kg (162 lb)   LMP  (LMP Unknown)   BMI 37.65 kg/m²     Physical Exam  Jose Maria Pandey MD

## 2023-11-14 NOTE — PATIENT INSTRUCTIONS
Continue lacosamide 50mg at bedtime  MRI brain with anesthesia  Cardiology referral  24hr ambulatory EEG  Call the office if any further events  Would advise staying well hydrated  Follow up after studies

## 2023-11-15 ENCOUNTER — CONSULT (OUTPATIENT)
Dept: INTERNAL MEDICINE CLINIC | Facility: CLINIC | Age: 48
End: 2023-11-15
Payer: MEDICARE

## 2023-11-15 VITALS
SYSTOLIC BLOOD PRESSURE: 126 MMHG | HEIGHT: 55 IN | DIASTOLIC BLOOD PRESSURE: 74 MMHG | WEIGHT: 162 LBS | BODY MASS INDEX: 37.49 KG/M2

## 2023-11-15 DIAGNOSIS — Z01.818 PREOP EXAMINATION: Primary | ICD-10-CM

## 2023-11-15 DIAGNOSIS — R21 RASH: ICD-10-CM

## 2023-11-15 DIAGNOSIS — R26.2 AMBULATORY DYSFUNCTION: ICD-10-CM

## 2023-11-15 DIAGNOSIS — R32 URINARY INCONTINENCE, UNSPECIFIED TYPE: ICD-10-CM

## 2023-11-15 PROCEDURE — 99213 OFFICE O/P EST LOW 20 MIN: CPT | Performed by: GENERAL ACUTE CARE HOSPITAL

## 2023-11-15 RX ORDER — NYSTATIN 100000 [USP'U]/G
POWDER TOPICAL 3 TIMES DAILY PRN
Qty: 60 G | Refills: 1 | Status: SHIPPED | OUTPATIENT
Start: 2023-11-15

## 2023-11-15 RX ORDER — DIAPER,BRIEF,ADULT, DISPOSABLE
EACH MISCELLANEOUS AS NEEDED
Qty: 200 EACH | Refills: 2 | Status: SHIPPED | OUTPATIENT
Start: 2023-11-15

## 2023-11-16 ENCOUNTER — OFFICE VISIT (OUTPATIENT)
Dept: INTERNAL MEDICINE CLINIC | Facility: CLINIC | Age: 48
End: 2023-11-16
Payer: MEDICARE

## 2023-11-16 ENCOUNTER — TELEPHONE (OUTPATIENT)
Age: 48
End: 2023-11-16

## 2023-11-16 VITALS
BODY MASS INDEX: 37.49 KG/M2 | WEIGHT: 162 LBS | SYSTOLIC BLOOD PRESSURE: 132 MMHG | HEIGHT: 55 IN | DIASTOLIC BLOOD PRESSURE: 76 MMHG

## 2023-11-16 DIAGNOSIS — E03.9 HYPOTHYROIDISM, UNSPECIFIED TYPE: ICD-10-CM

## 2023-11-16 DIAGNOSIS — M25.562 CHRONIC PAIN OF LEFT KNEE: ICD-10-CM

## 2023-11-16 DIAGNOSIS — Q90.9 DOWN SYNDROME: ICD-10-CM

## 2023-11-16 DIAGNOSIS — G89.29 CHRONIC PAIN OF LEFT KNEE: ICD-10-CM

## 2023-11-16 DIAGNOSIS — Z01.818 PREOP EXAMINATION: Primary | ICD-10-CM

## 2023-11-16 DIAGNOSIS — R56.9 SEIZURE-LIKE ACTIVITY (HCC): ICD-10-CM

## 2023-11-16 PROCEDURE — 99214 OFFICE O/P EST MOD 30 MIN: CPT | Performed by: INTERNAL MEDICINE

## 2023-11-16 RX ORDER — ACETAMINOPHEN 500 MG
1000 TABLET ORAL EVERY 8 HOURS PRN
Qty: 30 TABLET | Refills: 5 | Status: SHIPPED | OUTPATIENT
Start: 2023-11-16

## 2023-11-16 NOTE — TELEPHONE ENCOUNTER
Spoke to Dr. Dyana Curry. She is able to addend her note from yesterday to state that the patient is ok to proceed with the MRI. She will update this afternoon. Appt with Dr. Rick Lal cancelled for today. Unable to reach Elaine Qureshi at pre-admission. Will attempt later.

## 2023-11-16 NOTE — ASSESSMENT & PLAN NOTE
-For knee pain take Tylenol 1000 mg every 8 hours as needed  -Apply ice to the left knee 2-3 times a day as needed for no more than 10 to 15 minutes  -Apply Voltaren gel 4 times daily as needed  -An x-ray of the left knee has been ordered

## 2023-11-16 NOTE — ASSESSMENT & PLAN NOTE
-Patient may proceed with MRI as scheduled. Most recent labs and EKG reviewed. No further cardiac work-up indicated.

## 2023-11-16 NOTE — PROGRESS NOTES
Presurgical Evaluation    Subjective:      Patient ID: Mynor Cousin is a 46 y.o. female. Chief Complaint   Patient presents with   • Pre-op Exam     MRI with anesthesia        HPI    The following portions of the patient's history were reviewed and updated as appropriate: allergies, current medications, past family history, past medical history, past social history, past surgical history, and problem list.    Procedure date: 11/20/23    Planned study: MRI of brain with anesthesia   Diagnosis:  Seizure-like activity     The patient is seen for clearance for MRI of the brain with anesthesia. The patient is currently followed by neurology. Her provider has ordered an MRI of the brain with and without contrast for further evaluation after the patient presented to the ED on 10/31 with seizure-like activity. The patient has no history of ischemic heart disease, CHF or diabetes. Patient's caregiver reports today she has been complaining of left knee pain over the past several months. Patient had remote imaging of the left knee in 2019 which revealed mild tricompartmental arthritis. Review of Systems  All other systems negative except for pertinent findings noted in HPI. Current Outpatient Medications   Medication Sig Dispense Refill   • acetaminophen (TYLENOL) 500 mg tablet Take 2 tablets (1,000 mg total) by mouth every 8 (eight) hours as needed for mild pain 30 tablet 5   • carbamide peroxide (DEBROX) 6.5 % otic solution Use 4 gtts to both ears bid x 10 days prior to office visit to presoften cerumen.  15 mL 0   • Cholecalciferol 25 MCG (1000 UT) tablet Take 1 tablet (1,000 Units total) by mouth daily 31 tablet 5   • citalopram (CeleXA) 10 mg tablet Take 1 tablet by mouth every evening     • dextromethorphan-guaiFENesin (ROBITUSSIN DM)  mg/5 mL syrup Take 5 mL by mouth 3 (three) times a day as needed for cough or congestion 237 mL 0   • Diclofenac Sodium (VOLTAREN) 1 % Apply 2 g topically 4 (four) times a day as needed (knee pain) 50 g 0   • Incontinence Supply Disposable (Ultra-Soft Plus Briefs XXL) MISC Use if needed (incontinence) 200 each 2   • lacosamide (VIMPAT) 50 mg Take 1 tablet (50 mg total) by mouth daily at bedtime for 30 doses 30 tablet 0   • levothyroxine 50 mcg tablet TAKE ONE TABLET BY MOUTH AT 7 AM 30 tablet 3   • melatonin 3 mg Take 1 tablet (3 mg total) by mouth daily at bedtime 30 tablet 5   • Mouthwashes (Biotene Dry Mouth) LIQD Apply 1 Swab to the mouth or throat 2 (two) times a day as needed (dry mouth) Use 5ml of solution with each swab 237 mL 0   • nystatin (MYCOSTATIN) powder Apply topically 3 (three) times a day as needed (rash) 60 g 1   • sodium chloride (OCEAN) 0.65 % nasal spray 2 sprays into each nostril as needed for congestion or rhinitis 60 mL 0   • atorvastatin (LIPITOR) 10 mg tablet Take 1 tablet (10 mg total) by mouth daily 30 tablet 6   • cyanocobalamin 1,000 mcg/mL Inject 1 mL (1,000 mcg total) into a muscle once a week for 3 doses Do not start before October 20, 2023. 1 mL 0   • Heating Pad PADS by Does not apply route 2 (two) times a day (Patient not taking: Reported on 10/12/2023) 1 each 0   • Incontinence Supply Disposable (RA Adult Wipes) MISC Use daily (Patient not taking: Reported on 10/12/2023) 200 each 2     No current facility-administered medications for this visit.        Allergies on file:   Keppra [levetiracetam], Depakote [valproic acid], Lamictal [lamotrigine], and Pollen extract    Past Medical History:   Diagnosis Date   • Allergic    • Community acquired pneumonia     Last Assessed:1/22/2013   • Leukopenia     Last Assessed:3/5/2014   • Osteoporosis    • Trisomy 24, Down syndrome        Past Surgical History:   Procedure Laterality Date   • TONSILLECTOMY AND ADENOIDECTOMY         Family History   Problem Relation Age of Onset   • Cancer Mother    • No Known Problems Sister    • No Known Problems Brother    • No Known Problems Brother Social History     Tobacco Use   • Smoking status: Never   • Smokeless tobacco: Never   Vaping Use   • Vaping Use: Never used   Substance Use Topics   • Alcohol use: Not Currently     Comment: PATIENT DOES NOT DRINK   • Drug use: Never       Objective:    Vitals:    11/16/23 1539   BP: 132/76   BP Location: Left arm   Patient Position: Sitting   Cuff Size: Standard   Weight: 73.5 kg (162 lb)   Height: 4' 7" (1.397 m)       BP Readings from Last 3 Encounters:   11/16/23 132/76   11/15/23 126/74   10/31/23 118/70        Wt Readings from Last 3 Encounters:   11/16/23 73.5 kg (162 lb)   11/15/23 73.5 kg (162 lb)   11/14/23 73.5 kg (162 lb)        Physical Exam    All other systems negative except for pertinent findings noted in HPI. General: NAD  HEENT: NCAT, EOMI, normal conjunctiva  Cardiovascular: RRR, normal S1 and S2, no m/r/g  Pulmonary: Normal respiratory effort, no wheezes, rales or rhonchi  GI: Soft, nontender, nondistended, normoactive bowel sounds  Musculoskeletal: Tenderness to palpation over lateral joint line of left knee  Neuro: Non-focal  Extremities: No lower extremity edema  Skin: Normal skin color, no rashes   Psychiatric: Normal mood and affect          Assessment/Plan:    Patient may proceed with the planned imaging study. 1. Preop examination  Assessment & Plan:  -Patient may proceed with MRI as scheduled. Most recent labs and EKG reviewed. No further cardiac work-up indicated. 2. Seizure-like activity ([address])  Assessment & Plan:  -MRI of the brain with and without contrast recommended by neurology      3. Down syndrome    4. Hypothyroidism, unspecified type  Assessment & Plan:  -TSH within normal limit  -Continue current dose of levothyroxine       5.  Chronic pain of left knee  Assessment & Plan:  -For knee pain take Tylenol 1000 mg every 8 hours as needed  -Apply ice to the left knee 2-3 times a day as needed for no more than 10 to 15 minutes  -Apply Voltaren gel 4 times daily as needed  -An x-ray of the left knee has been ordered     Orders:  -     XR knee 3 vw left non injury; Future; Expected date: 11/16/2023  -     acetaminophen (TYLENOL) 500 mg tablet; Take 2 tablets (1,000 mg total) by mouth every 8 (eight) hours as needed for mild pain  -     Diclofenac Sodium (VOLTAREN) 1 %;  Apply 2 g topically 4 (four) times a day as needed (knee pain)

## 2023-11-16 NOTE — TELEPHONE ENCOUNTER
Spoke to [facility] caregivers, she needs a physical form completed and they will not accept an updated note, She will keep the appointment this afternoon with Dr. Fazia Meier.

## 2023-11-16 NOTE — TELEPHONE ENCOUNTER
Caller from preadmission testing calling to request a physical exam put in for PT's scheduled MRI. PT was seen by Dr Jen Franco yesterday 11/15.     Thank You

## 2023-11-17 ENCOUNTER — TELEPHONE (OUTPATIENT)
Age: 48
End: 2023-11-17

## 2023-11-17 DIAGNOSIS — G89.29 CHRONIC PAIN OF LEFT KNEE: Primary | ICD-10-CM

## 2023-11-17 DIAGNOSIS — M25.562 CHRONIC PAIN OF LEFT KNEE: Primary | ICD-10-CM

## 2023-11-17 NOTE — TELEPHONE ENCOUNTER
Pharmacy calling in regarding a script sent to them for pull ups and they need patient's medicare number    Please call them back

## 2023-11-17 NOTE — PRE-PROCEDURE INSTRUCTIONS
No outpatient medications have been marked as taking for the 11/20/23 encounter [facility] Encounter) with BE MRI 1. MAR and nursing home form was not faxed by [employer]. Per medication list in computer from 11/16/23 pt can take levothyroxine on day of MRI with 1-2 sips of water only. Pt cannot take any medication on day of surgery with Thik-it or applesauce. Please use only a sip of water to take your instructed medications. .     You will receive a call one business day prior to surgery with an arrival time and hospital directions. If your surgery is scheduled on a Monday, the hospital will be calling you on the Friday prior to your surgery. If you have not heard from anyone by 8pm, please call the hospital supervisor through the hospital  at [phone]. Darshana Ledesma [phone]). Do not eat or drink anything after midnight the night before your surgery, including candy, mints, lifesavers, or chewing gum. Do not drink alcohol 24hrs before your surgery. Try not to smoke at least 24hrs before your surgery. No contact lenses, eye make-up, or artificial eyelashes. Remove nail polish, including gel polish, and any artificial, gel, or acrylic nails if possible. Remove all jewelry including rings and body piercing jewelry. Wear causal clothing that is easy to take on and off. Consider your type of surgery. Keep any valuables, jewelry, piercings at home. Please bring any specially ordered equipment (sling, braces) if indicated. Arrange for a responsible person to drive you to and from the hospital on the day of your surgery. Visitor Guidelines discussed. Call the surgeon's office with any new illnesses, exposures, or additional questions prior to surgery.

## 2023-11-17 NOTE — TELEPHONE ENCOUNTER
Pharmacist calling in regarding Diclofenac Sodium (VOLTAREN) 1 %. He is requesting a new script sent for 100 g because it is covered by insurance. Please follow up.

## 2023-11-17 NOTE — PRE-PROCEDURE INSTRUCTIONS
Pre-Surgery Instructions:   Medication Instructions    acetaminophen (TYLENOL) 500 mg tablet Hold day of surgery. atorvastatin (LIPITOR) 10 mg tablet Take night before surgery    carbamide peroxide (DEBROX) 6.5 % otic solution Hold day of surgery. Cholecalciferol 25 MCG (1000 UT) tablet Hold day of surgery. citalopram (CeleXA) 10 mg tablet Take day of surgery. dextromethorphan-guaiFENesin (ROBITUSSIN DM)  mg/5 mL syrup Hold day of surgery. Diclofenac Sodium (VOLTAREN) 1 % Hold day of surgery. lacosamide (VIMPAT) 50 mg Take night before surgery    levothyroxine 50 mcg tablet Take day of surgery. melatonin 3 mg Take night before surgery    Mouthwashes (Biotene Dry Mouth) LIQD Hold day of surgery. sodium chloride (OCEAN) 0.65 % nasal spray Uses PRN- OK to take day of surgery    Pt can take medication as indicated above on day of MRI with 1-2 sips of water only. Pt cannot take medication on day of MRI with Thik-it or applesauce. Medication instructions for day surgery reviewed. Please use only a sip of water to take your instructed medications. You will receive a call one business day prior to surgery with an arrival time and hospital directions. If your surgery is scheduled on a Monday, the hospital will be calling you on the Friday prior to your surgery. If you have not heard from anyone by 8pm, please call the hospital supervisor through the hospital  at [phone]. Do not eat or drink anything after midnight the night before your surgery, including candy, mints, lifesavers, or chewing gum. Do not drink alcohol 24hrs before your surgery. Try not to smoke at least 24hrs before your surgery. No contact lenses, eye make-up, or artificial eyelashes. Remove nail polish, including gel polish, and any artificial, gel, or acrylic nails if possible. Remove all jewelry including rings and body piercing jewelry. Wear causal clothing that is easy to take on and off. Consider your type of surgery. Keep any valuables, jewelry, piercings at home. Please bring any specially ordered equipment (sling, braces) if indicated. Arrange for a responsible person to drive you to and from the hospital on the day of your surgery. Visitor Guidelines discussed. Call the surgeon's office with any new illnesses, exposures, or additional questions prior to surgery.

## 2023-11-20 ENCOUNTER — HOSPITAL ENCOUNTER (OUTPATIENT)
Dept: RADIOLOGY | Facility: HOSPITAL | Age: 48
Discharge: HOME | End: 2023-11-20

## 2023-11-25 PROBLEM — Z00.00 ENCOUNTER FOR MEDICARE ANNUAL WELLNESS EXAM: Status: RESOLVED | Noted: 2023-09-26 | Resolved: 2023-11-25

## 2023-12-04 DIAGNOSIS — E78.3 MIXED HYPERGLYCERIDEMIA: ICD-10-CM

## 2023-12-04 RX ORDER — ATORVASTATIN CALCIUM 10 MG/1
10 TABLET, FILM COATED ORAL DAILY
Qty: 30 TABLET | Refills: 0 | Status: SHIPPED | OUTPATIENT
Start: 2023-12-04 | End: 2024-01-03

## 2023-12-06 ENCOUNTER — HOSPITAL ENCOUNTER (OUTPATIENT)
Dept: NEUROLOGY | Facility: CLINIC | Age: 48
Discharge: HOME | End: 2023-12-06

## 2023-12-06 DIAGNOSIS — R56.9 SEIZURE-LIKE ACTIVITY (HCC): ICD-10-CM

## 2023-12-06 DIAGNOSIS — R55 SYNCOPE: ICD-10-CM

## 2023-12-07 ENCOUNTER — HOSPITAL ENCOUNTER (OUTPATIENT)
Dept: NEUROLOGY | Facility: CLINIC | Age: 48
End: 2023-12-07
Payer: MEDICARE

## 2023-12-07 DIAGNOSIS — R55 SYNCOPE: ICD-10-CM

## 2023-12-07 DIAGNOSIS — R56.9 SEIZURE-LIKE ACTIVITY (HCC): ICD-10-CM

## 2023-12-07 PROCEDURE — 95705 EEG W/O VID 2-12 HR UNMNTR: CPT

## 2023-12-11 ENCOUNTER — HOSPITAL ENCOUNTER (EMERGENCY)
Facility: HOSPITAL | Age: 48
Discharge: HOME | End: 2023-12-11
Attending: EMERGENCY MEDICINE | Admitting: EMERGENCY MEDICINE
Payer: MEDICARE

## 2023-12-11 ENCOUNTER — APPOINTMENT (EMERGENCY)
Dept: RADIOLOGY | Facility: HOSPITAL | Age: 48
End: 2023-12-11
Payer: MEDICARE

## 2023-12-11 VITALS — RESPIRATION RATE: 20 BRPM | TEMPERATURE: 98.3 F | HEART RATE: 78 BPM | OXYGEN SATURATION: 98 %
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
## 2023-12-11 DIAGNOSIS — T17.928A ASPIRATION OF FOOD, INITIAL ENCOUNTER: Primary | ICD-10-CM

## 2023-12-11 PROCEDURE — 95719 EEG PHYS/QHP EA INCR W/O VID: CPT | Performed by: PSYCHIATRY & NEUROLOGY

## 2023-12-11 PROCEDURE — 71045 X-RAY EXAM CHEST 1 VIEW: CPT

## 2023-12-11 PROCEDURE — 99284 EMERGENCY DEPT VISIT MOD MDM: CPT | Performed by: EMERGENCY MEDICINE

## 2023-12-11 PROCEDURE — 99283 EMERGENCY DEPT VISIT LOW MDM: CPT

## 2023-12-11 NOTE — ED PROVIDER NOTES
History  Chief Complaint   Patient presents with    Medical Problem     Pt coming from group home, recent removal of multiple teeth, staff reported hearing coughing and noticed vomit containing a piece of hot dog. Vitals stable for ems. Pt only c/o of increased secretions     HPI  Aminta De La Torre is a 48 y.o. female with PMH Down syndrome who presents to the emergency department after a choking episode.  Staff from the group home reports that patient was eating a hotdog and then began to have coughing and vomited with a piece of hotdog coming up.  Patient has improved since then but with some continued coughing.  Patient was reportedly in her normal state of health prior to this.    Prior to Admission Medications   Prescriptions Last Dose Informant Patient Reported? Taking?   Cholecalciferol 25 MCG (1000 UT) tablet   No No   Sig: Take 1 tablet (1,000 Units total) by mouth daily   Diclofenac Sodium (VOLTAREN) 1 %   No No   Sig: Apply 2 g topically 4 (four) times a day as needed (knee pain)   Heating Pad PADS   No No   Sig: by Does not apply route 2 (two) times a day   Incontinence Supply Disposable (RA Adult Wipes) MISC   No No   Sig: Use daily   Incontinence Supply Disposable (Ultra-Soft Plus Briefs XXL) MISC   No No   Sig: Use if needed (incontinence)   Mouthwashes (Biotene Dry Mouth) LIQD   No No   Sig: Apply 1 Swab to the mouth or throat 2 (two) times a day as needed (dry mouth) Use 5ml of solution with each swab   acetaminophen (TYLENOL) 500 mg tablet   No No   Sig: Take 2 tablets (1,000 mg total) by mouth every 8 (eight) hours as needed for mild pain   atorvastatin (LIPITOR) 10 mg tablet   No No   Sig: Take 1 tablet (10 mg total) by mouth daily   carbamide peroxide (DEBROX) 6.5 % otic solution   No No   Sig: Use 4 gtts to both ears bid x 10 days prior to office visit to edvin olivarez.   citalopram (CeleXA) 10 mg tablet   Yes No   Sig: Take 1 tablet by mouth daily   dextromethorphan-guaiFENesin (ROBITUSSIN  DM)  mg/5 mL syrup   No No   Sig: Take 5 mL by mouth 3 (three) times a day as needed for cough or congestion   lacosamide (VIMPAT) 50 mg   No No   Sig: Take 1 tablet (50 mg total) by mouth daily at bedtime for 30 doses   levothyroxine 50 mcg tablet   No No   Sig: TAKE ONE TABLET BY MOUTH AT 7 AM   melatonin 3 mg   No No   Sig: Take 1 tablet (3 mg total) by mouth daily at bedtime   nystatin (MYCOSTATIN) powder   No No   Sig: Apply topically 3 (three) times a day as needed (rash)   sodium chloride (OCEAN) 0.65 % nasal spray   No No   Si sprays into each nostril as needed for congestion or rhinitis      Facility-Administered Medications: None       Past Medical History:   Diagnosis Date    Allergic     Community acquired pneumonia     Last Assessed:2013    Leukopenia     Last Assessed:3/5/2014    Osteoporosis     Trisomy 21, Down syndrome        Past Surgical History:   Procedure Laterality Date    TONSILLECTOMY AND ADENOIDECTOMY         Family History   Problem Relation Age of Onset    Cancer Mother     No Known Problems Sister     No Known Problems Brother     No Known Problems Brother      I have reviewed and agree with the history as documented.    E-Cigarette/Vaping    E-Cigarette Use Never User      E-Cigarette/Vaping Substances    Nicotine No     THC No     CBD No     Flavoring No     Other No     Unknown No      Social History     Tobacco Use    Smoking status: Never    Smokeless tobacco: Never   Vaping Use    Vaping Use: Never used   Substance Use Topics    Alcohol use: Not Currently     Comment: PATIENT DOES NOT DRINK    Drug use: Never       Home medications:  Prior to Admission Medications   Prescriptions Last Dose Informant Patient Reported? Taking?   Cholecalciferol 25 MCG (1000 UT) tablet   No No   Sig: Take 1 tablet (1,000 Units total) by mouth daily   Diclofenac Sodium (VOLTAREN) 1 %   No No   Sig: Apply 2 g topically 4 (four) times a day as needed (knee pain)   Heating Pad PADS   No No    Sig: by Does not apply route 2 (two) times a day   Incontinence Supply Disposable (RA Adult Wipes) MISC   No No   Sig: Use daily   Incontinence Supply Disposable (Ultra-Soft Plus Briefs XXL) MISC   No No   Sig: Use if needed (incontinence)   Mouthwashes (Biotene Dry Mouth) LIQD   No No   Sig: Apply 1 Swab to the mouth or throat 2 (two) times a day as needed (dry mouth) Use 5ml of solution with each swab   acetaminophen (TYLENOL) 500 mg tablet   No No   Sig: Take 2 tablets (1,000 mg total) by mouth every 8 (eight) hours as needed for mild pain   atorvastatin (LIPITOR) 10 mg tablet   No No   Sig: Take 1 tablet (10 mg total) by mouth daily   carbamide peroxide (DEBROX) 6.5 % otic solution   No No   Sig: Use 4 gtts to both ears bid x 10 days prior to office visit to presoften cerumen.   citalopram (CeleXA) 10 mg tablet   Yes No   Sig: Take 1 tablet by mouth daily   dextromethorphan-guaiFENesin (ROBITUSSIN DM)  mg/5 mL syrup   No No   Sig: Take 5 mL by mouth 3 (three) times a day as needed for cough or congestion   lacosamide (VIMPAT) 50 mg   No No   Sig: Take 1 tablet (50 mg total) by mouth daily at bedtime for 30 doses   levothyroxine 50 mcg tablet   No No   Sig: TAKE ONE TABLET BY MOUTH AT 7 AM   melatonin 3 mg   No No   Sig: Take 1 tablet (3 mg total) by mouth daily at bedtime   nystatin (MYCOSTATIN) powder   No No   Sig: Apply topically 3 (three) times a day as needed (rash)   sodium chloride (OCEAN) 0.65 % nasal spray   No No   Si sprays into each nostril as needed for congestion or rhinitis      Facility-Administered Medications: None     Allergies:  Allergies   Allergen Reactions    Levetiracetam Drowsiness and Hives     And disorientation    Depakote [Valproic Acid] Drowsiness    Lamictal [Lamotrigine] Rash    Pollen Extract Sneezing        Review of Systems   Unable to perform ROS: Other       Physical Exam  ED Triage Vitals   Temperature Pulse Respirations BP SpO2   23 1459 23 1335  12/11/23 1335 -- 12/11/23 1335   98.3 °F (36.8 °C) 85 20  99 %      Temp src Heart Rate Source Patient Position - Orthostatic VS BP Location FiO2 (%)   -- -- -- -- --             Pain Score       --                    Orthostatic Vital Signs  Vitals:    12/11/23 1345 12/11/23 1445 12/11/23 1545 12/11/23 1645   Pulse: 82 74 62 78       Physical Exam  Vitals and nursing note reviewed.   Constitutional:       General: She is not in acute distress.     Appearance: She is not toxic-appearing.   HENT:      Head: Normocephalic.      Mouth/Throat:      Mouth: Mucous membranes are moist.   Eyes:      Pupils: Pupils are equal, round, and reactive to light.   Cardiovascular:      Rate and Rhythm: Normal rate and regular rhythm.   Pulmonary:      Effort: Pulmonary effort is normal. No respiratory distress.      Breath sounds: No wheezing or rhonchi.      Comments: Equal bilateral breath sounds  Abdominal:      General: Abdomen is flat. There is no distension.      Palpations: Abdomen is soft.      Tenderness: There is no abdominal tenderness. There is no guarding or rebound.   Skin:     General: Skin is warm and dry.   Neurological:      Mental Status: She is alert.         ED Medications  Medications - No data to display    Diagnostic Studies  Results Reviewed       None                   XR chest 1 view portable   Final Result by Chinedu Garcia MD (12/12 0554)      Lungs appear hypoinflated limiting evaluation. Bilateral lower lobe hazy pulmonary infiltrates noted, right greater than left. Mild elevation of the left hemidiaphragm.         Workstation performed: [id number]               Procedures  Procedures      ED Course                                       MDM  Medical Decision Making  Amount and/or Complexity of Data Reviewed  Radiology: ordered.      Aminta De La Torre is a 48 y.o. female with PMH Down syndrome who presents to the emergency department after an episode of choking while eating a hot dog, patient vomited up  hot dog prior to arrival. Workup including vital signs, physical exam, CXR. CXR with evidence of aspiration.  Patient currently back to baseline, saturating well on room air, no increased work of breathing, coughing resolved, observed in the emergency department for 4 hours without recurrence of symptoms.  Had extensive discussion with family and group home staff regarding aspiration and symptoms to monitor for in case of development to pneumonia.  Recommended observed feedings with cutting into small pieces.  Family feels comfortable with her being discharged to group home with close follow-up with PCP within a few days and monitoring for fevers, increased cough, shortness of breath, or changes in behavior. . Stable for discharge home with primary care follow up, discharge instructions and return precautions given.       Disposition  Final diagnoses:   Aspiration of food, initial encounter     Time reflects when diagnosis was documented in both MDM as applicable and the Disposition within this note       Time User Action Codes Description Comment    12/11/2023  5:38 PM Eldon Chacon [T17.928A,  [id number]] Aspiration of food, initial encounter           ED Disposition       ED Disposition   Discharge    Condition   Stable    Date/Time   Mon Dec 11, 2023  5:39 PM    Comment   Aminta De La Torre discharge to home/self care.                   Follow-up Information       Follow up With Specialties Details Why Contact Info    Radha Olvera MD Internal Medicine, Geriatric Medicine In 3 days  [address] [place] [address]  [phone]              Discharge Medication List as of 12/11/2023  5:39 PM        CONTINUE these medications which have NOT CHANGED    Details   acetaminophen (TYLENOL) 500 mg tablet Take 2 tablets (1,000 mg total) by mouth every 8 (eight) hours as needed for mild pain, Starting u 11/16/2023, Normal      atorvastatin (LIPITOR) 10 mg tablet Take 1 tablet (10 mg total) by mouth daily,  Starting Mon 12/4/2023, Until Wed 1/3/2024, Normal      carbamide peroxide (DEBROX) 6.5 % otic solution Use 4 gtts to both ears bid x 10 days prior to office visit to edvin olivarez., Normal      Cholecalciferol 25 MCG (1000 UT) tablet Take 1 tablet (1,000 Units total) by mouth daily, Starting Mon 10/24/2022, Normal      citalopram (CeleXA) 10 mg tablet Take 1 tablet by mouth daily, Starting Tue 8/22/2023, Historical Med      dextromethorphan-guaiFENesin (ROBITUSSIN DM)  mg/5 mL syrup Take 5 mL by mouth 3 (three) times a day as needed for cough or congestion, Starting Wed 10/18/2023, Normal      Diclofenac Sodium (VOLTAREN) 1 % Apply 2 g topically 4 (four) times a day as needed (knee pain), Starting Fri 11/17/2023, Normal      Heating Pad PADS by Does not apply route 2 (two) times a day, Starting Mon 6/24/2019, Normal      !! Incontinence Supply Disposable (RA Adult Wipes) MISC Use daily, Starting Mon 10/24/2022, Normal      !! Incontinence Supply Disposable (Ultra-Soft Plus Briefs XXL) MISC Use if needed (incontinence), Starting Wed 11/15/2023, Normal      lacosamide (VIMPAT) 50 mg Take 1 tablet (50 mg total) by mouth daily at bedtime for 30 doses, Starting Tue 11/7/2023, Until Thu 12/7/2023, Normal      levothyroxine 50 mcg tablet TAKE ONE TABLET BY MOUTH AT 7 AM, Normal      melatonin 3 mg Take 1 tablet (3 mg total) by mouth daily at bedtime, Starting Wed 7/19/2023, Normal      Mouthwashes (Biotene Dry Mouth) LIQD Apply 1 Swab to the mouth or throat 2 (two) times a day as needed (dry mouth) Use 5ml of solution with each swab, Starting Wed 10/18/2023, Normal      nystatin (MYCOSTATIN) powder Apply topically 3 (three) times a day as needed (rash), Starting Wed 11/15/2023, Normal      sodium chloride (OCEAN) 0.65 % nasal spray 2 sprays into each nostril as needed for congestion or rhinitis, Starting Wed 10/18/2023, Normal       !! - Potential duplicate medications found. Please discuss with provider.     "      No discharge procedures on file.    PDMP Review       None             ED Provider  Attending physically available and evaluated Aminta De La Torre. I managed the patient along with the ED Attending.    Electronically Signed by    Portions of the record may have been created with voice recognition software.  Occasional wrong word or \"sound a like\" substitutions may have occurred due to the inherent limitations of voice recognition software.  Read the chart carefully and recognize, using context, where substitutions have occurred       Eldon Chacon MD  12/12/23 8979    "

## 2023-12-11 NOTE — DISCHARGE INSTRUCTIONS
X-ray showed aspiration.    Follow-up with the primary care doctor within 3 days.    Supervise feedings and cut food into small pieces.    Return to the ER if symptoms worsen, fevers, increased productive cough, difficulty breathing, changes in behavior, or any other symptoms that concern you.

## 2023-12-11 NOTE — ED ATTENDING ATTESTATION
"12/11/2023  I, Angelo Carrero MD, saw and evaluated the patient. I have discussed the patient with the resident/non-physician practitioner and agree with the resident's/non-physician practitioner's findings, Plan of Care, and MDM as documented in the resident's/non-physician practitioner's note, except where noted. All available labs and Radiology studies were reviewed.  I was present for key portions of any procedure(s) performed by the resident/non-physician practitioner and I was immediately available to provide assistance.       At this point I agree with the current assessment done in the Emergency Department.  I have conducted an independent evaluation of this patient a history and physical is as follows:    ED Course     48-year-old female, history of Down syndrome, presenting to the emergency department for evaluation after choking episode.  Patient was eating hotdogs when she began to choke.  Patient subsequently had an episode of vomiting and vomited a large portion of unchewed hotdog.  Patient has complained of some shortness of breath.  Intellectual disability interferes with ability to effectively communicate.    On examination the patient has typical Down syndrome appearance.  Mucous membranes are dry.  Lungs are clear to auscultation bilaterally.  Heart is regular rate and rhythm with no murmurs rubs or gallops.  Abdomen is nondistended, soft and nontender.    Patient with choking episode.  Normal oxygen saturation, work of breathing, and respiratory rate.  Patient will be observed in the emergency department for aspiration pneumonitis.    XR chest 1 view portable    (Results Pending)     14:25-reevaluated patient because of \"gurgly\" secretions.  Patient is maintaining her airway.  She appears emotionally upset.  Nurses are trying to suction her.  Oxygen saturation is 100%.  I think her symptoms arise from oral secretions more so than pulmonary etiology.    17:44-patient remains with normal " oxygen saturation.  Family at bedside states that the patient appears to be at her baseline respiratory status.  Patient will be discharged with food precautions.    Critical Care Time  Procedures

## 2023-12-13 DIAGNOSIS — G40.802 REFLEX EPILEPSY (HCC): ICD-10-CM

## 2023-12-13 DIAGNOSIS — R56.9 SEIZURE-LIKE ACTIVITY (HCC): ICD-10-CM

## 2023-12-13 RX ORDER — LACOSAMIDE 50 MG/1
TABLET ORAL
Qty: 30 TABLET | Refills: 5 | Status: SHIPPED | OUTPATIENT
Start: 2023-12-13

## 2023-12-13 NOTE — TELEPHONE ENCOUNTER
12/12 9:58    A caregiver from pt's group home left a VM requesting a refill of Lacosamide 50 mg at . Transcribed VM:   Hi, yes I am calling on behalf of [facility]. Date of birth [birth year]. I'm calling about her prescription Lacosamide 50 mg. It was prescribed for 30 days at the hospital. At the last appointment it was said to continue with that, the 50 mg at bedtime. We need a new script sent to the pharmacy. Her pharmacy is [facility]. Fax number is [phone]. If you could give me a phone call back at [phone]. Again, that was for [facility]. Date of birth [birth year]. Thank you. Called back to inform caregiver that her request would be forwarded to the provider and she verbalized an understanding. Please sign off if agreeable. Thank you.

## 2023-12-14 ENCOUNTER — TELEPHONE (OUTPATIENT)
Dept: INTERNAL MEDICINE CLINIC | Facility: CLINIC | Age: 48
End: 2023-12-14

## 2023-12-14 NOTE — TELEPHONE ENCOUNTER
Patient was scheduled for follow up from ER 12/14. She refused to get out of van to come in for appointment today. Needs paperwork filled out for physical. Paperwork in bin they need ASAP. (Blank is in media)  Thank you advise if needed

## 2023-12-15 NOTE — TELEPHONE ENCOUNTER
Rylie (care giver/staff in home) stopped in today to see if forms had been completed for patient they needed the forms done for Monday 12/18/23. I explained that your out of the office today but should be her Monday and we can try to have it done. They need this ASAP for licensing certification.    Please call Rylie when done so they can  first thing ([phone])

## 2023-12-17 PROBLEM — J06.9 ACUTE URI: Status: RESOLVED | Noted: 2023-10-18 | Resolved: 2023-12-17

## 2023-12-18 NOTE — TELEPHONE ENCOUNTER
Her last physical was done on 9/26.   A physical form (same one) was filled on that visit scanned under media on 9/29.   There won't be any change in content.   Can fax over that form and attach that physical visit note.

## 2023-12-18 NOTE — TELEPHONE ENCOUNTER
Angle called gain for the pts physical forms. Once completed, please fax the to [phone]. They need them as soon as possible.

## 2023-12-19 ENCOUNTER — TELEPHONE (OUTPATIENT)
Age: 48
End: 2023-12-19

## 2023-12-19 NOTE — TELEPHONE ENCOUNTER
Rylie from [facility] called in regards to form received and being incomplete, needs completed asap for inspection. Called and spoke to [facility] office staff and warm transfer was completed.

## 2023-12-19 NOTE — TELEPHONE ENCOUNTER
Received call from Sharon this morning requesting completed physical form this morning for state compliance. Please fax to [phone]. Any questions, please call her.

## 2023-12-19 NOTE — TELEPHONE ENCOUNTER
Please fax over the physical form completed and scanned on 9/29. This was her most recent physical.

## 2024-01-03 DIAGNOSIS — E78.3 MIXED HYPERGLYCERIDEMIA: ICD-10-CM

## 2024-01-04 RX ORDER — ATORVASTATIN CALCIUM 10 MG/1
10 TABLET, FILM COATED ORAL DAILY
Qty: 90 TABLET | Refills: 1 | Status: SHIPPED | OUTPATIENT
Start: 2024-01-04 | End: 2024-07-02

## 2024-01-30 DIAGNOSIS — E03.9 HYPOTHYROIDISM, UNSPECIFIED TYPE: ICD-10-CM

## 2024-01-30 RX ORDER — LEVOTHYROXINE SODIUM 0.05 MG/1
TABLET ORAL
Qty: 30 TABLET | Refills: 5 | Status: SHIPPED | OUTPATIENT
Start: 2024-01-30

## 2024-02-15 ENCOUNTER — TELEPHONE (OUTPATIENT)
Dept: NEUROLOGY | Facility: CLINIC | Age: 49
End: 2024-02-15

## 2024-02-21 ENCOUNTER — OFFICE VISIT (OUTPATIENT)
Dept: NEUROLOGY | Facility: CLINIC | Age: 49
End: 2024-02-21
Payer: MEDICARE

## 2024-02-21 VITALS
WEIGHT: 162 LBS | BODY MASS INDEX: 37.49 KG/M2 | HEIGHT: 55 IN | DIASTOLIC BLOOD PRESSURE: 76 MMHG | TEMPERATURE: 97.9 F | SYSTOLIC BLOOD PRESSURE: 132 MMHG

## 2024-02-21 DIAGNOSIS — R55 SYNCOPE: ICD-10-CM

## 2024-02-21 DIAGNOSIS — Q90.9 TRISOMY 21, DOWN SYNDROME: ICD-10-CM

## 2024-02-21 DIAGNOSIS — Q90.9 DEMENTIA DUE TO DOWN SYNDROME (HCC): ICD-10-CM

## 2024-02-21 DIAGNOSIS — F02.80 DEMENTIA DUE TO DOWN SYNDROME (HCC): ICD-10-CM

## 2024-02-21 DIAGNOSIS — G40.909 RECURRENT SEIZURES (HCC): Primary | ICD-10-CM

## 2024-02-21 DIAGNOSIS — R26.2 AMBULATORY DYSFUNCTION: ICD-10-CM

## 2024-02-21 PROBLEM — H61.23 BILATERAL IMPACTED CERUMEN: Status: RESOLVED | Noted: 2019-08-28 | Resolved: 2024-02-21

## 2024-02-21 PROCEDURE — 99215 OFFICE O/P EST HI 40 MIN: CPT | Performed by: PSYCHIATRY & NEUROLOGY

## 2024-02-21 NOTE — PROGRESS NOTES
[facility]  Follow Up Visit    Impression/Plan    Ms. De La Torre is a 49 y.o. female with history of trisomy 21, hyothyroidism and hyperlipidemia returning for follow up regarding syncope versus seizure. I saw her 2014/2015 for events that were most consistent with syncope. I'm 2023 she had 4 events of loss of consciousness and generalized shaking, some with postictal fatigue. The fact that all events occurred either while straining on the toilet or walking to the bathroom may suggest syncope. However, the duration and severity of the witnessed shaking with at least one event was more than is typically seen with convulsive syncope. Patients with trisomy 21 are at elevated risk for seizure. Repeated EEGs, including prolonged ambulatory EEG, have not revealed evidence of epilepsy. A brain MRI is scheduled.     The diagnosis remains uncertain, but I have enough concern for seizure that I would like her to remain on an anti seizure medication. If there are additional possible seizures the dose of lacosamide can be increased and divided every 12 hours. Both levetiracetam and lacosamide have caused severe sedation, event at low doses. She had a rash with lamotrigine. She is on a dose of lacosamide that is typically subtherapeutic, but might be effective in some patients.  Prescribing nasal midazolam as rescue for seizure longer than 5 minutes.     I still have significant concern for syncope as the cause of at least some of her events. She is seeing cardiology in a few weeks to evaluate for cardiac cause of syncope.     The caregivers report that her ability of functioning independently has declined some. Her language abilities may have declined some was well. The time course of this decline is unclear to me, but it is not acute. She has been with her current caregivers since 10/2023. She very likely has dementia related to her trisomy 21. There appears to be ex vacuo dilatation of the  ventricles on her CT. A brain MRI is scheduled. B12 and TSH were normal in 10/2023.       Patient Instructions   Continue lacosamide 50 mg nightly. .  The lacosamide dose is a very low. If there are more seizures I will recommend increasing lacosamide to 25 mg morning and 50 mg evening with possible further increase after a few weeks.   Let us know if there are events concerning for seizure.   See Cardiology as scheduled to consider a cardiac cause of syncope.   Complete brain MRI as planned.   Give nasal midazolam for convulsive seizure longer than 5 minutes. Can repeat once 10 minutes after first dose if seizure continues.   Return in about 6 months.     Diagnoses and all orders for this visit:    Recurrent seizures (HCC)  -     Midazolam 5 MG/0.1ML SOLN; For seizure longer than 5 min or cluster of seizures. Give 1 spray (5 mg) into 1 nostril; if no response, a second dose of 1 spray (5 mg) into the other nostril may be given 10 min.    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     Interval Events:   Seizures since last visit: None  Hospitalizations: no    Seen by Argelia Felix PA-C on 11/14/2023. See that note for additional history. History is from the patient's caregivers and extensive review of the records.     She presented to the hospital on 10/12/2023 after passing out when she was in the bathroom. Staff today tell me that she was shaking constantly after fall for maybe 3-5 minutes. She was still unresponsive when EMS arrived. She did not tolerate lacosamide more than 50 mg qhs. The dose was titrated every 3 days initially. She was so sedated they took her to the ED and then she was told to go back to the prior dose that she tolerated (50 mg qhs). AEEG was unrevealing for source of seizure. Brain MRI is scheduled with anesthesia.  "Cardiology visit is scheduled.     AED/side effects/compliance:  Lacosamide 50mg daily (could not tolerate further titration due to drowsiness)      Event/Seizure semiology:  Prior episodes of syncope documented in  (looked like she was getting sick, pale and then with LOC).    LOC with stiffening, shaking.  Events occurred in the context of having a bowel movement (10/2023 event occurred while straining to have a BM), or when ambulating to the bathroom.     Special Features  Status epilepticus: No  Self Injury Seizures: No  Precipitating Factors: most events have occurred during straining to have a bowel movement, or on her way to the bathroom or from the bathroom.       Epilepsy Risk Factors:  Abnormal pregnancy: No  Abnormal birth/: Born late 1-2 weeks, otherwise uncomplicated, per sister  Abnormal Development: Down syndrome   Febrile seizures, simple: No  Febrile seizures, complex: No  CNS infection: No  Cerebral palsy: No  Head injury (moderate/severe): No  CNS neoplasm: No  CNS malformation: No  Neurosurgical procedure: No  Stroke: No  Alcohol abuse: No  Drug abuse: No  Family history Sz/epilepsy: No     Prior AEDs:  Levetiracetam - mood changes, \"zombie\", non-functional   Lamotrigine - rash on foot  Divalproex - \"zombie\", non-functional      Psychiatric History:  Sees psychiatry for depression and behavioral issues      Social History:  Lives with: Group home--[facility]--started living there in Oct 2023.  Previously has lived in other group home environments or with family   Driving: no     Prior workup:    10/12/2023 CTH head wo contrast  No acute abnormalities.  Enlargement of the ventricles and sulci secondary to chronic volume loss      2023 routine EEG ([facility])  The continuous generalized slowing seen throughout the record suggests the presence of a severe diffuse cortical dysfunction.  This is nonspecific but suggests a global encephalopathic process of most any etiology, " "including toxic/metabolic/anoxic/hypoxic.      2/13/2023 routine EEG ([facility])  Moderate bilateral slowing. No focal slowing or epileptiform abnormalities were seen. These findings suggest moderate bilateral cerebral dysfunction.      24 hour AEEG 12/7/2023:  This ambulatory EEG (day 1 of 1, with about 12 hours of interpretable data recorded) recorded during wakefulness, drowsiness, and sleep is abnormal. Background activities are overall too slow, suggesting mild to moderate diffuse cerebral dysfunction of nonspecific etiology. No clinical events were reported and around the times of three push buttons, activated for unclear reasons, there was no electrographic evidence of seizure. No epileptiform discharges or electrographic seizures were seen.     Objective    /76 (BP Location: Right arm, Patient Position: Sitting, Cuff Size: Adult) Comment: patient declined, last blood re-recorded  Temp 97.9 °F (36.6 °C) (Temporal)   Ht 4' 7\" (1.397 m)   Wt 73.5 kg (162 lb)   LMP  (LMP Unknown)   BMI 37.65 kg/m²      General Exam  No acute distress.    Neurologic Exam  Mental Status:  Alert and interactive. Repeats phrases at times. States name of caregiver. Uses short phrases. Often perseverates. Follows simple commands, but not always accurately (holds arms out, gives five when prompted, but does not give thumbs up)  Cranial Nerves:  attends to visual stimulation from right and left. Gaze conjugate. No nystagmus, full gaze to right and left. Face symmetric with equal activation. + dysarthria.  Motor:  at least 4/5 throughout without evidence of asymmetry.   Coordination: Finger to nose intact.  DTRs: Normal and symmetric (biceps, patella).  Gait: not tested. In wheel chair.       I have spent a total time of 50 minutes on 02/21/24 in caring for this patient including Diagnostic results, Prognosis, Risks and benefits of tx options, Instructions for management, Patient and family education, Importance of tx compliance, " Risk factor reductions, Impressions, Counseling / Coordination of care, Documenting in the medical record, Reviewing / ordering tests, medicine, procedures  , and Obtaining or reviewing history  .

## 2024-02-21 NOTE — PATIENT INSTRUCTIONS
Continue lacosamide 50 mg nightly. .  The lacosamide dose is a very low. If there are more seizures I will recommend increasing lacosamide to 25 mg morning and 50 mg evening with possible further increase after a few weeks.   Let us know if there are events concerning for seizure.   See Cardiology as scheduled to consider a cardiac cause of syncope.   Complete brain MRI as planned.   Give nasal midazolam for convulsive seizure longer than 5 minutes. Can repeat once 10 minutes after first dose if seizure continues.   Return in about 6 months.

## 2024-03-12 ENCOUNTER — OFFICE VISIT (OUTPATIENT)
Dept: CARDIOLOGY CLINIC | Facility: CLINIC | Age: 49
End: 2024-03-12
Payer: MEDICARE

## 2024-03-12 VITALS
OXYGEN SATURATION: 100 % | BODY MASS INDEX: 37.65 KG/M2 | DIASTOLIC BLOOD PRESSURE: 104 MMHG | SYSTOLIC BLOOD PRESSURE: 142 MMHG | HEART RATE: 60 BPM | HEIGHT: 55 IN

## 2024-03-12 DIAGNOSIS — E66.9 CLASS 2 OBESITY WITH BODY MASS INDEX (BMI) OF 37.0 TO 37.9 IN ADULT, UNSPECIFIED OBESITY TYPE, UNSPECIFIED WHETHER SERIOUS COMORBIDITY PRESENT: ICD-10-CM

## 2024-03-12 DIAGNOSIS — Z78.9 HEALTH MAINTENANCE ALTERATION: ICD-10-CM

## 2024-03-12 DIAGNOSIS — R55 SYNCOPE: Primary | ICD-10-CM

## 2024-03-12 PROBLEM — E66.812 CLASS 2 OBESITY WITH BODY MASS INDEX (BMI) OF 37.0 TO 37.9 IN ADULT, UNSPECIFIED OBESITY TYPE, UNSPECIFIED WHETHER SERIOUS COMORBIDITY PRESENT: Status: ACTIVE | Noted: 2020-09-07

## 2024-03-12 PROCEDURE — 99203 OFFICE O/P NEW LOW 30 MIN: CPT | Performed by: INTERNAL MEDICINE

## 2024-03-12 RX ORDER — CITALOPRAM 20 MG/1
20 TABLET ORAL DAILY
COMMUNITY
Start: 2024-02-27

## 2024-03-12 NOTE — PROGRESS NOTES
[facility]    CHIEF COMPLAINT:   Chief Complaint   Patient presents with    New Patient Visit     Consultation for syncope    Dizziness     Using the toilet       HPI:  Aminta De La Torre is a 49 y.o. female with a past medical history of Down syndrome, hypothyroidism, seizure-like activity. She presents today in consultation for possible syncope.    She had an episode of loss of consciousness in 2014 where she appeared pale and had subsequent loss of consciousness.  In 2023, she had 3 episodes of loss of consciousness.  2 episodes occurred while she was going to the bathroom.  Her last episode was in 10/2023 which occurred while she was having a bowel movement.  Her caretakers heard a loud thump on the floor and came in the bathroom to witness seizure-like activity.  There was postictal fatigue.  She had been evaluated by neurology inpatient and had an EEG and CT of the head which were unremarkable.  Neurology stated that symptoms are consistent with generalized seizure activity and there was some concern of reflex epilepsy.  She had been started on preventative antiepileptic medications.  She was referred to cardiology for possible syncope.    ROS: Unobtainable due to mental status.    The following portions of the patient's history were reviewed and updated as appropriate: allergies, current medications, past family history, past medical history, past social history, past surgical history, and problem list.    SINCE LAST OV I REVIEWED WITH THE PATIENT THE INTERIM LABS, TEST RESULTS, CONSULTANT(S) NOTES AND PERFORMED AN INTERIM REVIEW OF HISTORY    Past Medical History:   Diagnosis Date    Allergic     Community acquired pneumonia     Last Assessed:1/22/2013    Leukopenia     Last Assessed:3/5/2014    Osteoporosis     Trisomy 21, Down syndrome        Past Surgical History:   Procedure Laterality Date    TONSILLECTOMY AND ADENOIDECTOMY         Social History     Socioeconomic History    Marital  status: Single     Spouse name: Not on file    Number of children: Not on file    Years of education: Not on file    Highest education level: Not on file   Occupational History    Not on file   Tobacco Use    Smoking status: Never    Smokeless tobacco: Never   Vaping Use    Vaping status: Never Used   Substance and Sexual Activity    Alcohol use: Not Currently     Comment: PATIENT DOES NOT DRINK    Drug use: Never    Sexual activity: Never   Other Topics Concern    Not on file   Social History Narrative    Not on file     Social Determinants of Health     Financial Resource Strain: Patient Declined (6/5/2023)    Received from [facility]    Overall Financial Resource Strain (CARDIA)     Difficulty of Paying Living Expenses: Patient declined   Food Insecurity: Patient Declined (6/5/2023)    Received from [facility]    Hunger Vital Sign     Worried About Running Out of Food in the Last Year: Patient declined     Ran Out of Food in the Last Year: Patient declined   Transportation Needs: Patient Declined (6/5/2023)    Received from [facility]    PRAPARE - Transportation     Lack of Transportation (Medical): Patient declined     Lack of Transportation (Non-Medical): Patient declined   Physical Activity: Not on file   Stress: Not on file   Social Connections: Not on file   Intimate Partner Violence: Patient Declined (6/5/2023)    Received from [facility]    Humiliation, Afraid, Rape, and Kick questionnaire     Fear of Current or Ex-Partner: Patient declined     Emotionally Abused: Patient declined     Physically Abused: Patient declined     Sexually Abused: Patient declined   Housing Stability: Unknown (6/5/2023)    Received from [facility]    Housing Stability Vital Sign     Unable to Pay for Housing in the Last Year: Patient refused     Number of Places Lived in the Last Year: 1     Unstable Housing in the Last Year: Patient refused        Family History   Problem Relation Age of Onset    Cancer Mother     No Known Problems Sister     No Known Problems Brother     No Known Problems Brother        Allergies   Allergen Reactions    Levetiracetam Drowsiness and Hives     And disorientation    Depakote [Valproic Acid] Drowsiness    Lamictal [Lamotrigine] Rash    Pollen Extract Sneezing       Current Outpatient Medications   Medication Sig Dispense Refill    atorvastatin (LIPITOR) 10 mg tablet Take 1 tablet (10 mg total) by mouth daily 90 tablet 1    carbamide peroxide (DEBROX) 6.5 % otic solution Use 4 gtts to both ears bid x 10 days prior to office visit to edvin olivarez. 15 mL 0    citalopram (CeleXA) 20 mg tablet Take 20 mg by mouth daily      dextromethorphan-guaiFENesin (ROBITUSSIN DM)  mg/5 mL syrup Take 5 mL by mouth 3 (three) times a day as needed for cough or congestion 237 mL 0    lacosamide (VIMPAT) 50 mg Take 1 po HS 30 tablet 5    levothyroxine 50 mcg tablet TAKE ONE TABLET BY MOUTH AT 7 AM 30 tablet 5    melatonin 3 mg Take 1 tablet (3 mg total) by mouth daily at bedtime 30 tablet 5    Mouthwashes (Biotene Dry Mouth) LIQD Apply 1 Swab to the mouth or throat 2 (two) times a day as needed (dry mouth) Use 5ml of solution with each swab 237 mL 0    sodium chloride (OCEAN) 0.65 % nasal spray 2 sprays into each nostril as needed for congestion or rhinitis 60 mL 0    acetaminophen (TYLENOL) 500 mg tablet Take 2 tablets (1,000 mg total) by mouth every 8 (eight) hours as needed for mild pain (Patient not taking: Reported on 3/12/2024) 30 tablet 5    Cholecalciferol 25 MCG (1000 UT) tablet Take 1 tablet (1,000 Units total) by mouth daily 90 tablet 1    citalopram (CeleXA) 10 mg tablet Take 20 mg by mouth daily (Patient not taking: Reported on 3/12/2024)      Diclofenac Sodium (VOLTAREN) 1 % Apply 2 g topically 4 (four) times a day as needed (knee pain) (Patient not taking: Reported on 3/12/2024) 100 g 0    Heating Pad PADS by Does not  "apply route 2 (two) times a day (Patient not taking: Reported on 3/12/2024) 1 each 0    ibuprofen (MOTRIN) 600 mg tablet Take 600 mg by mouth every 6 (six) hours as needed (Patient not taking: Reported on 3/12/2024)      Incontinence Supply Disposable (RA Adult Wipes) MISC Use daily (Patient not taking: Reported on 3/12/2024) 200 each 2    Incontinence Supply Disposable (Ultra-Soft Plus Briefs XXL) MISC Use if needed (incontinence) (Patient not taking: Reported on 3/12/2024) 200 each 2    Midazolam 5 MG/0.1ML SOLN For seizure longer than 5 min or cluster of seizures. Give 1 spray (5 mg) into 1 nostril; if no response, a second dose of 1 spray (5 mg) into the other nostril may be given 10 min. (Patient not taking: Reported on 3/12/2024) 2 each 1    nystatin (MYCOSTATIN) powder Apply topically 3 (three) times a day as needed (rash) (Patient not taking: Reported on 3/12/2024) 60 g 1     No current facility-administered medications for this visit.       BP (!) 142/104 (BP Location: Right arm, Patient Position: Sitting, Cuff Size: Standard)   Pulse 60   Ht 4' 7\" (1.397 m)   LMP  (LMP Unknown)   SpO2 100%   BMI 37.65 kg/m²     Review of Systems   All other systems reviewed and are negative.      Physical Exam  Vitals reviewed.   Constitutional:       General: She is not in acute distress.     Appearance: She is well-developed. She is obese.   HENT:      Head: Normocephalic and atraumatic.   Eyes:      Conjunctiva/sclera: Conjunctivae normal.   Cardiovascular:      Rate and Rhythm: Normal rate and regular rhythm.      Heart sounds: No murmur heard.  Pulmonary:      Effort: Pulmonary effort is normal. No respiratory distress.      Breath sounds: Normal breath sounds.   Abdominal:      Palpations: Abdomen is soft.      Tenderness: There is no abdominal tenderness.   Musculoskeletal:         General: No swelling.      Cervical back: Neck supple.   Skin:     General: Skin is warm and dry.      Capillary Refill: Capillary " "refill takes less than 2 seconds.   Neurological:      Mental Status: She is alert.   Psychiatric:         Mood and Affect: Mood normal.          Lab Results   Component Value Date     08/20/2016    K 4.6 10/31/2023     10/31/2023    CO2 24 10/31/2023    BUN 9 10/31/2023    CREATININE 0.57 (L) 10/31/2023    GLUCOSE 97 11/01/2014    CALCIUM 8.5 10/31/2023    ALT 14 10/31/2023    AST 25 10/31/2023    INR 1.08 02/28/2014       Lab Results   Component Value Date    CHOL 167 08/20/2016    HDL 51 08/09/2022    LDLCALC 65 08/09/2022    TRIG 99 08/09/2022       Lab Results   Component Value Date    WBC 7.28 10/31/2023    HGB 15.2 10/31/2023    HCT 44.6 10/31/2023     10/31/2023       No results found for: \"EAG\", \"HGBA1C\"    Lab Results   Component Value Date    TSH 3.55 02/14/2023       Cardiac studies:   TTE - 11/2014: LEFT VENTRICLE: Size was normal. Systolic function was normal. Ejection fraction was estimated to be 55 %. Although no diagnostic regional wall motion abnormality was identified, this possibility cannot be completely excluded on   the basis of this study. Wall thickness was normal. DOPPLER: Left ventricular   diastolic function parameters were normal.   RIGHT VENTRICLE: The size was normal. Systolic function was normal. Wall   thickness was normal.   LEFT ATRIUM: Size was normal.   RIGHT ATRIUM: Size was at the upper limits of normal.   MITRAL VALVE: Valve structure was normal. There was normal leaflet separation.   DOPPLER: The transmitral velocity was within the normal range. There was no   evidence for stenosis. There was trace regurgitation.   AORTIC VALVE: The valve was trileaflet. Leaflets exhibited normal thickness    and normal cuspal separation. DOPPLER: Transaortic velocity was within the normal   range. There was no evidence for stenosis. There was no significant   regurgitation.   TRICUSPID VALVE: The valve structure was normal. There was normal leaflet   separation. DOPPLER: " The transtricuspid velocity was within the normal range.   There was no evidence for stenosis. There was mild regurgitation. Pulmonary   artery systolic pressure was within the normal range.   PULMONIC VALVE: Leaflets exhibited normal thickness, no calcification, and   normal cuspal separation. DOPPLER: The transpulmonic velocity was within the   normal range. There was trace regurgitation.   PERICARDIUM: There was no pericardial effusion. The pericardium was normal in   appearance.   AORTA: The root exhibited normal size.   SYSTEMIC VEINS: IVC: The inferior vena cava was normal in size.       ASSESSMENT AND PLAN:  Aminta was seen today for new patient visit and dizziness.    Diagnoses and all orders for this visit:    Syncope  -     Ambulatory Referral to Cardiology  -     AMB extended holter monitor; Future    Class 2 obesity with body mass index (BMI) of 37.0 to 37.9 in adult, unspecified obesity type, unspecified whether serious comorbidity present      49 y.o. female with a past medical history of Down syndrome, hypothyroidism, seizure-like activity. She presents today in consultation for possible syncope.    Prior ECG shows sinus rhythm with sinus arrhythmia.  She did have an echocardiogram in 2014 which showed normal biventricular size and systolic function.  Cardiovascular exam is unremarkable today and she has no signs of congestive heart failure.  There were no appreciable murmurs on exam.      Episode in which she was sitting on the toilet and having a bowel movement very well could have been reflex syncope (situational syncope from defecation).  Reflex epilepsy is also being considered by her Neurologist. It may have also been vasovagal if she had been straining leading to high vagal tone. History is limited but no malignant features. Given that 2 episodes may have occurred while she was walking to the bathroom, we will perform a cardiac monitor to assess for any arrhythmias.  Her caretakers do not feel  that she would be able to sit for an echocardiogram without sedation and I do not feel a repeat is necessary at this time given unremarkable cardiac exam.      Charlee Bell MD

## 2024-03-12 NOTE — TELEPHONE ENCOUNTER
Reason for call:   [x] Refill   [] Prior Auth  [] Other:     Office:   [x] PCP/Provider - [facility]  [] Specialty/Provider -     Medication: Vit D3    Dose/Frequency: 25mcg (1000 IU) qd    Quantity: 90    Pharmacy: [facility] [place] [place] - [facility]     Does the patient have enough for 3 days?   [] Yes   [x] No - Send as HP to POD

## 2024-03-28 ENCOUNTER — TELEPHONE (OUTPATIENT)
Age: 49
End: 2024-03-28

## 2024-04-02 ENCOUNTER — RA CDI HCC (OUTPATIENT)
Dept: OTHER | Facility: HOSPITAL | Age: 49
End: 2024-04-02

## 2024-04-02 PROBLEM — Z01.818 PREOP EXAMINATION: Status: RESOLVED | Noted: 2019-09-29 | Resolved: 2024-04-02

## 2024-04-02 PROBLEM — Z23 ENCOUNTER FOR VACCINATION: Status: RESOLVED | Noted: 2023-09-26 | Resolved: 2024-04-02

## 2024-04-02 PROBLEM — Z11.1 ENCOUNTER FOR PPD TEST: Status: RESOLVED | Noted: 2023-09-26 | Resolved: 2024-04-02

## 2024-04-03 ENCOUNTER — TELEPHONE (OUTPATIENT)
Dept: NEUROLOGY | Facility: CLINIC | Age: 49
End: 2024-04-03

## 2024-04-04 ENCOUNTER — CLINICAL SUPPORT (OUTPATIENT)
Dept: CARDIOLOGY CLINIC | Facility: CLINIC | Age: 49
End: 2024-04-04
Payer: MEDICARE

## 2024-04-04 DIAGNOSIS — R55 SYNCOPE, UNSPECIFIED SYNCOPE TYPE: Primary | ICD-10-CM

## 2024-04-04 DIAGNOSIS — R55 SYNCOPE: ICD-10-CM

## 2024-04-04 PROCEDURE — 93248 EXT ECG>7D<15D REV&INTERPJ: CPT | Performed by: INTERNAL MEDICINE

## 2024-04-08 ENCOUNTER — OFFICE VISIT (OUTPATIENT)
Dept: INTERNAL MEDICINE CLINIC | Facility: CLINIC | Age: 49
End: 2024-04-08
Payer: MEDICARE

## 2024-04-08 DIAGNOSIS — R41.89 COGNITIVE DECLINE: Primary | ICD-10-CM

## 2024-04-08 DIAGNOSIS — E53.8 B12 DEFICIENCY: ICD-10-CM

## 2024-04-08 DIAGNOSIS — M25.562 CHRONIC PAIN OF LEFT KNEE: ICD-10-CM

## 2024-04-08 DIAGNOSIS — R89.9 ABNORMAL LABORATORY TEST: ICD-10-CM

## 2024-04-08 DIAGNOSIS — E66.01 CLASS 3 SEVERE OBESITY DUE TO EXCESS CALORIES WITHOUT SERIOUS COMORBIDITY WITH BODY MASS INDEX (BMI) OF 40.0 TO 44.9 IN ADULT (HCC): ICD-10-CM

## 2024-04-08 DIAGNOSIS — G89.29 CHRONIC PAIN OF LEFT KNEE: ICD-10-CM

## 2024-04-08 DIAGNOSIS — E03.9 HYPOTHYROIDISM, UNSPECIFIED TYPE: ICD-10-CM

## 2024-04-08 DIAGNOSIS — R13.10 DYSPHAGIA, UNSPECIFIED TYPE: ICD-10-CM

## 2024-04-08 PROCEDURE — 99214 OFFICE O/P EST MOD 30 MIN: CPT | Performed by: GENERAL ACUTE CARE HOSPITAL

## 2024-04-08 PROCEDURE — G2211 COMPLEX E/M VISIT ADD ON: HCPCS | Performed by: GENERAL ACUTE CARE HOSPITAL

## 2024-04-08 RX ORDER — GINSENG 100 MG
CAPSULE ORAL DAILY
COMMUNITY
Start: 2024-04-05

## 2024-04-08 NOTE — ASSESSMENT & PLAN NOTE
Coughs when eating solids and liquids  Recent episode of choking reported by caregiver went to hospital.   On soft bite size diet   Group home require formal evaluation, refer to speech eval

## 2024-04-08 NOTE — ASSESSMENT & PLAN NOTE
On voltaren gel which helps but pt not able to verbalize for pain meds, change it from prn to standing order  Has xray ordered.   Pt has brain MRI with general anesthesia scheduled. It will be helpful to get her knee xray on the same day with MRI when she is under general anesthesia. She may get agitated during xray.

## 2024-04-08 NOTE — PROGRESS NOTES
Name: Aminta De La Torre      : 1975      MRN: [id number]  Encounter Provider: Radha Olvera MD  Encounter Date: 2024   Enc[facility] department: [facility]    Assessment & Plan     1. Cognitive decline  Assessment & Plan:  Has brain mri scheduled  Check b12 and tsh  F/u with neurology    Orders:  -     Ambulatory Referral to Neurology; Future  -     TSH, 3rd generation with Free T4 reflex; Future  -     Vitamin B12; Future  -     Methylmalonic acid, serum; Future    2. Chronic pain of left knee  Assessment & Plan:  On voltaren gel which helps but pt not able to verbalize for pain meds, change it from prn to standing order  Has xray ordered.   Pt has brain MRI with general anesthesia scheduled. It will be helpful to get her knee xray on the same day with MRI when she is under general anesthesia. She may get agitated during xray.     Orders:  -     Diclofenac Sodium (VOLTAREN) 1 %; Apply 2 g topically 4 (four) times a day  -     Basic metabolic panel; Future  -     CBC and differential; Future    3. Class 3 severe obesity due to excess calories without serious comorbidity with body mass index (BMI) of 40.0 to 44.9 in adult (HCC)  Assessment & Plan:  Continue to encourage healthy diet and regular exercise    Orders:  -     TSH, 3rd generation with Free T4 reflex; Future    4. Dysphagia, unspecified type  Assessment & Plan:  Coughs when eating solids and liquids  Recent episode of choking reported by caregiver went to hospital.   On soft bite size diet   Group home require formal evaluation, refer to speech eval    Orders:  -     FL barium swallow video w speech; Future; Expected date: 2024  -     Lipid Panel With Direct LDL; Future    5. Abnormal laboratory test    6. Hypothyroidism, unspecified type  -     TSH, 3rd generation with Free T4 reflex; Future    7. B12 deficiency  -     Vitamin B12; Future           Subjective      HPI  F/u on several issues  Cognitive decline  Recent her  previous day program refused to accept her back, due to her cognitive decline.   Can feed self, ambulate,wash face, dependent on caregiver for there ADL and iADLs.   Needs help with getting dressed, using bathroom  Usually sleeps most of the time, staff has to wake her up to get her to eat  Can recognize sister and brother, does not recognize previous providers in day program  Could not tell when she is in pain  Gets agitation, combative, talk to imaginary friend.     With current group home for 3 months. Care giver not sure what her baseline is.     Still has daily knee pain    Coughs when eating, both with liquids and solids  Recent choking episode went to hospital for it. Group home require formal evaluation for it.   Review of Systems    Current Outpatient Medications on File Prior to Visit   Medication Sig    atorvastatin (LIPITOR) 10 mg tablet Take 1 tablet (10 mg total) by mouth daily    bacitracin topical ointment 500 units/g topical ointment Apply topically in the morning    carbamide peroxide (DEBROX) 6.5 % otic solution Use 4 gtts to both ears bid x 10 days prior to office visit to edvin olivarez.    Cholecalciferol 25 MCG (1000 UT) tablet Take 1 tablet (1,000 Units total) by mouth daily    citalopram (CeleXA) 20 mg tablet Take 20 mg by mouth daily    dextromethorphan-guaiFENesin (ROBITUSSIN DM)  mg/5 mL syrup Take 5 mL by mouth 3 (three) times a day as needed for cough or congestion    lacosamide (VIMPAT) 50 mg Take 1 po HS    levothyroxine 50 mcg tablet TAKE ONE TABLET BY MOUTH AT 7 AM    melatonin 3 mg Take 1 tablet (3 mg total) by mouth daily at bedtime    Mouthwashes (Biotene Dry Mouth) LIQD Apply 1 Swab to the mouth or throat 2 (two) times a day as needed (dry mouth) Use 5ml of solution with each swab    sodium chloride (OCEAN) 0.65 % nasal spray 2 sprays into each nostril as needed for congestion or rhinitis    acetaminophen (TYLENOL) 500 mg tablet Take 2 tablets (1,000 mg total) by mouth  every 8 (eight) hours as needed for mild pain (Patient not taking: Reported on 3/12/2024)    Heating Pad PADS by Does not apply route 2 (two) times a day (Patient not taking: Reported on 3/12/2024)    ibuprofen (MOTRIN) 600 mg tablet Take 600 mg by mouth every 6 (six) hours as needed (Patient not taking: Reported on 3/12/2024)    Incontinence Supply Disposable (RA Adult Wipes) MISC Use daily (Patient not taking: Reported on 3/12/2024)    Incontinence Supply Disposable (Ultra-Soft Plus Briefs XXL) MISC Use if needed (incontinence) (Patient not taking: Reported on 3/12/2024)    Midazolam 5 MG/0.1ML SOLN For seizure longer than 5 min or cluster of seizures. Give 1 spray (5 mg) into 1 nostril; if no response, a second dose of 1 spray (5 mg) into the other nostril may be given 10 min. (Patient not taking: Reported on 3/12/2024)    nystatin (MYCOSTATIN) powder Apply topically 3 (three) times a day as needed (rash) (Patient not taking: Reported on 3/12/2024)    [DISCONTINUED] citalopram (CeleXA) 10 mg tablet Take 20 mg by mouth daily (Patient not taking: Reported on 3/12/2024)    [DISCONTINUED] Diclofenac Sodium (VOLTAREN) 1 % Apply 2 g topically 4 (four) times a day as needed (knee pain) (Patient not taking: Reported on 3/12/2024)       Objective     LMP  (LMP Unknown)     Physical Exam  Radha Olvera MD

## 2024-04-08 NOTE — TELEPHONE ENCOUNTER
Patient is scheduled for 4/18/2024 for BL ear exam under anesthesia with cerumen removal.     Letter under epic encounters date 3/28/2024. Please print for provider to complete and sign.

## 2024-04-10 ENCOUNTER — TELEPHONE (OUTPATIENT)
Dept: NEUROLOGY | Facility: CLINIC | Age: 49
End: 2024-04-10

## 2024-04-12 DIAGNOSIS — F51.01 PRIMARY INSOMNIA: ICD-10-CM

## 2024-04-12 DIAGNOSIS — F69 BEHAVIOR PROBLEM, ADULT: Primary | ICD-10-CM

## 2024-04-12 RX ORDER — LANOLIN ALCOHOL/MO/W.PET/CERES
3 CREAM (GRAM) TOPICAL
Qty: 30 TABLET | Refills: 5 | Status: SHIPPED | OUTPATIENT
Start: 2024-04-12

## 2024-04-15 RX ORDER — CITALOPRAM 20 MG/1
20 TABLET ORAL DAILY
Qty: 30 TABLET | Refills: 5 | Status: SHIPPED | OUTPATIENT
Start: 2024-04-15

## 2024-04-16 ENCOUNTER — TELEPHONE (OUTPATIENT)
Dept: FAMILY MEDICINE CLINIC | Facility: CLINIC | Age: 49
End: 2024-04-16

## 2024-04-16 NOTE — TELEPHONE ENCOUNTER
Yue from pre admission is calling asking if notes can be changed for physical component for MRI that she is having done     Any questions, please call Yue at [phone]    Thank you

## 2024-04-17 NOTE — PRE-PROCEDURE INSTRUCTIONS
Pre-Surgery Instructions:   Medication Instructions    acetaminophen (TYLENOL) 500 mg tablet Uses PRN- OK to take day of surgery    atorvastatin (LIPITOR) 10 mg tablet Take night before surgery    Cholecalciferol 25 MCG (1000 UT) tablet Hold day of surgery.    citalopram (CeleXA) 20 mg tablet Take day of surgery.    Diclofenac Sodium (VOLTAREN) 1 % Take day of surgery.    ibuprofen (MOTRIN) 600 mg tablet Uses PRN- OK to take day of surgery    lacosamide (VIMPAT) 50 mg Take night before surgery    levothyroxine 50 mcg tablet Take day of surgery.    melatonin 3 mg Take night before surgery    Mouthwashes (Biotene Dry Mouth) LIQD Take night before surgery    nystatin (MYCOSTATIN) powder Uses PRN- OK to take day of surgery    sodium chloride (OCEAN) 0.65 % nasal spray Uses PRN- OK to take day of surgery    The patient should have nothing to eat or drink after 11 pm the night before the MRI. The patient may take their medications with a sip of water at least 2 hours prior to their arrival time.  You will receive a call the evening before your MRI appointment with additional instructions.      Please leave your jewelry and valuables at home, wedding rings are the exception.   Please bring your physician order, insurance cards, a form of photo ID and a list of your medications with you. Arrive 15 minutes prior to your appointment time in order to register. Please bring any prior CT or MRI studies of this area that were not performed at a [facility].

## 2024-04-23 ENCOUNTER — PATIENT MESSAGE (OUTPATIENT)
Dept: INTERNAL MEDICINE CLINIC | Facility: CLINIC | Age: 49
End: 2024-04-23

## 2024-04-23 ENCOUNTER — HOSPITAL ENCOUNTER (OUTPATIENT)
Dept: RADIOLOGY | Facility: HOSPITAL | Age: 49
Discharge: HOME | End: 2024-04-23
Attending: GENERAL ACUTE CARE HOSPITAL
Payer: MEDICARE

## 2024-04-23 DIAGNOSIS — R26.81 UNSTEADY GAIT: Primary | ICD-10-CM

## 2024-04-23 DIAGNOSIS — R41.89 COGNITIVE DECLINE: ICD-10-CM

## 2024-04-23 DIAGNOSIS — R13.10 DYSPHAGIA, UNSPECIFIED TYPE: ICD-10-CM

## 2024-04-23 PROCEDURE — 92611 MOTION FLUOROSCOPY/SWALLOW: CPT

## 2024-04-23 PROCEDURE — 74230 X-RAY XM SWLNG FUNCJ C+: CPT

## 2024-04-29 ENCOUNTER — EVALUATION (OUTPATIENT)
Dept: PHYSICAL THERAPY | Facility: CLINIC | Age: 49
End: 2024-04-29
Payer: MEDICARE

## 2024-04-29 DIAGNOSIS — R26.81 UNSTEADY GAIT: ICD-10-CM

## 2024-04-29 PROCEDURE — 97162 PT EVAL MOD COMPLEX 30 MIN: CPT

## 2024-04-29 PROCEDURE — 97110 THERAPEUTIC EXERCISES: CPT

## 2024-04-29 NOTE — PROGRESS NOTES
PT Evaluation     Today's date: 2024  Patient name: Aminta De La Torre  : 1975  MRN: [id number]  Referring provider: Radha Olvera MD  Dx:   Encounter Diagnosis     ICD-10-CM    1. Unsteady gait  R26.81 Ambulatory Referral to Physical Therapy                     Assessment  Assessment details: Aminta De La Torre is a pleasant 49 y.o. female who was referred to outpatient physical therapy with the chief complaint unsteady gait and heavy reliance on caregivers. Currently, she needs assistance from caregivers for all mobility. They present with the goals to increase her balance and decrease her reliance on caregivers.    PT examination findings include: abnormal gait deviations such as reduced step length (step to pattern), minimal step height, increased lateral trunk sway bilaterally (L more so than R); slow gait speed of 0.19 m/s compared to gender and age matched controls of 1.31 m/s; max A to complete a STS transition, and reduced standing and walking tolerance with HHA via caregiver in order to complete. These finding classify her as a high falls risk and decreases her safety with functional mobility.     Time was spent educating her and her caregivers on POC with plan to trial RW to determine benefit on safety and easing of physical need from caregivers. Her overall participation may limit progress with PT. They verbalized understanding and is in agreement with this plan. The patient would benefit from skilled physical therapy to provide exercises, neuromuscular reeducation, gait training as deemed necessary in order to help her achieve her goals and maximize her safety and independence to decrease her falls risk and improve her participation.            Impairments: abnormal gait, abnormal or restricted ROM, activity intolerance, impaired balance, impaired physical strength, lacks appropriate home exercise program, pain with function, safety issue and weight-bearing intolerance  Understanding of Dx/Px/POC:  good   Prognosis: fair    Goals  STGs in 4 weeks  1. Patient will improve her gait speed by at least 0.1 m/s with appropriate AD for improved household ambulation.  2. Patient will be able to complete STS with no more than mod A from caregiver.  3. Patient will improve her walking tolerance to at least 75 ft with appropriate AD for improved endurance.    LTGs in 8 weeks  1. Patient will improve her walking tolerance to at least 100 ft with appropriate AD for improved endurance.  2. Patient will improve her gait speed to at least 0.4 m/s with appropriate AD for improved household ambulation.  3. Patient will be able to stand for 2 min with no more than HHA for improved weightbearing tolerance.  4. Patient will be able to complete STS with no more than min A from caregiver.  5. Patient will trial RW and determine safety and benefit.       Plan  Patient would benefit from: PT eval and skilled physical therapy  Planned therapy interventions: abdominal trunk stabilization, balance, neuromuscular re-education, patient education, postural training, strengthening, stretching, therapeutic activities, therapeutic exercise, gait training, graded activity, graded exercise and home exercise program  Frequency: 2x week  Duration in weeks: 8  Plan of Care beginning date: 4/29/2024  Plan of Care expiration date: 6/29/2024  Treatment plan discussed with: caregiver and patient        Subjective Evaluation    History of Present Illness  Mechanism of injury: Aminta presents in a wheelchair with Gena (caregiver) and Kimi (). They note that she walks but needs assistance to help guide her. They find that when she goes to stand up, she tries to push back. When walking, they find that she leans forward into them. She will randomly sit down when she wants to even if there is a chair there or not. She lives at a group home and 2 staff are with her at all times. She has been in this group home since October--unsure her  baseline of prior to coming to this group home.     She does have a seizure activity--undergoing some testing to determine if true seizure activity or syncope events when she is straining.     They note that she has arthritis in her L knee and had a previous fall and injury to it.   Patient Goals  Patient goals for therapy: increased strength and improved balance  Patient goal: try a RW to determine benefit  Pain  Pain scale: unsure--she will say ouch to everything.    Social Support  Steps to enter house: no  Stairs in house: no   Lives in: community-based residential facility (group home)          Objective      Balance Test  IE 4/29   Walking tolerance:   30 ft with HHA/CGA with WC follow   Gait Speed (m/s):  10m/50.8s = 0.19 m/s with HHA/CGA with WC follow   Sit to stand:  Max A/dependent (caregiver held/pulled her up via her hands)   Standing tolerance  1 min with HHA (1-2 people)   ABC Scale:  NT due to cognitive status       Sensation Left Right   Light Touch Grossly intact Grossly intact     Strength testing:  Unable to formally assess    PROM  Hip: WNL bilaterally  Knee: WNL R, slight reduction in L extension     Gait Assessment:  HHA via caregiver with CGA via PT (wc following): reduced step length (step to pattern), minimal step height, increased lateral trunk sway bilaterally (L more so than R)           Short Term Goal Expiration Date:(5/29/2024)  Long Term Goal Expiration Date: (6/29/2024)  POC Expiration Date: (6/29/2024)      POC expires Unit limit Auth Expiration date PT/OT/ST + Visit Limit?   6/29/24 6 NMR OT/PT N/A BOMN                           Visit/Unit Tracking  AUTH Status:  Date 4/29             BOMN Used 1 IE              To next PN  Of 10                   Precautions Downs Syndrome; seizure like activity; cognitive decline; dysphagia       Manuals 4/29                       Pt/caregiver education POC based on tolerance               Neuro Re-Ed         STS        Standing tolerance         Side stepping                                        Ther Ex                                                                        Ther Activity                        Gait Training        overground         AD training        Modalities

## 2024-04-30 ENCOUNTER — ANESTHESIA EVENT (OUTPATIENT)
Dept: RADIOLOGY | Facility: HOSPITAL | Age: 49
End: 2024-04-30

## 2024-04-30 NOTE — ANESTHESIA PREPROCEDURE EVALUATION
"Procedure:  MRI BRAIN SEIZURE WO AND W CONTRAST    Relevant Problems   ANESTHESIA (within normal limits)      CARDIO   (+) Hyperlipidemia      ENDO   (+) Hypothyroidism      GI/HEPATIC   (+) Dysphagia      /RENAL (within normal limits)      NEURO/PSYCH   (+) Reflex epilepsy (HCC)      PULMONARY (within normal limits)      Genetics   (+) Trisomy 21, Down syndrome      Neurology/Sleep   (+) Seizure-like activity (HCC)      Care Coordination   (+) Ambulatory dysfunction      Other   (+) Class 2 obesity with body mass index (BMI) of 37.0 to 37.9 in adult, unspecified obesity type, unspecified whether serious comorbidity present      EKG 10/2023:  Sinus rhythm with sinus arrhythmia  Right axis deviation  Abnormal ECG    TTE 2014:  LEFT VENTRICLE:   Systolic function was normal. Ejection fraction was estimated to be 55 %.   Although no diagnostic regional wall motion abnormality was identified, this   possibility cannot be completely excluded on the basis of this study.     RIGHT ATRIUM:   Size was at the upper limits of normal.     MITRAL VALVE:   There was trace regurgitation.     TRICUSPID VALVE:   There was mild regurgitation.   Pulmonary artery systolic pressure was within the normal range.     Lab Results   Component Value Date    WBC 7.28 10/31/2023    HGB 15.2 10/31/2023    HCT 44.6 10/31/2023     (H) 10/31/2023     10/31/2023     Lab Results   Component Value Date    SODIUM 135 10/31/2023    K 4.6 10/31/2023     10/31/2023    CO2 24 10/31/2023    BUN 9 10/31/2023    CREATININE 0.57 (L) 10/31/2023    GLUC 141 (H) 10/31/2023    CALCIUM 8.5 10/31/2023     Lab Results   Component Value Date    INR 1.08 02/28/2014    PROTIME 13.5 02/28/2014     No results found for: \"HGBA1C\"         Physical Exam    Airway    Mallampati score: unable to assess         Dental       Cardiovascular  Cardiovascular exam normal    Pulmonary  Pulmonary exam normal     Other Findings  post-pubertal.      Anesthesia " Plan  ASA Score- 3     Anesthesia Type- general with ASA Monitors.         Additional Monitors:     Airway Plan: ETT and LMA.           Plan Factors-    Chart reviewed. EKG reviewed.  Existing labs reviewed. Patient summary reviewed.                  Induction- intravenous.    Postoperative Plan-     Informed Consent- Anesthetic plan and risks discussed with sibling (consent obtained from patient's brother David De La Torre).  I personally reviewed this patient with the CRNA. Discussed and agreed on the Anesthesia Plan with the CRNA..

## 2024-05-01 ENCOUNTER — HOSPITAL ENCOUNTER (OUTPATIENT)
Dept: RADIOLOGY | Facility: HOSPITAL | Age: 49
Discharge: HOME | End: 2024-05-01
Payer: MEDICARE

## 2024-05-01 ENCOUNTER — ANESTHESIA (OUTPATIENT)
Dept: RADIOLOGY | Facility: HOSPITAL | Age: 49
End: 2024-05-01

## 2024-05-01 VITALS
HEIGHT: 55 IN | DIASTOLIC BLOOD PRESSURE: 64 MMHG | WEIGHT: 154 LBS | SYSTOLIC BLOOD PRESSURE: 117 MMHG | OXYGEN SATURATION: 95 % | TEMPERATURE: 97.5 F | HEART RATE: 117 BPM | RESPIRATION RATE: 22 BRPM | BODY MASS INDEX: 35.64 KG/M2

## 2024-05-01 DIAGNOSIS — R56.9 SEIZURE-LIKE ACTIVITY (HCC): ICD-10-CM

## 2024-05-01 DIAGNOSIS — R55 SYNCOPE: ICD-10-CM

## 2024-05-01 PROCEDURE — A9585 GADOBUTROL INJECTION: HCPCS | Performed by: PHYSICIAN ASSISTANT

## 2024-05-01 PROCEDURE — G1004 CDSM NDSC: HCPCS

## 2024-05-01 PROCEDURE — 70553 MRI BRAIN STEM W/O & W/DYE: CPT

## 2024-05-01 RX ORDER — GADOBUTROL 604.72 MG/ML
7 INJECTION INTRAVENOUS
Status: COMPLETED | OUTPATIENT
Start: 2024-05-01 | End: 2024-05-01

## 2024-05-01 RX ORDER — GLYCOPYRROLATE 0.2 MG/ML
INJECTION INTRAMUSCULAR; INTRAVENOUS AS NEEDED
Status: DISCONTINUED | OUTPATIENT
Start: 2024-05-01 | End: 2024-05-01

## 2024-05-01 RX ORDER — PROPOFOL 10 MG/ML
INJECTION, EMULSION INTRAVENOUS AS NEEDED
Status: DISCONTINUED | OUTPATIENT
Start: 2024-05-01 | End: 2024-05-01

## 2024-05-01 RX ORDER — LIDOCAINE HYDROCHLORIDE 10 MG/ML
INJECTION, SOLUTION EPIDURAL; INFILTRATION; INTRACAUDAL; PERINEURAL AS NEEDED
Status: DISCONTINUED | OUTPATIENT
Start: 2024-05-01 | End: 2024-05-01

## 2024-05-01 RX ORDER — EPHEDRINE SULFATE 50 MG/ML
INJECTION INTRAVENOUS AS NEEDED
Status: DISCONTINUED | OUTPATIENT
Start: 2024-05-01 | End: 2024-05-01

## 2024-05-01 RX ORDER — ONDANSETRON 2 MG/ML
INJECTION INTRAMUSCULAR; INTRAVENOUS AS NEEDED
Status: DISCONTINUED | OUTPATIENT
Start: 2024-05-01 | End: 2024-05-01

## 2024-05-01 RX ORDER — SODIUM CHLORIDE, SODIUM LACTATE, POTASSIUM CHLORIDE, CALCIUM CHLORIDE 600; 310; 30; 20 MG/100ML; MG/100ML; MG/100ML; MG/100ML
INJECTION, SOLUTION INTRAVENOUS CONTINUOUS PRN
Status: DISCONTINUED | OUTPATIENT
Start: 2024-05-01 | End: 2024-05-01

## 2024-05-01 RX ORDER — DEXAMETHASONE SODIUM PHOSPHATE 10 MG/ML
INJECTION, SOLUTION INTRAMUSCULAR; INTRAVENOUS AS NEEDED
Status: DISCONTINUED | OUTPATIENT
Start: 2024-05-01 | End: 2024-05-01

## 2024-05-01 RX ADMIN — EPHEDRINE SULFATE 5 MG: 50 INJECTION, SOLUTION INTRAVENOUS at 15:58

## 2024-05-01 RX ADMIN — PROPOFOL 120 MG: 10 INJECTION, EMULSION INTRAVENOUS at 15:58

## 2024-05-01 RX ADMIN — EPHEDRINE SULFATE 5 MG: 50 INJECTION, SOLUTION INTRAVENOUS at 16:01

## 2024-05-01 RX ADMIN — LIDOCAINE HYDROCHLORIDE 5 ML: 10 INJECTION, SOLUTION EPIDURAL; INFILTRATION; INTRACAUDAL; PERINEURAL at 15:58

## 2024-05-01 RX ADMIN — EPHEDRINE SULFATE 10 MG: 50 INJECTION, SOLUTION INTRAVENOUS at 16:36

## 2024-05-01 RX ADMIN — ONDANSETRON 4 MG: 2 INJECTION INTRAMUSCULAR; INTRAVENOUS at 16:05

## 2024-05-01 RX ADMIN — SODIUM CHLORIDE, SODIUM LACTATE, POTASSIUM CHLORIDE, AND CALCIUM CHLORIDE: .6; .31; .03; .02 INJECTION, SOLUTION INTRAVENOUS at 15:39

## 2024-05-01 RX ADMIN — EPHEDRINE SULFATE 10 MG: 50 INJECTION, SOLUTION INTRAVENOUS at 16:24

## 2024-05-01 RX ADMIN — EPHEDRINE SULFATE 5 MG: 50 INJECTION, SOLUTION INTRAVENOUS at 16:10

## 2024-05-01 RX ADMIN — GLYCOPYRROLATE 0.2 MG: 0.2 INJECTION, SOLUTION INTRAMUSCULAR; INTRAVENOUS at 15:58

## 2024-05-01 RX ADMIN — EPHEDRINE SULFATE 10 MG: 50 INJECTION, SOLUTION INTRAVENOUS at 16:14

## 2024-05-01 RX ADMIN — DEXAMETHASONE SODIUM PHOSPHATE 5 MG: 10 INJECTION, SOLUTION INTRAMUSCULAR; INTRAVENOUS at 16:05

## 2024-05-01 RX ADMIN — GADOBUTROL 7 ML: 604.72 INJECTION INTRAVENOUS at 16:49

## 2024-05-01 NOTE — ANESTHESIA POSTPROCEDURE EVALUATION
Post-Op Assessment Note    CV Status:  Stable  Pain Score: 0    Pain management: adequate       Mental Status:  Arousable   Hydration Status:  Euvolemic   PONV Controlled:  Controlled   Airway Patency:  Patent     Post Op Vitals Reviewed: Yes    No anethesia notable event occurred.    Staff: Anesthesiologist, CRNA               BP   Unable to obtain patient moving/uncooperative   Temp   97.8   Pulse  110   Resp      SpO2   97 room air

## 2024-05-01 NOTE — NURSING NOTE
Pt arrived with brother and case workers. Brother signed consent.   Pt lives in a group home and has down syndrome. Nonverbal and can be combative per brother. Pt presents calm and cooperative.     MRI brain seizure w/without contrast complete. Pt tolerated well.   VSS post procedure. Pt alert and oriented to baseline. Pt on room air.  No complaints or visible signs of distress.    Report given to NARENDRA HaywardU ORQUIDEA.  Transported back to [facility].

## 2024-05-02 ENCOUNTER — OFFICE VISIT (OUTPATIENT)
Dept: PHYSICAL THERAPY | Facility: CLINIC | Age: 49
End: 2024-05-02
Payer: MEDICARE

## 2024-05-02 DIAGNOSIS — R26.81 UNSTEADY GAIT: Primary | ICD-10-CM

## 2024-05-02 PROCEDURE — 97116 GAIT TRAINING THERAPY: CPT

## 2024-05-02 NOTE — PROGRESS NOTES
Daily Note     Today's date: 2024  Patient name: Aminta De La Torre  : 1975  MRN: [id number]  Referring provider: Radha Olvera MD  Dx:   Encounter Diagnosis     ICD-10-CM    1. Unsteady gait  R26.81                      Subjective: Aminta presents in transport chair with her caregiver.       Objective: See treatment diary below      Assessment: Aminta tolerated treatment fair. She needed max A to complete STS throughout session, pulling up on her caregiver and PT assisting at the hips to achieve full upright. She exhibited hesitation of utilizing parallel bars for UE support with ambulation, tending to attempt to grab caregiver hands. With PT helping facilitating weight shifting at her hips, she took more consistent steps with less pauses; however, no consistent throughout session. Within session, she had improved turning ranging from max A to mod A. She did have a tendency to push backwards at her trunk while ambulating and unclear if RW will be a safe option for her given this tendency with the parallel bars. She would benefit from continued skilled PT.      Plan: Continue per plan of care.  Progress treatment as tolerated.       Short Term Goal Expiration Date:(2024)  Long Term Goal Expiration Date: (2024)  POC Expiration Date: (2024)      POC expires Unit limit Auth Expiration date PT/OT/ST + Visit Limit?   24 6 NMR OT/PT N/A BOMN                           Visit/Unit Tracking  AUTH Status:  Date             BOMN Used 1 IE 2             To next PN  Of 10 Of 10                  Precautions Downs Syndrome; seizure like activity; cognitive decline; dysphagia       Manuals                       Pt/caregiver education POC based on tolerance               Neuro Re-Ed         STS  Max A x2 throughout session    Caregiver holding her hands, PT helping at hips      Standing tolerance        Side stepping                                        Ther Ex        LAQ        Seated march                                                         Ther Activity                        Gait Training        overground  //bars (long)  Max encouragement & A     Caregiver in front, guiding hands on railing, PT behind encouraging weightshifting at hips    6x1 length    Max A to fully turn       AD training        Modalities

## 2024-05-06 ENCOUNTER — OFFICE VISIT (OUTPATIENT)
Dept: PHYSICAL THERAPY | Facility: CLINIC | Age: 49
End: 2024-05-06
Payer: MEDICARE

## 2024-05-06 DIAGNOSIS — R26.81 UNSTEADY GAIT: Primary | ICD-10-CM

## 2024-05-06 PROCEDURE — 97116 GAIT TRAINING THERAPY: CPT

## 2024-05-06 PROCEDURE — 97112 NEUROMUSCULAR REEDUCATION: CPT

## 2024-05-06 NOTE — PROGRESS NOTES
Daily Note     Today's date: 2024  Patient name: Aminta De La Torre  : 1975  MRN: [id number]  Referring provider: Radha Olvera MD  Dx:   Encounter Diagnosis     ICD-10-CM    1. Unsteady gait  R26.81                      Subjective: Aminta presents in a WC with a different caregiver, Noemy, today compared to last session.       Objective: See treatment diary below      Assessment: Aminta tolerated treatment fair. Attempted to stand at a RW and needed max A to transition hands from caregiver onto the hand  of the RW. Once upright, she was able to maintain her trunk stability with mostly CGA and rare min A. After a few seconds, she would move her hand position and max cues to adjust. She seemed ready to take steps with RW but when attempted with PT advancing RW, she would not advance her feet. Even ambulation within parallel bars today was limited, taking significantly increased time to complete 1 lap with constant cues and encouragement. She would benefit from continued skilled PT to progress as tolerated within her participation level.       Plan: Continue per plan of care.  Progress treatment as tolerated.       Short Term Goal Expiration Date:(2024)  Long Term Goal Expiration Date: (2024)  POC Expiration Date: (2024)      POC expires Unit limit Auth Expiration date PT/OT/ST + Visit Limit?   24 6 NMR OT/PT N/A BOMN                           Visit/Unit Tracking  AUTH Status:  Date            BOMN Used 1 IE 2 3            To next PN  Of 10 Of 10 Of 10                 Precautions Downs Syndrome; seizure like activity; cognitive decline; dysphagia       Manuals                      Pt/caregiver education POC based on tolerance               Neuro Re-Ed         STS  Max A x2 throughout session    Caregiver holding her hands, PT helping at hips Max A x2 throughout session         Standing tolerance   Stand with hands on RW, CGA to min A at trunk    ~2 min  ~2 min      Side stepping                                        Ther Ex        LAQ        Seated march                                                        Ther Activity                        Gait Training        overground  //bars (long)  Max encouragement & A     Caregiver in front, guiding hands on railing, PT behind encouraging weightshifting at hips    6x1 length    Max A to fully turn //bars (long) max encouragement & A    Caregiver in front, guiding hands on railing, PT behind encouraging weightshifting at hips    X1 length (significant increase in duration)    Attempted 2nd lap but pt refused      AD training   Attempted to take steps with RW, max A and WC follow    Pt only took 1 step     Modalities

## 2024-05-09 ENCOUNTER — OFFICE VISIT (OUTPATIENT)
Dept: PHYSICAL THERAPY | Facility: CLINIC | Age: 49
End: 2024-05-09
Payer: MEDICARE

## 2024-05-09 DIAGNOSIS — R26.81 UNSTEADY GAIT: Primary | ICD-10-CM

## 2024-05-09 PROCEDURE — 97112 NEUROMUSCULAR REEDUCATION: CPT

## 2024-05-09 PROCEDURE — 97116 GAIT TRAINING THERAPY: CPT

## 2024-05-09 NOTE — PROGRESS NOTES
Daily Note     Today's date: 2024  Patient name: Aminta De La Torre  : 1975  MRN: [id number]  Referring provider: Radha Olvera MD  Dx:   Encounter Diagnosis     ICD-10-CM    1. Unsteady gait  R26.81                      Subjective: Aminta presents in WC with her caregiver.       Objective: See treatment diary below      Assessment: Aminta tolerated treatment well and was more engaged within session today. Her STS ranged from mod to max A x2 , mostly with caregiver pulling up on her arms and some assistance at hips to achieve full upright. She needed max encouragement and hand over hand to place her hands on the parallel bars today. At times, she was able to independently glide her hands along the parallel bars; however, at times, needed max A to advance them from caregiver. Her turns were slightly improved today, continuing to need mod to max A. Based on amount of assistance needed to place her hands on parallel bars and guidance needed to slide along them, unlikely that she will be able to consistently and safely utilize a RW without at least mod to max A. Will continue to trial standing and walking with RW as able and within parallel bars to reduce assistance as able.      Plan: Continue per plan of care.      Short Term Goal Expiration Date:(2024)  Long Term Goal Expiration Date: (2024)  POC Expiration Date: (2024)      POC expires Unit limit Auth Expiration date PT/OT/ST + Visit Limit?   24 6 NMR OT/PT N/A BOMN                           Visit/Unit Tracking  AUTH Status:  Date           BOMN Used 1 IE 2 3 4           To next PN  Of 10 Of 10 Of 10 Of 10                Precautions Downs Syndrome; seizure like activity; cognitive decline; dysphagia       Manuals                     Pt/caregiver education POC based on tolerance               Neuro Re-Ed         STS  Max A x2 throughout session    Caregiver holding her hands, PT helping at hips Max A x2  throughout session     Mod to max A x2 throughout    Standing tolerance   Stand with hands on RW, CGA to min A at trunk    ~2 min  ~2 min     Side stepping                                        Ther Ex        LAQ        Seated march                                                        Ther Activity                        Gait Training        overground  //bars (long)  Max encouragement & A     Caregiver in front, guiding hands on railing, PT behind encouraging weightshifting at hips    6x1 length    Max A to fully turn //bars (long) max encouragement & A    Caregiver in front, guiding hands on railing, PT behind encouraging weightshifting at hips    X1 length (significant increase in duration)    Attempted 2nd lap but pt refused //bars (long)  Max encouragement & min A    Caregiver in front    Pt able to advance slide UE independently along bar intermittently    2x1 length  X1 lap  2x1 length     AD training   Attempted to take steps with RW, max A and WC follow    Pt only took 1 step     Modalities

## 2024-05-13 ENCOUNTER — OFFICE VISIT (OUTPATIENT)
Dept: PHYSICAL THERAPY | Facility: CLINIC | Age: 49
End: 2024-05-13
Payer: MEDICARE

## 2024-05-13 DIAGNOSIS — R26.81 UNSTEADY GAIT: Primary | ICD-10-CM

## 2024-05-13 PROCEDURE — 97112 NEUROMUSCULAR REEDUCATION: CPT

## 2024-05-13 PROCEDURE — 97116 GAIT TRAINING THERAPY: CPT

## 2024-05-13 NOTE — PROGRESS NOTES
Daily Note     Today's date: 2024  Patient name: Aminta De La Torre  : 1975  MRN: [id number]  Referring provider: Radha Olvera MD  Dx:   Encounter Diagnosis     ICD-10-CM    1. Unsteady gait  R26.81                      Subjective: Aminta presents with her caregiver Noemy who notes that she is angry today.       Objective: See treatment diary below      Assessment: Aminta tolerated treatment fair, needing max encouragement and initial dependent STS to initiate session. As session went on, she was more willing to participate. She needed hand over hand guidance to progress UE along parallel bars today and if caregiver removed her hands, Aminta would not walk and tended to lean back into PT. She would benefit from continued skilled PT to progress as able given her participation.      Plan: Continue per plan of care.      Short Term Goal Expiration Date:(2024)  Long Term Goal Expiration Date: (2024)  POC Expiration Date: (2024)      POC expires Unit limit Auth Expiration date PT/OT/ST + Visit Limit?   24 6 NMR OT/PT N/A BOMN                           Visit/Unit Tracking  AUTH Status:  Date          BOMN Used 1 IE 2 3 4 5          To next PN  Of 10 Of 10 Of 10 Of 10 Of 10               Precautions Downs Syndrome; seizure like activity; cognitive decline; dysphagia       Manuals                    Pt/caregiver education POC based on tolerance               Neuro Re-Ed         STS  Max A x2 throughout session    Caregiver holding her hands, PT helping at hips Max A x2 throughout session     Mod to max A x2 throughout Max A x2 people throughout   Standing tolerance   Stand with hands on RW, CGA to min A at trunk    ~2 min  ~2 min     Side stepping                                        Ther Ex        LAQ        Seated march                                                        Ther Activity                        Gait Training        overground  //bars  (long)  Max encouragement & A     Caregiver in front, guiding hands on railing, PT behind encouraging weightshifting at hips    6x1 length    Max A to fully turn //bars (long) max encouragement & A    Caregiver in front, guiding hands on railing, PT behind encouraging weightshifting at hips    X1 length (significant increase in duration)    Attempted 2nd lap but pt refused //bars (long)  Max encouragement & min A    Caregiver in front    Pt able to advance slide UE independently along bar intermittently    2x1 length  X1 lap  2x1 length //bars (long)  Max encouragement and min A    Caregiver in front, guiding pt hands, PT behind providing min A at trunk to minimize posterior imbalance    4x1 length    Max A for turns    AD training   Attempted to take steps with RW, max A and WC follow    Pt only took 1 step     Modalities

## 2024-05-16 ENCOUNTER — OFFICE VISIT (OUTPATIENT)
Dept: PHYSICAL THERAPY | Facility: CLINIC | Age: 49
End: 2024-05-16
Payer: MEDICARE

## 2024-05-16 DIAGNOSIS — R26.81 UNSTEADY GAIT: Primary | ICD-10-CM

## 2024-05-16 PROCEDURE — 97116 GAIT TRAINING THERAPY: CPT

## 2024-05-16 NOTE — PROGRESS NOTES
Daily Note     Today's date: 2024  Patient name: Aminta De La Torre  : 1975  MRN: [id number]  Referring provider: Radha Olvera MD  Dx:   Encounter Diagnosis     ICD-10-CM    1. Unsteady gait  R26.81                      Subjective: Aminta presents with her caregiver. No new issues reported.      Objective: See treatment diary below      Assessment: Aminta tolerated treatment fair. Attempted ambulation with RW again. She needed max A x2 people to safely ambulate with the RW. She was unable to advance the RW independently with caregiver pulling the RW and PT providing assistance at trunk as she would tend to either lean backwards or forwards and randomly let going, causing her to tip laterally. At this point, ambulating with a RW seems more unsafe, requiring more people to assist in order to safely ambulate in this manner than with HHA from caregiver.       Plan: Continue per plan of care.  Progress treatment as tolerated.       Short Term Goal Expiration Date:(2024)  Long Term Goal Expiration Date: (2024)  POC Expiration Date: (2024)      POC expires Unit limit Auth Expiration date PT/OT/ST + Visit Limit?   24 6 NMR OT/PT N/A BOMN                           Visit/Unit Tracking  AUTH Status:  Date         BOMN Used 1 IE 2 3 4 5 6         To next PN  Of 10 Of 10 Of 10 Of 10 Of 10 Of 10              Precautions Downs Syndrome; seizure like activity; cognitive decline; dysphagia       Manuals                    Pt/caregiver education                Neuro Re-Ed         STS Mod to max A x2 people throughout Max A x2 throughout session    Caregiver holding her hands, PT helping at hips Max A x2 throughout session     Mod to max A x2 throughout Max A x2 people throughout   Standing tolerance   Stand with hands on RW, CGA to min A at trunk    ~2 min  ~2 min     Side stepping                                        Ther Ex        LAQ        Seated march                                                         Ther Activity                        Gait Training        overground //bars (long) max encouragement & min to mod A    Caregiver in front, guiding pt hands, PT behind providing min to mod A at trunk to minimize posterior lean    2x1 length  X1 lap    Max A on turns //bars (long)  Max encouragement & A     Caregiver in front, guiding hands on railing, PT behind encouraging weightshifting at hips    6x1 length    Max A to fully turn //bars (long) max encouragement & A    Caregiver in front, guiding hands on railing, PT behind encouraging weightshifting at hips    X1 length (significant increase in duration)    Attempted 2nd lap but pt refused //bars (long)  Max encouragement & min A    Caregiver in front    Pt able to advance slide UE independently along bar intermittently    2x1 length  X1 lap  2x1 length //bars (long)  Max encouragement and min A    Caregiver in front, guiding pt hands, PT behind providing min A at trunk to minimize posterior imbalance    4x1 length    Max A for turns    AD training With RW, WC follow    2x10 ft    Max A--caregiver advancing RW and mod A at trunk to maintain upright  Attempted to take steps with RW, max A and WC follow    Pt only took 1 step     Modalities

## 2024-05-20 ENCOUNTER — OFFICE VISIT (OUTPATIENT)
Dept: PHYSICAL THERAPY | Facility: CLINIC | Age: 49
End: 2024-05-20
Payer: MEDICARE

## 2024-05-20 DIAGNOSIS — R26.81 UNSTEADY GAIT: Primary | ICD-10-CM

## 2024-05-20 PROCEDURE — 97112 NEUROMUSCULAR REEDUCATION: CPT

## 2024-05-20 PROCEDURE — 97116 GAIT TRAINING THERAPY: CPT

## 2024-05-20 NOTE — PROGRESS NOTES
Daily Note     Today's date: 2024  Patient name: Aminta De La Torre  : 1975  MRN: [id number]  Referring provider: Radha Olvera MD  Dx:   Encounter Diagnosis     ICD-10-CM    1. Unsteady gait  R26.81                      Subjective: Aminta presents with her caregiver Noemy.      Objective: See treatment diary below      Assessment: Aminta tolerated treatment well. She did have one instance of completing a STS with no assistance, supporting that low motivation is more the cause for physical assistance. She was unable to advance her UE along the parallel bars independently, needing mod A from caregiver and constant encouragement. She would benefit from continued skilled PT in attempt to improve her safety and independence.      Plan: Continue per plan of care.      Short Term Goal Expiration Date:(2024)  Long Term Goal Expiration Date: (2024)  POC Expiration Date: (2024)      POC expires Unit limit Auth Expiration date PT/OT/ST + Visit Limit?   24 6 NMR OT/PT N/A BOMN                           Visit/Unit Tracking  AUTH Status:  Date        BOMN Used 1 IE 2 3 4 5 6 7        To next PN  Of 10 Of 10 Of 10 Of 10 Of 10 Of 10 Of 10             Precautions Downs Syndrome; seizure like activity; cognitive decline; dysphagia       Manuals                    Pt/caregiver education                Neuro Re-Ed         STS Mod to max A x2 people throughout Mod to max A x2 people    1 instances of no A needed Max A x2 throughout session     Mod to max A x2 throughout Max A x2 people throughout   Standing tolerance   Stand with hands on RW, CGA to min A at trunk    ~2 min  ~2 min     Side stepping                                        Ther Ex        LAQ        Seated march                                                        Ther Activity                        Gait Training        overground //bars (long) max encouragement & min to mod A    Caregiver  in front, guiding pt hands, PT behind providing min to mod A at trunk to minimize posterior lean    2x1 length  X1 lap    Max A on turns //bars (long)  Max encouragement & min to mod A    Caregiver in front, guiding pt hands, PT behind providing CGA to min A at trunk to minimize posterior lean    6x1 length    Mod A for turns //bars (long) max encouragement & A    Caregiver in front, guiding hands on railing, PT behind encouraging weightshifting at hips    X1 length (significant increase in duration)    Attempted 2nd lap but pt refused //bars (long)  Max encouragement & min A    Caregiver in front    Pt able to advance slide UE independently along bar intermittently    2x1 length  X1 lap  2x1 length //bars (long)  Max encouragement and min A    Caregiver in front, guiding pt hands, PT behind providing min A at trunk to minimize posterior imbalance    4x1 length    Max A for turns    AD training With RW, WC follow    2x10 ft    Max A--caregiver advancing RW and mod A at trunk to maintain upright  Attempted to take steps with RW, max A and WC follow    Pt only took 1 step     Modalities

## 2024-05-22 DIAGNOSIS — M21.70 LEG LENGTH DISCREPANCY: Primary | ICD-10-CM

## 2024-05-23 ENCOUNTER — OFFICE VISIT (OUTPATIENT)
Dept: PHYSICAL THERAPY | Facility: CLINIC | Age: 49
End: 2024-05-23
Payer: MEDICARE

## 2024-05-23 DIAGNOSIS — R26.81 UNSTEADY GAIT: Primary | ICD-10-CM

## 2024-05-23 PROCEDURE — 97112 NEUROMUSCULAR REEDUCATION: CPT

## 2024-05-23 PROCEDURE — 97116 GAIT TRAINING THERAPY: CPT

## 2024-05-23 NOTE — PROGRESS NOTES
Daily Note     Today's date: 2024  Patient name: Aminta De La Torre  : 1975  MRN: [id number]  Referring provider: Radha Olvera MD  Dx:   Encounter Diagnosis     ICD-10-CM    1. Unsteady gait  R26.81                      Subjective: Aminta presents in WC with her caregiver. Her caregiver notes that it was recommended she see an orthopedic to get a brace for her leg for her leg length issues. She is not sure that she will actually wear it.       Objective: See treatment diary below      Assessment: Aminta tolerated treatment fair. She needed constant encouragement to participate and mod to max A to complete. Unable to advance her UE on the parallel bars independently. She had more pushing back into PT behind today and more pulling of her hands along the bars from her caregiver. Progress note due next session.      Plan: Progress note during next visit.      Short Term Goal Expiration Date:(2024)  Long Term Goal Expiration Date: (2024)  POC Expiration Date: (2024)      POC expires Unit limit Auth Expiration date PT/OT/ST + Visit Limit?   24 6 NMR OT/PT N/A BOMN                           Visit/Unit Tracking  AUTH Status:  Date       BOMN Used 1 IE 2 3 4 5 6 7 8       To next PN  Of 10 Of 10 Of 10 Of 10 Of 10 Of 10 Of 10 Of 10            Precautions Downs Syndrome; seizure like activity; cognitive decline; dysphagia       Manuals                    Pt/caregiver education                Neuro Re-Ed         STS Mod to max A x2 people throughout Mod to max A x2 people    1 instances of no A needed Mod to max A x2 people Mod to max A x2 throughout Max A x2 people throughout   Standing tolerance        Side stepping                                        Ther Ex        LAQ        Seated march                                                        Ther Activity                        Gait Training        overground //bars (long) max  encouragement & min to mod A    Caregiver in front, guiding pt hands, PT behind providing min to mod A at trunk to minimize posterior lean    2x1 length  X1 lap    Max A on turns //bars (long)  Max encouragement & min to mod A    Caregiver in front, guiding pt hands, PT behind providing CGA to min A at trunk to minimize posterior lean    6x1 length    Mod A for turns //bars (long)  Max encouragement & mod to max A    Caregiver in front, guiding pt hands, PT behind providing CGA to min A at trunk to minimize posterior lean    2x1 length  2X1 lap //bars (long)  Max encouragement & min A    Caregiver in front    Pt able to advance slide UE independently along bar intermittently    2x1 length  X1 lap  2x1 length //bars (long)  Max encouragement and min A    Caregiver in front, guiding pt hands, PT behind providing min A at trunk to minimize posterior imbalance    4x1 length    Max A for turns    AD training With RW, WC follow    2x10 ft    Max A--caregiver advancing RW and mod A at trunk to maintain upright       Modalities

## 2024-05-24 ENCOUNTER — APPOINTMENT (OUTPATIENT)
Dept: PHYSICAL THERAPY | Facility: CLINIC | Age: 49
End: 2024-05-24
Payer: MEDICARE

## 2024-05-24 DIAGNOSIS — E78.3 MIXED HYPERGLYCERIDEMIA: ICD-10-CM

## 2024-05-28 ENCOUNTER — HOSPITAL ENCOUNTER (OUTPATIENT)
Dept: RADIOLOGY | Facility: HOSPITAL | Age: 49
Discharge: HOME | End: 2024-05-28
Payer: MEDICARE

## 2024-05-28 ENCOUNTER — EVALUATION (OUTPATIENT)
Dept: PHYSICAL THERAPY | Facility: CLINIC | Age: 49
End: 2024-05-28
Payer: MEDICARE

## 2024-05-28 ENCOUNTER — HOSPITAL ENCOUNTER (OUTPATIENT)
Dept: RADIOLOGY | Facility: HOSPITAL | Age: 49
Discharge: HOME | End: 2024-05-28
Attending: ORTHOPAEDIC SURGERY
Payer: MEDICARE

## 2024-05-28 ENCOUNTER — OFFICE VISIT (OUTPATIENT)
Dept: OBGYN CLINIC | Facility: HOSPITAL | Age: 49
End: 2024-05-28
Payer: MEDICARE

## 2024-05-28 VITALS
DIASTOLIC BLOOD PRESSURE: 86 MMHG | BODY MASS INDEX: 35.79 KG/M2 | SYSTOLIC BLOOD PRESSURE: 125 MMHG | HEART RATE: 86 BPM | HEIGHT: 55 IN

## 2024-05-28 DIAGNOSIS — M25.562 CHRONIC PAIN OF LEFT KNEE: ICD-10-CM

## 2024-05-28 DIAGNOSIS — R26.2 IMPAIRED AMBULATION: ICD-10-CM

## 2024-05-28 DIAGNOSIS — R26.81 UNSTEADY GAIT: Primary | ICD-10-CM

## 2024-05-28 DIAGNOSIS — G89.29 CHRONIC PAIN OF LEFT KNEE: Primary | ICD-10-CM

## 2024-05-28 DIAGNOSIS — M17.12 ARTHRITIS OF LEFT KNEE: ICD-10-CM

## 2024-05-28 DIAGNOSIS — M25.562 CHRONIC PAIN OF LEFT KNEE: Primary | ICD-10-CM

## 2024-05-28 DIAGNOSIS — M21.70 LEG LENGTH DISCREPANCY: ICD-10-CM

## 2024-05-28 DIAGNOSIS — G89.29 CHRONIC PAIN OF LEFT KNEE: ICD-10-CM

## 2024-05-28 PROCEDURE — 97112 NEUROMUSCULAR REEDUCATION: CPT

## 2024-05-28 PROCEDURE — 97116 GAIT TRAINING THERAPY: CPT

## 2024-05-28 PROCEDURE — 20610 DRAIN/INJ JOINT/BURSA W/O US: CPT | Performed by: ORTHOPAEDIC SURGERY

## 2024-05-28 PROCEDURE — 73560 X-RAY EXAM OF KNEE 1 OR 2: CPT

## 2024-05-28 PROCEDURE — 99203 OFFICE O/P NEW LOW 30 MIN: CPT | Performed by: ORTHOPAEDIC SURGERY

## 2024-05-28 RX ORDER — BETAMETHASONE SODIUM PHOSPHATE AND BETAMETHASONE ACETATE 3; 3 MG/ML; MG/ML
12 INJECTION, SUSPENSION INTRA-ARTICULAR; INTRALESIONAL; INTRAMUSCULAR; SOFT TISSUE
Status: COMPLETED | OUTPATIENT
Start: 2024-05-28 | End: 2024-05-28

## 2024-05-28 RX ORDER — ATORVASTATIN CALCIUM 10 MG/1
10 TABLET, FILM COATED ORAL DAILY
Qty: 30 TABLET | Refills: 5 | Status: SHIPPED | OUTPATIENT
Start: 2024-05-28 | End: 2024-11-24

## 2024-05-28 RX ORDER — BUPIVACAINE HYDROCHLORIDE 2.5 MG/ML
4 INJECTION, SOLUTION INFILTRATION; PERINEURAL
Status: COMPLETED | OUTPATIENT
Start: 2024-05-28 | End: 2024-05-28

## 2024-05-28 RX ADMIN — BETAMETHASONE SODIUM PHOSPHATE AND BETAMETHASONE ACETATE 12 MG: 3; 3 INJECTION, SUSPENSION INTRA-ARTICULAR; INTRALESIONAL; INTRAMUSCULAR; SOFT TISSUE at 08:00

## 2024-05-28 RX ADMIN — BUPIVACAINE HYDROCHLORIDE 4 ML: 2.5 INJECTION, SOLUTION INFILTRATION; PERINEURAL at 08:00

## 2024-05-28 NOTE — PROGRESS NOTES
"Assessment:   Diagnosis ICD-10-CM Associated Orders   1. Chronic pain of left knee  M25.562 Large joint arthrocentesis: L knee    G89.29       2. Impaired ambulation  R26.2 Ambulatory Referral to Orthopedic Surgery      3. Arthritis of left knee  M17.12 Large joint arthrocentesis: L knee        Plan:  We discussed that the patient's main issue is spasticity from her Down Syndrome which has lead to a left knee contracture.  Based on the patient's X-rays, she has moderate left knee osteoarthritis.  We discussed that knee replacement surgery for this patient is a very bad idea.  We could possibly consider a surgery to lengthen her tendons to allow her to straighten her knees, but this may take away from her strength, and so it is not recommended at this time.  Left knee CSI was offered and accepted today.  The procedure was tolerated without any immediate complications.    To do next visit:  Follow-up in 3 months or PRN for further injections.    The above stated was discussed in layman's terms and the patient expressed understanding.  All questions were answered to the patient's satisfaction.         Scribe Attestation      I,:  Mili Gilliland PA-C am acting as a scribe while in the presence of the attending physician.:       I,:  Galileo Hubbard MD personally performed the services described in this documentation    as scribed in my presence.:             Subjective:   Aminta De La Torre is a 49 y.o. female who presents to the office today as a new patient for evaluation of her left knee pain.  The patient has Down Syndrome, and she just arrived at a new facility so they are looking to have her evaluated.  She is mainly non-verbal, but will say \"ouch\" with certain tactile stimuli.  Her caretakers describe the way she walks as a \"wobble limp\" due to a congenital limb length discrepancy.  She seems to have pain in her left knee when she attempts to ambulate.  Per chart review, she did some PT in 2019 for left knee " patellofemoral pain syndrome.        Review of systems negative unless otherwise specified in HPI    Past Medical History:   Diagnosis Date    Allergic     Community acquired pneumonia     Last Assessed:1/22/2013    Encounter for PPD test 09/26/2023    Encounter for vaccination 09/26/2023    Tdap given at this visit    Leukopenia     Last Assessed:3/5/2014    Osteoporosis     Preop examination 09/29/2019    Trisomy 21, Down syndrome        Past Surgical History:   Procedure Laterality Date    TONSILLECTOMY AND ADENOIDECTOMY         Family History   Problem Relation Age of Onset    Cancer Mother     No Known Problems Sister     No Known Problems Brother     No Known Problems Brother        Social History     Occupational History    Not on file   Tobacco Use    Smoking status: Never    Smokeless tobacco: Never   Vaping Use    Vaping status: Never Used   Substance and Sexual Activity    Alcohol use: Not Currently     Comment: PATIENT DOES NOT DRINK    Drug use: Never    Sexual activity: Never         Current Outpatient Medications:     acetaminophen (TYLENOL) 500 mg tablet, Take 2 tablets (1,000 mg total) by mouth every 8 (eight) hours as needed for mild pain, Disp: 30 tablet, Rfl: 5    carbamide peroxide (DEBROX) 6.5 % otic solution, Use 4 gtts to both ears bid Tuesdays and Fridays, and then 4 days prior to ENT appt for softening of ear wax 4 gtts bid x 4 days., Disp: 15 mL, Rfl: 0    citalopram (CeleXA) 20 mg tablet, Take 1 tablet (20 mg total) by mouth daily, Disp: 30 tablet, Rfl: 5    dextromethorphan-guaiFENesin (ROBITUSSIN DM)  mg/5 mL syrup, Take 5 mL by mouth 3 (three) times a day as needed for cough or congestion, Disp: 237 mL, Rfl: 0    Diclofenac Sodium (VOLTAREN) 1 %, Apply 2 g topically 4 (four) times a day To left knee, Disp: 100 g, Rfl: 2    lacosamide (VIMPAT) 50 mg, Take 1 po HS, Disp: 30 tablet, Rfl: 5    levothyroxine 50 mcg tablet, TAKE ONE TABLET BY MOUTH AT 7 AM, Disp: 30 tablet, Rfl: 5     melatonin 3 mg, Take 1 tablet (3 mg total) by mouth daily at bedtime, Disp: 30 tablet, Rfl: 5    Mouthwashes (Biotene Dry Mouth) LIQD, Apply 1 Swab to the mouth or throat 2 (two) times a day as needed (dry mouth) Use 5ml of solution with each swab, Disp: 237 mL, Rfl: 0    nystatin (MYCOSTATIN) powder, Apply topically 3 (three) times a day as needed (rash), Disp: 60 g, Rfl: 1    ofloxacin (FLOXIN) 0.3 % otic solution, Administer 10 drops into both ears 2 (two) times a day, Disp: 10 mL, Rfl: 2    sodium chloride (OCEAN) 0.65 % nasal spray, 2 sprays into each nostril as needed for congestion or rhinitis, Disp: 60 mL, Rfl: 0    atorvastatin (LIPITOR) 10 mg tablet, Take 1 tablet (10 mg total) by mouth daily, Disp: 30 tablet, Rfl: 5    bacitracin topical ointment 500 units/g topical ointment, Apply topically in the morning (Patient not taking: Reported on 5/28/2024), Disp: , Rfl:     Cholecalciferol 25 MCG (1000 UT) tablet, Take 1 tablet (1,000 Units total) by mouth daily (Patient not taking: Reported on 5/28/2024), Disp: 90 tablet, Rfl: 1    Heating Pad PADS, by Does not apply route 2 (two) times a day (Patient not taking: Reported on 3/12/2024), Disp: 1 each, Rfl: 0    ibuprofen (MOTRIN) 600 mg tablet, Take 600 mg by mouth every 6 (six) hours as needed (Patient not taking: Reported on 5/28/2024), Disp: , Rfl:     Incontinence Supply Disposable (RA Adult Wipes) MISC, Use daily (Patient not taking: Reported on 3/12/2024), Disp: 200 each, Rfl: 2    Incontinence Supply Disposable (Ultra-Soft Plus Briefs XXL) MISC, Use if needed (incontinence) (Patient not taking: Reported on 3/12/2024), Disp: 200 each, Rfl: 2    Midazolam 5 MG/0.1ML SOLN, For seizure longer than 5 min or cluster of seizures. Give 1 spray (5 mg) into 1 nostril; if no response, a second dose of 1 spray (5 mg) into the other nostril may be given 10 min. (Patient not taking: Reported on 3/12/2024), Disp: 2 each, Rfl: 1    Allergies   Allergen Reactions     "Levetiracetam Drowsiness and Hives     And disorientation    Depakote [Valproic Acid] Drowsiness    Lamictal [Lamotrigine] Rash    Pollen Extract Sneezing            Vitals:    05/28/24 0820   BP: 125/86   Pulse: 86       Objective:    General:  Patient is WDWN and is in no acute distress.    Musculoskeletal:    Left Knee:    Inspection:  No visible abnormality.    Range of Motion:  The patient has a 20-degree flexion contrature of left knee.  She is in 25 degrees of valgus in the left knee.    Other:  Extremity WWP.        Diagnostics, reviewed and taken today if performed as documented:    The attending physician has personally reviewed the pertinent films in PACS and interpretation is as follows:  Left Knee X-rays:  No acute osseous abnormality such as a fracture or dislocation.  There is moderate tricompartmental oste arthritis within the patient's knee.      Procedures, if performed today:    Large joint arthrocentesis: L knee  Universal Protocol:  Consent given by: guardian  Time out: Immediately prior to procedure a \"time out\" was called to verify the correct patient, procedure, equipment, support staff and site/side marked as required.  Site marked: the operative site was marked  Supporting Documentation  Indications: pain   Procedure Details  Location: knee - L knee  Needle size: 22 G  Ultrasound guidance: no  Approach: anterolateral  Medications administered: 4 mL bupivacaine 0.25 %; 12 mg betamethasone acetate-betamethasone sodium phosphate 6 (3-3) mg/mL    Patient tolerance: patient tolerated the procedure well with no immediate complications  Dressing:  Sterile dressing applied          Portions of the record may have been created with voice recognition software.  Occasional wrong word or \"sound a like\" substitutions may have occurred due to the inherent limitations of voice recognition software.  Read the chart carefully and recognize, using context, where substitutions have occurred.  "

## 2024-05-28 NOTE — PROGRESS NOTES
PT Progress Note     Today's date: 2024  Patient name: Aminta De La Torre  : 1975  MRN: [id number]  Referring provider: Radha Olvera MD  Dx:   Encounter Diagnosis     ICD-10-CM    1. Unsteady gait  R26.81                      Subjective: Aminta presents in WC with her caregiver. She notes that she continues to need the same amount of assistance to walk at the group home. She says that she won't just stand up on her own, constantly needs them to hold on and assist her. She saw ortho this morning and did get an injection in her L knee.     Patient Goals  Patient goals for therapy: increased strength and improved balance  Patient goal: try a RW to determine benefit    Pain  Pain scale: unsure--she will say ouch to everything.      Objective: See treatment diary below      Balance Test  IE   PN    Walking tolerance:   30 ft with HHA/CGA with WC follow  85 ft with HHA/CGA    Gait Speed (m/s):  10m/50.8s = 0.19 m/s with HHA/CGA with WC follow  10m/42.3s = 0.23 m/s with HHA/CGA   Sit to stand:  Max A/dependent (caregiver held/pulled her up via her hands)  Mod to max A (caregiver held/pulled her up via her hands)   Standing tolerance  1 min with HHA (1-2 people)  2 min with HHA (1-2 people)   ABC Scale:  NT due to cognitive status  NT due to cognitive status          Assessment: Aminta was initially referred to outpatient physical therapy with the chief complaint unsteady gait and heavy reliance on caregivers. She has attended a total of 9 PT sessions and progress note was completed today. She demonstrated some improvements with PT with regards to ambulation and standing tolerance. She was able to walk nearly 100 ft which is a good improvement from IE. She was able to stand for a full 2 minutes which is time needed for skin integrity. No significant change to gait speed or overall assistance needed to complete STS or ambulation.    RW use has been trialed without success given her distractibility and tendency  to excessively lean forward or backwards and letting go with her hands. Her progress with PT has been slow given her cognitive status as well as limited participation, needing constant encouragement from PT and caregiver to participate within sessions. She would benefit from continued skilled PT along initial POC to improve her activity tolerance and balance as able to reduce caregiver burden.      Goals  STGs in 4 weeks  1. Patient will improve her gait speed by at least 0.1 m/s with appropriate AD for improved household ambulation. - PROGRESSING  2. Patient will be able to complete STS with no more than mod A from caregiver. - PROGRESSING  3. Patient will improve her walking tolerance to at least 75 ft with appropriate AD for improved endurance. - MET     LTGs in 8 weeks  1. Patient will improve her walking tolerance to at least 100 ft with appropriate AD for improved endurance. - PROGRESSING  2. Patient will improve her gait speed to at least 0.4 m/s with appropriate AD for improved household ambulation. - PROGRESSING  3. Patient will be able to stand for 2 min with no more than HHA for improved weightbearing tolerance. - MET  4. Patient will be able to complete STS with no more than min A from caregiver. - PROGRESSING  5. Patient will trial RW and determine safety and benefit.- PROGRESSING      Plan: Continue per plan of care.  Progress treatment as tolerated.      Planned therapy interventions: abdominal trunk stabilization, balance, neuromuscular re-education, patient education, postural training, strengthening, stretching, therapeutic activities, therapeutic exercise, gait training, graded activity, graded exercise and home exercise program    Frequency: 2x week  Duration in weeks: 4    Plan of Care beginning date: 4/29/2024  Plan of Care expiration date: 6/29/2024     Short Term Goal Expiration Date:(5/29/2024)  Long Term Goal Expiration Date: (6/29/2024)  POC Expiration Date: (6/29/2024)      POC expires  Unit limit Auth Expiration date PT/OT/ST + Visit Limit?   6/29/24 6 NMR OT/PT N/A BOMN                           Visit/Unit Tracking  AUTH Status:  Date 4/29 5/2 5/6 5/9 5/13 5/16 5/20 5/23 5/28     BOMN Used 1 IE 2 3 4 5 6 7 8 9 PN      To next PN  Of 10 Of 10 Of 10 Of 10 Of 10 Of 10 Of 10 Of 10 Of 10           Precautions Downs Syndrome; seizure like activity; cognitive decline; dysphagia       Manuals 5/16 5/20 5/23 5/28 5/13                   Pt/caregiver education                Neuro Re-Ed         STS Mod to max A x2 people throughout Mod to max A x2 people    1 instances of no A needed Mod to max A x2 people Mod to max A x1 person Max A x2 people throughout   Standing tolerance        Side stepping                                        Ther Ex        LAQ        Seated march                                                        Ther Activity                        Gait Training        overground //bars (long) max encouragement & min to mod A    Caregiver in front, guiding pt hands, PT behind providing min to mod A at trunk to minimize posterior lean    2x1 length  X1 lap    Max A on turns //bars (long)  Max encouragement & min to mod A    Caregiver in front, guiding pt hands, PT behind providing CGA to min A at trunk to minimize posterior lean    6x1 length    Mod A for turns //bars (long)  Max encouragement & mod to max A    Caregiver in front, guiding pt hands, PT behind providing CGA to min A at trunk to minimize posterior lean    2x1 length  2X1 lap //bars (long)    Max encouragement & mod A    Caregiver in front, guiding pt hands, PT behind providing CGA to min A at trunk to minimize posterior lean    2x1 lap //bars (long)  Max encouragement and min A    Caregiver in front, guiding pt hands, PT behind providing min A at trunk to minimize posterior imbalance    4x1 length    Max A for turns    AD training With RW, WC follow    2x10 ft    Max A--caregiver advancing RW and mod A at trunk to maintain  upright       Modalities

## 2024-05-30 ENCOUNTER — OFFICE VISIT (OUTPATIENT)
Dept: PHYSICAL THERAPY | Facility: CLINIC | Age: 49
End: 2024-05-30
Payer: MEDICARE

## 2024-05-30 DIAGNOSIS — R56.9 SEIZURE-LIKE ACTIVITY (HCC): ICD-10-CM

## 2024-05-30 DIAGNOSIS — R26.81 UNSTEADY GAIT: Primary | ICD-10-CM

## 2024-05-30 DIAGNOSIS — G40.802 REFLEX EPILEPSY (HCC): ICD-10-CM

## 2024-05-30 PROCEDURE — 97116 GAIT TRAINING THERAPY: CPT

## 2024-05-30 PROCEDURE — 97112 NEUROMUSCULAR REEDUCATION: CPT

## 2024-05-30 NOTE — PROGRESS NOTES
"Daily Note     Today's date: 2024  Patient name: Aminta De La Torre  : 1975  MRN: [id number]  Referring provider: Radha Olvera MD  Dx:   Encounter Diagnosis     ICD-10-CM    1. Unsteady gait  R26.81                      Subjective: Aminta's caregiver states that it was a tougher morning getting her out of the house today.       Objective: See treatment diary below      Assessment: Aminta tolerated treatment well. She needed slightly less assistance to complete STS today, ranging from min to mod A to complete with constant encouragement. She had more emotional outbursts and spoken word \"ouch\" today while turning. Unclear cause or location of pain as she was unable to verbalize this. Able to complete 2 laps in parallel bars prior to sitting early on with initial ability to independently glide her hands but this only lasted temporarily. She would benefit from continued skilled PT along initial POC.      Plan: Continue per plan of care.  Progress treatment as tolerated.       Short Term Goal Expiration Date:(2024)  Long Term Goal Expiration Date: (2024)  POC Expiration Date: (2024)      POC expires Unit limit Auth Expiration date PT/OT/ST + Visit Limit?   24 6 NMR OT/PT N/A BOMN                           Visit/Unit Tracking  AUTH Status:  Date  5    BOMN Used 1 IE 2 3 4 5 6 7 8 9 PN 10     To next PN  Of 10 Of 10 Of 10 Of 10 Of 10 Of 10 Of 10 Of 10 Of 10 1 of 10          Precautions Downs Syndrome; seizure like activity; cognitive decline; dysphagia       Manuals                    Pt/caregiver education                Neuro Re-Ed         STS Mod to max A x2 people throughout Mod to max A x2 people    1 instances of no A needed Mod to max A x2 people Mod to max A x1 person Min to mod A x1 person   Standing tolerance        Side stepping                                        Ther Ex        LAQ        "                                           Ther Activity                        Gait Training        overground //bars (long) max encouragement & min to mod A    Caregiver in front, guiding pt hands, PT behind providing min to mod A at trunk to minimize posterior lean    2x1 length  X1 lap    Max A on turns //bars (long)  Max encouragement & min to mod A    Caregiver in front, guiding pt hands, PT behind providing CGA to min A at trunk to minimize posterior lean    6x1 length    Mod A for turns //bars (long)  Max encouragement & mod to max A    Caregiver in front, guiding pt hands, PT behind providing CGA to min A at trunk to minimize posterior lean    2x1 length  2X1 lap //bars (long)    Max encouragement & mod A    Caregiver in front, guiding pt hands, PT behind providing CGA to min A at trunk to minimize posterior lean    2x1 lap //bars (long)    Max encouragement & min to mod A    Caregiver in front, intermittently guiding pt hands, PT behind providing CGA to rare min A at trunk to minimize posterior lean     X2 laps  X1 lap  X1 lap  X1 lap    AD training With RW, WC follow    2x10 ft    Max A--caregiver advancing RW and mod A at trunk to maintain upright       Modalities

## 2024-06-03 ENCOUNTER — OFFICE VISIT (OUTPATIENT)
Dept: PHYSICAL THERAPY | Facility: CLINIC | Age: 49
End: 2024-06-03
Payer: MEDICARE

## 2024-06-03 DIAGNOSIS — R26.81 UNSTEADY GAIT: Primary | ICD-10-CM

## 2024-06-03 PROCEDURE — 97116 GAIT TRAINING THERAPY: CPT

## 2024-06-03 PROCEDURE — 97112 NEUROMUSCULAR REEDUCATION: CPT

## 2024-06-03 RX ORDER — LACOSAMIDE 50 MG/1
50 TABLET ORAL
Qty: 30 TABLET | Refills: 5 | Status: SHIPPED | OUTPATIENT
Start: 2024-06-03

## 2024-06-03 NOTE — PROGRESS NOTES
Daily Note     Today's date: 6/3/2024  Patient name: Aminta De La Torre  : 1975  MRN: [id number]  Referring provider: Radha Olvera MD  Dx:   Encounter Diagnosis     ICD-10-CM    1. Unsteady gait  R26.81                      Subjective: Aminta presents with her caregiver Noemy who notes that she seems to be pretty awake this afternoon which she wasn't sure how the later appt would do.       Objective: See treatment diary below      Assessment: Aminta tolerated treatment fair. She had minimal instances of independently progressing UE along parallel bars today with caregiver guidance. PT needed to provide more tactile cues at posterior hips to encourage anterior momentum. She needed more assistance to complete STS and turns with constant encouragement throughout the session. She would benefit from continued  skilled PT.      Plan: Continue per plan of care.      Short Term Goal Expiration Date:(2024)  Long Term Goal Expiration Date: (2024)  POC Expiration Date: (2024)      POC expires Unit limit Auth Expiration date PT/OT/ST + Visit Limit?   24 6 NMR OT/PT N/A BOMN                           Visit/Unit Tracking  AUTH Status:  Date  5 5 5/9 5/13 5/16 5/20 5/23 5/28 5/30 6/3   BOMN Used 1 IE 2 3 4 5 6 7 8 9 PN 10 11    To next PN  Of 10 Of 10 Of 10 Of 10 Of 10 Of 10 Of 10 Of 10 Of 10 1 of 10 2 of 10         Precautions Downs Syndrome; seizure like activity; cognitive decline; dysphagia       Manuals 6/3 5/20 5/23 5/28 5/30                   Pt/caregiver education                Neuro Re-Ed         STS Mod to max A x1 person Mod to max A x2 people    1 instances of no A needed Mod to max A x2 people Mod to max A x1 person Min to mod A x1 person   Standing tolerance        Side stepping                                        Ther Ex        LAQ        Seated march                                                        Ther Activity                        Gait Training        overground //bars  (long)    Max encouragement & mod A    Caregiver in front, guiding pt hands, PT behind providing CGA to rare min A at trunk to minimize posterior lean     X1 lap  X1 lap  X1 lap //bars (long)  Max encouragement & min to mod A    Caregiver in front, guiding pt hands, PT behind providing CGA to min A at trunk to minimize posterior lean    6x1 length    Mod A for turns //bars (long)  Max encouragement & mod to max A    Caregiver in front, guiding pt hands, PT behind providing CGA to min A at trunk to minimize posterior lean    2x1 length  2X1 lap //bars (long)    Max encouragement & mod A    Caregiver in front, guiding pt hands, PT behind providing CGA to min A at trunk to minimize posterior lean    2x1 lap //bars (long)    Max encouragement & min to mod A    Caregiver in front, intermittently guiding pt hands, PT behind providing CGA to rare min A at trunk to minimize posterior lean     X2 laps  X1 lap  X1 lap  X1 lap    AD training        Modalities

## 2024-06-05 ENCOUNTER — APPOINTMENT (EMERGENCY)
Dept: RADIOLOGY | Facility: HOSPITAL | Age: 49
End: 2024-06-05
Payer: MEDICARE

## 2024-06-05 ENCOUNTER — HOSPITAL ENCOUNTER (EMERGENCY)
Facility: HOSPITAL | Age: 49
Discharge: HOME | End: 2024-06-05
Attending: EMERGENCY MEDICINE
Payer: MEDICARE

## 2024-06-05 VITALS
DIASTOLIC BLOOD PRESSURE: 63 MMHG | RESPIRATION RATE: 18 BRPM | SYSTOLIC BLOOD PRESSURE: 98 MMHG | HEART RATE: 63 BPM | OXYGEN SATURATION: 97 % | TEMPERATURE: 98.2 F

## 2024-06-05 DIAGNOSIS — R09.89 CHOKING EPISODE: Primary | ICD-10-CM

## 2024-06-05 PROCEDURE — 99284 EMERGENCY DEPT VISIT MOD MDM: CPT | Performed by: EMERGENCY MEDICINE

## 2024-06-05 PROCEDURE — 71045 X-RAY EXAM CHEST 1 VIEW: CPT

## 2024-06-05 PROCEDURE — 99283 EMERGENCY DEPT VISIT LOW MDM: CPT

## 2024-06-05 NOTE — ED PROVIDER NOTES
Emergency Department Note- Aminta De La Torre 49 y.o. female MRN: [id number]    Unit/Bed#: ED 26 Encounter: [id number]        History of Present Illness   HPI:  Aminta De La Torre is a 49 y.o. female who presents with choking episode while eating food at her facility  The patient coughed and gagged up some food she then gagged up additional food  She has no complaints she is in no distress  No increased work of breathing no stridor no drooling  Patient has a history of Down syndrome  No abdominal pain no fever   Review of Systems  REVIEW OF SYSTEMS  Constitutional:  denies fever, chills, no weight loss   Eyes:  Denies visual changes, denies eye pain    HENT:  Denies nasal congestion or sore throat   Respiratory:  Denies cough or shortness of breath, denies hemoptysis    Cardiovascular:  Denies chest pain, palpitations, or leg edema    GI:  Denies abdominal pain, nausea, vomiting, bloody stools, melena, or diarrhea   :  Denies dysuria, hematuria, polyuria   Musculoskeletal:  Denies back pain or joint pain   Integument:  Denies rash, denies color change    Neurologic:  Denies headache, focal weakness or sensory changes   Endocrine:  Denies polyuria or polydipsia   Lymphatic:  Denies swollen glands   Psychiatric:  Denies depression or anxiety     All system reviewed and negative except as noted above or in HPI    Historical Information   Past Medical History:   Diagnosis Date    Allergic     Community acquired pneumonia     Last Assessed:1/22/2013    Encounter for PPD test 09/26/2023    Encounter for vaccination 09/26/2023    Tdap given at this visit    Leukopenia     Last Assessed:3/5/2014    Osteoporosis     Preop examination 09/29/2019    Trisomy 21, Down syndrome      Past Surgical History:   Procedure Laterality Date    TONSILLECTOMY AND ADENOIDECTOMY       Social History   Social History     Substance and Sexual Activity   Alcohol Use Not Currently    Comment: PATIENT DOES NOT DRINK     Social History     Substance  and Sexual Activity   Drug Use Never     Social History     Tobacco Use   Smoking Status Never   Smokeless Tobacco Never     Family History:   Family History   Problem Relation Age of Onset    Cancer Mother     No Known Problems Sister     No Known Problems Brother     No Known Problems Brother        Meds/Allergies   Not in a hospital admission.  Allergies   Allergen Reactions    Levetiracetam Drowsiness and Hives     And disorientation    Depakote [Valproic Acid] Drowsiness    Lamictal [Lamotrigine] Rash    Pollen Extract Sneezing       Objective   Vitals: Blood pressure 98/63, pulse 63, temperature 98.2 °F (36.8 °C), temperature source Oral, resp. rate 18, SpO2 97%.    PHYSICAL EXAM  Constitutional:  Well developed, well nourished, no acute distress, non toxic appearance   Eyes:   PERRL, EOMI, conjunctiva normal, sclera anicteric, no proptosis   HENT:  Atraumatic, external ears normal, nose normal, oropharynx moist, no pharyngeal exudates. Neck  no JVD, no bruits,  normal range of motion, no tenderness, supple, thyroid normal    Respiratory:  No respiratory distress, normal breath sounds B/L, no rales, no wheezing no increased work of breathing  Cardiovascular:  NSR, no M/G/R  GI:  Soft,  Normal BS,  ND,  NT,  No mass or bruits   :  No costovertebral angle tenderness   Extremities: No edema, no tenderness, no deformities. Normal pulses   Musculoskeletal:   Back - no midline tenderness,  FROM  Upper and lower extremities   SKIN:   no rash, warm and dry    Neurologic:  Alert &  CN 2-12 intact, normal motor function, normal sensory function, no focal deficits noted,   Psychiatric:  Speech appropriate     Lab Results: Lab Results: I have personally reviewed pertinent lab results.  Labs Reviewed - No data to display  Imaging: I have personally reviewed pertinent reports.    XR chest 1 view portable    (Results Pending)     EKG, Pathology, and Other Studies: I have personally reviewed pertinent films in PACS   Chest  x-ray shows mild haziness at the right lung base  No pneumothorax  Patient was able to tolerate p.o. fluids without any difficulty she remains asymptomatic her pulse ox remains in the upper 90s      Assessment & Plan     ED Medical Decision Making:  Choking episode  Now resolved  P.o. challenge  Chest x-ray  1. Choking episode      Careful return precautions given     Chinedu Rg MD  06/05/24 [id number]

## 2024-06-05 NOTE — DISCHARGE INSTRUCTIONS
Please return to the emergency department if you have any difficulty breathing\  Please return if you develop any fever  Please return if there are any concerns

## 2024-06-06 ENCOUNTER — OFFICE VISIT (OUTPATIENT)
Dept: PHYSICAL THERAPY | Facility: CLINIC | Age: 49
End: 2024-06-06
Payer: MEDICARE

## 2024-06-06 DIAGNOSIS — R26.81 UNSTEADY GAIT: Primary | ICD-10-CM

## 2024-06-06 PROCEDURE — 97112 NEUROMUSCULAR REEDUCATION: CPT

## 2024-06-06 PROCEDURE — 97116 GAIT TRAINING THERAPY: CPT

## 2024-06-06 NOTE — PROGRESS NOTES
Daily Note     Today's date: 2024  Patient name: Aminta De La Torre  : 1975  MRN: [id number]  Referring provider: Radha Olvera MD  Dx:   Encounter Diagnosis     ICD-10-CM    1. Unsteady gait  R26.81                      Subjective: Aminta's caregiver states that she is doing ok today.      Objective: See treatment diary below      Assessment: Aminta tolerated treatment poor. Much less agreeable to PT participation today, needing more guidance of UE along parallel bars and more assistance at hips to encourage weight shifting and LE advancement. She had much more difficulty with turns, needing max A of both people and unable to fully complete a turn, pushing back into PT and needing an urgent chair. She would benefit from continued skilled PT to address her mobility concerns as able.      Plan: Continue per plan of care.      Short Term Goal Expiration Date:(2024)  Long Term Goal Expiration Date: (2024)  POC Expiration Date: (2024)      POC expires Unit limit Auth Expiration date PT/OT/ST + Visit Limit?   24 6 NMR OT/PT N/A BOMN                           Visit/Unit Tracking  AUTH Status:  Date 6/6 5/2 5/6 5/9 5/13 5/16 5/20 5/23 5/28 5/30 6/3   BOMN Used 12 2 3 4 5 6 7 8 9 PN 10 11    To next PN  3 of 10 Of 10 Of 10 Of 10 Of 10 Of 10 Of 10 Of 10 Of 10 1 of 10 2 of 10         Precautions Downs Syndrome; seizure like activity; cognitive decline; dysphagia       Manuals 6/3 6/6 5/23 5/28 5/30                   Pt/caregiver education                Neuro Re-Ed         STS Mod to max A x1 person Mod to max A x1-2 people Mod to max A x2 people Mod to max A x1 person Min to mod A x1 person   Standing tolerance        Side stepping                                        Ther Ex        LAQ        Seated march                                                        Ther Activity                        Gait Training        overground //bars (long)    Max encouragement & mod A    Caregiver in front,  guiding pt hands, PT behind providing CGA to rare min A at trunk to minimize posterior lean     X1 lap  X1 lap  X1 lap //bars (long)    Max encouragement & mod A    Caregiver in front, guiding pt hands, PT behind providing min to mod A at hips to encourage LE advancement    X2 laps  X1 lap      Max A x2 for turns--went to sit down and needed chair urgently //bars (long)  Max encouragement & mod to max A    Caregiver in front, guiding pt hands, PT behind providing CGA to min A at trunk to minimize posterior lean    2x1 length  2X1 lap //bars (long)    Max encouragement & mod A    Caregiver in front, guiding pt hands, PT behind providing CGA to min A at trunk to minimize posterior lean    2x1 lap //bars (long)    Max encouragement & min to mod A    Caregiver in front, intermittently guiding pt hands, PT behind providing CGA to rare min A at trunk to minimize posterior lean     X2 laps  X1 lap  X1 lap  X1 lap    AD training        Modalities

## 2024-06-07 ENCOUNTER — APPOINTMENT (OUTPATIENT)
Dept: PHYSICAL THERAPY | Facility: CLINIC | Age: 49
End: 2024-06-07
Payer: MEDICARE

## 2024-06-07 ENCOUNTER — OFFICE VISIT (OUTPATIENT)
Dept: INTERNAL MEDICINE CLINIC | Facility: CLINIC | Age: 49
End: 2024-06-07
Payer: MEDICARE

## 2024-06-07 VITALS
DIASTOLIC BLOOD PRESSURE: 78 MMHG | HEIGHT: 55 IN | WEIGHT: 154 LBS | SYSTOLIC BLOOD PRESSURE: 112 MMHG | BODY MASS INDEX: 35.64 KG/M2

## 2024-06-07 DIAGNOSIS — E66.9 NON MORBID OBESITY: ICD-10-CM

## 2024-06-07 DIAGNOSIS — R13.10 DYSPHAGIA, UNSPECIFIED TYPE: ICD-10-CM

## 2024-06-07 DIAGNOSIS — T17.308D CHOKING, SUBSEQUENT ENCOUNTER: Primary | ICD-10-CM

## 2024-06-07 PROCEDURE — 99213 OFFICE O/P EST LOW 20 MIN: CPT | Performed by: INTERNAL MEDICINE

## 2024-06-07 PROCEDURE — G2211 COMPLEX E/M VISIT ADD ON: HCPCS | Performed by: INTERNAL MEDICINE

## 2024-06-07 NOTE — PROGRESS NOTES
Name: Aminta De La Torre      : 1975      MRN: [id number]  Encounter Provider: Wiley Mcintosh MD  Encounter Date: 2024   [facility]    Assessment & Plan     1. Choking, subsequent encounter  -     FL barium swallow video w speech; Future; Expected date: 2024  2. Dysphagia, unspecified type  Assessment & Plan:  -A repeat swallow study has been ordered for evaluation by speech to see if a different type of diet is recommended.  Her caregivers have concerns that she is unable to tolerate a chopped diet I suspect she may need puréed foods.  Video swallow study ordered.  Orders:  -     FL barium swallow video w speech; Future; Expected date: 2024  3. Non morbid obesity  Assessment & Plan:  -Patient's caregivers are requesting referral to see a nutritionist for dietary recommendations to help the patient lose weight.  A referral has been generated.  Orders:  -     Ambulatory Referral to Nutrition Services; Future         Subjective      HPI  Patient presents today as an acute visit.  She was recently in the emergency department on  after experiencing a choking episode while eating food at her facility.  It was reported that she coughed and then gagged up some of the food.  X-ray imaging in the ED revealed a bilateral hazy opacity felt to be due to body habitus and low lung volumes.    Today the patient is accompanied by her caregivers.  Overall they reports she appears to be back to her baseline.  They are concerned however about her current dietary recommendations.  They states she was recently seen for a swallow eval in April.  They report the recommendation was that she be encouraged to take small bites and have a chopped diet.  They concerned and feel that she may need more of a puréed diet.  They have requested for her to be seen for a repeat swallow study.    All other systems negative except for pertinent findings noted in HPI.        Current Outpatient Medications on File Prior to Visit   Medication Sig   • acetaminophen (TYLENOL) 500 mg tablet Take 2 tablets (1,000 mg total) by mouth every 8 (eight) hours as needed for mild pain   • atorvastatin (LIPITOR) 10 mg tablet Take 1 tablet (10 mg total) by mouth daily   • carbamide peroxide (DEBROX) 6.5 % otic solution Use 4 gtts to both ears bid Tuesdays and Fridays, and then 4 days prior to ENT appt for softening of ear wax 4 gtts bid x 4 days.   • Cholecalciferol 25 MCG (1000 UT) tablet Take 1 tablet (1,000 Units total) by mouth daily   • citalopram (CeleXA) 20 mg tablet Take 1 tablet (20 mg total) by mouth daily   • Diclofenac Sodium (VOLTAREN) 1 % Apply 2 g topically 4 (four) times a day To left knee   • ibuprofen (MOTRIN) 600 mg tablet Take 600 mg by mouth every 6 (six) hours as needed   • lacosamide (VIMPAT) 50 mg Take 1 tablet (50 mg total) by mouth daily at bedtime   • levothyroxine 50 mcg tablet TAKE ONE TABLET BY MOUTH AT 7 AM   • melatonin 3 mg Take 1 tablet (3 mg total) by mouth daily at bedtime   • Mouthwashes (Biotene Dry Mouth) LIQD Apply 1 Swab to the mouth or throat 2 (two) times a day as needed (dry mouth) Use 5ml of solution with each swab   • nystatin (MYCOSTATIN) powder Apply topically 3 (three) times a day as needed (rash)   • sodium chloride (OCEAN) 0.65 % nasal spray 2 sprays into each nostril as needed for congestion or rhinitis   • bacitracin topical ointment 500 units/g topical ointment Apply topically in the morning (Patient not taking: Reported on 5/28/2024)   • dextromethorphan-guaiFENesin (ROBITUSSIN DM)  mg/5 mL syrup Take 5 mL by mouth 3 (three) times a day as needed for cough or congestion (Patient not taking: Reported on 6/7/2024)   • Heating Pad PADS by Does not apply route 2 (two) times a day (Patient not taking: Reported on 3/12/2024)   • Incontinence Supply Disposable (RA Adult Wipes) MISC Use daily (Patient not taking: Reported on 3/12/2024)   •  "Incontinence Supply Disposable (Ultra-Soft Plus Briefs XXL) MISC Use if needed (incontinence) (Patient not taking: Reported on 3/12/2024)   • Midazolam 5 MG/0.1ML SOLN For seizure longer than 5 min or cluster of seizures. Give 1 spray (5 mg) into 1 nostril; if no response, a second dose of 1 spray (5 mg) into the other nostril may be given 10 min. (Patient not taking: Reported on 3/12/2024)   • ofloxacin (FLOXIN) 0.3 % otic solution Administer 10 drops into both ears 2 (two) times a day (Patient not taking: Reported on 6/7/2024)       Objective     /78 Comment: unable to obtain  Ht 4' 7\" (1.397 m)   Wt 69.9 kg (154 lb)   LMP  (LMP Unknown)   BMI 35.79 kg/m²     BP Readings from Last 3 Encounters:   06/07/24 112/78   06/05/24 98/63   05/28/24 125/86        Wt Readings from Last 3 Encounters:   06/07/24 69.9 kg (154 lb)   05/02/24 69.9 kg (154 lb)   05/01/24 69.9 kg (154 lb)     Physical Exam    General: NAD, sitting comfortably in a wheelchair  HEENT: NCAT, EOMI, normal conjunctiva  Cardiovascular: RRR, normal S1 and S2  Pulmonary: Normal respiratory effort, no wheezes, rales or rhonchi  Extremities: No lower extremity edema  Skin: Normal skin color, no rashes     Wiley Mcintosh MD  "

## 2024-06-07 NOTE — ASSESSMENT & PLAN NOTE
-Patient's caregivers are requesting referral to see a nutritionist for dietary recommendations to help the patient lose weight.  A referral has been generated.

## 2024-06-07 NOTE — ASSESSMENT & PLAN NOTE
-A repeat swallow study has been ordered for evaluation by speech to see if a different type of diet is recommended.  Her caregivers have concerns that she is unable to tolerate a chopped diet I suspect she may need puréed foods.  Video swallow study ordered.

## 2024-06-10 ENCOUNTER — OFFICE VISIT (OUTPATIENT)
Dept: PHYSICAL THERAPY | Facility: CLINIC | Age: 49
End: 2024-06-10
Payer: MEDICARE

## 2024-06-10 DIAGNOSIS — R26.81 UNSTEADY GAIT: Primary | ICD-10-CM

## 2024-06-10 PROCEDURE — 97116 GAIT TRAINING THERAPY: CPT

## 2024-06-10 NOTE — PROGRESS NOTES
Daily Note     Today's date: 6/10/2024  Patient name: Aminta De La Torre  : 1975  MRN: [id number]  Referring provider: Radha Olvera MD  Dx:   Encounter Diagnosis     ICD-10-CM    1. Unsteady gait  R26.81                      Subjective: Aminta presents with her caregiver who said that she is doing alright today.       Objective: See treatment diary below      Assessment: Aminta tolerated treatment fair. She continues to be hesitant to participate in PT, needing max encouragement and cues. She was more resistant with turns again today, pushing back into PT. Plan to finish remaining appts prior to discharge.      Plan: Continue per plan of care.      Short Term Goal Expiration Date:(2024)  Long Term Goal Expiration Date: (2024)  POC Expiration Date: (2024)      POC expires Unit limit Auth Expiration date PT/OT/ST + Visit Limit?   24 6 NMR OT/PT N/A BOMN                           Visit/Unit Tracking  AUTH Status:  Date 6/6 6/10   5/13 5/16 5/20 5/23 5/28 5/30 6/3   BOMN Used 12 13   5 6 7 8 9 PN 10 11    To next PN  3 of 10 4 of 10   Of 10 Of 10 Of 10 Of 10 Of 10 1 of 10 2 of 10         Precautions Downs Syndrome; seizure like activity; cognitive decline; dysphagia       Manuals 6/3 6/6 6/10 5/28 5/30                   Pt/caregiver education                Neuro Re-Ed         STS Mod to max A x1 person Mod to max A x1-2 people Mod to max A x1 person Mod to max A x1 person Min to mod A x1 person   Standing tolerance        Side stepping                                        Ther Ex        LAQ        Seated march                                                        Ther Activity                        Gait Training        overground //bars (long)    Max encouragement & mod A    Caregiver in front, guiding pt hands, PT behind providing CGA to rare min A at trunk to minimize posterior lean     X1 lap  X1 lap  X1 lap //bars (long)    Max encouragement & mod A    Caregiver in front, guiding pt hands,  PT behind providing min to mod A at hips to encourage LE advancement    X2 laps  X1 lap      Max A x2 for turns--went to sit down and needed chair urgently //bars (long)    Max encouragement and mod A    Caregiver in front, guiding pt hands, PT behind providing intermittent min A at hops to encourage weight shift    X1 lap  X1 lap  X1 length  X1 length //bars (long)    Max encouragement & mod A    Caregiver in front, guiding pt hands, PT behind providing CGA to min A at trunk to minimize posterior lean    2x1 lap //bars (long)    Max encouragement & min to mod A    Caregiver in front, intermittently guiding pt hands, PT behind providing CGA to rare min A at trunk to minimize posterior lean     X2 laps  X1 lap  X1 lap  X1 lap    AD training        Modalities

## 2024-06-13 ENCOUNTER — OFFICE VISIT (OUTPATIENT)
Dept: PHYSICAL THERAPY | Facility: CLINIC | Age: 49
End: 2024-06-13
Payer: MEDICARE

## 2024-06-13 DIAGNOSIS — R26.81 UNSTEADY GAIT: Primary | ICD-10-CM

## 2024-06-13 PROCEDURE — 97116 GAIT TRAINING THERAPY: CPT

## 2024-06-13 NOTE — PROGRESS NOTES
Daily Note     Today's date: 2024  Patient name: Aminta De La Torre  : 1975  MRN: [id number]  Referring provider: Radha Olvera MD  Dx:   Encounter Diagnosis     ICD-10-CM    1. Unsteady gait  R26.81                      Subjective: Aminta presents with a different caregiver. She notes that she wasn't feeling good this morning.      Objective: See treatment diary below      Assessment: Aminta tolerated treatment fair. Continues to need encouragement as well as mod to max A to advance her UE along parallel bars, intermittently letting go. She had slight increase ease in turns today compared to last session. Towards end of session, she began getting teary eyed but no outbursts. Plan to complete initial POC of next 2 weeks followed by discharge. PT plans to call  next week.      Plan: Continue per plan of care.      Short Term Goal Expiration Date:(2024)  Long Term Goal Expiration Date: (2024)  POC Expiration Date: (2024)      POC expires Unit limit Auth Expiration date PT/OT/ST + Visit Limit?   24 6 NMR OT/PT N/A BOMN                           Visit/Unit Tracking  AUTH Status:  Date 6/6 6/10 6/13  5/13 5/16 5/20 5/23 5/28 5/30 6/3   BOMN Used 12 13 14  5 6 7 8 9 PN 10 11    To next PN  3 of 10 4 of 10 5 of 10  Of 10 Of 10 Of 10 Of 10 Of 10 1 of 10 2 of 10         Precautions Downs Syndrome; seizure like activity; cognitive decline; dysphagia       Manuals 6/3 6/6 6/10 6/13 5/30                   Pt/caregiver education                Neuro Re-Ed         STS Mod to max A x1 person Mod to max A x1-2 people Mod to max A x1 person Mod to max A x1 person Min to mod A x1 person   Standing tolerance        Side stepping                                        Ther Ex        LAQ        Seated march                                                        Ther Activity                        Gait Training        overground //bars (long)    Max encouragement & mod A    Caregiver in front,  guiding pt hands, PT behind providing CGA to rare min A at trunk to minimize posterior lean     X1 lap  X1 lap  X1 lap //bars (long)    Max encouragement & mod A    Caregiver in front, guiding pt hands, PT behind providing min to mod A at hips to encourage LE advancement    X2 laps  X1 lap      Max A x2 for turns--went to sit down and needed chair urgently //bars (long)    Max encouragement and mod A    Caregiver in front, guiding pt hands, PT behind providing intermittent min A at hops to encourage weight shift    X1 lap  X1 lap  X1 length  X1 length //bars (long)    Max encouragement and modA    Caregiver in front, guiding pt hands, PT behind providing intermittent min A at hops to encourage weight shift      X1 lap  X2 laps  X1 laps    Mod A x2 for turns //bars (long)    Max encouragement & min to mod A    Caregiver in front, intermittently guiding pt hands, PT behind providing CGA to rare min A at trunk to minimize posterior lean     X2 laps  X1 lap  X1 lap  X1 lap    AD training        Modalities

## 2024-06-14 ENCOUNTER — HOSPITAL ENCOUNTER (OUTPATIENT)
Dept: RADIOLOGY | Facility: HOSPITAL | Age: 49
Discharge: HOME | End: 2024-06-14
Attending: INTERNAL MEDICINE
Payer: MEDICARE

## 2024-06-14 DIAGNOSIS — T17.308D CHOKING, SUBSEQUENT ENCOUNTER: ICD-10-CM

## 2024-06-14 DIAGNOSIS — R13.10 DYSPHAGIA, UNSPECIFIED TYPE: ICD-10-CM

## 2024-06-14 PROCEDURE — 92611 MOTION FLUOROSCOPY/SWALLOW: CPT

## 2024-06-14 PROCEDURE — 74230 X-RAY XM SWLNG FUNCJ C+: CPT

## 2024-06-14 NOTE — PROCEDURES
"                                   Video Swallow Study      Patient Name: Aminta De La Torre  Today's Date: 6/14/2024        Past Medical History  Past Medical History:   Diagnosis Date    Allergic     Community acquired pneumonia     Last Assessed:1/22/2013    Encounter for PPD test 09/26/2023    Encounter for vaccination 09/26/2023    Tdap given at this visit    Leukopenia     Last Assessed:3/5/2014    Osteoporosis     Preop examination 09/29/2019    Trisomy 21, Down syndrome         Past Surgical History  Past Surgical History:   Procedure Laterality Date    TONSILLECTOMY AND ADENOIDECTOMY           General Information:  Aminta De La Torre is a 49 year old female referred to [facility] for repeat VBS by Dr. Mcintosh due to recent choking episode on June 5, 2024 while eating a small piece of chicken cordon blue at her group home. Pt was accompanied to today's VBS with her caregivers. Due to baseline cognitive deficits, pt was unable to provide answers to case history questions. Caregivers report that patient has observed to have less oral manipulation/mastication of her foods, oral holding, and has started to \"throw her head back\" in order to move bolus back and swallow. Pt consumes medications whole in applesauce as she \"likes to chew them\". Caregivers deny recent PNA/respiratory illness.      Cognition: History of cognitive impairment     Speech/Swallow [facility]: Unable to adequately complete oral motor exam due to reduced ability to follow directions. Pt with missing dentition. Pt is on room air.      Pt was seen in radiology for a Video Barium Swallow Study, seated in the upright position and viewed laterally with the following consistencies: thin liquids, nectar thick liquids, honey thick liquids, pudding, and small piece of a shortbread cookie.       Results are as follows:      **Images are available for review on PACS     Oral Impairment:  Lip Closure: no labial escape  Tongue Control During Bolus Hold: unable to " follow commands for targeted bolus hold, however bolus appears cohesive throughout trials   Bolus Preparation/Mastication: disorganized chewing/mashing with solid pieces of bolus unchewed   Bolus Transport/Lingual Motion: liquids- brisk tongue movement; puree- delayed initiation of tongue motion; piecemeal transfer of bolus  Oral Residue: majority of bolus remaining given pudding only   Initiation of the Pharyngeal Swallow: bolus head to valleculae     Pharyngeal Impairment:  Soft Palate Elevation: adequate  Laryngeal Elevation: adequate  Anterior Hyoid Excursion: adequate     Epiglottic Inversion: complete  Laryngeal Vestibular Closure: adequate  Pharyngeal Stripping Wave: present- diminished  Pharyngeal Contraction: n/a (did not complete AP view)  Pharyngoesophageal Segment Opening: adequate  Tongue Base Retraction: adequate  Pharyngeal Residue: complete pharyngeal clearance      Esophageal Impairment:  Esophageal Clearance Upright Position: n/a (did not complete AP view)    The esophagus was briefly screened in the lateral view given a half barium tablet with pudding. Unable to fully visual esophagus due to body habitus. However, esophageal retention that included 1/2 barium tablet was observed in the distal esophagus.       Assessment Summary:  Patient presents with mild-moderate oral dysphagia given mechanical soft and soft solids and functional pharyngeal stage of the swallow. Straw aid not present for evaluation and patient did not follow directions for single sips. In order to maintain appropriate engagement, SLP allowed patient to consume consecutive sips of all presented liquids. No penetration/aspiration observed on today's study.     Please note that SLP observed patient with mechanical soft and soft solid with and without use of fluro machine as patient started to become upset and shut down when positioned in the xray machine. Pt with significant oral holding of solid and limited oral  manipulation/mastication. Pt required multiple sips of thin liquids to fully clear bolus from oral cavity.     Recommend diet downgrade to puree. Continue thin liquids via controlled straw and medications as tolerated.     Recommendations:  1. Puree   2. Thin Liquids  3. Medications as Tolerated   4. Oral care 2-3x per day  5. Swallow Strategies: upright with oral intake, direct supervision, small bites, small single sips  6. Continue use of safe straw to limit liquid bolus amount due to impulsivity and fast rate of intake of liquids

## 2024-06-17 ENCOUNTER — APPOINTMENT (OUTPATIENT)
Dept: PHYSICAL THERAPY | Facility: CLINIC | Age: 49
End: 2024-06-17
Payer: MEDICARE

## 2024-06-17 ENCOUNTER — TELEPHONE (OUTPATIENT)
Dept: PHYSICAL THERAPY | Facility: CLINIC | Age: 49
End: 2024-06-17

## 2024-06-17 DIAGNOSIS — R26.81 UNSTEADY GAIT: Primary | ICD-10-CM

## 2024-06-17 NOTE — PROGRESS NOTES
Daily Note     Today's date: 2024  Patient name: Aminta De La Torre  : 1975  MRN: [id number]  Referring provider: Radha Olvera MD  Dx:   Encounter Diagnosis     ICD-10-CM    1. Unsteady gait  R26.81                      Subjective: ***      Objective: See treatment diary below      Assessment: Tolerated treatment {Tolerated treatment :3442509906}. Patient {assessment:4801510979}      Plan: {PLAN:4326213658}     Short Term Goal Expiration Date:(2024)  Long Term Goal Expiration Date: (2024)  POC Expiration Date: (2024)      POC expires Unit limit Auth Expiration date PT/OT/ST + Visit Limit?   24 6 NMR OT/PT N/A BOMN                           Visit/Unit Tracking  AUTH Status:  Date 6/6 6/10 6/13 6/17***    5/23 5/28 5/30 6/3   BOMN Used 12 13 14 15    8 9 PN 10 11    To next PN  3 of 10 4 of 10 5 of 10 6 of 10    Of 10 Of 10 1 of 10 2 of 10         Precautions Downs Syndrome; seizure like activity; cognitive decline; dysphagia       Manuals 6/3 6/6 6/10 6/13 6/17                   Pt/caregiver education                Neuro Re-Ed         STS Mod to max A x1 person Mod to max A x1-2 people Mod to max A x1 person Mod to max A x1 person ***   Standing tolerance        Side stepping                                        Ther Ex        LAQ        Seated march                                                        Ther Activity                        Gait Training        overground //bars (long)    Max encouragement & mod A    Caregiver in front, guiding pt hands, PT behind providing CGA to rare min A at trunk to minimize posterior lean     X1 lap  X1 lap  X1 lap //bars (long)    Max encouragement & mod A    Caregiver in front, guiding pt hands, PT behind providing min to mod A at hips to encourage LE advancement    X2 laps  X1 lap      Max A x2 for turns--went to sit down and needed chair urgently //bars (long)    Max encouragement and mod A    Caregiver in front, guiding pt hands, PT behind  providing intermittent min A at hops to encourage weight shift    X1 lap  X1 lap  X1 length  X1 length //bars (long)    Max encouragement and modA    Caregiver in front, guiding pt hands, PT behind providing intermittent min A at hops to encourage weight shift      X1 lap  X2 laps  X1 laps    Mod A x2 for turns //bars (long)    Max encouragement and mod A    Caregiver in front, guiding pt hands, PT behind providing intermittent min A at hops to encourage weight shift    ***  ***  ***    AD training        Modalities

## 2024-06-17 NOTE — TELEPHONE ENCOUNTER
Aminta's caregiver came into PT clinic on 6/17 stating that the pt was refusing to get out of the van, having some verbal outbursts. Caregiver called  and discussed cancelling today's appt. PT called shortly after to discuss POC. Over recent sessions, Aminta's participation has been dwindling.    At this time, does not seem that she is getting much from PT and not likely RW is safe option given her impulsivity and cognitive status in understanding how to safely use AD.    Discussed plan with discharge this week or next week depending on how she does on her next appt. PT will call  on Thurs to discuss decision based on that day's performance.    She verbalized understanding on phone today.

## 2024-06-20 ENCOUNTER — APPOINTMENT (OUTPATIENT)
Dept: PHYSICAL THERAPY | Facility: CLINIC | Age: 49
End: 2024-06-20
Payer: MEDICARE

## 2024-06-20 ENCOUNTER — OFFICE VISIT (OUTPATIENT)
Dept: PHYSICAL THERAPY | Facility: CLINIC | Age: 49
End: 2024-06-20
Payer: MEDICARE

## 2024-06-20 ENCOUNTER — TELEPHONE (OUTPATIENT)
Dept: INTERNAL MEDICINE CLINIC | Facility: CLINIC | Age: 49
End: 2024-06-20

## 2024-06-20 DIAGNOSIS — R26.81 UNSTEADY GAIT: Primary | ICD-10-CM

## 2024-06-20 PROCEDURE — 97116 GAIT TRAINING THERAPY: CPT

## 2024-06-24 ENCOUNTER — OFFICE VISIT (OUTPATIENT)
Dept: PHYSICAL THERAPY | Facility: CLINIC | Age: 49
End: 2024-06-24
Payer: MEDICARE

## 2024-06-24 DIAGNOSIS — R26.81 UNSTEADY GAIT: Primary | ICD-10-CM

## 2024-06-24 PROCEDURE — 97116 GAIT TRAINING THERAPY: CPT

## 2024-06-24 NOTE — PROGRESS NOTES
Daily Note     Today's date: 2024  Patient name: Aminta De La Torre  : 1975  MRN: [id number]  Referring provider: Radha Olvera MD  Dx:   Encounter Diagnosis     ICD-10-CM    1. Unsteady gait  R26.81                      Subjective: Aminta presents with her caregiver Noemy. They arrived late as they had the appt time written down wrong. Accommodated with shortened session.       Objective: See treatment diary below      Assessment: Aminta tolerated a shortened treatment fair with some emotional outburst throughout session. She was unable to advance her UE along the parallel bars independently with more posterior lean into PT today compared to previous sessions. More difficulty with turns, need assist from both caregiver and PT as well as 1 instance of chair needing to be turned to her. PT discussed with  discharge next visit as no significant progress has been made with regards to her mobility or assistance level needed. She verbalized understanding.       Plan: Progress note during next visit.  Potential discharge next visit.     Short Term Goal Expiration Date:(2024)  Long Term Goal Expiration Date: (2024)  POC Expiration Date: (2024)      POC expires Unit limit Auth Expiration date PT/OT/ST + Visit Limit?   24 6 NMR OT/PT N/A BOMN                           Visit/Unit Tracking  AUTH Status:  Date 6/6 6/10 6/13 6/20 6/24   5/23 5/28 5/30 6/3   BOMN Used 12 13 14 15 16   8 9 PN 10 11    To next PN  3 of 10 4 of 10 5 of 10 6 of 10 7 of 10   Of 10 Of 10 1 of 10 2 of 10         Precautions Downs Syndrome; seizure like activity; cognitive decline; dysphagia       Manuals 6/24 6/6 6/10 6/13 6/20                   Pt/caregiver education                Neuro Re-Ed         STS Mod to max A x1-2 people Mod to max A x1-2 people Mod to max A x1 person Mod to max A x1 person Mod to max A x1 person   Standing tolerance        Side stepping                                        Ther Ex         LAQ        Seated march                                                        Ther Activity                        Gait Training        overground //bars (long)    Max encouragement and mod A    Caregiver in front, constantly guiding pt hands, PT behind providing intermittent mod A at hips to encourage weight and prevent posterior lean    X1 lap  X1 lap  X1 lap    Mod to max A for turns //bars (long)    Max encouragement & mod A    Caregiver in front, guiding pt hands, PT behind providing min to mod A at hips to encourage LE advancement    X2 laps  X1 lap      Max A x2 for turns--went to sit down and needed chair urgently //bars (long)    Max encouragement and mod A    Caregiver in front, guiding pt hands, PT behind providing intermittent min A at hops to encourage weight shift    X1 lap  X1 lap  X1 length  X1 length //bars (long)    Max encouragement and modA    Caregiver in front, guiding pt hands, PT behind providing intermittent min A at hops to encourage weight shift      X1 lap  X2 laps  X1 laps    Mod A x2 for turns //bars (long)    Max encouragement and mod A    Caregiver in front, intermittently guiding pt hands, PT behind providing rare min A at hips to encourage weight shift    X2 laps  X2 laps  X1 lap    Min to mod A for turns    AD training     With RW  X30 ft    Min A at trunk to prevent posterior LOB, Min to mod A at RW for steering, keeping wheels on the ground and hands on RW    WC follow   Modalities

## 2024-06-27 ENCOUNTER — APPOINTMENT (OUTPATIENT)
Dept: PHYSICAL THERAPY | Facility: CLINIC | Age: 49
End: 2024-06-27
Payer: MEDICARE

## 2024-07-09 ENCOUNTER — PATIENT MESSAGE (OUTPATIENT)
Dept: INTERNAL MEDICINE CLINIC | Facility: CLINIC | Age: 49
End: 2024-07-09

## 2024-07-09 DIAGNOSIS — G47.30 SLEEP APNEA, UNSPECIFIED TYPE: Primary | ICD-10-CM

## 2024-07-10 ENCOUNTER — CLINICAL SUPPORT (OUTPATIENT)
Dept: NUTRITION | Facility: HOSPITAL | Age: 49
End: 2024-07-10
Payer: MEDICARE

## 2024-07-10 VITALS — WEIGHT: 156.3 LBS | HEIGHT: 55 IN | BODY MASS INDEX: 36.17 KG/M2

## 2024-07-10 DIAGNOSIS — E66.09 CLASS 2 OBESITY DUE TO EXCESS CALORIES WITH BODY MASS INDEX (BMI) OF 38.0 TO 38.9 IN ADULT, UNSPECIFIED WHETHER SERIOUS COMORBIDITY PRESENT: Primary | ICD-10-CM

## 2024-07-10 NOTE — PROGRESS NOTES
Nutrition Assessment Form    Patient Name: Aminta De La Torre    YOB: 1975    Sex: Female     Assessment Date: 7/10/2024  Start Time: 12pm Stop Time: 1pm Total Minutes: 60 mins     Data:  Present at session: self and group home care takers   Parent/Patient Concerns/reason for visit: Care takers reported concerns with weight    Medical Dx/Reason for Referral: E66.9 (ICD-10-CM)    Past Medical History:   Diagnosis Date    Allergic     Community acquired pneumonia     Last Assessed:1/22/2013    Encounter for PPD test 09/26/2023    Encounter for vaccination 09/26/2023    Tdap given at this visit    Leukopenia     Last Assessed:3/5/2014    Osteoporosis     Preop examination 09/29/2019    Trisomy 21, Down syndrome        Current Outpatient Medications   Medication Sig Dispense Refill    acetaminophen (TYLENOL) 500 mg tablet Take 2 tablets (1,000 mg total) by mouth every 8 (eight) hours as needed for mild pain 30 tablet 5    atorvastatin (LIPITOR) 10 mg tablet Take 1 tablet (10 mg total) by mouth daily 30 tablet 5    bacitracin topical ointment 500 units/g topical ointment Apply topically in the morning (Patient not taking: Reported on 5/28/2024)      carbamide peroxide (DEBROX) 6.5 % otic solution Use 4 gtts to both ears bid Tuesdays and Fridays, and then 4 days prior to ENT appt for softening of ear wax 4 gtts bid x 4 days. 15 mL 0    Cholecalciferol 25 MCG (1000 UT) tablet Take 1 tablet (1,000 Units total) by mouth daily 90 tablet 1    citalopram (CeleXA) 20 mg tablet Take 1 tablet (20 mg total) by mouth daily 30 tablet 5    dextromethorphan-guaiFENesin (ROBITUSSIN DM)  mg/5 mL syrup Take 5 mL by mouth 3 (three) times a day as needed for cough or congestion (Patient not taking: Reported on 6/7/2024) 237 mL 0    Diclofenac Sodium (VOLTAREN) 1 % Apply 2 g topically 4 (four) times a day To left knee 100 g 2    Heating Pad PADS by Does not apply route 2 (two) times a day (Patient not taking: Reported on  "3/12/2024) 1 each 0    ibuprofen (MOTRIN) 600 mg tablet Take 600 mg by mouth every 6 (six) hours as needed      Incontinence Supply Disposable (RA Adult Wipes) MISC Use daily (Patient not taking: Reported on 3/12/2024) 200 each 2    Incontinence Supply Disposable (Ultra-Soft Plus Briefs XXL) MISC Use if needed (incontinence) (Patient not taking: Reported on 3/12/2024) 200 each 2    lacosamide (VIMPAT) 50 mg Take 1 tablet (50 mg total) by mouth daily at bedtime 30 tablet 5    levothyroxine 50 mcg tablet TAKE ONE TABLET BY MOUTH AT 7 AM 30 tablet 5    melatonin 3 mg Take 1 tablet (3 mg total) by mouth daily at bedtime 30 tablet 5    Midazolam 5 MG/0.1ML SOLN For seizure longer than 5 min or cluster of seizures. Give 1 spray (5 mg) into 1 nostril; if no response, a second dose of 1 spray (5 mg) into the other nostril may be given 10 min. (Patient not taking: Reported on 3/12/2024) 2 each 1    Mouthwashes (Biotene Dry Mouth) LIQD Apply 1 Swab to the mouth or throat 2 (two) times a day as needed (dry mouth) Use 5ml of solution with each swab 237 mL 0    nystatin (MYCOSTATIN) powder Apply topically 3 (three) times a day as needed (rash) 60 g 1    ofloxacin (FLOXIN) 0.3 % otic solution Administer 10 drops into both ears 2 (two) times a day (Patient not taking: Reported on 6/7/2024) 10 mL 2    sodium chloride (OCEAN) 0.65 % nasal spray 2 sprays into each nostril as needed for congestion or rhinitis 60 mL 0     No current facility-administered medications for this visit.        Additional Meds/Supplements:    Special Learning Needs/barriers to learning/any new barriers    Height: Ht Readings from Last 5 Encounters:   06/07/24 4' 7\" (1.397 m)   05/28/24 4' 7\" (1.397 m)   05/02/24 4' 7\" (1.397 m)   05/01/24 4' 7\" (1.397 m)   03/12/24 4' 7\" (1.397 m)      Weight: Wt Readings from Last 10 Encounters:   06/07/24 69.9 kg (154 lb)   05/02/24 69.9 kg (154 lb)   05/01/24 69.9 kg (154 lb)   02/21/24 73.5 kg (162 lb)   11/22/23 73.5 kg " "(162 lb)   11/16/23 73.5 kg (162 lb)   11/15/23 73.5 kg (162 lb)   11/14/23 73.5 kg (162 lb)   11/03/23 75.3 kg (166 lb)   10/18/23 75.3 kg (166 lb)     Estimated body mass index is 35.79 kg/m² as calculated from the following:    Height as of 6/7/24: 4' 7\" (1.397 m).    Weight as of 6/7/24: 69.9 kg (154 lb).   Recent Weight Change: [x]Yes     []No  Amount: Currently 156.3lbs, overall 9.7lbs/5.8% wt. Loss x 8 months      Energy Needs: [place] Equation:  x1.4 minus 541anp=3505ipn, 57-71g .8-1g/kg, 1418mL 20mL/kg   Allergies   Allergen Reactions    Levetiracetam Drowsiness and Hives     And disorientation    Depakote [Valproic Acid] Drowsiness    Lamictal [Lamotrigine] Rash    Pollen Extract Sneezing    or intolerances    Social History     Substance and Sexual Activity   Alcohol Use Not Currently    Comment: PATIENT DOES NOT DRINK    _______x/wk or month  1 or 2 or 3 or 4 or____ drinks/session   Mixed drinks/ wine/ beer   Social History     Tobacco Use   Smoking Status Never   Smokeless Tobacco Never       Who shops? Care takers   Who cooks/cooking methods/Eating out/take out habits   Care takers  Cooking methods: bake/groves/air groves/grill/boil/other________    Take out: __0_ x/wk or month   Dining out __0__ x/wk or month   Exercise: Walk/ run/ bike/ gym/elliptical/other _________  ____ x/wk how many minutes:______   strength training    Wheel chair bound, did have physical therapy but recently discharged   Other: ie: Sleep habits/ stress level/ work habits household-lives with ?/ food security No concerns with sleep, stress identified   Prior Nutritional Counseling? []Yes     [x]No  When:      Why:         Diet Hx:  Breakfast: Pureed diet: Oatmeal or cream of wheat, or cold cereal, sausage/waffles, 12oz cranberry juice   Lunch:   Pureed Stuffed peppers (single), veggie, maybe dessert such as icecream, or chicken pot pie, or rice with protein  12oz Cranberry juice or ice tea     Dinner:   Pureed Chicken, rice, " or mashed potatoes, veggies or soup, pasta  120z cranberry juice, or ice tea     Snacks: AM - rare  PM - ice cream  HS - not typical or may have ice cream or cake   Other Notes/ Initial Assessment:  Osito presents with care takers from [facility]. Aminta is wheel chair bound, has down syndrome, non verbal, on pureed diet, thin liquids. Osito has a good appetite, has some issues with bowel, doesn't have bowel movement daily sometimes 3 x week. Discussed MyPlate method for balancing meals reviewed portion sizes. Discussed cutting back on sweetened beverages and increasing water intake. Reviewed tips to increase fiber intake.     Updated assessment (Follow up note only):           Nutrition Diagnosis:   Overweight/obesity  related to Physical inactivity as evidenced by  BMI more than normative standard for age and sex (obesity-grade II 35-39.9)       Any change or new dx since previous visit:     Nutrition Diagnosis:         Medical Nutrition Therapy Intervention:  [x]Individualized Meal Plan- Discussed the plate method of portioning foods, including half a plate fruits and vegetables or a half plate all vegetables, 1/4 of the plate a lean protein source or meat, and a 1/4 of the plate being a whole grain carb- usually 1/2-1c.  This should be followed for at least 2 meals of the day, but could also be followed for all 3. Reviewed tips for decreasing amount of juices or ice tea and increasing water intake. Reviewed tips for increasing fiber intake. Discussed importance of adequate fruits and vegetables, and appropriate serving sizes, including cooking methods, variety of colors daily, and how they help balance diet and food intake.  Portions of other foods may increase if adequate fruits and vegetables are not included on a daily basis. Provided written material on Eating Right with MyPlate and Eating Right with Less Added Sugar.  []Understanding Lab Values   []Basic Pathophysiology of Disease  []Food/Medication Interactions   []Food Diary []Exercise   []Lifestyle/Behavior Modification Techniques []Medication, Mechanism of Action   []Label Reading: CHO/ Na/ Fat/ other_________ []Self Blood Glucose Monitoring   []Weight/BMI Goals: gain/lose/maintain []Other -           Comprehension: []Excellent  []Very Good  [x]Good  []Fair   []Poor    Receptivity: []Excellent  []Very Good  [x]Good  []Fair   []Poor    Expected Compliance: []Excellent  []Very Good  [x]Good  []Fair   []Poor        Goals (initial)/ Progress made on previous goals/new goals:  Limit juice and ice tea to half a cup by f/u   2. Increase water intake, consider 1 cup before and after each meal by f/u   3. Limit added sugar from ice cream, cakes, yogurt, consider pureed fruit with whip cream or Greek yogurt, unsweetened applesauce as a sweet treat instead by f/u  4. Switch to low fat dairy, consider skim or 1% milk or low fat cheese, low fat yogurt by /fu  5. Increase fiber intake using whole grains, fresh veggies and fruits by f/u       No follow-ups on file.  Labs:  CMP  Lab Results   Component Value Date     08/20/2016    K 4.6 10/31/2023     10/31/2023    CO2 24 10/31/2023    ANIONGAP 7 11/01/2014    BUN 9 10/31/2023    CREATININE 0.57 (L) 10/31/2023    GLUCOSE 97 11/01/2014    GLUF 112 (H) 10/13/2023    CALCIUM 8.5 10/31/2023    CORRECTEDCA 9.4 10/31/2023    AST 25 10/31/2023    ALT 14 10/31/2023    ALKPHOS 108 (H) 10/31/2023    PROT 6.8 08/20/2016    BILITOT 0.8 08/20/2016    EGFR 108 10/31/2023       BMP  Lab Results   Component Value Date    GLUCOSE 97 11/01/2014    CALCIUM 8.5 10/31/2023     08/20/2016    K 4.6 10/31/2023    CO2 24 10/31/2023     10/31/2023    BUN 9 10/31/2023    CREATININE 0.57 (L) 10/31/2023       Lipids  Lab Results   Component Value Date    CHOL 167 08/20/2016    CHOL 173 09/06/2014     Lab Results   Component Value Date    HDL 51 08/09/2022    HDL 52 05/04/2021    HDL 53 09/01/2018     Lab  "Results   Component Value Date    LDLCALC 65 08/09/2022    LDLCALC 93 05/04/2021    LDLCALC 94 09/01/2018     Lab Results   Component Value Date    TRIG 99 08/09/2022    TRIG 152 (H) 05/04/2021    TRIG 119 09/01/2018     No results found for: \"CHOLHDL\"    Hemoglobin A1C  No results found for: \"HGBA1C\"    Fasting Glucose  Lab Results   Component Value Date    GLUF 112 (H) 10/13/2023       Insulin     Thyroid  Lab Results   Component Value Date    TSH 3.55 02/14/2023       Hepatic Function Panel  Lab Results   Component Value Date    ALT 14 10/31/2023    AST 25 10/31/2023    ALKPHOS 108 (H) 10/31/2023    BILITOT 0.8 08/20/2016       Celiac Disease Antibody Panel  No results found for: \"ENDOMYSIAL IGA\", \"GLIADIN IGA\", \"GLIADIN IGG\", \"IGA\", \"TISSUE TRANSGLUT AB\", \"TTG IGA\"   Iron  No results found for: \"IRON\", \"TIBC\", \"FERRITIN\"         Clifton Griffith RD  [facility]  [address] [id number]    "

## 2024-07-17 ENCOUNTER — OFFICE VISIT (OUTPATIENT)
Dept: NEUROLOGY | Facility: CLINIC | Age: 49
End: 2024-07-17
Payer: MEDICARE

## 2024-07-17 VITALS
BODY MASS INDEX: 36.63 KG/M2 | HEIGHT: 55 IN | DIASTOLIC BLOOD PRESSURE: 76 MMHG | SYSTOLIC BLOOD PRESSURE: 116 MMHG | WEIGHT: 158.3 LBS

## 2024-07-17 DIAGNOSIS — Q90.9 TRISOMY 21, DOWN SYNDROME: ICD-10-CM

## 2024-07-17 DIAGNOSIS — R26.2 AMBULATORY DYSFUNCTION: ICD-10-CM

## 2024-07-17 DIAGNOSIS — Q90.9 DEMENTIA DUE TO DOWN SYNDROME (HCC): ICD-10-CM

## 2024-07-17 DIAGNOSIS — F02.80 DEMENTIA DUE TO DOWN SYNDROME (HCC): ICD-10-CM

## 2024-07-17 DIAGNOSIS — G40.909 RECURRENT SEIZURES (HCC): Primary | ICD-10-CM

## 2024-07-17 DIAGNOSIS — R55 SYNCOPE: ICD-10-CM

## 2024-07-17 PROCEDURE — G2211 COMPLEX E/M VISIT ADD ON: HCPCS | Performed by: PSYCHIATRY & NEUROLOGY

## 2024-07-17 PROCEDURE — 99214 OFFICE O/P EST MOD 30 MIN: CPT | Performed by: PSYCHIATRY & NEUROLOGY

## 2024-07-17 NOTE — PATIENT INSTRUCTIONS
Continue lacosamide 50 mg nightly.   Call us if you want to consider a 2 week trial off the lacosamide to see if it is causing side effects.   Let us know if there are events concerning for seizure.   Give nasal midazolam for seizure greater than 5 minutes.   Return in about 6 months.

## 2024-07-17 NOTE — PROGRESS NOTES
[facility]  Follow Up Visit    Impression/Plan    Ms. De La Torre is a 49 y.o. female  with history of trisomy 21, hyothyroidism and hyperlipidemia returning for follow up regarding syncope versus seizure. I saw her 2014/2015 for events that were most consistent with syncope. I'm 2023 she had 4 events of loss of consciousness and generalized shaking, some with postictal fatigue. The fact that all events occurred either while straining on the toilet or walking to the bathroom may suggest syncope. However, the duration and severity of the witnessed shaking with at least one event was more than is typically seen with convulsive syncope. Patients with trisomy 21 are at elevated risk for seizure. Repeated EEGs, including prolonged ambulatory EEG, have not revealed evidence of epilepsy. A brain MRI revealed significant diffuse atrophy which is not unexpected.     The diagnosis remains uncertain, but I have enough concern for seizure that I would like her to remain on an anti seizure medication. If there are additional possible seizures the dose of lacosamide can be increased and divided every 12 hours. Both levetiracetam and lacosamide have caused severe sedation, event at low doses. She had a rash with lamotrigine. She is on a dose of lacosamide that is typically subtherapeutic, but might be effective in some patients.  Prescribing nasal midazolam as rescue for seizure longer than 5 minutes (not filled after it was initially prescribed at her February visit).      Her gait and language functions have declined over time.  May be especially over the last year.  She has dementia related to her trisomy 21.  Her MRI confirms significant atrophy with significant progression since her head CT in 2014.  B12 was on the low side, but in the normal range in October 2023.  Repeat B12 and methylmalonic acid were ordered by her primary care physician in April, but I do not see the results.  Staff  will look into it and send us the results.    Her brother today had good questions about whether any of her medications could be contributing to language problems and gait difficulty.  Her lacosamide dose is very low and it would be unusual to experience significant side effects from a dose this low.  However, she was very sensitive to doses that were just a little bit higher and I cannot exclude the possibility that lacosamide could be contributing.  We discussed today the possibility of stopping lacosamide for 2 weeks and monitoring for improvement in language/cognition/gait.  If there were no significant improvement I would have her continue on lacosamide 50 mg/day.  If there was good evidence that lacosamide were causing significant side effects I would have her either remain off lacosamide or transition her to a low-dose of a new seizure medication.  Her brother was hesitant to take, he did not want to risk causing a seizure.  They will let us know if they would like to attempt a trial off lacosamide in the future.    Patient Instructions   Continue lacosamide 50 mg nightly.   Call us if you want to consider a 2 week trial off the lacosamide to see if it is causing side effects.   Let us know if there are events concerning for seizure.   Give nasal midazolam for seizure greater than 5 minutes.   Return in about 6 months.     Diagnoses and all orders for this visit:    Recurrent seizures (HCC)  -     Midazolam 5 MG/0.1ML SOLN; For seizure longer than 5 min or cluster of seizures. Give 1 spray (5 mg) into 1 nostril; if no response, a second dose of 1 spray (5 mg) into the other nostril may be given 10 min.    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     Interval Events:   Seizures since last visit: None    Last seen February 2024.    No events  concerning for seizure. Remains on low-dose of lacosamide once per day.  She did not tolerate higher doses.  She arrives with a caregiver that sees her 5 days a week as well as her brother who sees her a few hours per week.  He asks about the possibility of medications contributing to her decline over the last year.  Over the last year her ability to communicate has worsened some, her speech is more limited.  Her gait has also declined.  She just finished a course of physical therapy.  Knee problems also likely contribute. Blood work ordered by PCP in April included B12 and MMA, I do not see the results, but the caregiver thinks it was likely completed.  She will look into it and send us the results.    MRI brain completed in May shows significant atrophy that has progressed since the prior CT 10 years ago.    AED/side effects/compliance:  Lacosamide 50mg daily (could not tolerate further titration due to drowsiness)      Event/Seizure semiology:  Prior episodes of syncope documented in  (looked like she was getting sick, pale and then with LOC).    LOC with stiffening, shaking.  Events occurred in the context of having a bowel movement (10/2023 event occurred while straining to have a BM), or when ambulating to the bathroom.     Special Features  Status epilepticus: No  Self Injury Seizures: No  Precipitating Factors: most events have occurred during straining to have a bowel movement, or on her way to the bathroom or from the bathroom.       Epilepsy Risk Factors:  Abnormal pregnancy: No  Abnormal birth/: Born late 1-2 weeks, otherwise uncomplicated, per sister  Abnormal Development: Down syndrome   Febrile seizures, simple: No  Febrile seizures, complex: No  CNS infection: No  Cerebral palsy: No  Head injury (moderate/severe): No  CNS neoplasm: No  CNS malformation: No  Neurosurgical procedure: No  Stroke: No  Alcohol abuse: No  Drug abuse: No  Family history Sz/epilepsy: No     Prior  "AEDs:  Levetiracetam - mood changes, \"zombie\", non-functional   Lamotrigine - rash on foot  Divalproex - \"zombie\", non-functional      Psychiatric History:  Sees psychiatry for depression and behavioral issues      Social History:  Lives with: Group home--[facility]--started living there in Oct 2023.  Previously has lived in other group home environments or with family   Driving: no     Prior workup:    10/12/2023 [facility] head wo contrast  No acute abnormalities.  Enlargement of the ventricles and sulci secondary to chronic volume loss      6/6/2023 routine EEG ([facility])  The continuous generalized slowing seen throughout the record suggests the presence of a severe diffuse cortical dysfunction.  This is nonspecific but suggests a global encephalopathic process of most any etiology, including toxic/metabolic/anoxic/hypoxic.      2/13/2023 routine EEG ([facility])  Moderate bilateral slowing. No focal slowing or epileptiform abnormalities were seen. These findings suggest moderate bilateral cerebral dysfunction.      24 hour AEEG 12/7/2023:  This ambulatory EEG (day 1 of 1, with about 12 hours of interpretable data recorded) recorded during wakefulness, drowsiness, and sleep is abnormal. Background activities are overall too slow, suggesting mild to moderate diffuse cerebral dysfunction of nonspecific etiology. No clinical events were reported and around the times of three push buttons, activated for unclear reasons, there was no electrographic evidence of seizure. No epileptiform discharges or electrographic seizures were seen.     Brain MRI seizure protocol with and without contrast 5/1/2024:  1.  Remarkable progressive cerebral atrophy when compared to head CT 11/1/2014 with moderate ex vacuo enlargement of the ventricular system most pronounced in the temporal horns.  2.  Significant hippocampal atrophy with ex vacuo enlargement of the adjacent temporal horns.  3.  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 Precocious microangiopathy is within differential consideration.  4.  Minimal supratentorial white matter change may indicate microangiopathy.      Objective    /76 (BP Location: Left arm, Patient Position: Sitting, Cuff Size: Large)   Ht 4' 5.5\" (1.359 m)   Wt 71.8 kg (158 lb 4.8 oz)   LMP  (LMP Unknown)   BMI 38.88 kg/m²      General Exam  No acute distress.  In wheelchair, with lap belt.    Neurologic Exam  Mental Status:  Alert and active in her chair.  Frequent verbalization, often not something I can comprehend, but family can  on what she says.  She claps occasionally.  She says bye when I waved at her.          "

## 2024-07-26 DIAGNOSIS — G89.29 CHRONIC PAIN OF LEFT KNEE: ICD-10-CM

## 2024-07-26 DIAGNOSIS — M25.562 CHRONIC PAIN OF LEFT KNEE: ICD-10-CM

## 2024-07-26 DIAGNOSIS — E03.9 HYPOTHYROIDISM, UNSPECIFIED TYPE: ICD-10-CM

## 2024-07-26 RX ORDER — LEVOTHYROXINE SODIUM 0.05 MG/1
TABLET ORAL
Qty: 30 TABLET | Refills: 5 | Status: SHIPPED | OUTPATIENT
Start: 2024-07-26

## 2024-07-27 DIAGNOSIS — R21 RASH: ICD-10-CM

## 2024-07-29 RX ORDER — NYSTATIN 100000 [USP'U]/G
POWDER TOPICAL
Qty: 60 G | Refills: 0 | Status: SHIPPED | OUTPATIENT
Start: 2024-07-29

## 2024-08-19 ENCOUNTER — APPOINTMENT (EMERGENCY)
Dept: RADIOLOGY | Facility: HOSPITAL | Age: 49
End: 2024-08-19
Payer: MEDICARE

## 2024-08-19 ENCOUNTER — HOSPITAL ENCOUNTER (EMERGENCY)
Facility: HOSPITAL | Age: 49
Discharge: HOME | End: 2024-08-20
Attending: EMERGENCY MEDICINE
Payer: MEDICARE

## 2024-08-19 DIAGNOSIS — S82.843A BIMALLEOLAR ANKLE FRACTURE: Primary | ICD-10-CM

## 2024-08-19 PROCEDURE — 73610 X-RAY EXAM OF ANKLE: CPT

## 2024-08-19 PROCEDURE — 99156 MOD SED OTH PHYS/QHP 5/>YRS: CPT | Performed by: EMERGENCY MEDICINE

## 2024-08-19 PROCEDURE — 99284 EMERGENCY DEPT VISIT MOD MDM: CPT

## 2024-08-19 PROCEDURE — 73600 X-RAY EXAM OF ANKLE: CPT

## 2024-08-19 PROCEDURE — 73560 X-RAY EXAM OF KNEE 1 OR 2: CPT

## 2024-08-19 PROCEDURE — 96374 THER/PROPH/DIAG INJ IV PUSH: CPT

## 2024-08-19 PROCEDURE — 99285 EMERGENCY DEPT VISIT HI MDM: CPT | Performed by: EMERGENCY MEDICINE

## 2024-08-19 RX ORDER — FENTANYL CITRATE 50 UG/ML
50 INJECTION, SOLUTION INTRAMUSCULAR; INTRAVENOUS ONCE
Status: COMPLETED | OUTPATIENT
Start: 2024-08-19 | End: 2024-08-19

## 2024-08-19 RX ORDER — KETAMINE HCL IN NACL, ISO-OSM 100MG/10ML
2 SYRINGE (ML) INJECTION ONCE
Status: COMPLETED | OUTPATIENT
Start: 2024-08-19 | End: 2024-08-19

## 2024-08-19 RX ORDER — ACETAMINOPHEN 325 MG/1
975 TABLET ORAL ONCE
Status: COMPLETED | OUTPATIENT
Start: 2024-08-19 | End: 2024-08-19

## 2024-08-19 RX ORDER — IBUPROFEN 400 MG/1
400 TABLET, FILM COATED ORAL ONCE
Status: COMPLETED | OUTPATIENT
Start: 2024-08-19 | End: 2024-08-19

## 2024-08-19 RX ADMIN — FENTANYL CITRATE 50 MCG: 50 INJECTION INTRAMUSCULAR; INTRAVENOUS at 23:04

## 2024-08-19 RX ADMIN — ACETAMINOPHEN 975 MG: 325 TABLET ORAL at 19:55

## 2024-08-19 RX ADMIN — Medication 100 MG: at 23:37

## 2024-08-19 RX ADMIN — IBUPROFEN 400 MG: 400 TABLET, FILM COATED ORAL at 19:55

## 2024-08-19 NOTE — ED PROVIDER NOTES
History  Chief Complaint   Patient presents with    Knee Pain     Started abruptly  with L knee pain.      HPI  Patient is a 49 y.o. female with history of trisomy 21 presenting to the emergency department for difficulty walking. Per patients caretaker / staff member she tried getting the patient to stand to move today and the patient immediately had pain and would not continue standing. She denies the patient having any known injuries but states that she has difficulty walking at baseline.     Prior to Admission Medications   Prescriptions Last Dose Informant Patient Reported? Taking?   Cholecalciferol 25 MCG (1000 UT) tablet  Care Giver No No   Sig: Take 1 tablet (1,000 Units total) by mouth daily   Diclofenac Sodium (VOLTAREN) 1 %   No No   Sig: Apply 2 g topically 4 (four) times a day To left knee   Heating Pad PADS  Care Giver No No   Sig: by Does not apply route 2 (two) times a day   Patient not taking: Reported on 3/12/2024   Incontinence Supply Disposable (RA Adult Wipes) MISC  Care Giver No No   Sig: Use daily   Incontinence Supply Disposable (Ultra-Soft Plus Briefs XXL) MISC  Care Giver No No   Sig: Use if needed (incontinence)   Midazolam 5 MG/0.1ML SOLN  Care Giver No No   Sig: For seizure longer than 5 min or cluster of seizures. Give 1 spray (5 mg) into 1 nostril; if no response, a second dose of 1 spray (5 mg) into the other nostril may be given 10 min.   Mouthwashes (Biotene Dry Mouth) LIQD  Care Giver No No   Sig: Apply 1 Swab to the mouth or throat 2 (two) times a day as needed (dry mouth) Use 5ml of solution with each swab   acetaminophen (TYLENOL) 500 mg tablet  Care Giver No No   Sig: Take 2 tablets (1,000 mg total) by mouth every 8 (eight) hours as needed for mild pain   atorvastatin (LIPITOR) 10 mg tablet  Care Giver No No   Sig: Take 1 tablet (10 mg total) by mouth daily   bacitracin topical ointment 500 units/g topical ointment  Care Giver Yes No   Sig: Apply topically in the morning    Patient not taking: Reported on 2024   carbamide peroxide (DEBROX) 6.5 % otic solution  Care Giver No No   Sig: Use 4 gtts to both ears bid  and , and then 4 days prior to ENT appt for softening of ear wax 4 gtts bid x 4 days.   Patient not taking: Reported on 2024   citalopram (CeleXA) 20 mg tablet  Care Giver No No   Sig: Take 1 tablet (20 mg total) by mouth daily   dextromethorphan-guaiFENesin (ROBITUSSIN DM)  mg/5 mL syrup  Care Giver No No   Sig: Take 5 mL by mouth 3 (three) times a day as needed for cough or congestion   ibuprofen (MOTRIN) 600 mg tablet  Care Giver Yes No   Sig: Take 600 mg by mouth every 6 (six) hours as needed   lacosamide (VIMPAT) 50 mg  Care Giver No No   Sig: Take 1 tablet (50 mg total) by mouth daily at bedtime   levothyroxine 50 mcg tablet   No No   Sig: TAKE ONE TABLET BY MOUTH AT 7 AM   melatonin 3 mg  Care Giver No No   Sig: Take 1 tablet (3 mg total) by mouth daily at bedtime   nystatin powder   No No   Sig: Apply topically 3 (three) times a day as needed (rash)   ofloxacin (FLOXIN) 0.3 % otic solution  Care Giver No No   Sig: Administer 10 drops into both ears 2 (two) times a day   sodium chloride (OCEAN) 0.65 % nasal spray  Care Giver No No   Si sprays into each nostril as needed for congestion or rhinitis      Facility-Administered Medications: None       Past Medical History:   Diagnosis Date    Allergic     Community acquired pneumonia     Last Assessed:2013    Encounter for PPD test 2023    Encounter for vaccination 2023    Tdap given at this visit    Leukopenia     Last Assessed:3/5/2014    Osteoporosis     Preop examination 2019    Trisomy 21, Down syndrome        Past Surgical History:   Procedure Laterality Date    TONSILLECTOMY AND ADENOIDECTOMY         Family History   Problem Relation Age of Onset    Cancer Mother     No Known Problems Sister     No Known Problems Brother     No Known Problems Brother      I  have reviewed and agree with the history as documented.    E-Cigarette/Vaping    E-Cigarette Use Never User      E-Cigarette/Vaping Substances    Nicotine No     THC No     CBD No     Flavoring No      Social History     Tobacco Use    Smoking status: Never    Smokeless tobacco: Never   Vaping Use    Vaping status: Never Used   Substance Use Topics    Alcohol use: Not Currently     Comment: PATIENT DOES NOT DRINK    Drug use: Never        Review of Systems   Unable to perform ROS: Patient nonverbal       Physical Exam  ED Triage Vitals   Temperature Pulse Respirations Blood Pressure SpO2   08/19/24 1818 08/19/24 1818 08/19/24 1818 08/19/24 1818 08/19/24 1818   98 °F (36.7 °C) (!) 109 20 (!) 189/103 99 %      Temp Source Heart Rate Source Patient Position - Orthostatic VS BP Location FiO2 (%)   08/19/24 1818 08/19/24 1818 08/19/24 1818 08/19/24 1818 --   Oral Monitor Sitting Right arm       Pain Score       08/19/24 1955       7             Orthostatic Vital Signs  Vitals:    08/19/24 2346 08/19/24 2350 08/19/24 2356 08/20/24 0000   BP: 112/70 112/74 112/71 111/71   Pulse: 95 93 100 101   Patient Position - Orthostatic VS: Lying Lying Lying Lying       Physical Exam  Vitals and nursing note reviewed.   Constitutional:       General: She is not in acute distress.     Appearance: She is not ill-appearing.   HENT:      Head: Normocephalic and atraumatic.      Mouth/Throat:      Mouth: Mucous membranes are moist.   Eyes:      Conjunctiva/sclera: Conjunctivae normal.   Cardiovascular:      Rate and Rhythm: Normal rate.   Pulmonary:      Effort: Pulmonary effort is normal. No respiratory distress.   Abdominal:      General: There is no distension.   Musculoskeletal:      Cervical back: Neck supple.      Comments: Patient complains of pain with movement of her left knee and left ankle. No TTP to left hip.   Skin:     General: Skin is warm.   Neurological:      General: No focal deficit present.      Mental Status: She is  alert.   Psychiatric:         Mood and Affect: Mood normal.         ED Medications  Medications   acetaminophen (TYLENOL) tablet 975 mg (975 mg Oral Given 8/19/24 1955)   ibuprofen (MOTRIN) tablet 400 mg (400 mg Oral Given 8/19/24 1955)   fentaNYL injection 50 mcg (50 mcg Intravenous Given 8/19/24 2304)   Ketamine HCl 144 mg (100 mg Intravenous Given 8/19/24 2337)   fentaNYL injection 25 mcg (25 mcg Intravenous Given 8/20/24 0125)   haloperidol lactate (HALDOL) injection 5 mg (5 mg Intravenous Given 8/20/24 0133)       Diagnostic Studies  Results Reviewed       None                   XR tibia fibula 2 vw left   Final Result by Zander Rene MD (08/20 1341)      No acute proximal tibia or fibula fracture or dislocation. Refer to dedicated ankle radiographs for discussion of distal fractures.               Computerized Assisted Algorithm (CAA) may have been used to analyze all applicable images.               Workstation performed: [id number]         XR ankle 3+ views LEFT   Final Result by Maicol Herring MD (08/20 1318)   Near anatomic alignment status post closed reduction for distal tibiofibular fractures, with bony detail somewhat limited by overlying cast.      Computerized Assisted Algorithm (CAA) may have been used to analyze all applicable images.               Workstation performed: [id number]         XR ankle 3+ vw left   Final Result by Zander Rene MD (08/20 1312)      Tibial and fibular fractures as described with findings suggesting interosseous membrane disruption/instability.         Computerized Assisted Algorithm (CAA) may have been used to analyze all applicable images.               Workstation performed: [id number]         XR knee 1 or 2 vw left   ED Interpretation by June Mcneill DO (08/19 2104)   No acute fracture      Final Result by Nancy Garcia MD (08/20 1324)      No acute osseous abnormality.   Degenerative changes, stable appearing.         Computerized Assisted Algorithm (CAA)  may have been used to analyze all applicable images.         Workstation performed: [id number]         XR ankle 2 vw left   ED Interpretation by June Mcneill DO (08/19 2104)   Distal tibia and fibula fractures       Final Result by Zander Rene MD (08/20 1312)      Tibial and fibular fractures as described with findings suggesting interosseous membrane disruption/instability.         Computerized Assisted Algorithm (CAA) may have been used to analyze all applicable images.               Workstation performed: [id number]         CT lower extremity wo contrast left    (Results Pending)         Procedures  Procedures      ED Course  ED Course as of 08/21/24 0645   Mon Aug 19, 2024   2117 Consulted orthopedics regarding disposition of fracture   Tue Aug 20, 2024   0004 Reduction completed in the ED by orthopedics. Sedation performed by ED staff. Patient back to baseline. Will discuss with orthopedics for final discharge recommendations    0008 Discussed with patients caretaker - will be able to keep patient non-weight bearing of her LLE at their facility. Patient will be stable for discharge tonight after cleared by orthopedics    0030 Orthopedics requesting CT lower extremity, will obtain prior to discharge.    0143 Attempted to get CT scan but patient became agitated, gave haldol but patient still not tolerating. Was unable to lie flat or stop moving for scan.                                        Medical Decision Making  Amount and/or Complexity of Data Reviewed  Radiology: ordered and independent interpretation performed.    Risk  OTC drugs.  Prescription drug management.    Patient is a 49 y.o. female with PMH of trisomy 21 who presents to the ED with leg pain / issues ambulating.    Vital signs stable. On exam tenderness primarily to left ankle.    History and physical exam most consistent with ankle fracture. However, differential diagnosis included but not limited to sprain / strain.     Plan: XR left  "ankle and left knee    View ED course above for further discussion on patient workup.    All labs reviewed and utilized in the medical decision making process  All radiology studies independently viewed by me and interpreted by the radiologist.  I reviewed all testing with the patient.     Upon re-evaluation patient tolerated splinting.    Disposition: I have reviewed the patient's vital signs, nursing notes, and other relevant tests/information. I had a detailed discussion with the patient regarding the history, exam findings, and any diagnostic results.   Plan to discharge home in stable condition with splint, follow up with orthopedics.  Discussed with patient who is agreeable to plan.  I discussed discharge instructions, need for follow-up, and oral return precautions for what to return for in addition to the written return precautions and discharge instructions, specifically highlighting areas of special concern.  The patient verbalized understanding of the discharge instructions and warnings that would necessitate return to the Emergency Department including worsening pain.  All questions the patient had were answered prior to discharge to the best of my ability.       Portions of the record may have been created with voice recognition software. Occasional wrong word or \"sound a like\" substitutions may have occurred due to the inherent limitations of voice recognition software. Read the chart carefully and recognize, using context, where substitutions have occurred.        Disposition  Final diagnoses:   Bimalleolar ankle fracture     Time reflects when diagnosis was documented in both MDM as applicable and the Disposition within this note       Time User Action Codes Description Comment    8/19/2024  9:10 PM June Mcneill Add [S82.843A] Bimalleolar ankle fracture           ED Disposition       ED Disposition   Discharge    Condition   Stable    Date/Time   Tue Aug 20, 2024  2:40 AM    Comment   Aminta PEREZ" Brauchle discharge to home/self care.                   Follow-up Information       Follow up With Specialties Details Why Contact Info Additional Information    [facility] [place] [facility] Schedule an appointment as soon as possible for a visit   [address] [facility] [address] [phone] [facility] [place], [facility] [place] [address], [place], [place], [address] [phone]  Use Entrance A             Discharge Medication List as of 8/20/2024  3:23 AM        CONTINUE these medications which have NOT CHANGED    Details   acetaminophen (TYLENOL) 500 mg tablet Take 2 tablets (1,000 mg total) by mouth every 8 (eight) hours as needed for mild pain, Starting Thu 11/16/2023, Normal      atorvastatin (LIPITOR) 10 mg tablet Take 1 tablet (10 mg total) by mouth daily, Starting Tue 5/28/2024, Until Sun 11/24/2024, Normal      bacitracin topical ointment 500 units/g topical ointment Apply topically in the morning, Starting Fri 4/5/2024, Historical Med      carbamide peroxide (DEBROX) 6.5 % otic solution Use 4 gtts to both ears bid Tuesdays and Fridays, and then 4 days prior to ENT appt for softening of ear wax 4 gtts bid x 4 days., Normal      Cholecalciferol 25 MCG (1000 UT) tablet Take 1 tablet (1,000 Units total) by mouth daily, Starting Tue 3/12/2024, Normal      citalopram (CeleXA) 20 mg tablet Take 1 tablet (20 mg total) by mouth daily, Starting Mon 4/15/2024, Normal      dextromethorphan-guaiFENesin (ROBITUSSIN DM)  mg/5 mL syrup Take 5 mL by mouth 3 (three) times a day as needed for cough or congestion, Starting Wed 10/18/2023, Normal      Diclofenac Sodium (VOLTAREN) 1 % Apply 2 g topically 4 (four) times a day To left knee, Starting Fri 7/26/2024, Normal      Heating Pad PADS by Does not apply route 2 (two) times a day, Starting Mon 6/24/2019, Normal      ibuprofen (MOTRIN) 600 mg tablet Take 600 mg by mouth every 6 (six)  hours as needed, Starting Tue 11/28/2023, Until Wed 11/27/2024 at 2359, Historical Med      !! Incontinence Supply Disposable (RA Adult Wipes) MISC Use daily, Starting Mon 10/24/2022, Normal      !! Incontinence Supply Disposable (Ultra-Soft Plus Briefs XXL) MISC Use if needed (incontinence), Starting Wed 11/15/2023, Normal      lacosamide (VIMPAT) 50 mg Take 1 tablet (50 mg total) by mouth daily at bedtime, Starting Mon 6/3/2024, Normal      levothyroxine 50 mcg tablet TAKE ONE TABLET BY MOUTH AT 7 AM, Normal      melatonin 3 mg Take 1 tablet (3 mg total) by mouth daily at bedtime, Starting Fri 4/12/2024, Normal      Midazolam 5 MG/0.1ML SOLN For seizure longer than 5 min or cluster of seizures. Give 1 spray (5 mg) into 1 nostril; if no response, a second dose of 1 spray (5 mg) into the other nostril may be given 10 min., Normal      Mouthwashes (Biotene Dry Mouth) LIQD Apply 1 Swab to the mouth or throat 2 (two) times a day as needed (dry mouth) Use 5ml of solution with each swab, Starting Wed 10/18/2023, Normal      nystatin powder Apply topically 3 (three) times a day as needed (rash), Normal      ofloxacin (FLOXIN) 0.3 % otic solution Administer 10 drops into both ears 2 (two) times a day, Starting Thu 4/18/2024, Normal      sodium chloride (OCEAN) 0.65 % nasal spray 2 sprays into each nostril as needed for congestion or rhinitis, Starting Wed 10/18/2023, Normal       !! - Potential duplicate medications found. Please discuss with provider.        Outpatient Discharge Orders   CT lower extremity wo contrast left   Standing Status: Future Standing Exp. Date: 08/20/28       PDMP Review         Value Time User    PDMP Reviewed  Yes 6/3/2024  4:49 PM Hernesto Savage MD             ED Provider  Attending physically available and evaluated Aminta De La Torre. I managed the patient along with the ED Attending.    Electronically Signed by           June Mcneill DO  08/21/24 9417

## 2024-08-20 ENCOUNTER — APPOINTMENT (EMERGENCY)
Dept: RADIOLOGY | Facility: HOSPITAL | Age: 49
End: 2024-08-20
Payer: MEDICARE

## 2024-08-20 VITALS
HEART RATE: 101 BPM | RESPIRATION RATE: 20 BRPM | TEMPERATURE: 98 F | OXYGEN SATURATION: 94 % | SYSTOLIC BLOOD PRESSURE: 111 MMHG | DIASTOLIC BLOOD PRESSURE: 71 MMHG

## 2024-08-20 PROCEDURE — 73590 X-RAY EXAM OF LOWER LEG: CPT

## 2024-08-20 PROCEDURE — NC001 PR NO CHARGE: Performed by: ORTHOPAEDIC SURGERY

## 2024-08-20 PROCEDURE — 96375 TX/PRO/DX INJ NEW DRUG ADDON: CPT

## 2024-08-20 PROCEDURE — 96376 TX/PRO/DX INJ SAME DRUG ADON: CPT

## 2024-08-20 RX ORDER — FENTANYL CITRATE 50 UG/ML
25 INJECTION, SOLUTION INTRAMUSCULAR; INTRAVENOUS ONCE
Status: COMPLETED | OUTPATIENT
Start: 2024-08-20 | End: 2024-08-20

## 2024-08-20 RX ORDER — HALOPERIDOL 5 MG/ML
5 INJECTION INTRAMUSCULAR ONCE
Status: COMPLETED | OUTPATIENT
Start: 2024-08-20 | End: 2024-08-20

## 2024-08-20 RX ADMIN — FENTANYL CITRATE 25 MCG: 50 INJECTION INTRAMUSCULAR; INTRAVENOUS at 01:25

## 2024-08-20 RX ADMIN — HALOPERIDOL LACTATE 5 MG: 5 INJECTION, SOLUTION INTRAMUSCULAR at 01:33

## 2024-08-20 NOTE — CONSULTS
Orthopedics   Aminta De La Torre 49 y.o. female MRN: [id number]  Unit/Bed#: Z6HD      Chief Complaint:   Left ankle pain    HPI:  49 y.o. female who requires an assistant to ambulate and presents with a chief complaint of left ankle pain after an unknown mechanism of injury.  Patient is nonverbal at baseline due to severe mental disability, and caretaker is present to provide additional history.  She says she found the patient sitting comfortably on her recliner this afternoon, but when she went to stand the patient up the patient expressed a considerable amount of distress. Patient does not take blood thinners at baseline. Medical history significant for down syndrome, hypothyroidism, kyphosis causing reduced lung capacity, heart murmur, and limb length discrepancy. Care taker notes that she is unsteady on her feet even with assistance when she ambulates at baseline. Patient lives in a group home.    Review Of Systems:   Skin: Normal  Neuro: See HPI  Musculoskeletal: See HPI  14 point review of systems negative except as stated above     Past Medical History:   Past Medical History:   Diagnosis Date    Allergic     Community acquired pneumonia     Last Assessed:1/22/2013    Encounter for PPD test 09/26/2023    Encounter for vaccination 09/26/2023    Tdap given at this visit    Leukopenia     Last Assessed:3/5/2014    Osteoporosis     Preop examination 09/29/2019    Trisomy 21, Down syndrome        Past Surgical History:   Past Surgical History:   Procedure Laterality Date    TONSILLECTOMY AND ADENOIDECTOMY         Family History:  Family history reviewed and non-contributory  Family History   Problem Relation Age of Onset    Cancer Mother     No Known Problems Sister     No Known Problems Brother     No Known Problems Brother        Social History:  Social History     Socioeconomic History    Marital status: Single     Spouse name: None    Number of children: None    Years of education: None    Highest education  level: None   Occupational History    None   Tobacco Use    Smoking status: Never    Smokeless tobacco: Never   Vaping Use    Vaping status: Never Used   Substance and Sexual Activity    Alcohol use: Not Currently     Comment: PATIENT DOES NOT DRINK    Drug use: Never    Sexual activity: Never   Other Topics Concern    None   Social History Narrative    None     Social Determinants of Health     Financial Resource Strain: Patient Declined (6/5/2023)    [facility]    Overall Financial Resource Strain (CARDIA)     Difficulty of Paying Living Expenses: Patient declined   Food Insecurity: Patient Declined (6/5/2023)    [facility]    Hunger Vital Sign     Worried About Running Out of Food in the Last Year: Patient declined     Ran Out of Food in the Last Year: Patient declined   Transportation Needs: Patient Declined (6/5/2023)    [facility]    PRAPARE - Transportation     Lack of Transportation (Medical): Patient declined     Lack of Transportation (Non-Medical): Patient declined   Physical Activity: Not on file   Stress: Not on file   Social Connections: Not on file   Intimate Partner Violence: Patient Declined (6/5/2023)    [facility]    Humiliation, Afraid, Rape, and Kick questionnaire     Fear of Current or Ex-Partner: Patient declined     Emotionally Abused: Patient declined     Physically Abused: Patient declined     Sexually Abused: Patient declined   Housing Stability: Not on file       Allergies:   Allergies   Allergen Reactions    Levetiracetam Drowsiness and Hives     And disorientation    Depakote [Valproic Acid] Drowsiness    Lamictal [Lamotrigine] Rash    Pollen Extract Sneezing           Labs:  0   Lab Value Date/Time    HCT 44.6 10/31/2023 1512    HCT 45.7  10/13/2023 0551    HCT 45.6 10/12/2023 1720    HCT 44.2 08/20/2016 0739    HCT 42.2 11/01/2014 0949    HCT 44.6 05/09/2014 0949    HGB 15.2 10/31/2023 1512    HGB 15.7 (H) 10/13/2023 0551    HGB 15.6 (H) 10/12/2023 1720    HGB 14.3 08/20/2016 0739    HGB 14.5 11/01/2014 0949    HGB 15.1 05/09/2014 0949    INR 1.08 02/28/2014 0952    WBC 7.28 10/31/2023 1512    WBC 6.30 10/13/2023 0551    WBC 7.90 10/12/2023 1720    WBC 4.3 08/20/2016 0739    WBC 4.84 11/01/2014 0949    WBC 9.50 05/09/2014 0949       Meds:  No current facility-administered medications for this encounter.    Current Outpatient Medications:     acetaminophen (TYLENOL) 500 mg tablet, Take 2 tablets (1,000 mg total) by mouth every 8 (eight) hours as needed for mild pain, Disp: 30 tablet, Rfl: 5    atorvastatin (LIPITOR) 10 mg tablet, Take 1 tablet (10 mg total) by mouth daily, Disp: 30 tablet, Rfl: 5    bacitracin topical ointment 500 units/g topical ointment, Apply topically in the morning (Patient not taking: Reported on 5/28/2024), Disp: , Rfl:     carbamide peroxide (DEBROX) 6.5 % otic solution, Use 4 gtts to both ears bid Tuesdays and Fridays, and then 4 days prior to ENT appt for softening of ear wax 4 gtts bid x 4 days. (Patient not taking: Reported on 7/17/2024), Disp: 15 mL, Rfl: 0    Cholecalciferol 25 MCG (1000 UT) tablet, Take 1 tablet (1,000 Units total) by mouth daily, Disp: 90 tablet, Rfl: 1    citalopram (CeleXA) 20 mg tablet, Take 1 tablet (20 mg total) by mouth daily, Disp: 30 tablet, Rfl: 5    dextromethorphan-guaiFENesin (ROBITUSSIN DM)  mg/5 mL syrup, Take 5 mL by mouth 3 (three) times a day as needed for cough or congestion, Disp: 237 mL, Rfl: 0    Diclofenac Sodium (VOLTAREN) 1 %, Apply 2 g topically 4 (four) times a day To left knee, Disp: 100 g, Rfl: 5    Heating Pad PADS, by Does not apply route 2 (two) times a day (Patient not taking: Reported on 3/12/2024), Disp: 1 each, Rfl: 0    ibuprofen (MOTRIN) 600 mg tablet, Take  "600 mg by mouth every 6 (six) hours as needed, Disp: , Rfl:     Incontinence Supply Disposable (RA Adult Wipes) MISC, Use daily, Disp: 200 each, Rfl: 2    Incontinence Supply Disposable (Ultra-Soft Plus Briefs XXL) MISC, Use if needed (incontinence), Disp: 200 each, Rfl: 2    lacosamide (VIMPAT) 50 mg, Take 1 tablet (50 mg total) by mouth daily at bedtime, Disp: 30 tablet, Rfl: 5    levothyroxine 50 mcg tablet, TAKE ONE TABLET BY MOUTH AT 7 AM, Disp: 30 tablet, Rfl: 5    melatonin 3 mg, Take 1 tablet (3 mg total) by mouth daily at bedtime, Disp: 30 tablet, Rfl: 5    Midazolam 5 MG/0.1ML SOLN, For seizure longer than 5 min or cluster of seizures. Give 1 spray (5 mg) into 1 nostril; if no response, a second dose of 1 spray (5 mg) into the other nostril may be given 10 min., Disp: 2 each, Rfl: 1    Mouthwashes (Biotene Dry Mouth) LIQD, Apply 1 Swab to the mouth or throat 2 (two) times a day as needed (dry mouth) Use 5ml of solution with each swab, Disp: 237 mL, Rfl: 0    nystatin powder, Apply topically 3 (three) times a day as needed (rash), Disp: 60 g, Rfl: 0    ofloxacin (FLOXIN) 0.3 % otic solution, Administer 10 drops into both ears 2 (two) times a day, Disp: 10 mL, Rfl: 2    sodium chloride (OCEAN) 0.65 % nasal spray, 2 sprays into each nostril as needed for congestion or rhinitis, Disp: 60 mL, Rfl: 0    Blood Culture:   No results found for: \"BLOODCX\"    Wound Culture:   No results found for: \"WOUNDCULT\"    Ins and Outs:  No intake/output data recorded.          Physical Exam:   BP (!) 189/103 (BP Location: Right arm)   Pulse (!) 109   Temp 98 °F (36.7 °C) (Oral)   Resp 20   LMP  (LMP Unknown)   SpO2 99%   Gen: No acute distress, resting comfortably in bed  HEENT: Eyes clear, moist mucus membranes, hearing intact  Respiratory: No audible wheezing or stridor  Cardiovascular: Well Perfused peripherally, 2+ distal pulse  Abdomen: nondistended, no peritoneal signs  Musculoskeletal: left Lower Extremity " extremity  Physical exam limited by patient baseline non-verbal status.  No obvious deformity, no ecchymosis or erythema appreciated.  Tenderness to palpation elicited over the left medial and lateral malleoli as well as with passive manipulation of the foot.  Patient responds to tactile stimuli over the left foot.  Patient observed grossly moving left lower extremity spontaneously.  Extremities warm and well-perfused, DP pulse palpable.    Tertiary: no tenderness over all other joints/long bones as except already stated.      Radiology:     X-rays demonstrate a trimalleolar fracture with mild displacement of the lateral malleolus and minimal displacement of the medial and posterior malleoli.      I personally reviewed the films.      Assessment:  49 y.o.female with a left trimalleolar fracture placed in a short leg splint. Patient unable to tolerate CT scan during this visit to the emergency department. Plan to follow up in outpatient setting to complete CT scan, and in outpatient clinic to discuss further management including possible surgery.    Plan:   Nonweightbearing left lower extremity in short leg splint  DVT ppx: ASA 81 mg BID  Pain control  Obtain outpatient CT scan  Patient to follow-up in outpatient clinic for further discussion of management including possible surgery.      Patrick Malone MD    Case and plan was discussed with senior resident who is in agreement.

## 2024-08-20 NOTE — ED ATTENDING ATTESTATION
8/19/2024  I, Chinedu Rg MD, saw and evaluated the patient. I have discussed the patient with the resident/non-physician practitioner and agree with the resident's/non-physician practitioner's findings, Plan of Care, and MDM as documented in the resident's/non-physician practitioner's note, except where noted. All available labs and Radiology studies were reviewed.  I was present for key portions of any procedure(s) performed by the resident/non-physician practitioner and I was immediately available to provide assistance.       At this point I agree with the current assessment done in the Emergency Department.  I have conducted an independent evaluation of this patient a history and physical is as follows:  Patient has a history of Down syndrome she lives at a group home apparently she may have fallen and injured her left leg is unclear where her leg actually hurts but it seems to be predominantly in her left ankle area no tenderness to palpation over the hip femur or knee as you palpate down her lower legs she seems to indicate pain in the ankle area it is not deformed    Will x-ray knee and ankle  ED Course         Critical Care Time  Procedures

## 2024-08-20 NOTE — DISCHARGE INSTRUCTIONS
Tylenol 975 mg q8hr for 7 days     You were seen in the emergency department today for issues walking.    Your testing showed you have a fracture of your ankle.    Follow up with orthopedics.  Follow up for outpatient CT scan of your lower extremity as well.     Take Tylenol / Ibuprofen as needed following the instructions on the bottle as needed for pain.     Return to the emergency department for any new or concerning symptoms including worsening pain.     Thank you for choosing [facility] for your care today.

## 2024-08-21 NOTE — ED PROCEDURE NOTE
Procedure  Procedural Sedation    Date/Time: 8/19/2024 11:23 PM    Performed by: Tiffanie Taylor DO  Authorized by: Tiffanie Taylor DO    Immediate pre-procedure details:     Reassessment: Patient reassessed immediately prior to procedure      Reviewed: vital signs, relevant labs/tests and NPO status      Verified: bag valve mask available, emergency equipment available, intubation equipment available, IV patency confirmed, oxygen available, reversal medications available and suction available    Procedure details (see MAR for exact dosages):     Sedation start time:  8/19/2024 11:23 PM    Preoxygenation:  Nasal cannula    Sedation:  Ketamine    Analgesia:  Fentanyl    Intra-procedure monitoring:  Blood pressure monitoring, cardiac monitor, continuous pulse oximetry, frequent vital sign checks and frequent LOC assessments    Intra-procedure events: none      Intra-procedure management:  Airway suctioning, airway repositioning and supplemental oxygen    Sedation end time:  8/19/2024 11:58 PM    Total sedation time (minutes):  35  Post-procedure details:     Post-sedation assessment completed:  8/20/2024 12:10 AM    Attendance: Constant attendance by certified staff until patient recovered      Recovery: Patient returned to pre-procedure baseline      Post-sedation assessments completed and reviewed: post-procedure airway patency not reviewed, post-procedure cardiovascular function not reviewed, post-procedure hydration status not reviewed, post-procedure mental status not reviewed, post-procedure nausea and vomiting status not reviewed, pain score not reviewed, post-procedure respiratory function not reviewed and post-procedure temperature not reviewed      Patient is stable for discharge or admission: yes      Patient tolerance:  Tolerated well, no immediate complications  Pre-Procedural Sedation    Performed by: Tiffanie Taylor DO  Authorized by: Tiffanie Taylor DO    Consent:     Consent obtained:  Written     Consent given by:  Guardian    Risks discussed:  Allergic reaction, dysrhythmia, inadequate sedation, nausea, vomiting, respiratory compromise necessitating ventilatory assistance and intubation, prolonged sedation necessitating reversal and prolonged hypoxia resulting in organ damage    Alternatives discussed:  Analgesia without sedation and anxiolysis  Universal protocol:     Procedure explained and questions answered to patient or proxy's satisfaction: yes      Relevant documents present and verified: yes      Test results available and properly labeled: yes      Radiology Images displayed and confirmed.  If images not available, report reviewed: yes      Required blood products, implants, devices, and special equipment available: yes      Site/side marked: yes      Immediately prior to procedure a time out was called: yes      Patient identity confirmation method:  Arm band  Indications:     Sedation purpose:  Fracture reduction    Procedure necessitating sedation performed by:  Physician performing sedation    Intended level of sedation:  Moderate (conscious sedation)  Pre-sedation assessment:     ASA classification: class 3 - patient with severe systemic disease      Neck mobility: reduced      Mouth openin finger widths    Thyromental distance:  3 finger widths    Mallampati score:  Unable to assess    Pre-sedation assessments completed and reviewed: airway patency not reviewed, cardiovascular function not reviewed, hydration status not reviewed, mental status not reviewed, nausea/vomiting not reviewed, pain level not reviewed, respiratory function not reviewed and temperature not reviewed      Pre-sedation assessment completed:  2024 11:15 PM                   Tiffanie Taylor DO  24 1541

## 2024-08-22 ENCOUNTER — APPOINTMENT (EMERGENCY)
Dept: RADIOLOGY | Facility: HOSPITAL | Age: 49
End: 2024-08-22
Payer: MEDICARE

## 2024-08-22 ENCOUNTER — HOSPITAL ENCOUNTER (EMERGENCY)
Facility: HOSPITAL | Age: 49
Discharge: HOME | End: 2024-08-22
Attending: EMERGENCY MEDICINE
Payer: MEDICARE

## 2024-08-22 VITALS
OXYGEN SATURATION: 97 % | RESPIRATION RATE: 24 BRPM | DIASTOLIC BLOOD PRESSURE: 80 MMHG | TEMPERATURE: 97.5 F | HEART RATE: 62 BPM | SYSTOLIC BLOOD PRESSURE: 92 MMHG

## 2024-08-22 DIAGNOSIS — R06.00 DYSPNEA: Primary | ICD-10-CM

## 2024-08-22 LAB
ATRIAL RATE: 58 BPM
BASOPHILS # BLD AUTO: 0.06 THOUSANDS/ÂΜL (ref 0–0.1)
BASOPHILS NFR BLD AUTO: 1 % (ref 0–1)
CARDIAC TROPONIN I PNL SERPL HS: 4 NG/L
EOSINOPHIL # BLD AUTO: 0.21 THOUSAND/ÂΜL (ref 0–0.61)
EOSINOPHIL NFR BLD AUTO: 4 % (ref 0–6)
ERYTHROCYTE [DISTWIDTH] IN BLOOD BY AUTOMATED COUNT: 13.5 % (ref 11.6–15.1)
HCT VFR BLD AUTO: 45 % (ref 34.8–46.1)
HGB BLD-MCNC: 15.1 G/DL (ref 11.5–15.4)
IMM GRANULOCYTES # BLD AUTO: 0.02 THOUSAND/UL (ref 0–0.2)
IMM GRANULOCYTES NFR BLD AUTO: 0 % (ref 0–2)
LYMPHOCYTES # BLD AUTO: 1.44 THOUSANDS/ÂΜL (ref 0.6–4.47)
LYMPHOCYTES NFR BLD AUTO: 25 % (ref 14–44)
MCH RBC QN AUTO: 35.2 PG (ref 26.8–34.3)
MCHC RBC AUTO-ENTMCNC: 33.6 G/DL (ref 31.4–37.4)
MCV RBC AUTO: 105 FL (ref 82–98)
MONOCYTES # BLD AUTO: 0.86 THOUSAND/ÂΜL (ref 0.17–1.22)
MONOCYTES NFR BLD AUTO: 15 % (ref 4–12)
NEUTROPHILS # BLD AUTO: 3.13 THOUSANDS/ÂΜL (ref 1.85–7.62)
NEUTS SEG NFR BLD AUTO: 55 % (ref 43–75)
NRBC BLD AUTO-RTO: 0 /100 WBCS
P AXIS: 28 DEGREES
PLATELET # BLD AUTO: 149 THOUSANDS/UL (ref 149–390)
PMV BLD AUTO: 10.8 FL (ref 8.9–12.7)
PR INTERVAL: 150 MS
QRS AXIS: 59 DEGREES
QRSD INTERVAL: 80 MS
QT INTERVAL: 424 MS
QTC INTERVAL: 416 MS
RBC # BLD AUTO: 4.29 MILLION/UL (ref 3.81–5.12)
T WAVE AXIS: 26 DEGREES
VENTRICULAR RATE: 58 BPM
WBC # BLD AUTO: 5.72 THOUSAND/UL (ref 4.31–10.16)

## 2024-08-22 PROCEDURE — 84484 ASSAY OF TROPONIN QUANT: CPT | Performed by: EMERGENCY MEDICINE

## 2024-08-22 PROCEDURE — 99285 EMERGENCY DEPT VISIT HI MDM: CPT

## 2024-08-22 PROCEDURE — 71045 X-RAY EXAM CHEST 1 VIEW: CPT

## 2024-08-22 PROCEDURE — 36415 COLL VENOUS BLD VENIPUNCTURE: CPT

## 2024-08-22 PROCEDURE — 99284 EMERGENCY DEPT VISIT MOD MDM: CPT | Performed by: EMERGENCY MEDICINE

## 2024-08-22 PROCEDURE — 85025 COMPLETE CBC W/AUTO DIFF WBC: CPT | Performed by: EMERGENCY MEDICINE

## 2024-08-22 PROCEDURE — 93010 ELECTROCARDIOGRAM REPORT: CPT | Performed by: INTERNAL MEDICINE

## 2024-08-22 PROCEDURE — 93005 ELECTROCARDIOGRAM TRACING: CPT

## 2024-08-22 NOTE — ED ATTENDING ATTESTATION
8/22/2024  I, Renaldo Gregg DO, saw and evaluated the patient. I have discussed the patient with the resident/non-physician practitioner and agree with the resident's/non-physician practitioner's findings, Plan of Care, and MDM as documented in the resident's/non-physician practitioner's note, except where noted. All available labs and Radiology studies were reviewed.  I was present for key portions of any procedure(s) performed by the resident/non-physician practitioner and I was immediately available to provide assistance.       At this point I agree with the current assessment done in the Emergency Department.  I have conducted an independent evaluation of this patient a history and physical is as follows:    Patient is a 49-year-old female history of osteoporosis, leukopenia and Down syndrome, accompanied by staff from her facility.  Patient was seen and evaluated August 19 through August 20 in the emergency department, presented for difficulty walking and left leg pain.  Her workup showed that she had a left trimalleolar fracture, which was treated with procedural sedation and closed reduction and splinting.  She was normally ambulatory with assistance before the injury, since then has been nonambulatory.  She normally sits up with sleeping and is in a more comfortable physician sitting upright.  Staff indicates that yesterday she seemed to be a little more tired, slept a bit more, seem to be breathing a bit more from her mouth.  Also seem to be eating a bit less yesterday.  Today she was doing better, not nearly as tired, seem to be breathing better but they were concerned that she may have something wrong with her.  There has been no noted fever, chills, cough, or other complaints noted.  No personal or family history of DVT or PE.    General:  Patient is well-appearing  Head:  Atraumatic  Eyes:  Conjunctiva pink  ENT:  Mucous membranes are moist  Neck:  Supple  Cardiac:  S1-S2, without murmurs  Lungs:   "Clear to auscultation bilaterally  Abdomen:  Soft, nontender, normal bowel sounds, no CVA tenderness, no tympany, no rigidity, no guarding  Extremities: Patient has a left lower leg short splint.,  No peripheral edema.  Neurologic: Awake, sitting upright in bed, nonverbal, does move all 4 extremities well  Skin:  Pink warm and dry  Psychiatric:  Alert, pleasant, cooperative          ED Course  ED Course as of 08/22/24 1643   Thu Aug 22, 2024   1635 ECG interpreted by me, sinus rhythm rate of 58, no acute ST segment elevation depression, no acute ischemic or infarctive changes, no significant acute change from October 31, 2023     Chest x-ray interpreted me shows no acute cardiopulmonary disease, no acute change from June 5, 2024    Labs Reviewed   CBC AND DIFFERENTIAL - Abnormal       Result Value Ref Range Status    WBC 5.72  4.31 - 10.16 Thousand/uL Final    RBC 4.29  3.81 - 5.12 Million/uL Final    Hemoglobin 15.1  11.5 - 15.4 g/dL Final    Hematocrit 45.0  34.8 - 46.1 % Final     (*) 82 - 98 fL Final    MCH 35.2 (*) 26.8 - 34.3 pg Final    MCHC 33.6  31.4 - 37.4 g/dL Final    RDW 13.5  11.6 - 15.1 % Final    MPV 10.8  8.9 - 12.7 fL Final    Platelets 149  149 - 390 Thousands/uL Final    nRBC 0  /100 WBCs Final    Segmented % 55  43 - 75 % Final    Immature Grans % 0  0 - 2 % Final    Lymphocytes % 25  14 - 44 % Final    Monocytes % 15 (*) 4 - 12 % Final    Eosinophils Relative 4  0 - 6 % Final    Basophils Relative 1  0 - 1 % Final    Absolute Neutrophils 3.13  1.85 - 7.62 Thousands/µL Final    Absolute Immature Grans 0.02  0.00 - 0.20 Thousand/uL Final    Absolute Lymphocytes 1.44  0.60 - 4.47 Thousands/µL Final    Absolute Monocytes 0.86  0.17 - 1.22 Thousand/µL Final    Eosinophils Absolute 0.21  0.00 - 0.61 Thousand/µL Final    Basophils Absolute 0.06  0.00 - 0.10 Thousands/µL Final   HS TROPONIN I 0HR - Normal    hs TnI 0hr 4  \"Refer to ACS Flowchart\"- see link ng/L Final    Comment:                  "                             Initial (time 0) result  If >=50 ng/L, Myocardial injury suggested ;  Type of myocardial injury and treatment strategy  to be determined.  If 5-49 ng/L, a delta result at 2 hours and or 4 hours will be needed to further evaluate.  If <4 ng/L, and chest pain has been >3 hours since onset, patient may qualify for discharge based on the HEART score in the ED.  If <5 ng/L and <3hours since onset of chest pain, a delta result at 2 hours will be needed to further evaluate.    HS Troponin 99th Percentile URL of a Health Population=12 ng/L with a 95% Confidence Interval of 8-18 ng/L.    Second Troponin (time 2 hours)  If calculated delta >= 20 ng/L,  Myocardial injury suggested ; Type of myocardial injury and treatment strategy to be determined.  If 5-49 ng/L and the calculated delta is 5-19 ng/L, consult medical service for evaluation.  Continue evaluation for ischemia on ecg and other possible etiology and repeat hs troponin at 4 hours.  If delta is <5 ng/L at 2 hours, consider discharge based on risk stratification via the HEART score (if in ED), or CLAYTON risk score in IP/Observation.    HS Troponin 99th Percentile URL of a Health Population=12 ng/L with a 95% Confidence Interval of 8-18 ng/L.   LIGHT BLUE TOP     I do not see any evidence of aspiration pneumonia or aspiration pneumonitis, she has a normal white count, afebrile, do not believe this is arterial pneumonia.  She may have a small viral syndrome but as it would not , viral testing was not obtained.I do not believe this patient's complaints are from pulmonary embolism and I believe they would most likely be harmed through false positive test results and other complications of testing by further pursuing the diagnosis of pulmonary embolism.While the cause of the patient's complaints is most likely benign, it is possible that this is the early presentation of a more serious condition. This diagnostic uncertainty was  discussed with the caregivers, as was the importance of follow up care, as well as the need to return to immediately return to the closest emergency department for the signs/symptoms in the discharge instruction sheets, or they were otherwise concerned about their medical condition. The caregivers stated they were aware of this diagnostic uncertainty, understood the importance of follow up and were comfortable being discharged.  Supportive care, importance of follow-up and return precautions were discussed with caregivers, who expressed understanding.      DIAGNOSIS:  Acute shortness of breath, history of left trimalleolar ankle fracture, history of Down syndrome    MEDICAL DECISION MAKING CODING    COLLECTION AND INTERPRETATION OF DATA  I reviewed prior external notes, including June 5, 2024 chest x-ray as noted above    I ordered each unique test  Tests reviewed personally by me:  ECG: See my ED course  Labs: see Above  Imaging: I independently interpreted the CXR as noted above            Critical Care Time  Procedures

## 2024-08-22 NOTE — DISCHARGE INSTRUCTIONS
You were seen in the emergency department today for reported difficulty breathing.    Your testing showed no acute abnormality.    Follow up with your primary care provider.     Take your normal medications as prescribed.     Return to the emergency department for any new or concerning symptoms including worsening difficulty breathing especially if associated with fevers.     Thank you for choosing [facility] for your care today.

## 2024-08-22 NOTE — ED PROVIDER NOTES
History  Chief Complaint   Patient presents with    Shortness of Breath     Staff concerned for SOB; concerned because pt has been mouth breathing. Staff states pt has not been eating well.      HPI  Patient is a 49 y.o. female with history of trisomy 21 presenting to the emergency department for shortness of breath. Per patients brother and caretaker the patient started acting more tired yesterday, and was eating a little bit less than she usually does. Today the patient started having more mouth breathing and they were worried about her breathing. She has not had any cough, retractions, fever, or vomiting. Patient has reportedly been looking better since she got to the ED and has no respiratory symptoms at this time / family states she is back to her baseline. Patient was seen in the ED on Monday night for ankle pain and was diagnosed with a trimalleolar fracture which required procedural sedation and splinting. Patient has been non-weight bearing since then. Prior to this the patient was able to ambulate with assistance. Patient has no leg swelling and has no history of family history of blood clots.     Prior to Admission Medications   Prescriptions Last Dose Informant Patient Reported? Taking?   Cholecalciferol 25 MCG (1000 UT) tablet  Care Giver No No   Sig: Take 1 tablet (1,000 Units total) by mouth daily   Diclofenac Sodium (VOLTAREN) 1 %   No No   Sig: Apply 2 g topically 4 (four) times a day To left knee   Heating Pad PADS  Care Giver No No   Sig: by Does not apply route 2 (two) times a day   Patient not taking: Reported on 3/12/2024   Incontinence Supply Disposable (RA Adult Wipes) MISC  Care Giver No No   Sig: Use daily   Incontinence Supply Disposable (Ultra-Soft Plus Briefs XXL) MISC  Care Giver No No   Sig: Use if needed (incontinence)   Midazolam 5 MG/0.1ML SOLN  Care Giver No No   Sig: For seizure longer than 5 min or cluster of seizures. Give 1 spray (5 mg) into 1 nostril; if no response, a second  dose of 1 spray (5 mg) into the other nostril may be given 10 min.   Mouthwashes (Biotene Dry Mouth) LIQD  Care Giver No No   Sig: Apply 1 Swab to the mouth or throat 2 (two) times a day as needed (dry mouth) Use 5ml of solution with each swab   acetaminophen (TYLENOL) 500 mg tablet  Care Giver No No   Sig: Take 2 tablets (1,000 mg total) by mouth every 8 (eight) hours as needed for mild pain   atorvastatin (LIPITOR) 10 mg tablet  Care Giver No No   Sig: Take 1 tablet (10 mg total) by mouth daily   bacitracin topical ointment 500 units/g topical ointment  Care Giver Yes No   Sig: Apply topically in the morning   Patient not taking: Reported on 2024   carbamide peroxide (DEBROX) 6.5 % otic solution  Care Giver No No   Sig: Use 4 gtts to both ears bid  and , and then 4 days prior to ENT appt for softening of ear wax 4 gtts bid x 4 days.   Patient not taking: Reported on 2024   citalopram (CeleXA) 20 mg tablet  Care Giver No No   Sig: Take 1 tablet (20 mg total) by mouth daily   dextromethorphan-guaiFENesin (ROBITUSSIN DM)  mg/5 mL syrup  Care Giver No No   Sig: Take 5 mL by mouth 3 (three) times a day as needed for cough or congestion   ibuprofen (MOTRIN) 600 mg tablet  Care Giver Yes No   Sig: Take 600 mg by mouth every 6 (six) hours as needed   lacosamide (VIMPAT) 50 mg  Care Giver No No   Sig: Take 1 tablet (50 mg total) by mouth daily at bedtime   levothyroxine 50 mcg tablet   No No   Sig: TAKE ONE TABLET BY MOUTH AT 7 AM   melatonin 3 mg  Care Giver No No   Sig: Take 1 tablet (3 mg total) by mouth daily at bedtime   nystatin powder   No No   Sig: Apply topically 3 (three) times a day as needed (rash)   ofloxacin (FLOXIN) 0.3 % otic solution  Care Giver No No   Sig: Administer 10 drops into both ears 2 (two) times a day   sodium chloride (OCEAN) 0.65 % nasal spray  Care Giver No No   Si sprays into each nostril as needed for congestion or rhinitis      Facility-Administered  Medications: None       Past Medical History:   Diagnosis Date    Allergic     Community acquired pneumonia     Last Assessed:1/22/2013    Encounter for PPD test 09/26/2023    Encounter for vaccination 09/26/2023    Tdap given at this visit    Leukopenia     Last Assessed:3/5/2014    Osteoporosis     Preop examination 09/29/2019    Trisomy 21, Down syndrome        Past Surgical History:   Procedure Laterality Date    TONSILLECTOMY AND ADENOIDECTOMY         Family History   Problem Relation Age of Onset    Cancer Mother     No Known Problems Sister     No Known Problems Brother     No Known Problems Brother      I have reviewed and agree with the history as documented.    E-Cigarette/Vaping    E-Cigarette Use Never User      E-Cigarette/Vaping Substances    Nicotine No     THC No     CBD No     Flavoring No      Social History     Tobacco Use    Smoking status: Never    Smokeless tobacco: Never   Vaping Use    Vaping status: Never Used   Substance Use Topics    Alcohol use: Not Currently     Comment: PATIENT DOES NOT DRINK    Drug use: Never        Review of Systems   Unable to perform ROS: Patient nonverbal       Physical Exam  ED Triage Vitals   Temperature Pulse Respirations Blood Pressure SpO2   08/22/24 1425 08/22/24 1425 08/22/24 1425 08/22/24 1425 08/22/24 1425   97.5 °F (36.4 °C) 63 22 (!) 87/64 96 %      Temp src Heart Rate Source Patient Position - Orthostatic VS BP Location FiO2 (%)   -- 08/22/24 1425 08/22/24 1518 08/22/24 1518 --    Monitor Sitting Right arm       Pain Score       --                    Orthostatic Vital Signs  Vitals:    08/22/24 1425 08/22/24 1518   BP: (!) 87/64 92/80   Pulse: 63 62   Patient Position - Orthostatic VS:  Sitting       Physical Exam  Vitals and nursing note reviewed.   Constitutional:       Appearance: Normal appearance.   HENT:      Head: Normocephalic and atraumatic.      Mouth/Throat:      Mouth: Mucous membranes are moist.   Eyes:      Conjunctiva/sclera:  Conjunctivae normal.   Cardiovascular:      Rate and Rhythm: Normal rate and regular rhythm.      Pulses: Normal pulses.      Heart sounds: Normal heart sounds.   Pulmonary:      Effort: Pulmonary effort is normal.      Breath sounds: Normal breath sounds. No wheezing or rhonchi.   Abdominal:      Palpations: Abdomen is soft.      Tenderness: There is no abdominal tenderness.   Musculoskeletal:         General: No tenderness.      Cervical back: Neck supple.      Comments: LLE in splint. Good perfusion to toes, no visualized edema   Skin:     General: Skin is warm and dry.      Capillary Refill: Capillary refill takes less than 2 seconds.   Neurological:      General: No focal deficit present.      Mental Status: She is alert. Mental status is at baseline.   Psychiatric:         Mood and Affect: Mood normal.         ED Medications  Medications - No data to display    Diagnostic Studies  Results Reviewed       Procedure Component Value Units Date/Time    HS Troponin 0hr (reflex protocol) [[id number]]  (Normal) Collected: 08/22/24 1525    Lab Status: Final result Specimen: Blood from Hand, Right Updated: 08/22/24 1623     hs TnI 0hr 4 ng/L     CBC and differential [[id number]]  (Abnormal) Collected: 08/22/24 1525    Lab Status: Final result Specimen: Blood from Hand, Right Updated: 08/22/24 1538     WBC 5.72 Thousand/uL      RBC 4.29 Million/uL      Hemoglobin 15.1 g/dL      Hematocrit 45.0 %       fL      MCH 35.2 pg      MCHC 33.6 g/dL      RDW 13.5 %      MPV 10.8 fL      Platelets 149 Thousands/uL      nRBC 0 /100 WBCs      Segmented % 55 %      Immature Grans % 0 %      Lymphocytes % 25 %      Monocytes % 15 %      Eosinophils Relative 4 %      Basophils Relative 1 %      Absolute Neutrophils 3.13 Thousands/µL      Absolute Immature Grans 0.02 Thousand/uL      Absolute Lymphocytes 1.44 Thousands/µL      Absolute Monocytes 0.86 Thousand/µL      Eosinophils Absolute 0.21 Thousand/µL      Basophils Absolute  0.06 Thousands/µL                    XR chest 1 view portable   Final Result by Tab Oliveira MD (08/22 7138)      Mild pulmonary vascular congestion.            Workstation performed: [id number]               Procedures  Procedures      ED Course  ED Course as of 08/22/24 1730   Thu Aug 22, 2024   1629 WBC: 5.72   1635 Procedure Note: EKG  Date/Time: 08/22/24 4:35 PM   Interpreted by: June Mcneill  Indications / Diagnosis: shortness of breath  ECG reviewed by me, the ED Provider: yes   The EKG demonstrates:  Rate: 58  Rhythm: sinus bradycardia  Intervals: regular  Axis: regular  QRS/Blocks: regular  ST Changes: No acute ST Changes, no STD/GUSTAVO.  Compared to prior EKG on 10/31/23, no acute change.                                SBIRT 20yo+      Flowsheet Row Most Recent Value   Initial Alcohol Screen: US AUDIT-C     1. How often do you have a drink containing alcohol? 0 Filed at: 08/22/2024 1424   2. How many drinks containing alcohol do you have on a typical day you are drinking?  0 Filed at: 08/22/2024 1424   3a. Male UNDER 65: How often do you have five or more drinks on one occasion? 0 Filed at: 08/22/2024 1424   3b. FEMALE Any Age, or MALE 65+: How often do you have 4 or more drinks on one occassion? 0 Filed at: 08/22/2024 1424   Audit-C Score 0 Filed at: 08/22/2024 1424   XIMENA: How many times in the past year have you...    Used an illegal drug or used a prescription medication for non-medical reasons? Never Filed at: 08/22/2024 1424                  Medical Decision Making  Amount and/or Complexity of Data Reviewed  Labs: ordered. Decision-making details documented in ED Course.  Radiology: ordered.    Patient is a 49 y.o. female with PMH of down syndrome who presents to the ED with difficulty breathing.    Vital signs stable, afebrile. On exam no focal rhonchi / rales / wheezing.    History and physical exam most consistent with viral URI. However, differential diagnosis included but not limited to pneumonia.  "    Plan: CBC, CMP, troponin and CXR ordered by first-nurse protocol. CMP coagulated but low concern for acute electrolyte abnormality, will not re-draw    View ED course above for further discussion on patient workup.     On review of previous records reviewed ED visit note from Monday.    All labs reviewed and utilized in the medical decision making process  All radiology studies independently viewed by me and interpreted by the radiologist.  I reviewed all testing with the patient.     Upon re-evaluation patient resting comfortably, no difficulty breathing, at baseline.    Disposition: I have reviewed the patient's vital signs, nursing notes, and other relevant tests/information. I had a detailed discussion with the patients brother / caretaker regarding the history, exam findings, and any diagnostic results.   Plan to discharge home in stable condition follow up with primary care provider  Discussed with patients brother and caretaker, agreeable to plan.  I discussed discharge instructions, need for follow-up, and oral return precautions for what to return for in addition to the written return precautions and discharge instructions, specifically highlighting areas of special concern.  The patients brother and caretaker verbalized understanding of the discharge instructions and warnings that would necessitate return to the Emergency Department including worsening difficulty breathing.  All questions the patients brother and caretaker had were answered prior to discharge.       Portions of the record may have been created with voice recognition software. Occasional wrong word or \"sound a like\" substitutions may have occurred due to the inherent limitations of voice recognition software. Read the chart carefully and recognize, using context, where substitutions have occurred.        Disposition  Final diagnoses:   Dyspnea     Time reflects when diagnosis was documented in both MDM as applicable and the Disposition " within this note       Time User Action Codes Description Comment    8/22/2024  4:39 PM OsitoJune SHEILA Add [R06.00] Dyspnea           ED Disposition       ED Disposition   Discharge    Condition   Stable    Date/Time   Thu Aug 22, 2024 1636    Comment   Aminta De La Torre discharge to home/self care.                   Follow-up Information       Follow up With Specialties Details Why Contact Info    Radha Olvera MD Internal Medicine, Geriatric Medicine Schedule an appointment as soon as possible for a visit in 1 week  [address] [place] [address]  [phone]              Discharge Medication List as of 8/22/2024  4:43 PM        CONTINUE these medications which have NOT CHANGED    Details   acetaminophen (TYLENOL) 500 mg tablet Take 2 tablets (1,000 mg total) by mouth every 8 (eight) hours as needed for mild pain, Starting Thu 11/16/2023, Normal      atorvastatin (LIPITOR) 10 mg tablet Take 1 tablet (10 mg total) by mouth daily, Starting Tue 5/28/2024, Until Sun 11/24/2024, Normal      bacitracin topical ointment 500 units/g topical ointment Apply topically in the morning, Starting Fri 4/5/2024, Historical Med      carbamide peroxide (DEBROX) 6.5 % otic solution Use 4 gtts to both ears bid Tuesdays and Fridays, and then 4 days prior to ENT appt for softening of ear wax 4 gtts bid x 4 days., Normal      Cholecalciferol 25 MCG (1000 UT) tablet Take 1 tablet (1,000 Units total) by mouth daily, Starting Tue 3/12/2024, Normal      citalopram (CeleXA) 20 mg tablet Take 1 tablet (20 mg total) by mouth daily, Starting Mon 4/15/2024, Normal      dextromethorphan-guaiFENesin (ROBITUSSIN DM)  mg/5 mL syrup Take 5 mL by mouth 3 (three) times a day as needed for cough or congestion, Starting Wed 10/18/2023, Normal      Diclofenac Sodium (VOLTAREN) 1 % Apply 2 g topically 4 (four) times a day To left knee, Starting Fri 7/26/2024, Normal      Heating Pad PADS by Does not apply route 2 (two) times a day, Starting Mon  6/24/2019, Normal      ibuprofen (MOTRIN) 600 mg tablet Take 600 mg by mouth every 6 (six) hours as needed, Starting Tue 11/28/2023, Until Wed 11/27/2024 at 2359, Historical Med      !! Incontinence Supply Disposable (RA Adult Wipes) MISC Use daily, Starting Mon 10/24/2022, Normal      !! Incontinence Supply Disposable (Ultra-Soft Plus Briefs XXL) MISC Use if needed (incontinence), Starting Wed 11/15/2023, Normal      lacosamide (VIMPAT) 50 mg Take 1 tablet (50 mg total) by mouth daily at bedtime, Starting Mon 6/3/2024, Normal      levothyroxine 50 mcg tablet TAKE ONE TABLET BY MOUTH AT 7 AM, Normal      melatonin 3 mg Take 1 tablet (3 mg total) by mouth daily at bedtime, Starting Fri 4/12/2024, Normal      Midazolam 5 MG/0.1ML SOLN For seizure longer than 5 min or cluster of seizures. Give 1 spray (5 mg) into 1 nostril; if no response, a second dose of 1 spray (5 mg) into the other nostril may be given 10 min., Normal      Mouthwashes (Biotene Dry Mouth) LIQD Apply 1 Swab to the mouth or throat 2 (two) times a day as needed (dry mouth) Use 5ml of solution with each swab, Starting Wed 10/18/2023, Normal      nystatin powder Apply topically 3 (three) times a day as needed (rash), Normal      ofloxacin (FLOXIN) 0.3 % otic solution Administer 10 drops into both ears 2 (two) times a day, Starting u 4/18/2024, Normal      sodium chloride (OCEAN) 0.65 % nasal spray 2 sprays into each nostril as needed for congestion or rhinitis, Starting Wed 10/18/2023, Normal       !! - Potential duplicate medications found. Please discuss with provider.        No discharge procedures on file.    PDMP Review         Value Time User    PDMP Reviewed  Yes 6/3/2024  4:49 PM Hernesto Savage MD             ED Provider  Attending physically available and evaluated Aminta De La Torre. I managed the patient along with the ED Attending.    Electronically Signed by           June Mcneill DO  08/22/24 7541

## 2024-08-26 ENCOUNTER — TELEPHONE (OUTPATIENT)
Dept: OBGYN CLINIC | Facility: CLINIC | Age: 49
End: 2024-08-26

## 2024-08-26 ENCOUNTER — APPOINTMENT (OUTPATIENT)
Dept: RADIOLOGY | Age: 49
End: 2024-08-26
Payer: MEDICARE

## 2024-08-26 ENCOUNTER — OFFICE VISIT (OUTPATIENT)
Dept: OBGYN CLINIC | Facility: CLINIC | Age: 49
End: 2024-08-26
Payer: MEDICARE

## 2024-08-26 VITALS — WEIGHT: 158 LBS | HEIGHT: 55 IN | BODY MASS INDEX: 36.57 KG/M2

## 2024-08-26 DIAGNOSIS — S82.843A BIMALLEOLAR ANKLE FRACTURE: ICD-10-CM

## 2024-08-26 DIAGNOSIS — S82.892A CLOSED FRACTURE OF LEFT ANKLE, INITIAL ENCOUNTER: Primary | ICD-10-CM

## 2024-08-26 PROCEDURE — 99215 OFFICE O/P EST HI 40 MIN: CPT | Performed by: ORTHOPAEDIC SURGERY

## 2024-08-26 PROCEDURE — 73610 X-RAY EXAM OF ANKLE: CPT

## 2024-08-26 PROCEDURE — 27818 TREATMENT OF ANKLE FRACTURE: CPT | Performed by: ORTHOPAEDIC SURGERY

## 2024-08-26 RX ORDER — CEFAZOLIN SODIUM 2 G/50ML
2000 SOLUTION INTRAVENOUS ONCE
Status: CANCELLED | OUTPATIENT
Start: 2024-08-29 | End: 2024-08-26

## 2024-08-26 RX ORDER — CHLORHEXIDINE GLUCONATE ORAL RINSE 1.2 MG/ML
15 SOLUTION DENTAL ONCE
Status: CANCELLED | OUTPATIENT
Start: 2024-08-29 | End: 2024-08-26

## 2024-08-26 NOTE — TELEPHONE ENCOUNTER
Called and spoke with sister Franca to get verbal consent for patient to have CT with anesthesia.  Also received verbal consent for patient to have surgery 8/29, however sister said that either her or another sibling will be at the hospital to sign surgery consent.  I went over surgery info with Franca and provided my direct ph [phone] for call back if needed.

## 2024-08-26 NOTE — PROGRESS NOTES
CHIEF COMPLAINT  Left ankle fracture dislocation    HISTORY OF PRESENT ILLNESS  Aminta De La Torre is a 49 y.o. female who presents for evaluation of their left ankle.  Patient presented to the ER on August 19, 2024 after complaints of difficulty walking noted by caretakers.  She had ankle x-rays ordered which appeared to show ankle fracture.  This was reduced in the ER and splinted.  The ER attempted to get a CT scan but patient was unable to remain flat or stop moving for the scan.  Patient was discharged and advised to follow-up with orthopedics.  Today, patient comes in for further evaluation.  History is obtained through the caretakers as patient is nonverbal. Caretaker said patient does not walk on her own and typically uses a gait belt with assistance when she ambulates. Caretaker is unsure how she injured her left ankle.    REVIEW OF SYSTEMS  Review of systems was performed and, outside that mentioned in the HPI, it was negative for symptomology related to the integumentary, hematologic, immunologic, allergic, neurologic, cardiovascular, respiratory, GI or  systems.    MEDICAL HISTORY  Patient Active Problem List   Diagnosis    Allergic rhinitis    Down syndrome    Hyperlipidemia    Hypothyroidism    Osteoporosis    Periodontitis    Trisomy 21, Down syndrome    Chronic pain of left knee    Neck pain    Preop examination    Class 3 severe obesity due to excess calories without serious comorbidity with body mass index (BMI) of 40.0 to 44.9 in adult (HCC)    Class 2 obesity with body mass index (BMI) of 37.0 to 37.9 in adult, unspecified obesity type, unspecified whether serious comorbidity present    Abnormal laboratory test    Primary insomnia    Cognitive decline    Behavior problem, adult    Seizure-like activity (HCC)    Rash    Vitamin D deficiency    B12 deficiency    Reflex epilepsy (HCC)    Dry mouth    Ambulatory dysfunction    Dysphagia    Non morbid obesity        SURGICAL HISTORY  Past Surgical  History:   Procedure Laterality Date    TONSILLECTOMY AND ADENOIDECTOMY         CURRENT MEDICATIONS    Current Outpatient Medications:     acetaminophen (TYLENOL) 500 mg tablet, Take 2 tablets (1,000 mg total) by mouth every 8 (eight) hours as needed for mild pain, Disp: 30 tablet, Rfl: 5    atorvastatin (LIPITOR) 10 mg tablet, Take 1 tablet (10 mg total) by mouth daily, Disp: 30 tablet, Rfl: 5    Cholecalciferol 25 MCG (1000 UT) tablet, Take 1 tablet (1,000 Units total) by mouth daily, Disp: 90 tablet, Rfl: 1    citalopram (CeleXA) 20 mg tablet, Take 1 tablet (20 mg total) by mouth daily, Disp: 30 tablet, Rfl: 5    dextromethorphan-guaiFENesin (ROBITUSSIN DM)  mg/5 mL syrup, Take 5 mL by mouth 3 (three) times a day as needed for cough or congestion, Disp: 237 mL, Rfl: 0    Diclofenac Sodium (VOLTAREN) 1 %, Apply 2 g topically 4 (four) times a day To left knee, Disp: 100 g, Rfl: 5    ibuprofen (MOTRIN) 600 mg tablet, Take 600 mg by mouth every 6 (six) hours as needed, Disp: , Rfl:     Incontinence Supply Disposable (RA Adult Wipes) MISC, Use daily, Disp: 200 each, Rfl: 2    Incontinence Supply Disposable (Ultra-Soft Plus Briefs XXL) MISC, Use if needed (incontinence), Disp: 200 each, Rfl: 2    levothyroxine 50 mcg tablet, TAKE ONE TABLET BY MOUTH AT 7 AM, Disp: 30 tablet, Rfl: 5    melatonin 3 mg, Take 1 tablet (3 mg total) by mouth daily at bedtime, Disp: 30 tablet, Rfl: 5    Midazolam 5 MG/0.1ML SOLN, For seizure longer than 5 min or cluster of seizures. Give 1 spray (5 mg) into 1 nostril; if no response, a second dose of 1 spray (5 mg) into the other nostril may be given 10 min., Disp: 2 each, Rfl: 1    Mouthwashes (Biotene Dry Mouth) LIQD, Apply 1 Swab to the mouth or throat 2 (two) times a day as needed (dry mouth) Use 5ml of solution with each swab, Disp: 237 mL, Rfl: 0    ofloxacin (FLOXIN) 0.3 % otic solution, Administer 10 drops into both ears 2 (two) times a day, Disp: 10 mL, Rfl: 2    bacitracin  topical ointment 500 units/g topical ointment, Apply topically in the morning (Patient not taking: Reported on 5/28/2024), Disp: , Rfl:     carbamide peroxide (DEBROX) 6.5 % otic solution, Use 4 gtts to both ears bid Tuesdays and Fridays, and then 4 days prior to ENT appt for softening of ear wax 4 gtts bid x 4 days. (Patient not taking: Reported on 7/17/2024), Disp: 15 mL, Rfl: 0    Heating Pad PADS, by Does not apply route 2 (two) times a day (Patient not taking: Reported on 3/12/2024), Disp: 1 each, Rfl: 0    lacosamide (VIMPAT) 50 mg, Take 1 tablet (50 mg total) by mouth daily at bedtime (Patient not taking: Reported on 8/26/2024), Disp: 30 tablet, Rfl: 5    nystatin powder, Apply topically 3 (three) times a day as needed (rash) (Patient not taking: Reported on 8/26/2024), Disp: 60 g, Rfl: 0    sodium chloride (OCEAN) 0.65 % nasal spray, 2 sprays into each nostril as needed for congestion or rhinitis (Patient not taking: Reported on 8/26/2024), Disp: 60 mL, Rfl: 0    SOCIAL HISTORY  Social History     Socioeconomic History    Marital status: Single     Spouse name: Not on file    Number of children: Not on file    Years of education: Not on file    Highest education level: Not on file   Occupational History    Not on file   Tobacco Use    Smoking status: Never    Smokeless tobacco: Never   Vaping Use    Vaping status: Never Used   Substance and Sexual Activity    Alcohol use: Not Currently     Comment: PATIENT DOES NOT DRINK    Drug use: Never    Sexual activity: Never   Other Topics Concern    Not on file   Social History Narrative    Not on file     Social Determinants of Health     Financial Resource Strain: Patient Declined (6/5/2023)    [facility]    Overall Financial Resource Strain (CARDIA)     Difficulty of Paying Living Expenses: Patient declined   Food Insecurity: Patient Declined (6/5/2023)    Received from [facility],  "[facility]    Hunger Vital Sign     Worried About Running Out of Food in the Last Year: Patient declined     Ran Out of Food in the Last Year: Patient declined   Transportation Needs: Patient Declined (6/5/2023)    [facility]    PRAPARE - Transportation     Lack of Transportation (Medical): Patient declined     Lack of Transportation (Non-Medical): Patient declined   Physical Activity: Not on file   Stress: Not on file   Social Connections: Not on file   Intimate Partner Violence: Patient Declined (6/5/2023)    [facility]    Humiliation, Afraid, Rape, and Kick questionnaire     Fear of Current or Ex-Partner: Patient declined     Emotionally Abused: Patient declined     Physically Abused: Patient declined     Sexually Abused: Patient declined   Housing Stability: Not on file       Objective   VITAL SIGNS  Ht 4' 5.5\" (1.359 m)   Wt 71.7 kg (158 lb)   LMP  (LMP Unknown)   BMI 38.81 kg/m²     PHYSICAL EXAMINATION  Musculoskeletal: Left Ankle   Skin Intact               Swelling/ecchymosis over diffuse ankle              TTP: : diffusely   Range of motion and strength testing limited due to pain   Sensation not able to be tested as patient is nonverbal             Motor function testing ditally unable to be tested as patient is nonverbal              BCR              Gait: Antalgic     DIAGNOSTIC IMAGING  Initial XR's demonstrate trimal ankle fracture with some mild medial widening     Postreduction ankle x-ray shows reduced ankle      Assessment & Plan   1. Pilon Variant/Trimal Ankle fracture    The patient verbalized understanding of exam findings and treatment plan. We engaged in the shared decision-making process and treatment options were discussed at length with the patient. Surgical and conservative management discussed today along with risks and benefits.  The patient " has an unstable ankle fracture which is amenable to surgical fixation.  We will plan for surgery at the earliest mutually convenient time.  Consent form was signed by the patient  We will obtain CT of the left ankle to further evaluate fracture pattern  See back 2 weeks postop for suture removal and transition from splint to CAM boot    If any issues, questions, or concerns arise between now and the next appointment, we have encouraged the patient contact our team.      Splint application    Date/Time: 8/26/2024 9:30 AM    Performed by: Zander Colvin PA-C  Authorized by: Zander Colvin PA-C  Universal Protocol:  Procedure performed by: (Dr. Zamorano)    Procedure details:     Laterality:  Left    Location:  Ankle    Ankle:  L ankle  Fracture / Dislocation Treatment    Date/Time: 8/26/2024 9:30 AM    Performed by: Mahamed Zamorano MD  Authorized by: Mahamed Zamorano MD    Patient Location:  Clinic    Injury location:  Ankle  Location details:  Left ankle  Injury type:  Fracture-dislocation  Fracture type: trimalleolar    Neurovascular status: Neurovascularly intact    Manipulation performed?: Yes    Skin traction used?: No    Skeletal traction used?: No    Reduction successful?: Yes    Confirmation: Reduction confirmed by x-ray    Immobilization:  Splint  Neurovascular status: Neurovascularly intact    Patient tolerance:  Patient tolerated the procedure well with no immediate complications            Scribe Attestation      I,:  Zander Colvin PA-C am acting as a scribe while in the presence of the attending physician.:       I,:  Mahamed Zamorano MD personally performed the services described in this documentation    as scribed in my presence.:

## 2024-08-27 ENCOUNTER — CONSULT (OUTPATIENT)
Dept: INTERNAL MEDICINE CLINIC | Facility: CLINIC | Age: 49
End: 2024-08-27
Payer: MEDICARE

## 2024-08-27 VITALS
HEART RATE: 73 BPM | DIASTOLIC BLOOD PRESSURE: 70 MMHG | OXYGEN SATURATION: 95 % | BODY MASS INDEX: 36.57 KG/M2 | HEIGHT: 55 IN | RESPIRATION RATE: 17 BRPM | SYSTOLIC BLOOD PRESSURE: 118 MMHG | WEIGHT: 158 LBS

## 2024-08-27 VITALS — WEIGHT: 158 LBS | HEIGHT: 55 IN | BODY MASS INDEX: 36.57 KG/M2

## 2024-08-27 DIAGNOSIS — S82.892A CLOSED FRACTURE OF LEFT ANKLE, INITIAL ENCOUNTER: ICD-10-CM

## 2024-08-27 DIAGNOSIS — Q90.9 DOWN SYNDROME: ICD-10-CM

## 2024-08-27 DIAGNOSIS — Z01.818 PREOP EXAM FOR INTERNAL MEDICINE: Primary | ICD-10-CM

## 2024-08-27 PROCEDURE — 99213 OFFICE O/P EST LOW 20 MIN: CPT | Performed by: INTERNAL MEDICINE

## 2024-08-27 NOTE — PATIENT INSTRUCTIONS
Problem List Items Addressed This Visit          Genetics    Down syndrome     Pt is non-verbal            Surgery/Wound/Pain    Preop exam for internal medicine - Primary     Patient is medically cleared for ankle surgery, patient appears to be in no distress, cardiopulmonary exam okay, blood pressure is good, patient can continue her normal meds, patient not on blood thinners, no reported problems with anesthesia in the past.          Other Visit Diagnoses       Closed fracture of left ankle, initial encounter

## 2024-08-27 NOTE — PROGRESS NOTES
Ambulatory Visit  Name: Aminta De La Torre      : 1975      MRN: [id number]  Encounter Provider: Chinedu Deleon MD  Encounter Date: 2024   Encounter department: [facility]    Assessment & Plan   1. Preop exam for internal medicine  Assessment & Plan:  Patient is medically cleared for ankle surgery, patient appears to be in no distress, cardiopulmonary exam okay, blood pressure is good, patient can continue her normal meds, patient not on blood thinners, no reported problems with anesthesia in the past.  2. Closed fracture of left ankle, initial encounter  -     Ambulatory referral to Family Practice  3. Down syndrome  Assessment & Plan:  Pt is non-verbal         History of Present Illness     I am seeing patient in coverage for an acute issue, patient here for preop medical clearance.  No reported cardiopulmonary complaints, no reported problems with anesthesia in the past.  Pt has Down's Syndrome.      Review of Systems   Unable to perform ROS: Patient nonverbal     Past Medical History:   Diagnosis Date   • Allergic    • Community acquired pneumonia     Last Assessed:2013   • Encounter for PPD test 2023   • Encounter for vaccination 2023    Tdap given at this visit   • Leukopenia     Last Assessed:3/5/2014   • Osteoporosis    • Preop examination 2019   • Trisomy 21, Down syndrome      Past Surgical History:   Procedure Laterality Date   • TONSILLECTOMY AND ADENOIDECTOMY       Family History   Problem Relation Age of Onset   • Cancer Mother    • No Known Problems Sister    • No Known Problems Brother    • No Known Problems Brother      Social History     Tobacco Use   • Smoking status: Never   • Smokeless tobacco: Never   Vaping Use   • Vaping status: Never Used   Substance and Sexual Activity   • Alcohol use: Not Currently     Comment: PATIENT DOES NOT DRINK   • Drug use: Never   • Sexual activity: Never     Current Outpatient Medications on File Prior to  Visit   Medication Sig   • acetaminophen (TYLENOL) 500 mg tablet Take 2 tablets (1,000 mg total) by mouth every 8 (eight) hours as needed for mild pain   • atorvastatin (LIPITOR) 10 mg tablet Take 1 tablet (10 mg total) by mouth daily   • Cholecalciferol 25 MCG (1000 UT) tablet Take 1 tablet (1,000 Units total) by mouth daily   • citalopram (CeleXA) 20 mg tablet Take 1 tablet (20 mg total) by mouth daily   • Diclofenac Sodium (VOLTAREN) 1 % Apply 2 g topically 4 (four) times a day To left knee   • Incontinence Supply Disposable (RA Adult Wipes) MISC Use daily   • Incontinence Supply Disposable (Ultra-Soft Plus Briefs XXL) MISC Use if needed (incontinence)   • levothyroxine 50 mcg tablet TAKE ONE TABLET BY MOUTH AT 7 AM   • melatonin 3 mg Take 1 tablet (3 mg total) by mouth daily at bedtime   • Mouthwashes (Biotene Dry Mouth) LIQD Apply 1 Swab to the mouth or throat 2 (two) times a day as needed (dry mouth) Use 5ml of solution with each swab   • sodium chloride (OCEAN) 0.65 % nasal spray 2 sprays into each nostril as needed for congestion or rhinitis   • bacitracin topical ointment 500 units/g topical ointment Apply topically in the morning (Patient not taking: Reported on 8/27/2024)   • carbamide peroxide (DEBROX) 6.5 % otic solution Use 4 gtts to both ears bid Tuesdays and Fridays, and then 4 days prior to ENT appt for softening of ear wax 4 gtts bid x 4 days. (Patient not taking: Reported on 7/17/2024)   • dextromethorphan-guaiFENesin (ROBITUSSIN DM)  mg/5 mL syrup Take 5 mL by mouth 3 (three) times a day as needed for cough or congestion (Patient not taking: Reported on 8/27/2024)   • Heating Pad PADS by Does not apply route 2 (two) times a day (Patient not taking: Reported on 3/12/2024)   • ibuprofen (MOTRIN) 600 mg tablet Take 600 mg by mouth every 6 (six) hours as needed (Patient not taking: Reported on 8/27/2024)   • lacosamide (VIMPAT) 50 mg Take 1 tablet (50 mg total) by mouth daily at bedtime  "(Patient not taking: Reported on 8/27/2024)   • Midazolam 5 MG/0.1ML SOLN For seizure longer than 5 min or cluster of seizures. Give 1 spray (5 mg) into 1 nostril; if no response, a second dose of 1 spray (5 mg) into the other nostril may be given 10 min. (Patient not taking: Reported on 8/27/2024)   • nystatin powder Apply topically 3 (three) times a day as needed (rash) (Patient not taking: Reported on 8/26/2024)   • ofloxacin (FLOXIN) 0.3 % otic solution Administer 10 drops into both ears 2 (two) times a day (Patient not taking: Reported on 8/27/2024)     Allergies   Allergen Reactions   • Levetiracetam Drowsiness and Hives     And disorientation   • Depakote [Valproic Acid] Drowsiness   • Lamictal [Lamotrigine] Rash     Immunization History   Administered Date(s) Administered   • COVID-19 MODERNA VACC 0.5 ML IM 02/12/2021, 03/12/2021   • INFLUENZA 11/13/2018   • Influenza Quadrivalent Preservative Free 3 years and older IM 10/04/2014, 10/16/2015, 11/03/2016, 11/24/2017   • Influenza, injectable, quadrivalent, preservative free 0.5 mL 09/26/2019, 09/03/2020, 09/28/2023   • Influenza, recombinant, quadrivalent,injectable, preservative free 11/13/2018   • Influenza, seasonal, injectable 10/25/2012, 10/05/2013   • Tdap 12/13/2012, 09/26/2023   • Tuberculin Skin Test-PPD Intradermal 07/31/2012, 07/29/2014, 08/19/2016, 08/27/2018, 09/15/2020, 09/26/2023     Objective     /70 (BP Location: Left arm, Patient Position: Sitting, Cuff Size: Standard)   Pulse 73   Resp 17   Ht 4' 5.5\" (1.359 m)   Wt 71.7 kg (158 lb)   LMP  (LMP Unknown)   SpO2 95%   BMI 38.81 kg/m²     Physical Exam  Constitutional:       General: She is not in acute distress.  Cardiovascular:      Rate and Rhythm: Normal rate and regular rhythm.      Heart sounds: Normal heart sounds. No murmur heard.  Pulmonary:      Effort: Pulmonary effort is normal. No respiratory distress.      Breath sounds: Normal breath sounds. No stridor. No wheezing, " rhonchi or rales.

## 2024-08-27 NOTE — LETTER
2024     Mahamed Zamorano MD  [id number] [facility] [id number]    Patient: Aminta De La Torre   YOB: 1975   Date of Visit: 2024       Dear Dr. Zamorano:    Thank you for referring Aminta De La Torre to me for evaluation. Below are my notes for this consultation.    If you have questions, please do not hesitate to call me. I look forward to following your patient along with you.         Sincerely,        Chinedu Deleon MD        CC: No Recipients    Chinedu Deleon MD  2024  2:46 PM  Incomplete  Ambulatory Visit  Name: Aminta De La Trore      : 1975      MRN: [id number]  Encounter Provider: Chinedu Deleon MD  Encounter Date: 2024   Encounter [facility]: [facility]    Assessment & Plan  1. Closed fracture of left ankle, initial encounter  -     Ambulatory referral to [facility]         History of Present Illness    I am seeing patient in coverage for an acute issue, patient here for preop medical clearance.  No reported cardiopulmonary complaints, no reported problems with anesthesia in the past.  Pt has Down's Syndrome.      Review of Systems   Unable to perform ROS: Patient nonverbal     Past Medical History:   Diagnosis Date   • Allergic    • Community acquired pneumonia     Last Assessed:2013   • Encounter for PPD test 2023   • Encounter for vaccination 2023    Tdap given at this visit   • Leukopenia     Last Assessed:3/5/2014   • Osteoporosis    • Preop examination 2019   • Trisomy 21, Down syndrome      Past Surgical History:   Procedure Laterality Date   • TONSILLECTOMY AND ADENOIDECTOMY       Family History   Problem Relation Age of Onset   • Cancer Mother    • No Known Problems Sister    • No Known Problems Brother    • No Known Problems Brother      Social History     Tobacco Use   • Smoking status: Never   • Smokeless tobacco: Never   Vaping Use   • Vaping status: Never Used   Substance and Sexual Activity   •  Alcohol use: Not Currently     Comment: PATIENT DOES NOT DRINK   • Drug use: Never   • Sexual activity: Never     Current Outpatient Medications on File Prior to Visit   Medication Sig   • acetaminophen (TYLENOL) 500 mg tablet Take 2 tablets (1,000 mg total) by mouth every 8 (eight) hours as needed for mild pain   • atorvastatin (LIPITOR) 10 mg tablet Take 1 tablet (10 mg total) by mouth daily   • Cholecalciferol 25 MCG (1000 UT) tablet Take 1 tablet (1,000 Units total) by mouth daily   • citalopram (CeleXA) 20 mg tablet Take 1 tablet (20 mg total) by mouth daily   • Diclofenac Sodium (VOLTAREN) 1 % Apply 2 g topically 4 (four) times a day To left knee   • Incontinence Supply Disposable (RA Adult Wipes) MISC Use daily   • Incontinence Supply Disposable (Ultra-Soft Plus Briefs XXL) MISC Use if needed (incontinence)   • levothyroxine 50 mcg tablet TAKE ONE TABLET BY MOUTH AT 7 AM   • melatonin 3 mg Take 1 tablet (3 mg total) by mouth daily at bedtime   • Mouthwashes (Biotene Dry Mouth) LIQD Apply 1 Swab to the mouth or throat 2 (two) times a day as needed (dry mouth) Use 5ml of solution with each swab   • sodium chloride (OCEAN) 0.65 % nasal spray 2 sprays into each nostril as needed for congestion or rhinitis   • bacitracin topical ointment 500 units/g topical ointment Apply topically in the morning (Patient not taking: Reported on 8/27/2024)   • carbamide peroxide (DEBROX) 6.5 % otic solution Use 4 gtts to both ears bid Tuesdays and Fridays, and then 4 days prior to ENT appt for softening of ear wax 4 gtts bid x 4 days. (Patient not taking: Reported on 7/17/2024)   • dextromethorphan-guaiFENesin (ROBITUSSIN DM)  mg/5 mL syrup Take 5 mL by mouth 3 (three) times a day as needed for cough or congestion (Patient not taking: Reported on 8/27/2024)   • Heating Pad PADS by Does not apply route 2 (two) times a day (Patient not taking: Reported on 3/12/2024)   • ibuprofen (MOTRIN) 600 mg tablet Take 600 mg by mouth  "every 6 (six) hours as needed (Patient not taking: Reported on 8/27/2024)   • lacosamide (VIMPAT) 50 mg Take 1 tablet (50 mg total) by mouth daily at bedtime (Patient not taking: Reported on 8/27/2024)   • Midazolam 5 MG/0.1ML SOLN For seizure longer than 5 min or cluster of seizures. Give 1 spray (5 mg) into 1 nostril; if no response, a second dose of 1 spray (5 mg) into the other nostril may be given 10 min. (Patient not taking: Reported on 8/27/2024)   • nystatin powder Apply topically 3 (three) times a day as needed (rash) (Patient not taking: Reported on 8/26/2024)   • ofloxacin (FLOXIN) 0.3 % otic solution Administer 10 drops into both ears 2 (two) times a day (Patient not taking: Reported on 8/27/2024)     Allergies   Allergen Reactions   • Levetiracetam Drowsiness and Hives     And disorientation   • Depakote [Valproic Acid] Drowsiness   • Lamictal [Lamotrigine] Rash     Immunization History   Administered Date(s) Administered   • COVID-19 MODERNA VACC 0.5 ML IM 02/12/2021, 03/12/2021   • INFLUENZA 11/13/2018   • Influenza Quadrivalent Preservative Free 3 years and older IM 10/04/2014, 10/16/2015, 11/03/2016, 11/24/2017   • Influenza, injectable, quadrivalent, preservative free 0.5 mL 09/26/2019, 09/03/2020, 09/28/2023   • Influenza, recombinant, quadrivalent,injectable, preservative free 11/13/2018   • Influenza, seasonal, injectable 10/25/2012, 10/05/2013   • Tdap 12/13/2012, 09/26/2023   • Tuberculin Skin Test-PPD Intradermal 07/31/2012, 07/29/2014, 08/19/2016, 08/27/2018, 09/15/2020, 09/26/2023     Objective    /70 (BP Location: Left arm, Patient Position: Sitting, Cuff Size: Standard)   Pulse 73   Resp 17   Ht 4' 5.5\" (1.359 m)   Wt 71.7 kg (158 lb)   LMP  (LMP Unknown)   SpO2 95%   BMI 38.81 kg/m²     Physical Exam  Constitutional:       General: She is not in acute distress.  Cardiovascular:      Rate and Rhythm: Normal rate and regular rhythm.      Heart sounds: Normal heart sounds. No " murmur heard.  Pulmonary:      Effort: Pulmonary effort is normal. No respiratory distress.      Breath sounds: Normal breath sounds. No stridor. No wheezing, rhonchi or rales.

## 2024-08-27 NOTE — ASSESSMENT & PLAN NOTE
Patient is medically cleared for ankle surgery, patient appears to be in no distress, cardiopulmonary exam okay, blood pressure is good, patient can continue her normal meds, patient not on blood thinners, no reported problems with anesthesia in the past.   Unable to refill patient has not had a TSH done since 07/10/2021, patient has a pending appointment on 10/03/2022    FD:  Please call patient and to schedule a lab appointment, she needs to get labs done in order to refill her medications.

## 2024-08-27 NOTE — PRE-PROCEDURE INSTRUCTIONS
Pre-Surgery Instructions:   Medication Instructions    acetaminophen (TYLENOL) 500 mg tablet Hold day of surgery.    atorvastatin (LIPITOR) 10 mg tablet Hold day of surgery.    bacitracin topical ointment 500 units/g topical ointment Hold day of surgery.    Cholecalciferol 25 MCG (1000 UT) tablet Hold day of surgery.    citalopram (CeleXA) 20 mg tablet Hold day of surgery.    Diclofenac Sodium (VOLTAREN) 1 % Hold day of surgery.    ibuprofen (MOTRIN) 600 mg tablet Hold day of surgery.    lacosamide (VIMPAT) 50 mg Hold day of surgery.    levothyroxine 50 mcg tablet Hold day of surgery.    melatonin 3 mg Hold day of surgery.    Midazolam 5 MG/0.1ML SOLN Uses PRN- OK to take day of surgery    sodium chloride (OCEAN) 0.65 % nasal spray Uses PRN- OK to take day of surgery    Medication instructions for day of procedure reviewed. **Instructed to hold all am meds because pt takes them with applesauce**     You will receive a call one business day prior to procedure with an arrival time and hospital directions. If procedure is scheduled on a Monday, the hospital will be calling you on the Friday prior to your procedure. If you have not heard from anyone by 8pm, please call the hospital supervisor through the hospital  at [phone]. (Zheng [phone]).     Please do not eat or drink after 11 pm the night before the MRI. Please take your medications with a sip of water at least 2 hours prior to their arrival time.     Please shower either the night prior to the procedure or the morning of the procedure. Dress in clean, comfortable clothes.      Keep any valuables, jewelry, piercings at home.     Please bring your insurance cards, a form of photo ID, physician order, and a list of medications with you. Arrive 15 minutes prior to your given arrival time in order to register. Please bring any prior CT or MRI studies of the same area that were not performed at a [facility] along.      Arrange for a  responsible person to drive patient to and from the hospital on the day of the procedure. Visitor Guidelines discussed.     Call the prescribing physician's office with any new illnesses, exposures, or additional questions prior to procedure.

## 2024-08-28 ENCOUNTER — ANESTHESIA EVENT (OUTPATIENT)
Dept: PERIOP | Facility: HOSPITAL | Age: 49
End: 2024-08-28
Payer: MEDICARE

## 2024-08-28 ENCOUNTER — ANESTHESIA EVENT (OUTPATIENT)
Dept: RADIOLOGY | Facility: HOSPITAL | Age: 49
End: 2024-08-28
Payer: MEDICARE

## 2024-08-28 ENCOUNTER — ANESTHESIA (OUTPATIENT)
Dept: RADIOLOGY | Facility: HOSPITAL | Age: 49
End: 2024-08-28
Payer: MEDICARE

## 2024-08-28 ENCOUNTER — HOSPITAL ENCOUNTER (OUTPATIENT)
Dept: RADIOLOGY | Facility: HOSPITAL | Age: 49
Discharge: HOME | End: 2024-08-28
Payer: MEDICARE

## 2024-08-28 VITALS
DIASTOLIC BLOOD PRESSURE: 62 MMHG | HEIGHT: 55 IN | BODY MASS INDEX: 36.57 KG/M2 | HEART RATE: 81 BPM | SYSTOLIC BLOOD PRESSURE: 127 MMHG | RESPIRATION RATE: 16 BRPM | TEMPERATURE: 97.6 F | WEIGHT: 158 LBS | OXYGEN SATURATION: 96 %

## 2024-08-28 DIAGNOSIS — S82.892A CLOSED FRACTURE OF LEFT ANKLE, INITIAL ENCOUNTER: ICD-10-CM

## 2024-08-28 PROCEDURE — 73700 CT LOWER EXTREMITY W/O DYE: CPT

## 2024-08-28 RX ORDER — ONDANSETRON 2 MG/ML
INJECTION INTRAMUSCULAR; INTRAVENOUS AS NEEDED
Status: DISCONTINUED | OUTPATIENT
Start: 2024-08-28 | End: 2024-08-28

## 2024-08-28 RX ORDER — PROPOFOL 10 MG/ML
INJECTION, EMULSION INTRAVENOUS AS NEEDED
Status: DISCONTINUED | OUTPATIENT
Start: 2024-08-28 | End: 2024-08-28

## 2024-08-28 RX ORDER — SODIUM CHLORIDE, SODIUM LACTATE, POTASSIUM CHLORIDE, CALCIUM CHLORIDE 600; 310; 30; 20 MG/100ML; MG/100ML; MG/100ML; MG/100ML
INJECTION, SOLUTION INTRAVENOUS CONTINUOUS PRN
Status: DISCONTINUED | OUTPATIENT
Start: 2024-08-28 | End: 2024-08-28

## 2024-08-28 RX ORDER — LIDOCAINE HYDROCHLORIDE 10 MG/ML
INJECTION, SOLUTION EPIDURAL; INFILTRATION; INTRACAUDAL; PERINEURAL AS NEEDED
Status: DISCONTINUED | OUTPATIENT
Start: 2024-08-28 | End: 2024-08-28

## 2024-08-28 RX ORDER — DEXAMETHASONE SODIUM PHOSPHATE 10 MG/ML
INJECTION, SOLUTION INTRAMUSCULAR; INTRAVENOUS AS NEEDED
Status: DISCONTINUED | OUTPATIENT
Start: 2024-08-28 | End: 2024-08-28

## 2024-08-28 RX ADMIN — ONDANSETRON 4 MG: 2 INJECTION INTRAMUSCULAR; INTRAVENOUS at 11:51

## 2024-08-28 RX ADMIN — LIDOCAINE HYDROCHLORIDE 10 MG: 10 INJECTION, SOLUTION EPIDURAL; INFILTRATION; INTRACAUDAL; PERINEURAL at 11:40

## 2024-08-28 RX ADMIN — PROPOFOL 100 MG: 10 INJECTION, EMULSION INTRAVENOUS at 11:40

## 2024-08-28 RX ADMIN — DEXAMETHASONE SODIUM PHOSPHATE 4 MG: 10 INJECTION, SOLUTION INTRAMUSCULAR; INTRAVENOUS at 11:51

## 2024-08-28 RX ADMIN — SODIUM CHLORIDE, SODIUM LACTATE, POTASSIUM CHLORIDE, AND CALCIUM CHLORIDE: .6; .31; .03; .02 INJECTION, SOLUTION INTRAVENOUS at 11:17

## 2024-08-28 NOTE — ANESTHESIA PREPROCEDURE EVALUATION
"Procedure:  CT LOWER EXTREMITY WO CONTRAST LEFT    Relevant Problems   ANESTHESIA (within normal limits)      CARDIO   (+) Hyperlipidemia      ENDO   (+) Hypothyroidism      GI/HEPATIC  Confirmed NPO appropriate   (+) Dysphagia      /RENAL (within normal limits)      HEMATOLOGY (within normal limits)      MUSCULOSKELETAL (within normal limits)      NEURO/PSYCH   (+) Reflex epilepsy (HCC)      PULMONARY (within normal limits)      Genetics   (+) Down syndrome      Other   (+) Non morbid obesity        Physical Exam    Airway  Comment: Uncooperative with airway exam           Dental       Cardiovascular  Rhythm: regular    Pulmonary  Pulmonary exam normal     Other Findings  post-pubertal.      Anesthesia Plan  ASA Score- 3     Anesthesia Type- general with ASA Monitors.         Additional Monitors:     Airway Plan: LMA.    Comment: Phone consent obtained from patient's sister, Franca Bernstein.  Consent witnessed by Garbiela Olivarez RN..       Plan Factors-    Chart reviewed.        Patient is not a current smoker.              Induction- intravenous.    Postoperative Plan- . Planned trial extubation        Informed Consent- Anesthetic plan and risks discussed with sibling.  I personally reviewed this patient with the CRNA. Discussed and agreed on the Anesthesia Plan with the CRNA..      Lab Results   Component Value Date    WBC 5.72 08/22/2024    HGB 15.1 08/22/2024    HCT 45.0 08/22/2024     (H) 08/22/2024     08/22/2024     Lab Results   Component Value Date    SODIUM 135 10/31/2023    K 4.6 10/31/2023     10/31/2023    CO2 24 10/31/2023    BUN 9 10/31/2023    CREATININE 0.57 (L) 10/31/2023    GLUC 141 (H) 10/31/2023    CALCIUM 8.5 10/31/2023     Lab Results   Component Value Date    ALT 14 10/31/2023    AST 25 10/31/2023    ALKPHOS 108 (H) 10/31/2023    BILITOT 0.8 08/20/2016     .lastinr  No results found for: \"HGBA1C\"    "

## 2024-08-28 NOTE — ANESTHESIA POSTPROCEDURE EVALUATION
Post-Op Assessment Note    CV Status:  Stable    Pain management: adequate       Mental Status:  Sleepy   Hydration Status:  Euvolemic   PONV Controlled:  Controlled   Airway Patency:  Patent     Post Op Vitals Reviewed: Yes    No anethesia notable event occurred.    Staff: CRNA, Anesthesiologist               BP   114/67   Temp 97.6 °F (36.4 °C) (08/28/24 1214)    Pulse  92   Resp      SpO2   97 RA

## 2024-08-29 ENCOUNTER — HOSPITAL ENCOUNTER (OUTPATIENT)
Facility: HOSPITAL | Age: 49
Setting detail: OUTPATIENT SURGERY
Discharge: HOME | End: 2024-08-29
Attending: ORTHOPAEDIC SURGERY | Admitting: ORTHOPAEDIC SURGERY
Payer: MEDICARE

## 2024-08-29 ENCOUNTER — HOSPITAL ENCOUNTER (OUTPATIENT)
Dept: RADIOLOGY | Facility: HOSPITAL | Age: 49
Setting detail: OUTPATIENT SURGERY
Discharge: HOME | End: 2024-08-29
Payer: MEDICARE

## 2024-08-29 ENCOUNTER — ANESTHESIA (OUTPATIENT)
Dept: PERIOP | Facility: HOSPITAL | Age: 49
End: 2024-08-29
Payer: MEDICARE

## 2024-08-29 VITALS
DIASTOLIC BLOOD PRESSURE: 78 MMHG | WEIGHT: 158 LBS | BODY MASS INDEX: 36.57 KG/M2 | HEART RATE: 90 BPM | SYSTOLIC BLOOD PRESSURE: 120 MMHG | OXYGEN SATURATION: 93 % | RESPIRATION RATE: 18 BRPM | TEMPERATURE: 97.5 F | HEIGHT: 55 IN

## 2024-08-29 DIAGNOSIS — S82.892A CLOSED FRACTURE OF LEFT ANKLE, INITIAL ENCOUNTER: Primary | ICD-10-CM

## 2024-08-29 DIAGNOSIS — S82.892A CLOSED FRACTURE OF LEFT ANKLE, INITIAL ENCOUNTER: ICD-10-CM

## 2024-08-29 PROCEDURE — C1713 ANCHOR/SCREW BN/BN,TIS/BN: HCPCS | Performed by: ORTHOPAEDIC SURGERY

## 2024-08-29 PROCEDURE — 27822 TREATMENT OF ANKLE FRACTURE: CPT | Performed by: ORTHOPAEDIC SURGERY

## 2024-08-29 PROCEDURE — 73610 X-RAY EXAM OF ANKLE: CPT

## 2024-08-29 PROCEDURE — 27822 TREATMENT OF ANKLE FRACTURE: CPT

## 2024-08-29 PROCEDURE — C9290 INJ, BUPIVACAINE LIPOSOME: HCPCS | Performed by: ANESTHESIOLOGY

## 2024-08-29 DEVICE — SCREW VAL 3 X 14MM KREULOCK: Type: IMPLANTABLE DEVICE | Site: ANKLE | Status: FUNCTIONAL

## 2024-08-29 DEVICE — IMPLANTABLE DEVICE: Type: IMPLANTABLE DEVICE | Site: ANKLE | Status: FUNCTIONAL

## 2024-08-29 DEVICE — SCREW CORT 3.5 X 12MM LOPRFL FLLY THRD: Type: IMPLANTABLE DEVICE | Site: ANKLE | Status: FUNCTIONAL

## 2024-08-29 DEVICE — SCREW VAL 3 X 16MM KREULOCK: Type: IMPLANTABLE DEVICE | Site: ANKLE | Status: FUNCTIONAL

## 2024-08-29 DEVICE — K-WIRE 1.3 X 130MM TROCAR TIP: Type: IMPLANTABLE DEVICE | Site: ANKLE | Status: FUNCTIONAL

## 2024-08-29 DEVICE — ANCHOR SUT TAK-BB THRD: Type: IMPLANTABLE DEVICE | Site: ANKLE | Status: FUNCTIONAL

## 2024-08-29 DEVICE — SCREW VAL 3 X 12MM KREULOCK: Type: IMPLANTABLE DEVICE | Site: ANKLE | Status: FUNCTIONAL

## 2024-08-29 DEVICE — SCREW CORT 3.5 X 14MM LOPRFL FLLY THRD: Type: IMPLANTABLE DEVICE | Site: ANKLE | Status: FUNCTIONAL

## 2024-08-29 RX ORDER — CHLORHEXIDINE GLUCONATE ORAL RINSE 1.2 MG/ML
15 SOLUTION DENTAL ONCE
Status: DISCONTINUED | OUTPATIENT
Start: 2024-08-29 | End: 2024-08-29 | Stop reason: HOSPADM

## 2024-08-29 RX ORDER — ONDANSETRON 2 MG/ML
INJECTION INTRAMUSCULAR; INTRAVENOUS AS NEEDED
Status: DISCONTINUED | OUTPATIENT
Start: 2024-08-29 | End: 2024-08-29

## 2024-08-29 RX ORDER — ALBUTEROL SULFATE 0.83 MG/ML
2.5 SOLUTION RESPIRATORY (INHALATION) ONCE AS NEEDED
Status: DISCONTINUED | OUTPATIENT
Start: 2024-08-29 | End: 2024-08-29 | Stop reason: HOSPADM

## 2024-08-29 RX ORDER — ONDANSETRON 2 MG/ML
4 INJECTION INTRAMUSCULAR; INTRAVENOUS ONCE AS NEEDED
Status: DISCONTINUED | OUTPATIENT
Start: 2024-08-29 | End: 2024-08-29 | Stop reason: HOSPADM

## 2024-08-29 RX ORDER — LIDOCAINE HYDROCHLORIDE 20 MG/ML
INJECTION, SOLUTION EPIDURAL; INFILTRATION; INTRACAUDAL; PERINEURAL AS NEEDED
Status: DISCONTINUED | OUTPATIENT
Start: 2024-08-29 | End: 2024-08-29

## 2024-08-29 RX ORDER — DEXAMETHASONE SODIUM PHOSPHATE 10 MG/ML
INJECTION, SOLUTION INTRAMUSCULAR; INTRAVENOUS AS NEEDED
Status: DISCONTINUED | OUTPATIENT
Start: 2024-08-29 | End: 2024-08-29

## 2024-08-29 RX ORDER — HYDROMORPHONE HCL/PF 1 MG/ML
0.5 SYRINGE (ML) INJECTION
Status: DISCONTINUED | OUTPATIENT
Start: 2024-08-29 | End: 2024-08-29 | Stop reason: HOSPADM

## 2024-08-29 RX ORDER — OXYCODONE HYDROCHLORIDE 5 MG/1
5 TABLET ORAL EVERY 4 HOURS PRN
Qty: 15 TABLET | Refills: 0 | Status: SHIPPED | OUTPATIENT
Start: 2024-08-29

## 2024-08-29 RX ORDER — OXYCODONE HYDROCHLORIDE 5 MG/1
5 TABLET ORAL EVERY 4 HOURS PRN
Status: DISCONTINUED | OUTPATIENT
Start: 2024-08-29 | End: 2024-08-29 | Stop reason: HOSPADM

## 2024-08-29 RX ORDER — EPHEDRINE SULFATE 50 MG/ML
INJECTION INTRAVENOUS AS NEEDED
Status: DISCONTINUED | OUTPATIENT
Start: 2024-08-29 | End: 2024-08-29

## 2024-08-29 RX ORDER — TRANEXAMIC ACID 100 MG/ML
INJECTION, SOLUTION INTRAVENOUS AS NEEDED
Status: DISCONTINUED | OUTPATIENT
Start: 2024-08-29 | End: 2024-08-29

## 2024-08-29 RX ORDER — ACETAMINOPHEN 10 MG/ML
1000 INJECTION, SOLUTION INTRAVENOUS ONCE
Status: COMPLETED | OUTPATIENT
Start: 2024-08-29 | End: 2024-08-29

## 2024-08-29 RX ORDER — MAGNESIUM HYDROXIDE 1200 MG/15ML
LIQUID ORAL AS NEEDED
Status: DISCONTINUED | OUTPATIENT
Start: 2024-08-29 | End: 2024-08-29 | Stop reason: HOSPADM

## 2024-08-29 RX ORDER — BUPIVACAINE HYDROCHLORIDE 5 MG/ML
INJECTION, SOLUTION EPIDURAL; INTRACAUDAL AS NEEDED
Status: DISCONTINUED | OUTPATIENT
Start: 2024-08-29 | End: 2024-08-29

## 2024-08-29 RX ORDER — FENTANYL CITRATE/PF 50 MCG/ML
25 SYRINGE (ML) INJECTION
Status: DISCONTINUED | OUTPATIENT
Start: 2024-08-29 | End: 2024-08-29 | Stop reason: HOSPADM

## 2024-08-29 RX ORDER — CEFAZOLIN SODIUM 2 G/50ML
2000 SOLUTION INTRAVENOUS ONCE
Status: COMPLETED | OUTPATIENT
Start: 2024-08-29 | End: 2024-08-29

## 2024-08-29 RX ORDER — ROCURONIUM BROMIDE 10 MG/ML
INJECTION, SOLUTION INTRAVENOUS AS NEEDED
Status: DISCONTINUED | OUTPATIENT
Start: 2024-08-29 | End: 2024-08-29

## 2024-08-29 RX ORDER — MIDAZOLAM HYDROCHLORIDE 2 MG/ML
8 SYRUP ORAL ONCE
Status: COMPLETED | OUTPATIENT
Start: 2024-08-29 | End: 2024-08-29

## 2024-08-29 RX ORDER — GLYCOPYRROLATE 0.2 MG/ML
INJECTION INTRAMUSCULAR; INTRAVENOUS AS NEEDED
Status: DISCONTINUED | OUTPATIENT
Start: 2024-08-29 | End: 2024-08-29

## 2024-08-29 RX ORDER — SODIUM CHLORIDE, SODIUM LACTATE, POTASSIUM CHLORIDE, CALCIUM CHLORIDE 600; 310; 30; 20 MG/100ML; MG/100ML; MG/100ML; MG/100ML
125 INJECTION, SOLUTION INTRAVENOUS CONTINUOUS
Status: DISCONTINUED | OUTPATIENT
Start: 2024-08-29 | End: 2024-08-29 | Stop reason: HOSPADM

## 2024-08-29 RX ORDER — PROPOFOL 10 MG/ML
INJECTION, EMULSION INTRAVENOUS AS NEEDED
Status: DISCONTINUED | OUTPATIENT
Start: 2024-08-29 | End: 2024-08-29

## 2024-08-29 RX ORDER — OXYCODONE HYDROCHLORIDE 10 MG/1
10 TABLET ORAL EVERY 4 HOURS PRN
Status: DISCONTINUED | OUTPATIENT
Start: 2024-08-29 | End: 2024-08-29 | Stop reason: HOSPADM

## 2024-08-29 RX ORDER — HYDROMORPHONE HCL/PF 1 MG/ML
SYRINGE (ML) INJECTION AS NEEDED
Status: DISCONTINUED | OUTPATIENT
Start: 2024-08-29 | End: 2024-08-29

## 2024-08-29 RX ORDER — ACETAMINOPHEN 325 MG/1
325 TABLET ORAL EVERY 6 HOURS PRN
Qty: 30 TABLET | Refills: 0 | Status: SHIPPED | OUTPATIENT
Start: 2024-08-29

## 2024-08-29 RX ORDER — FENTANYL CITRATE 50 UG/ML
INJECTION, SOLUTION INTRAMUSCULAR; INTRAVENOUS AS NEEDED
Status: DISCONTINUED | OUTPATIENT
Start: 2024-08-29 | End: 2024-08-29

## 2024-08-29 RX ORDER — PROMETHAZINE HYDROCHLORIDE 25 MG/ML
12.5 INJECTION, SOLUTION INTRAMUSCULAR; INTRAVENOUS ONCE AS NEEDED
Status: DISCONTINUED | OUTPATIENT
Start: 2024-08-29 | End: 2024-08-29 | Stop reason: HOSPADM

## 2024-08-29 RX ORDER — MIDAZOLAM HYDROCHLORIDE 2 MG/ML
6 SYRUP ORAL ONCE
Status: DISCONTINUED | OUTPATIENT
Start: 2024-08-29 | End: 2024-08-29

## 2024-08-29 RX ADMIN — LIDOCAINE HYDROCHLORIDE 60 MG: 20 INJECTION, SOLUTION EPIDURAL; INFILTRATION; INTRACAUDAL at 14:07

## 2024-08-29 RX ADMIN — SODIUM CHLORIDE, SODIUM LACTATE, POTASSIUM CHLORIDE, AND CALCIUM CHLORIDE: .6; .31; .03; .02 INJECTION, SOLUTION INTRAVENOUS at 14:06

## 2024-08-29 RX ADMIN — BUPIVACAINE 5 ML: 13.3 INJECTION, SUSPENSION, LIPOSOMAL INFILTRATION at 14:18

## 2024-08-29 RX ADMIN — ROCURONIUM BROMIDE 30 MG: 10 INJECTION, SOLUTION INTRAVENOUS at 14:07

## 2024-08-29 RX ADMIN — ACETAMINOPHEN 1000 MG: 10 INJECTION INTRAVENOUS at 16:31

## 2024-08-29 RX ADMIN — MIDAZOLAM HYDROCHLORIDE 8 MG: 2 SYRUP ORAL at 11:57

## 2024-08-29 RX ADMIN — PROPOFOL 100 MG: 10 INJECTION, EMULSION INTRAVENOUS at 14:07

## 2024-08-29 RX ADMIN — DEXAMETHASONE SODIUM PHOSPHATE 10 MG: 10 INJECTION INTRAMUSCULAR; INTRAVENOUS at 14:09

## 2024-08-29 RX ADMIN — BUPIVACAINE HYDROCHLORIDE 5 ML: 5 INJECTION, SOLUTION EPIDURAL; INTRACAUDAL; PERINEURAL at 14:18

## 2024-08-29 RX ADMIN — EPHEDRINE SULFATE 5 MG: 50 INJECTION INTRAVENOUS at 14:32

## 2024-08-29 RX ADMIN — HYDROMORPHONE HYDROCHLORIDE 0.25 MG: 1 INJECTION, SOLUTION INTRAMUSCULAR; INTRAVENOUS; SUBCUTANEOUS at 16:00

## 2024-08-29 RX ADMIN — PROPOFOL 30 MG: 10 INJECTION, EMULSION INTRAVENOUS at 15:06

## 2024-08-29 RX ADMIN — SUGAMMADEX 200 MG: 100 INJECTION, SOLUTION INTRAVENOUS at 17:26

## 2024-08-29 RX ADMIN — FENTANYL CITRATE 25 MCG: 50 INJECTION INTRAMUSCULAR; INTRAVENOUS at 15:32

## 2024-08-29 RX ADMIN — ONDANSETRON 4 MG: 2 INJECTION INTRAMUSCULAR; INTRAVENOUS at 14:09

## 2024-08-29 RX ADMIN — GLYCOPYRROLATE 0.2 MG: 0.2 INJECTION, SOLUTION INTRAMUSCULAR; INTRAVENOUS at 14:07

## 2024-08-29 RX ADMIN — EPHEDRINE SULFATE 5 MG: 50 INJECTION INTRAVENOUS at 14:19

## 2024-08-29 RX ADMIN — FENTANYL CITRATE 50 MCG: 50 INJECTION INTRAMUSCULAR; INTRAVENOUS at 17:01

## 2024-08-29 RX ADMIN — CEFAZOLIN SODIUM 2000 MG: 2 SOLUTION INTRAVENOUS at 14:55

## 2024-08-29 RX ADMIN — PROPOFOL 20 MG: 10 INJECTION, EMULSION INTRAVENOUS at 15:12

## 2024-08-29 RX ADMIN — HYDROMORPHONE HYDROCHLORIDE 0.25 MG: 1 INJECTION, SOLUTION INTRAMUSCULAR; INTRAVENOUS; SUBCUTANEOUS at 15:50

## 2024-08-29 RX ADMIN — TRANEXAMIC ACID 1000 MG: 100 INJECTION, SOLUTION INTRAVENOUS at 15:10

## 2024-08-29 RX ADMIN — SODIUM CHLORIDE, SODIUM LACTATE, POTASSIUM CHLORIDE, AND CALCIUM CHLORIDE: .6; .31; .03; .02 INJECTION, SOLUTION INTRAVENOUS at 16:51

## 2024-08-29 RX ADMIN — OXYCODONE HYDROCHLORIDE 5 MG: 5 TABLET ORAL at 18:55

## 2024-08-29 RX ADMIN — FENTANYL CITRATE 50 MCG: 50 INJECTION INTRAMUSCULAR; INTRAVENOUS at 15:40

## 2024-08-29 RX ADMIN — FENTANYL CITRATE 25 MCG: 50 INJECTION INTRAMUSCULAR; INTRAVENOUS at 15:15

## 2024-08-29 RX ADMIN — TRANEXAMIC ACID 1000 MG: 100 INJECTION, SOLUTION INTRAVENOUS at 16:41

## 2024-08-29 RX ADMIN — ROCURONIUM BROMIDE 10 MG: 10 INJECTION, SOLUTION INTRAVENOUS at 15:06

## 2024-08-29 RX ADMIN — BUPIVACAINE HYDROCHLORIDE 5 ML: 5 INJECTION, SOLUTION EPIDURAL; INTRACAUDAL; PERINEURAL at 14:24

## 2024-08-29 NOTE — ANESTHESIA PREPROCEDURE EVALUATION
Procedure:  (ORIF) ANKLE (Left: Ankle)    Relevant Problems   CARDIO   (+) Hyperlipidemia      ENDO   (+) Hypothyroidism      GI/HEPATIC   (+) Dysphagia      NEURO/PSYCH   (+) Reflex epilepsy (HCC)      Genetics   (+) Down syndrome   (+) Trisomy 21, Down syndrome      Neurology/Sleep   (+) Cognitive decline   (+) Seizure-like activity (HCC)      Orthopedic/Musculoskeletal   (+) Osteoporosis      Other   (+) Obesity (BMI 30-39.9)    2014 Echo:  LEFT VENTRICLE:   Systolic function was normal. Ejection fraction was estimated to be 55 %.   Although no diagnostic regional wall motion abnormality was identified, this   possibility cannot be completely excluded on the basis of this study.   RIGHT ATRIUM:   Size was at the upper limits of normal.   MITRAL VALVE:   There was trace regurgitation.   TRICUSPID VALVE:   There was mild regurgitation.   Pulmonary artery systolic pressure was within the normal range.     2024 holter normal      Physical Exam    Airway  Comment: Large tongue noted  Mallampati score: III  TM Distance: <3 FB       Dental   Comment: A few missing teeth, family deneis loose teeth     Cardiovascular  Rhythm: regular, Rate: normal    Pulmonary   Breath sounds clear to auscultation    Other Findings  post-pubertal.      Anesthesia Plan  ASA Score- 3     Anesthesia Type- general with ASA Monitors.         Additional Monitors:     Airway Plan: LMA.    Comment: Discussed block if needed, po midazolam, will attempt pre-op PIV.       Plan Factors-Exercise tolerance (METS): >4 METS.    Chart reviewed.   Existing labs reviewed.     Patient is not a current smoker.      Obstructive sleep apnea risk education given perioperatively.        Induction- intravenous.    Postoperative Plan- Plan for postoperative opioid use.     Perioperative Resuscitation Plan - Level 1 - Full Code.       Informed Consent- Anesthetic plan and risks discussed with patient and sibling (sister at bedside gave consent).

## 2024-08-29 NOTE — DISCHARGE INSTR - AVS FIRST PAGE
POSTOPERATIVE INSTRUCTIONS for ANKLE FRACTURE    MEDICATIONS:  Resume all home medications unless otherwise instructed by your surgeon.  Pain Medication:  Oxycodone 5 mg, 1 tablets every 4 hours as needed  If you were given a regional anesthetic (nerve block), please begin taking the pain medication as soon as you get home, even if you have minimal or no pain.  DO NOT WAIT FOR THE NERVE BLOCK TO WEAR OFF.  Possible side effects include nausea, constipation, and urinary retention.  If you experience these side effects, please call our office for assistance.  Pain med refills are authorized only during office hours (8am-4pm, Mon-Fri).  Anti-Inflammatory:  Tylenol 325 mg, 1-2 tablets every 6 hours for 4 weeks  Take with food.  Stop if you experience nausea, reflux, or stomach pain.  Blood Clot Prevention:  Aspirin 81 mg, 1 tablet twice daily for 30 days  Pump your foot up and down 20 times per hour while you are less mobile.    WOUND CARE:  Keep the dressing clean and dry.  Light drainage may occur the first 2 days postop.  Do not remove the dressings until you are seen in our office.  Cover the bandages appropriately while washing to keep them from getting wet.  Please call our office ([phone]) if you experience any of the following:  Sudden increase in swelling, redness, or warmth at the surgical site  Excessive incisional drainage that persists beyond the 3rd day after surgery  Oral temperature greater than 101 degrees, not relieved with Tylenol  Shortness of breath, chest pain, nausea, or any other concerning symptoms    SWELLING CONTROL:  Cold Therapy:  Apply ice (20 min on, 20 min off) as often as you feel is necessary.  Elevation:  Elevate the entire leg above heart level as much as possible.  Compression:  Apply ACE wraps or a compression stocking as needed.    RANGE OF MOTION:  You are not allowed range of motion until you are given permission from your surgeon.    IMMOBILIZATION:  Do not remove your  "splint.  Keep it clean and dry.    ACTIVITY:  Do not bear weight on the operative leg.  Always use crutches.  Using Crutches on Stairs:  Going up, lead with your \"good\" (nonoperative) leg.  Going down, lead with your \"bad\" (operative) leg.  Use a hand rail when available.  Quad Sets:  Sit or lie with your knee straight.  Tighten your quadriceps (front thigh) muscle.  Hold for 3 seconds, then relax.  Repeat 20 times per hour while awake.    PHYSICAL THERAPY:  You will be given a physical therapy prescription at your first postoperative visit.    FOLLOW-UP APPOINTMENT:  10-14 days after surgery with:    Dr. Mahamed Zamorano MD  [facility]  [address] [place], [address] ([facility] Location)  [address] [place], [address] ([facility])  [phone]   "

## 2024-08-29 NOTE — ANESTHESIA POSTPROCEDURE EVALUATION
Post-Op Assessment Note    CV Status:  Stable  Pain Score: 0    Pain management: adequate    Multimodal analgesia used between 6 hours prior to anesthesia start to PACU discharge    Mental Status:  Sleepy   Hydration Status:  Stable   PONV Controlled:  None   Airway Patency:  Patent     Post Op Vitals Reviewed: Yes    No anethesia notable event occurred.    Staff: JUWAN               /84 (08/29/24 1744)    Temp (!) 96.8 °F (36 °C) (08/29/24 1744)    Pulse 103 (08/29/24 1744)   Resp 12 (08/29/24 1744)    SpO2 98 % (08/29/24 1744)

## 2024-08-29 NOTE — ANESTHESIA PROCEDURE NOTES
Peripheral Block    Patient location during procedure: OR  Start time: 8/29/2024 2:18 PM  Reason for block: procedure for pain, at surgeon's request and post-op pain management  Staffing  Performed by: Hill Brandt DO  Authorized by: Hill Brandt DO    Preanesthetic Checklist  Completed: patient identified, IV checked, site marked, risks and benefits discussed, surgical consent, monitors and equipment checked, pre-op evaluation and timeout performed  Peripheral Block  Patient position: supine  Prep: ChloraPrep  Patient monitoring: continuous pulse oximetry, frequent blood pressure checks and heart rate  Block type: Popliteal (Left anterior thigh)  Laterality: left  Injection technique: single-shot  Procedures: ultrasound guided, Ultrasound guidance required for the procedure to increase accuracy and safety of medication placement and decrease risk of complications. and nerve stimulator  Ultrasound permanent image saved  bupivacaine liposomal (EXPAREL) 1.3 % injection 20 mL - Perineural, Left Anterior Thigh   5 mL - 8/29/2024 2:18:00 PM  Needle  Needle type: Stimuplex   Needle length: 4 in  Needle localization: ultrasound guidance and nerve stimulator  Catheter size: 19 G  Assessment  Injection assessment: frequent aspiration, injected with ease, negative aspiration, no paresthesia on injection, no symptoms of intraneural/intravenous injection, negative for heart rate change, needle tip visualized at all times and incremental injection  Paresthesia pain: none  Post-procedure:  site cleaned  patient tolerated the procedure well with no immediate complications          
Peripheral Block    Patient location during procedure: OR  Start time: 8/29/2024 2:24 PM  Reason for block: procedure for pain, at surgeon's request and post-op pain management  Staffing  Performed by: Hill Brandt DO  Authorized by: Hill Brandt DO    Preanesthetic Checklist  Completed: patient identified, IV checked, site marked, risks and benefits discussed, surgical consent, monitors and equipment checked, pre-op evaluation and timeout performed  Peripheral Block  Patient position: supine  Prep: ChloraPrep  Patient monitoring: continuous pulse oximetry, frequent blood pressure checks and heart rate  Block type: Femoral  Laterality: left  Injection technique: single-shot  Procedures: ultrasound guided, Ultrasound guidance required for the procedure to increase accuracy and safety of medication placement and decrease risk of complications. and nerve stimulator  Ultrasound permanent image saved  bupivacaine liposomal (EXPAREL) 1.3 % injection 20 mL - Perineural, Left Anterior Thigh   5 mL - 8/29/2024 2:24:00 PM  Needle  Needle type: Stimuplex   Needle length: 4 in  Needle localization: anatomical landmarks, nerve stimulator and ultrasound guidance  Assessment  Injection assessment: frequent aspiration, injected with ease, no paresthesia on injection, negative aspiration, no symptoms of intraneural/intravenous injection, negative for heart rate change, needle tip visualized at all times and incremental injection  Paresthesia pain: none  Post-procedure:  site cleaned  patient tolerated the procedure well with no immediate complications          
Detail Level: Detailed
Hide Additional Notes?: No

## 2024-08-30 DIAGNOSIS — Z98.890 STATUS POST ORIF OF FRACTURE OF ANKLE: Primary | ICD-10-CM

## 2024-08-30 DIAGNOSIS — Z87.81 STATUS POST ORIF OF FRACTURE OF ANKLE: Primary | ICD-10-CM

## 2024-08-30 NOTE — PROGRESS NOTES
Called and spoke with facility about patient's postop appointment and rescheduled today.  We did discuss that since the facility did not give the patient her pain medication that she would likely be in pain since it is only 1 day postoperative from her surgery.  I did explain to the facility that they should do the best to try to elevate her leg by putting pillows underneath her.  I did advise that there is not an indication to use a knee brace at this time as there is no injury to the knee.  Facility did ask for a wheelchair with an elevated rest so an order was placed for this today.  Our office will be faxing this order over.  I did recommend reaching back out to us should they have any other questions or concerns.

## 2024-08-30 NOTE — OP NOTE
OPERATIVE REPORT  PATIENT NAME: Aminta De La Torre    :  1975  MRN: [id number]  Pt Location: [facility] OR ROOM 05    SURGERY DATE: 2024    Surgeons and Role:     * Mahamed Zamorano MD - Primary     * NGOZI Mueller-C - Assisting    Preop Diagnosis:  Closed fracture of left ankle, initial encounter [S82.892A]    Post-Op Diagnosis Codes:     * Closed fracture of left ankle, initial encounter [S82.892A]    Procedure(s):  Left - ORIF TRIMALLEOLAR FX. OPEN REDUCTION/TREATMENT OF TILLEAUX FRACTURE    Modifier 22 :   Given the non-acute nature of the fracture and the patients significant medical comorbidities, the patient surgery required significant extra time (90 minutes), resources, and mental energy to complete surgical case.       Specimen(s):  * No specimens in log *    Estimated Blood Loss:   100 mL    Drains:  * No LDAs found *    Anesthesia Type:   General w/ Regional    Operative Indications:  Closed fracture of left ankle, initial encounter [S82.892A]      Operative Findings:  Nonunion of the lateral malleolus fracture  Nonunion of the anterolateral tibial plafond  Posterior malleolus fracture  Medial tibial distal third shaft fracture    Complications:   None    Procedure and Technique:  SURGEON(S) AND ROLE  Mahamed Zamorano    ASSISTANT:  I requested NGOZI Andrade to assist with this procedure. Assistance was medically necessary in order to safely perform the procedure without increased blood loss or morbidity. Assistance was provided through positioning, instrumentation, and retraction of incisions for better visualization of underlying structures and cauterization for hemostasis. Assistance was also provided through wound closure, and instillation of anesthetic, application of sterile dressing, and safe transport from the operative suite.    No qualified resident physician was available for assistance    PROCEDURE:   Debridement and takedown of nonunion of lateral malleolus  Open reduction  internal fixation of lateral malleolus  Debridement and takedown of nonunion distal tibia/medial plafond   Open reduction internal fixation of distal tibia/medial plafond   Closed reduction of posterior malleolus    The patient was taken to the operating room for the aforementioned diagnosis, identified by the anesthesia service. The correct extremity had been marked by Dr. Zamorano and after adequate induction of regional anesthetic, the patient was positioned in a supine position with a bump under the left hip taking care to protect the down structures. The left lower extremity was prepped in a sterile fashion.    A standard lateral approach was performed to the left ankle, the superficial peroneal was never encountered. The dissection was carried down through the skin directly onto the bone, in which the the fracture was encountered.  The fracture had significant callus formation and was debrided with a combination of a curette, rongeur until the healthy bone edges were encountered of the fracture.,  Next the fracture was reduced with a set of forceps and a lag screw from anterior to posterior was placed to lack the oblique nature of the fracture.  Next, a distal fibular plate was placed onto the fibula spanning the fracture with a minimum of 3 holes distal and proximal, several screws were placed proximal and distal securing the plate to the bone. Fluoroscopic images AP mortise and lateral were taken to confirm adequate anatomic reduction and plate fixation.      Attention was then directed towards the distal tibia/medial plafond fracture. Using standard approach to the medial distal tibia, a 5 cm incision was created over the medial distal tibia, dissection was carried down to the bone, carefully identifying the saphenous nerve and vein anteriorly and protecting them throughout dissection. Once the medial tibia fracture was fully exposed, there was significant callus that was encountered, which was taken down  with a combination of a curette and a rongeur and the fracture was booked open and cleaned and debrided with a curette and rongeur.  Next, using a medial spanning plate over the distal tibia, several cortical screws were placed across the distal tibia fracture, then followed by several locking screws distally, nicely holding the tibia in anatomic alignment.  Fluoroscopic AP mortise and lateral views were taken which confirmed adequate anatomic reduction of the distal tibia fracture at this point.  The reduction of the lateral malleolus and distal tibia, head reduced the posterior malleolus, which was confirmed under fluoroscopy Final fluoroscopic images were taken in AP, lateral and mortise. The wounds were copiously irrigated and closed in standard fashion, with 0-Vicryl for the deep layers, 2-0 Vicryl for the subcutaneous and 3-0 nylon horizontal mattress sutures for the skin. Standard Xeroform and plain dressings were applied followed by placing the patient in a short-leg splint and will be nonweightbearing, will remain in splint for 2 weeks until follow-up visit in office.    I was present for the entire procedure.    Patient Disposition:  PACU         SIGNATURE: Mahamed Zamorano MD  DATE: August 30, 2024  TIME: 7:44 AM

## 2024-09-03 ENCOUNTER — OFFICE VISIT (OUTPATIENT)
Dept: OBGYN CLINIC | Facility: HOSPITAL | Age: 49
End: 2024-09-03
Payer: MEDICARE

## 2024-09-03 VITALS — HEIGHT: 55 IN | WEIGHT: 158 LBS | BODY MASS INDEX: 36.57 KG/M2

## 2024-09-03 DIAGNOSIS — M21.70 LEG LENGTH DISCREPANCY: ICD-10-CM

## 2024-09-03 DIAGNOSIS — R26.2 IMPAIRED AMBULATION: ICD-10-CM

## 2024-09-03 DIAGNOSIS — M25.562 CHRONIC PAIN OF LEFT KNEE: Primary | ICD-10-CM

## 2024-09-03 DIAGNOSIS — M17.12 ARTHRITIS OF LEFT KNEE: ICD-10-CM

## 2024-09-03 DIAGNOSIS — G89.29 CHRONIC PAIN OF LEFT KNEE: Primary | ICD-10-CM

## 2024-09-03 PROCEDURE — 99212 OFFICE O/P EST SF 10 MIN: CPT | Performed by: ORTHOPAEDIC SURGERY

## 2024-09-03 NOTE — PROGRESS NOTES
"Assessment:   Diagnosis ICD-10-CM Associated Orders   1. Chronic pain of left knee  M25.562     G89.29       2. Arthritis of left knee  M17.12       3. Leg length discrepancy  M21.70       4. Impaired ambulation  R26.2           Plan:  Patient recently underwent left ankle trimalleolar fracture ORIF with Dr. Zamorano  Repeat cortisone injection of the right knee discussed, however deferred as she will be non-weightbearing for a period of time, and we do not want to hinder any healing of her ankle fracture  May receive CSI with Dr. Zamorano as needed if that is more convenient  Follow up on an as needed basis    To do next visit:  Follow-up in 3 months or PRN for further injections.    The above stated was discussed in layman's terms and the patient expressed understanding.  All questions were answered to the patient's satisfaction.         Scribe Attestation      I,:  Maral Tay am acting as a scribe while in the presence of the attending physician.:       I,:  Galileo Hubbard MD personally performed the services described in this documentation    as scribed in my presence.:             Subjective:   Aminta De La Torre is a 49 y.o. female who presents to the office today for follow up evaluation of left knee pain due to known underlying tricompartmental osteoarthritis. The patient has Down Syndrome, and  is mainly non-verbal, but will say \"ouch\" with certain tactile stimuli. Her caretakers note apparent improvement in her pain after CSI provided at her last visit 3 months ago, however she did still have a \"wobble limp\" due to a congenital limb length discrepancy.  She seems to have pain in her left knee when she attempts to rise from her recliner.  She takes Tylenol and Motrin as needed for pain and has been using topical Voltaren gel with some apparent relief. Aminta recently underwent ORIF of left ankle trimalleolar fracture on week ago and presents today in her post-op splint and using wheelchair for ambulatory " assistance.       Review of systems negative unless otherwise specified in HPI    Past Medical History:   Diagnosis Date    Allergic     Community acquired pneumonia     Last Assessed:1/22/2013    Encounter for PPD test 09/26/2023    Encounter for vaccination 09/26/2023    Tdap given at this visit    Leukopenia     Last Assessed:3/5/2014    Osteoporosis     Preop examination 09/29/2019    Trisomy 21, Down syndrome        Past Surgical History:   Procedure Laterality Date    NC OPEN TX TRIMALLEOLAR ANKLE FX W/O FIXJ PST LIP Left 8/29/2024    Procedure: ORIF TRIMALLEOLAR FX, OPEN REDUCTION/TREATMENT OF TILLEAUX FRACTURE;  Surgeon: Mahamed Zamorano MD;  Location: [facility];  Service: Orthopedics    TONSILLECTOMY AND ADENOIDECTOMY         Family History   Problem Relation Age of Onset    Cancer Mother     No Known Problems Sister     No Known Problems Brother     No Known Problems Brother        Social History     Occupational History    Not on file   Tobacco Use    Smoking status: Never    Smokeless tobacco: Never   Vaping Use    Vaping status: Never Used   Substance and Sexual Activity    Alcohol use: Not Currently     Comment: PATIENT DOES NOT DRINK    Drug use: Never    Sexual activity: Never         Current Outpatient Medications:     acetaminophen (TYLENOL) 325 mg tablet, Take 1 tablet (325 mg total) by mouth every 6 (six) hours as needed for mild pain, Disp: 30 tablet, Rfl: 0    aspirin (ECOTRIN LOW STRENGTH) 81 mg EC tablet, Take 1 tablet (81 mg total) by mouth 2 (two) times a day, Disp: 60 tablet, Rfl: 0    atorvastatin (LIPITOR) 10 mg tablet, Take 1 tablet (10 mg total) by mouth daily, Disp: 30 tablet, Rfl: 5    Cholecalciferol 25 MCG (1000 UT) tablet, Take 1 tablet (1,000 Units total) by mouth daily, Disp: 90 tablet, Rfl: 1    citalopram (CeleXA) 20 mg tablet, Take 1 tablet (20 mg total) by mouth daily, Disp: 30 tablet, Rfl: 5    Diclofenac Sodium (VOLTAREN) 1 %, Apply 2 g topically 4 (four) times a day To  left knee, Disp: 100 g, Rfl: 5    Incontinence Supply Disposable (RA Adult Wipes) MISC, Use daily, Disp: 200 each, Rfl: 2    Incontinence Supply Disposable (Ultra-Soft Plus Briefs XXL) MISC, Use if needed (incontinence), Disp: 200 each, Rfl: 2    levothyroxine 50 mcg tablet, TAKE ONE TABLET BY MOUTH AT 7 AM, Disp: 30 tablet, Rfl: 5    melatonin 3 mg, Take 1 tablet (3 mg total) by mouth daily at bedtime, Disp: 30 tablet, Rfl: 5    Mouthwashes (Biotene Dry Mouth) LIQD, Apply 1 Swab to the mouth or throat 2 (two) times a day as needed (dry mouth) Use 5ml of solution with each swab, Disp: 237 mL, Rfl: 0    oxyCODONE (Roxicodone) 5 immediate release tablet, Take 1 tablet (5 mg total) by mouth every 4 (four) hours as needed for moderate pain for up to 15 doses Max Daily Amount: 30 mg, Disp: 15 tablet, Rfl: 0    sodium chloride (OCEAN) 0.65 % nasal spray, 2 sprays into each nostril as needed for congestion or rhinitis, Disp: 60 mL, Rfl: 0    bacitracin topical ointment 500 units/g topical ointment, Apply topically in the morning (Patient not taking: Reported on 8/27/2024), Disp: , Rfl:     carbamide peroxide (DEBROX) 6.5 % otic solution, Use 4 gtts to both ears bid Tuesdays and Fridays, and then 4 days prior to ENT appt for softening of ear wax 4 gtts bid x 4 days. (Patient not taking: Reported on 7/17/2024), Disp: 15 mL, Rfl: 0    dextromethorphan-guaiFENesin (ROBITUSSIN DM)  mg/5 mL syrup, Take 5 mL by mouth 3 (three) times a day as needed for cough or congestion (Patient not taking: Reported on 8/27/2024), Disp: 237 mL, Rfl: 0    Heating Pad PADS, by Does not apply route 2 (two) times a day (Patient not taking: Reported on 3/12/2024), Disp: 1 each, Rfl: 0    ibuprofen (MOTRIN) 600 mg tablet, Take 600 mg by mouth every 6 (six) hours as needed (Patient not taking: Reported on 8/27/2024), Disp: , Rfl:     lacosamide (VIMPAT) 50 mg, Take 1 tablet (50 mg total) by mouth daily at bedtime (Patient not taking: Reported on  "8/27/2024), Disp: 30 tablet, Rfl: 5    Midazolam 5 MG/0.1ML SOLN, For seizure longer than 5 min or cluster of seizures. Give 1 spray (5 mg) into 1 nostril; if no response, a second dose of 1 spray (5 mg) into the other nostril may be given 10 min. (Patient not taking: Reported on 8/27/2024), Disp: 2 each, Rfl: 1    nystatin powder, Apply topically 3 (three) times a day as needed (rash) (Patient not taking: Reported on 8/26/2024), Disp: 60 g, Rfl: 0    ofloxacin (FLOXIN) 0.3 % otic solution, Administer 10 drops into both ears 2 (two) times a day (Patient not taking: Reported on 8/27/2024), Disp: 10 mL, Rfl: 2    Allergies   Allergen Reactions    Levetiracetam Drowsiness and Hives     And disorientation    Depakote [Valproic Acid] Drowsiness    Lamictal [Lamotrigine] Rash            Vitals:       Objective:    General:  Patient is WDWN and is in no acute distress.    Musculoskeletal:    Left Knee:    Inspection:  No visible abnormality.    Range of Motion:  The patient has a 20-degree flexion contrature of left knee.  She is in 25 degrees of valgus in the left knee.    Other:  Extremity WWP.        Diagnostics, reviewed and taken today if performed as documented:    The attending physician has personally reviewed the pertinent films in PACS and interpretation is as follows:  Left Knee X-rays:  No acute osseous abnormality such as a fracture or dislocation.  There is moderate tricompartmental oste arthritis within the patient's knee.      Procedures, if performed today:    Procedures    Portions of the record may have been created with voice recognition software.  Occasional wrong word or \"sound a like\" substitutions may have occurred due to the inherent limitations of voice recognition software.  Read the chart carefully and recognize, using context, where substitutions have occurred.  "

## 2024-09-13 ENCOUNTER — APPOINTMENT (OUTPATIENT)
Dept: RADIOLOGY | Age: 49
End: 2024-09-13
Payer: MEDICARE

## 2024-09-13 ENCOUNTER — OFFICE VISIT (OUTPATIENT)
Dept: OBGYN CLINIC | Facility: CLINIC | Age: 49
End: 2024-09-13

## 2024-09-13 VITALS — WEIGHT: 158 LBS | HEIGHT: 55 IN | BODY MASS INDEX: 36.57 KG/M2

## 2024-09-13 DIAGNOSIS — S82.892A CLOSED FRACTURE OF LEFT ANKLE, INITIAL ENCOUNTER: ICD-10-CM

## 2024-09-13 DIAGNOSIS — Z87.81 STATUS POST ORIF OF FRACTURE OF ANKLE: Primary | ICD-10-CM

## 2024-09-13 DIAGNOSIS — Z98.890 STATUS POST ORIF OF FRACTURE OF ANKLE: Primary | ICD-10-CM

## 2024-09-13 PROCEDURE — 73600 X-RAY EXAM OF ANKLE: CPT

## 2024-09-13 PROCEDURE — 99024 POSTOP FOLLOW-UP VISIT: CPT | Performed by: ORTHOPAEDIC SURGERY

## 2024-09-13 NOTE — PROGRESS NOTES
Subjective   CHIEF COMPLAINT/REASON FOR VISIT  Aminta De La Torre is a 49 y.o. female who presents for 2 week post op follow-up after Orif Trimalleolar Fx, Open Reduction/treatment Of Tilleaux Fracture - Left on 8/29/2024     HISTORY OF PRESENT ILLNESS  Overall, she feels things are going well and progressing appropriately.  She is accompanied by caregiver.  Caregiver said patient is doing well and her pain has been controlled.  She has been trying to remove the splint so caregivers have reinforced this.    Objective   PHYSICAL EXAMINATION  Left  Ankle  Surgical incisions are healed.   Range of motion of the ankle appears stiff but intact  Unable to assess sensation due to patient mental status  Palpable pedal pulse                       Assessment & Plan   1. Status Post Orif Trimalleolar Fx, Open Reduction/treatment Of Tilleaux Fracture - Left    She is doing well after surgery and making appropriate progress.  X-rays taken today appear to show stable hardware splint taken down and sutures removed today.  Steri-Strips placed over incision sites.  Will transition patient to cam boot.  She is to continue to be nonweightbearing with the left lower extremity.    We will plan to see her back at the  3.5 month post-operative waqar. If any issues, questions, or concerns arise between now and the next appointment, we have encouraged the patient contact our team.        Scribe Attestation      I,:  Zander Colvin PA-C am acting as a scribe while in the presence of the attending physician.:       I,:  Mahamed Zamorano MD personally performed the services described in this documentation    as scribed in my presence.:

## 2024-09-17 ENCOUNTER — TELEPHONE (OUTPATIENT)
Dept: INTERNAL MEDICINE CLINIC | Facility: CLINIC | Age: 49
End: 2024-09-17

## 2024-09-17 DIAGNOSIS — M81.0 OSTEOPOROSIS, UNSPECIFIED OSTEOPOROSIS TYPE, UNSPECIFIED PATHOLOGICAL FRACTURE PRESENCE: Primary | ICD-10-CM

## 2024-09-24 ENCOUNTER — RA CDI HCC (OUTPATIENT)
Dept: OTHER | Facility: HOSPITAL | Age: 49
End: 2024-09-24

## 2024-09-30 ENCOUNTER — OFFICE VISIT (OUTPATIENT)
Dept: INTERNAL MEDICINE CLINIC | Facility: CLINIC | Age: 49
End: 2024-09-30
Payer: MEDICARE

## 2024-09-30 VITALS — SYSTOLIC BLOOD PRESSURE: 98 MMHG | HEART RATE: 88 BPM | DIASTOLIC BLOOD PRESSURE: 62 MMHG

## 2024-09-30 DIAGNOSIS — Z00.00 MEDICARE ANNUAL WELLNESS VISIT, SUBSEQUENT: Primary | ICD-10-CM

## 2024-09-30 DIAGNOSIS — E03.9 HYPOTHYROIDISM, UNSPECIFIED TYPE: ICD-10-CM

## 2024-09-30 DIAGNOSIS — R21 RASH: ICD-10-CM

## 2024-09-30 DIAGNOSIS — H61.23 BILATERAL IMPACTED CERUMEN: ICD-10-CM

## 2024-09-30 DIAGNOSIS — Z98.890 STATUS POST ORIF OF FRACTURE OF ANKLE: ICD-10-CM

## 2024-09-30 DIAGNOSIS — Z23 IMMUNIZATION DUE: ICD-10-CM

## 2024-09-30 DIAGNOSIS — R56.9 SEIZURE-LIKE ACTIVITY (HCC): ICD-10-CM

## 2024-09-30 DIAGNOSIS — Z86.69 HISTORY OF IMPACTED CERUMEN: ICD-10-CM

## 2024-09-30 DIAGNOSIS — F69 BEHAVIOR PROBLEM, ADULT: ICD-10-CM

## 2024-09-30 DIAGNOSIS — S82.892A CLOSED FRACTURE OF LEFT ANKLE, INITIAL ENCOUNTER: ICD-10-CM

## 2024-09-30 DIAGNOSIS — Z87.81 STATUS POST ORIF OF FRACTURE OF ANKLE: ICD-10-CM

## 2024-09-30 DIAGNOSIS — R26.2 AMBULATORY DYSFUNCTION: ICD-10-CM

## 2024-09-30 PROCEDURE — G0439 PPPS, SUBSEQ VISIT: HCPCS | Performed by: GENERAL ACUTE CARE HOSPITAL

## 2024-09-30 PROCEDURE — G0008 ADMIN INFLUENZA VIRUS VAC: HCPCS

## 2024-09-30 PROCEDURE — 99214 OFFICE O/P EST MOD 30 MIN: CPT | Performed by: GENERAL ACUTE CARE HOSPITAL

## 2024-09-30 PROCEDURE — 90673 RIV3 VACCINE NO PRESERV IM: CPT

## 2024-09-30 RX ORDER — CITALOPRAM HYDROBROMIDE 20 MG/1
10 TABLET ORAL 2 TIMES DAILY
Start: 2024-09-30

## 2024-09-30 RX ORDER — ACETAMINOPHEN 500 MG
500 TABLET ORAL EVERY 6 HOURS PRN
COMMUNITY

## 2024-09-30 RX ORDER — ASPIRIN 81 MG/1
81 TABLET ORAL 2 TIMES DAILY
Qty: 180 TABLET | Refills: 3 | Status: SHIPPED | OUTPATIENT
Start: 2024-09-30

## 2024-09-30 NOTE — PROGRESS NOTES
Ambulatory Visit  Name: Aminta De La Torre      : 1975      MRN: [id number]  Encounter Provider: Radha Olvera MD  Encounter Date: 2024   Encounter department: [facility]  Medicare annual wellness visit, subsequent  Vaccinations: Give flu shot today.  Staff will check with her family about COVID-vaccine  Cognition: Follow-up with neurology  Urinary incontinence: Wears briefs  Mammogram: Ordered  Colon cancer screen: Cologuard ordered  DEXA: DEXA ordered  Healthy diet and regular exercise      Orders:    Mammo breast specimen bilateral; Future    Ambulatory Referral to Obstetrics / Gynecology; Future    Cologuard    Ambulatory dysfunction    Orders:    Wheelchair    Bath Benches    Elastic Bandages & Supports (Gait/Transfer Belt) MISC; Use in the morning    Closed fracture of left ankle, initial encounter    Orders:    aspirin (Aspirin Low Dose) 81 mg EC tablet; Take 1 tablet (81 mg total) by mouth 2 (two) times a day    Behavior problem, adult    Orders:    citalopram (CeleXA) 20 mg tablet; Take 0.5 tablets (10 mg total) by mouth 2 (two) times a day    Rash    Orders:    ZINC OXIDE, TOPICAL, 10 % CREA; Apply topically 3 (three) times a day as needed (rash)    Immunization due    Orders:    influenza vaccine, recombinant, PF, 0.5 mL IM (Flublok)    History of impacted cerumen    Orders:    carbamide peroxide (DEBROX) 6.5 % otic solution; Use 4 gtts to both ears bid  and , and then 4 days prior to ENT appt for softening of ear wax 4 gtts bid x 4 days.    Bilateral impacted cerumen    Orders:    carbamide peroxide (DEBROX) 6.5 % otic solution; Use 4 gtts to both ears bid  and , and then 4 days prior to ENT appt for softening of ear wax 4 gtts bid x 4 days.    Seizure-like activity (HCC)  Continue lacosamide.  Follow-up with neurology         Hypothyroidism, unspecified type  Continue levothyroxine.  Monitor TSH         Status post ORIF of  fracture of ankle  Follow-up with Ortho.  Wears cam boot.  Nonweightbearing per Ortho            Preventive health issues were discussed with patient, and age appropriate screening tests were ordered as noted in patient's After Visit Summary. Personalized health advice and appropriate referrals for health education or preventive services given if needed, as noted in patient's After Visit Summary.    History of Present Illness     HPI   Patient Care Team:  Radha Olvera MD as PCP - General (Internal Medicine)    Review of Systems  Medical History Reviewed by provider this encounter:       Annual Wellness Visit Questionnaire   Aminta is here for her Subsequent Wellness visit.     Historian  Patient cannot answer questions due to cognitive impairment, intelluctual disability, or expressive limitations. Information provided by: caregiver.    Health Risk Assessment:   Patient feels that their physical health rating is slightly worse. Patient is satisfied with their life. Eyesight was rated as same. Hearing was rated as same. Patient feels that their emotional and mental health rating is much better. Patients states they are sometimes angry. Patient states they are often unusually tired/fatigued. Pain experienced in the last 7 days has been none. Patient states that she has experienced weight loss or gain in last 6 months.     Depression Screening:   PHQ-2 Score: 0      Fall Risk Screening:   In the past year, patient has experienced: history of falling in past year    Number of falls: 1  Injured during fall?: No    Feels unsteady when standing or walking?: Yes    Worried about falling?: Yes      Urinary Incontinence Screening:   Patient has leaked urine accidently in the last six months.     Home Safety:  Patient has trouble with stairs inside or outside of their home. Patient has working smoke alarms and has working carbon monoxide detector. Home safety hazards include: none.     Nutrition:   Current diet is Other (please  comment). pureed    Medications:   Patient is not currently taking any over-the-counter supplements. Patient is not able to manage medications.     Activities of Daily Living (ADLs)/Instrumental Activities of Daily Living (IADLs):   Walk and transfer into and out of bed and chair?: No  Dress and groom yourself?: No    Bathe or shower yourself?: Yes    Feed yourself? No  Do your laundry/housekeeping?: No  Manage your money, pay your bills and track your expenses?: No  Make your own meals?: No    Do your own shopping?: No    Previous Hospitalizations:   Any hospitalizations or ED visits within the last 12 months?: Yes    How many hospitalizations have you had in the last year?: 3-4    PREVENTIVE SCREENINGS      Cardiovascular Screening:    General: Screening Not Indicated and History Lipid Disorder      Diabetes Screening:     General: Screening Current      Osteoporosis Screening:    General: Screening Not Indicated and History Osteoporosis      Lung Cancer Screening:     General: Screening Not Indicated      Hepatitis C Screening:    General: Screening Current    Screening, Brief Intervention, and Referral to Treatment (SBIRT)    Screening  Typical number of drinks in a day: 0  Typical number of drinks in a week: 0  Interpretation: Low risk drinking behavior.    Single Item Drug Screening:  How often have you used an illegal drug (including marijuana) or a prescription medication for non-medical reasons in the past year? never    Single Item Drug Screen Score: 0  Interpretation: Negative screen for possible drug use disorder    Social Determinants of Health     Financial Resource Strain: Patient Declined (6/5/2023)    [facility]    Overall Financial Resource Strain (CARDIA)     Difficulty of Paying Living Expenses: Patient declined   Food Insecurity: No Food Insecurity (9/30/2024)    Hunger Vital Sign     Worried About Running Out of Food in the Last  Year: Never true     Ran Out of Food in the Last Year: Never true   Transportation Needs: No Transportation Needs (9/30/2024)    PRAPARE - Transportation     Lack of Transportation (Medical): No     Lack of Transportation (Non-Medical): No   Housing Stability: Low Risk  (9/30/2024)    Housing Stability Vital Sign     Unable to Pay for Housing in the Last Year: No     Number of Times Moved in the Last Year: 1     Homeless in the Last Year: No   Utilities: Not At Risk (9/30/2024)    [facility] Utilities     Threatened with loss of utilities: No     No results found.  Current Outpatient Medications on File Prior to Visit   Medication Sig Dispense Refill    acetaminophen (TYLENOL) 325 mg tablet Take 1 tablet (325 mg total) by mouth every 6 (six) hours as needed for mild pain 30 tablet 0    acetaminophen (TYLENOL) 500 mg tablet Take 500 mg by mouth every 6 (six) hours as needed for mild pain With 600 mg ibuprofen      atorvastatin (LIPITOR) 10 mg tablet Take 1 tablet (10 mg total) by mouth daily 30 tablet 5    Cholecalciferol 25 MCG (1000 UT) tablet Take 1 tablet (1,000 Units total) by mouth daily 90 tablet 1    dextromethorphan-guaiFENesin (ROBITUSSIN DM)  mg/5 mL syrup Take 5 mL by mouth 3 (three) times a day as needed for cough or congestion 237 mL 0    Diclofenac Sodium (VOLTAREN) 1 % Apply 2 g topically 4 (four) times a day To left knee 100 g 5    Heating Pad PADS by Does not apply route 2 (two) times a day 1 each 0    ibuprofen (MOTRIN) 600 mg tablet Take 600 mg by mouth every 6 (six) hours as needed      Incontinence Supply Disposable (RA Adult Wipes) MISC Use daily 200 each 2    Incontinence Supply Disposable (Ultra-Soft Plus Briefs XXL) MISC Use if needed (incontinence) 200 each 2    lacosamide (VIMPAT) 50 mg Take 1 tablet (50 mg total) by mouth daily at bedtime 30 tablet 5    levothyroxine 50 mcg tablet TAKE ONE TABLET BY MOUTH AT 7 AM 30 tablet 5    melatonin 3 mg Take 1 tablet (3 mg total) by mouth daily at  bedtime 30 tablet 5    Mouthwashes (Biotene Dry Mouth) LIQD Apply 1 Swab to the mouth or throat 2 (two) times a day as needed (dry mouth) Use 5ml of solution with each swab 237 mL 0    nystatin powder Apply topically 3 (three) times a day as needed (rash) 60 g 0    sodium chloride (OCEAN) 0.65 % nasal spray 2 sprays into each nostril as needed for congestion or rhinitis 60 mL 0    bacitracin topical ointment 500 units/g topical ointment Apply topically in the morning (Patient not taking: Reported on 8/27/2024)      Midazolam 5 MG/0.1ML SOLN For seizure longer than 5 min or cluster of seizures. Give 1 spray (5 mg) into 1 nostril; if no response, a second dose of 1 spray (5 mg) into the other nostril may be given 10 min. (Patient not taking: Reported on 8/27/2024) 2 each 1    ofloxacin (FLOXIN) 0.3 % otic solution Administer 10 drops into both ears 2 (two) times a day (Patient not taking: Reported on 9/30/2024) 10 mL 2     No current facility-administered medications on file prior to visit.     Allergies   Allergen Reactions    Levetiracetam Drowsiness and Hives     And disorientation    Depakote [Valproic Acid] Drowsiness    Lamictal [Lamotrigine] Rash    Dust Mite Extract Other (See Comments)     Per patient's medical appointment form, no reaction listed    Molds & Smuts Other (See Comments)     Per patient's medical appointment form, no reaction listed    Other Other (See Comments)     Volporic acid, levitracetam, Per patient's medical appointment form, no reaction listed     Patient Active Problem List   Diagnosis    Allergic rhinitis    Down syndrome    Hyperlipidemia    Hypothyroidism    Periodontitis    Trisomy 21, Down syndrome    Chronic pain of left knee    Neck pain    Medicare annual wellness visit, subsequent    Class 3 severe obesity due to excess calories without serious comorbidity with body mass index (BMI) of 40.0 to 44.9 in adult (HCC)    Class 2 obesity with body mass index (BMI) of 37.0 to 37.9 in  adult, unspecified obesity type, unspecified whether serious comorbidity present    Abnormal laboratory test    Primary insomnia    Cognitive decline    Behavior problem, adult    Seizure-like activity (HCC)    Rash    Vitamin D deficiency    B12 deficiency    Reflex epilepsy (HCC)    Dry mouth    Ambulatory dysfunction    Dysphagia    Obesity (BMI 30-39.9)    Closed fracture of left ankle, initial encounter    Status post ORIF of fracture of ankle    Intellectual delay       Objective     BP 98/62   Pulse 88   LMP  (LMP Unknown)       Physical Exam

## 2024-09-30 NOTE — PATIENT INSTRUCTIONS
Medicare Preventive Visit Patient Instructions  Thank you for completing your Welcome to Medicare Visit or Medicare Annual Wellness Visit today. Your next wellness visit will be due in one year (10/1/2025).  The screening/preventive services that you may require over the next 5-10 years are detailed below. Some tests may not apply to you based off risk factors and/or age. Screening tests ordered at today's visit but not completed yet may show as past due. Also, please note that scanned in results may not display below.  Preventive Screenings:  Service Recommendations Previous Testing/Comments   Colorectal Cancer Screening  * Colonoscopy    * Fecal Occult Blood Test (FOBT)/Fecal Immunochemical Test (FIT)  * Fecal DNA/Cologuard Test  * Flexible Sigmoidoscopy Age: 45-75 years old   Colonoscopy: every 10 years (may be performed more frequently if at higher risk)  OR  FOBT/FIT: every 1 year  OR  Cologuard: every 3 years  OR  Sigmoidoscopy: every 5 years  Screening may be recommended earlier than age 45 if at higher risk for colorectal cancer. Also, an individualized decision between you and your healthcare provider will decide whether screening between the ages of 76-85 would be appropriate. Colonoscopy: Not on file  FOBT/FIT: Not on file  Cologuard: Not on file  Sigmoidoscopy: Not on file          Breast Cancer Screening Age: 40+ years old  Frequency: every 1-2 years  Not required if history of left and right mastectomy Mammogram: 08/12/2022        Cervical Cancer Screening Between the ages of 21-29, pap smear recommended once every 3 years.   Between the ages of 30-65, can perform pap smear with HPV co-testing every 5 years.   Recommendations may differ for women with a history of total hysterectomy, cervical cancer, or abnormal pap smears in past. Pap Smear: Not on file        Hepatitis C Screening Once for adults born between 1945 and 1965  More frequently in patients at high risk for Hepatitis C Hep C Antibody:  01/23/2015        Diabetes Screening 1-2 times per year if you're at risk for diabetes or have pre-diabetes Fasting glucose: 112 mg/dL (10/13/2023)  A1C: No results in last 5 years (No results in last 5 years)      Cholesterol Screening Once every 5 years if you don't have a lipid disorder. May order more often based on risk factors. Lipid panel: 08/09/2022          Other Preventive Screenings Covered by Medicare:  Abdominal Aortic Aneurysm (AAA) Screening: covered once if your at risk. You're considered to be at risk if you have a family history of AAA.  Lung Cancer Screening: covers low dose CT scan once per year if you meet all of the following conditions: (1) Age 55-77; (2) No signs or symptoms of lung cancer; (3) Current smoker or have quit smoking within the last 15 years; (4) You have a tobacco smoking history of at least 20 pack years (packs per day multiplied by number of years you smoked); (5) You get a written order from a healthcare provider.  Glaucoma Screening: covered annually if you're considered high risk: (1) You have diabetes OR (2) Family history of glaucoma OR (3)  aged 50 and older OR (4)  American aged 65 and older  Osteoporosis Screening: covered every 2 years if you meet one of the following conditions: (1) You're estrogen deficient and at risk for osteoporosis based off medical history and other findings; (2) Have a vertebral abnormality; (3) On glucocorticoid therapy for more than 3 months; (4) Have primary hyperparathyroidism; (5) On osteoporosis medications and need to assess response to drug therapy.   Last bone density test (DXA Scan): 09/17/2013.  HIV Screening: covered annually if you're between the age of 15-65. Also covered annually if you are younger than 15 and older than 65 with risk factors for HIV infection. For pregnant patients, it is covered up to 3 times per pregnancy.    Immunizations:  Immunization Recommendations   Influenza Vaccine Annual  influenza vaccination during flu season is recommended for all persons aged >= 6 months who do not have contraindications   Pneumococcal Vaccine   * Pneumococcal conjugate vaccine = PCV13 (Prevnar 13), PCV15 (Vaxneuvance), PCV20 (Prevnar 20)  * Pneumococcal polysaccharide vaccine = PPSV23 (Pneumovax) Adults 19-63 yo with certain risk factors or if 65+ yo  If never received any pneumonia vaccine: recommend Prevnar 20 (PCV20)  Give PCV20 if previously received 1 dose of PCV13 or PPSV23   Hepatitis B Vaccine 3 dose series if at intermediate or high risk (ex: diabetes, end stage renal disease, liver disease)   Respiratory syncytial virus (RSV) Vaccine - COVERED BY MEDICARE PART D  * RSVPreF3 (Arexvy) CDC recommends that adults 60 years of age and older may receive a single dose of RSV vaccine using shared clinical decision-making (SCDM)   Tetanus (Td) Vaccine - COST NOT COVERED BY MEDICARE PART B Following completion of primary series, a booster dose should be given every 10 years to maintain immunity against tetanus. Td may also be given as tetanus wound prophylaxis.   Tdap Vaccine - COST NOT COVERED BY MEDICARE PART B Recommended at least once for all adults. For pregnant patients, recommended with each pregnancy.   Shingles Vaccine (Shingrix) - COST NOT COVERED BY MEDICARE PART B  2 shot series recommended in those 19 years and older who have or will have weakened immune systems or those 50 years and older     Health Maintenance Due:      Topic Date Due    HIV Screening  Never done    Cervical Cancer Screening  Never done    Colorectal Cancer Screening  Never done    Breast Cancer Screening: Mammogram  08/12/2023    Hepatitis C Screening  Completed     Immunizations Due:      Topic Date Due    COVID-19 Vaccine (3 - 2023-24 season) 09/01/2024    Influenza Vaccine (1) 09/01/2024     Advance Directives   What are advance directives?  Advance directives are legal documents that state your wishes and plans for medical  care. These plans are made ahead of time in case you lose your ability to make decisions for yourself. Advance directives can apply to any medical decision, such as the treatments you want, and if you want to donate organs.   What are the types of advance directives?  There are many types of advance directives, and each state has rules about how to use them. You may choose a combination of any of the following:  Living will:  This is a written record of the treatment you want. You can also choose which treatments you do not want, which to limit, and which to stop at a certain time. This includes surgery, medicine, IV fluid, and tube feedings.   Durable power of  for healthcare (DPAHC):  This is a written record that states who you want to make healthcare choices for you when you are unable to make them for yourself. This person, called a proxy, is usually a family member or a friend. You may choose more than 1 proxy.  Do not resuscitate (DNR) order:  A DNR order is used in case your heart stops beating or you stop breathing. It is a request not to have certain forms of treatment, such as CPR. A DNR order may be included in other types of advance directives.  Medical directive:  This covers the care that you want if you are in a coma, near death, or unable to make decisions for yourself. You can list the treatments you want for each condition. Treatment may include pain medicine, surgery, blood transfusions, dialysis, IV or tube feedings, and a ventilator (breathing machine).  Values history:  This document has questions about your views, beliefs, and how you feel and think about life. This information can help others choose the care that you would choose.  Why are advance directives important?  An advance directive helps you control your care. Although spoken wishes may be used, it is better to have your wishes written down. Spoken wishes can be misunderstood, or not followed. Treatments may be given even if  you do not want them. An advance directive may make it easier for your family to make difficult choices about your care.   Weight Management   Why it is important to manage your weight:  Being overweight increases your risk of health conditions such as heart disease, high blood pressure, type 2 diabetes, and certain types of cancer. It can also increase your risk for osteoarthritis, sleep apnea, and other respiratory problems. Aim for a slow, steady weight loss. Even a small amount of weight loss can lower your risk of health problems.  How to lose weight safely:  A safe and healthy way to lose weight is to eat fewer calories and get regular exercise. You can lose up about 1 pound a week by decreasing the number of calories you eat by 500 calories each day.   Healthy meal plan for weight management:  A healthy meal plan includes a variety of foods, contains fewer calories, and helps you stay healthy. A healthy meal plan includes the following:  Eat whole-grain foods more often.  A healthy meal plan should contain fiber. Fiber is the part of grains, fruits, and vegetables that is not broken down by your body. Whole-grain foods are healthy and provide extra fiber in your diet. Some examples of whole-grain foods are whole-wheat breads and pastas, oatmeal, brown rice, and bulgur.  Eat a variety of vegetables every day.  Include dark, leafy greens such as spinach, kale, [place]ilah greens, and mustard greens. Eat yellow and orange vegetables such as carrots, sweet potatoes, and winter squash.   Eat a variety of fruits every day.  Choose fresh or canned fruit (canned in its own juice or light syrup) instead of juice. Fruit juice has very little or no fiber.  Eat low-fat dairy foods.  Drink fat-free (skim) milk or 1% milk. Eat fat-free yogurt and low-fat cottage cheese. Try low-fat cheeses such as mozzarella and other reduced-fat cheeses.  Choose meat and other protein foods that are low in fat.  Choose beans or other legumes  such as split peas or lentils. Choose fish, skinless poultry (chicken or turkey), or lean cuts of red meat (beef or pork). Before you cook meat or poultry, cut off any visible fat.   Use less fat and oil.  Try baking foods instead of frying them. Add less fat, such as margarine, sour cream, regular salad dressing and mayonnaise to foods. Eat fewer high-fat foods. Some examples of high-fat foods include french fries, doughnuts, ice cream, and cakes.  Eat fewer sweets.  Limit foods and drinks that are high in sugar. This includes candy, cookies, regular soda, and sweetened drinks.  Exercise:  Exercise at least 30 minutes per day on most days of the week. Some examples of exercise include walking, biking, dancing, and swimming. You can also fit in more physical activity by taking the stairs instead of the elevator or parking farther away from stores. Ask your healthcare provider about the best exercise plan for you.      © Copyright [employer] 2018 Information is for End User's use only and may not be sold, redistributed or otherwise used for commercial purposes. All illustrations and images included in CareNotes® are the copyrighted property of [employer] Inc. or [employer]

## 2024-10-01 ENCOUNTER — TELEPHONE (OUTPATIENT)
Dept: OBGYN CLINIC | Facility: HOSPITAL | Age: 49
End: 2024-10-01

## 2024-10-01 PROBLEM — F81.9 INTELLECTUAL DELAY: Status: ACTIVE | Noted: 2024-10-01

## 2024-10-01 NOTE — TELEPHONE ENCOUNTER
Caller: Ms. Pina--- [facility] [place]    Doctor: Jaun    Reason for call: Ms. Pina is aware that patient is non- weight bearing but wants to know if the patient can walk short distances with the boot on. Please advise.    Call back#: [phone]

## 2024-10-02 ENCOUNTER — OFFICE VISIT (OUTPATIENT)
Dept: PODIATRY | Facility: CLINIC | Age: 49
End: 2024-10-02
Payer: MEDICARE

## 2024-10-02 DIAGNOSIS — Q90.9 DOWN SYNDROME: ICD-10-CM

## 2024-10-02 DIAGNOSIS — R60.1 GENERALIZED EDEMA: ICD-10-CM

## 2024-10-02 DIAGNOSIS — L85.3 XEROSIS OF SKIN: ICD-10-CM

## 2024-10-02 DIAGNOSIS — B35.1 ONYCHOMYCOSIS: Primary | ICD-10-CM

## 2024-10-02 PROCEDURE — RECHECK: Performed by: PODIATRIST

## 2024-10-02 PROCEDURE — 11721 DEBRIDE NAIL 6 OR MORE: CPT | Performed by: PODIATRIST

## 2024-10-02 NOTE — TELEPHONE ENCOUNTER
Caller: Ms. Pina    Doctor: Jaun    Reason for call: Calling to confirm patient can start weight bearing. She also wanted to make sure this information will be I the patients Mychart. No further questions.

## 2024-10-02 NOTE — PROGRESS NOTES
Ambulatory Visit  Name: Aminta De La Torre      : 1975      MRN: [id number]  Encounter Provider: Jonathan Cruz DPM  Encounter Date: 10/2/2024   Encounter department: [facility] PODIATRY [place]    Assessment & Plan  Onychomycosis       Debride mycotic nails and thin the nail plates x 10 with the use of a nail nipper manually and an electric Dremel bur was used to smooth the edges and reduce the thickness on the nails.  Generalized edema         Xerosis of skin       Staff is to apply lotion daily to the bottom of both feet after she showers and to use a loofah sponge to help remove the flaky skin that is present.  Down syndrome           History of Present Illness     Aminta De La Torre is a 49 y.o. female who presents chief complaint of painful thick nails on both feet and scaly skin present on the bottom of her left foot.    History obtained from : patient  Review of Systems  Medical History Reviewed by provider this encounter:       Current Outpatient Medications on File Prior to Visit   Medication Sig Dispense Refill    acetaminophen (TYLENOL) 325 mg tablet Take 1 tablet (325 mg total) by mouth every 6 (six) hours as needed for mild pain 30 tablet 0    acetaminophen (TYLENOL) 500 mg tablet Take 500 mg by mouth every 6 (six) hours as needed for mild pain With 600 mg ibuprofen      aspirin (Aspirin Low Dose) 81 mg EC tablet Take 1 tablet (81 mg total) by mouth 2 (two) times a day 180 tablet 3    atorvastatin (LIPITOR) 10 mg tablet Take 1 tablet (10 mg total) by mouth daily 30 tablet 5    bacitracin topical ointment 500 units/g topical ointment Apply topically in the morning (Patient not taking: Reported on 2024)      carbamide peroxide (DEBROX) 6.5 % otic solution Use 4 gtts to both ears bid  and , and then 4 days prior to ENT appt for softening of ear wax 4 gtts bid x 4 days. 15 mL 0    Cholecalciferol 25 MCG (1000 UT) tablet Take 1 tablet (1,000 Units total) by mouth daily 90  tablet 1    citalopram (CeleXA) 20 mg tablet Take 0.5 tablets (10 mg total) by mouth 2 (two) times a day      dextromethorphan-guaiFENesin (ROBITUSSIN DM)  mg/5 mL syrup Take 5 mL by mouth 3 (three) times a day as needed for cough or congestion 237 mL 0    Diclofenac Sodium (VOLTAREN) 1 % Apply 2 g topically 4 (four) times a day To left knee 100 g 5    Elastic Bandages & Supports (Gait/Transfer Belt) MISC Use in the morning 1 each 0    Heating Pad PADS by Does not apply route 2 (two) times a day 1 each 0    ibuprofen (MOTRIN) 600 mg tablet Take 600 mg by mouth every 6 (six) hours as needed      Incontinence Supply Disposable (RA Adult Wipes) MISC Use daily 200 each 2    Incontinence Supply Disposable (Ultra-Soft Plus Briefs XXL) MISC Use if needed (incontinence) 200 each 2    lacosamide (VIMPAT) 50 mg Take 1 tablet (50 mg total) by mouth daily at bedtime 30 tablet 5    levothyroxine 50 mcg tablet TAKE ONE TABLET BY MOUTH AT 7 AM 30 tablet 5    melatonin 3 mg Take 1 tablet (3 mg total) by mouth daily at bedtime 30 tablet 5    Midazolam 5 MG/0.1ML SOLN For seizure longer than 5 min or cluster of seizures. Give 1 spray (5 mg) into 1 nostril; if no response, a second dose of 1 spray (5 mg) into the other nostril may be given 10 min. (Patient not taking: Reported on 8/27/2024) 2 each 1    Mouthwashes (Biotene Dry Mouth) LIQD Apply 1 Swab to the mouth or throat 2 (two) times a day as needed (dry mouth) Use 5ml of solution with each swab 237 mL 0    nystatin powder Apply topically 3 (three) times a day as needed (rash) 60 g 0    ofloxacin (FLOXIN) 0.3 % otic solution Administer 10 drops into both ears 2 (two) times a day (Patient not taking: Reported on 9/30/2024) 10 mL 2    sodium chloride (OCEAN) 0.65 % nasal spray 2 sprays into each nostril as needed for congestion or rhinitis 60 mL 0    ZINC OXIDE, TOPICAL, 10 % CREA Apply topically 3 (three) times a day as needed (rash) 113 g 3     No current  facility-administered medications on file prior to visit.      Social History     Tobacco Use    Smoking status: Never     Passive exposure: Past    Smokeless tobacco: Never   Vaping Use    Vaping status: Never Used   Substance and Sexual Activity    Alcohol use: Not Currently     Comment: PATIENT DOES NOT DRINK    Drug use: Never    Sexual activity: Never         Objective     LMP  (LMP Unknown)     Physical Exam  Vascular status is a faint 1/4 DP PT negative digital hair normal distal cooling immediate capillary refill with pitting edema present bilaterally.  The edema is +2 in nature.    Derm nails are brittle elongated hypertrophic yellow-white discoloration with subungual debris x 10.  There is an increased thickness in the nails of approximately 2 mm.  There is yellow scaly skin present on the plantar aspect of the left foot compared to the right.  There is also dependent rubor present on the left extremity compared to the right.    Ortho pes planus is noted bilaterally and hammertoe deformities are in adductovarus positioning of the fifth digit bilaterally.    Neuro is intact for light touch 0.5 monofilament test was not.  Administrative Statements   I have spent a total time of 15 minutes in caring for this patient on the day of the visit/encounter including Risks and benefits of tx options, Instructions for management, Patient and family education, Importance of tx compliance, Counseling / Coordination of care, Documenting in the medical record, Reviewing / ordering tests, medicine, procedures  , and Obtaining or reviewing history  .

## 2024-10-10 DIAGNOSIS — G89.29 CHRONIC PAIN OF LEFT KNEE: ICD-10-CM

## 2024-10-10 DIAGNOSIS — M25.562 CHRONIC PAIN OF LEFT KNEE: ICD-10-CM

## 2024-10-11 ENCOUNTER — VBI (OUTPATIENT)
Dept: ADMINISTRATIVE | Facility: OTHER | Age: 49
End: 2024-10-11

## 2024-10-11 RX ORDER — DICLOFENAC SODIUM 10 MG/G
GEL TOPICAL
Qty: 100 G | Refills: 1 | Status: SHIPPED | OUTPATIENT
Start: 2024-10-11

## 2024-10-15 ENCOUNTER — CLINICAL SUPPORT (OUTPATIENT)
Dept: NUTRITION | Facility: HOSPITAL | Age: 49
End: 2024-10-15
Payer: MEDICARE

## 2024-10-15 ENCOUNTER — TELEPHONE (OUTPATIENT)
Age: 49
End: 2024-10-15

## 2024-10-15 VITALS — WEIGHT: 155 LBS | HEIGHT: 55 IN | BODY MASS INDEX: 35.87 KG/M2

## 2024-10-15 DIAGNOSIS — E66.812 CLASS 2 OBESITY WITHOUT SERIOUS COMORBIDITY WITH BODY MASS INDEX (BMI) OF 38.0 TO 38.9 IN ADULT, UNSPECIFIED OBESITY TYPE: Primary | ICD-10-CM

## 2024-10-15 NOTE — PROGRESS NOTES
Follow-Up Nutrition Assessment Form    Patient Name: Aminta De La Torre    YOB: 1975    Sex: Female      Follow Up Date: 10/15/2024  Start Time: 12pm Stop Time: 12:30pm Total Minutes: 30 mins     Data:  Present at session: self and caretaker from institution   Parent/Patient Concerns: Recently broke ankle and had surgery limiting her activity prior she was able to walk with assistance for short periods but now less mobile, has boot on till December   Medical Dx/Reason for Referral: E66.9 (ICD-10-CM)    Past Medical History:   Diagnosis Date    Allergic     Community acquired pneumonia     Last Assessed:1/22/2013    Encounter for PPD test 09/26/2023    Encounter for vaccination 09/26/2023    Tdap given at this visit    Leukopenia     Last Assessed:3/5/2014    Osteoporosis     Preop examination 09/29/2019    Trisomy 21, Down syndrome        Current Outpatient Medications   Medication Sig Dispense Refill    acetaminophen (TYLENOL) 325 mg tablet Take 1 tablet (325 mg total) by mouth every 6 (six) hours as needed for mild pain 30 tablet 0    acetaminophen (TYLENOL) 500 mg tablet Take 500 mg by mouth every 6 (six) hours as needed for mild pain With 600 mg ibuprofen      aspirin (Aspirin Low Dose) 81 mg EC tablet Take 1 tablet (81 mg total) by mouth 2 (two) times a day 180 tablet 3    atorvastatin (LIPITOR) 10 mg tablet Take 1 tablet (10 mg total) by mouth daily 30 tablet 5    bacitracin topical ointment 500 units/g topical ointment Apply topically in the morning (Patient not taking: Reported on 8/27/2024)      carbamide peroxide (DEBROX) 6.5 % otic solution Use 4 gtts to both ears bid Tuesdays and Fridays, and then 4 days prior to ENT appt for softening of ear wax 4 gtts bid x 4 days. 15 mL 0    Cholecalciferol 25 MCG (1000 UT) tablet Take 1 tablet (1,000 Units total) by mouth daily 90 tablet 1    citalopram (CeleXA) 20 mg tablet Take 0.5 tablets (10 mg total) by mouth 2 (two) times a day       dextromethorphan-guaiFENesin (ROBITUSSIN DM)  mg/5 mL syrup Take 5 mL by mouth 3 (three) times a day as needed for cough or congestion 237 mL 0    Diclofenac Sodium (GoodSense Arthritis Pain) 1 % Apply 2 g topically 4 (four) times a day To left knee 100 g 1    Elastic Bandages & Supports (Gait/Transfer Belt) MISC Use in the morning 1 each 0    Heating Pad PADS by Does not apply route 2 (two) times a day 1 each 0    ibuprofen (MOTRIN) 600 mg tablet Take 600 mg by mouth every 6 (six) hours as needed      Incontinence Supply Disposable (RA Adult Wipes) MISC Use daily 200 each 2    Incontinence Supply Disposable (Ultra-Soft Plus Briefs XXL) MISC Use if needed (incontinence) 200 each 2    lacosamide (VIMPAT) 50 mg Take 1 tablet (50 mg total) by mouth daily at bedtime 30 tablet 5    levothyroxine 50 mcg tablet TAKE ONE TABLET BY MOUTH AT 7 AM 30 tablet 5    melatonin 3 mg Take 1 tablet (3 mg total) by mouth daily at bedtime 30 tablet 5    Midazolam 5 MG/0.1ML SOLN For seizure longer than 5 min or cluster of seizures. Give 1 spray (5 mg) into 1 nostril; if no response, a second dose of 1 spray (5 mg) into the other nostril may be given 10 min. (Patient not taking: Reported on 8/27/2024) 2 each 1    Mouthwashes (Biotene Dry Mouth) LIQD Apply 1 Swab to the mouth or throat 2 (two) times a day as needed (dry mouth) Use 5ml of solution with each swab 237 mL 0    nystatin powder Apply topically 3 (three) times a day as needed (rash) 60 g 0    ofloxacin (FLOXIN) 0.3 % otic solution Administer 10 drops into both ears 2 (two) times a day (Patient not taking: Reported on 9/30/2024) 10 mL 2    sodium chloride (OCEAN) 0.65 % nasal spray 2 sprays into each nostril as needed for congestion or rhinitis 60 mL 0    ZINC OXIDE, TOPICAL, 10 % CREA Apply topically 3 (three) times a day as needed (rash) 113 g 3     No current facility-administered medications for this visit.        Additional Meds/Supplements:    Barriers to Learning:   "  Labs:    Height: Ht Readings from Last 3 Encounters:   10/15/24 4' 5\" (1.346 m)   09/13/24 4' 5\" (1.346 m)   09/03/24 4' 5\" (1.346 m)      Weight: Wt Readings from Last 10 Encounters:   10/15/24 70.3 kg (155 lb)   09/13/24 71.7 kg (158 lb)   09/03/24 71.7 kg (158 lb)   08/29/24 71.7 kg (158 lb)   08/28/24 71.7 kg (158 lb)   08/27/24 71.7 kg (158 lb)   08/27/24 71.7 kg (158 lb)   08/26/24 71.7 kg (158 lb)   07/17/24 71.8 kg (158 lb 4.8 oz)   07/10/24 70.9 kg (156 lb 4.8 oz)     Estimated body mass index is 38.8 kg/m² as calculated from the following:    Height as of this encounter: 4' 5\" (1.346 m).    Weight as of this encounter: 70.3 kg (155 lb).   Wt. Change Since Last Visit: [x]Yes     []No  Amount: 3lb/1.8% wt.  loss x 1 month      Energy Needs: Mifflinx 1.4 minus 935=3040nwh, 56g .8g/kg, 1406mL 20mL/kg   Pain Screen: Are you having pain now?       Previous Goals or Goals Achieved:     Limit juice and ice tea to half a cup by f/u-10/15 met continue by f/u   2. Increase water intake, consider 1 cup before and after each meal by f/u-10/15 met, continue by f/u   3. Limit added sugar from ice cream, cakes, yogurt, consider pureed fruit with whip cream or Greek yogurt, unsweetened applesauce as a sweet treat instead by f/u-10/15 improving, continue by f/u will switch to unsweetened applesauce  4. Switch to low fat dairy, consider skim or 1% milk or low fat cheese, low fat yogurt by /fu-10/15 not met, continue to switch to skim or 1% milk by f/u  5. Increase fiber intake using whole grains, fresh veggies and fruits by f/u- 10/15 met continue by f/u       New Goals:   1.   2.   3.       Initial PES:      Overweight/obesity  related to Physical inactivity as evidenced by  BMI more than normative standard for age and sex (obesity-grade II 35-39.9)-continue      New PES:       New Problem List:  1.   2.   3.       Assessment:  Aminta is accompanied by care takers from facility for nutrition f/u appointment. Aminta had " broke her ankle and had surgery which limited her activity more so then what she was already limited with. Aminta is on pureed diet, not a picky eater, dietary changes have been made, will continue to monitor weight. Bowel movements have improved.      Medical Nutrition Therapy Intervention:  [x]Individualized Meal Plan- Reinforced balancing meals using MyPlate method, avoiding concentrated sweets and sweetened beverages, provide low fat diary and increase fruit and vegetable intake.  []Understanding Lab Values   []Basic Pathophysiology of Disease []Food/Medication Interactions   []Food Diary []Exercise   []Lifestyle/Behavior Modification Techniques []Medication, Mechanism of Action   []Label Reading []Self Blood Glucose Monitoring   []Weight/BMI Goals []Other -    Other Notes:        Comprehension: []Excellent  []Very Good  [x]Good  []Fair   []Poor    Receptivity: []Excellent  []Very Good  [x]Good  []Fair   []Poor    Expected Compliance: []Excellent  []Very Good  [x]Good  []Fair   []Poor      Labs:  CMP  Lab Results   Component Value Date     08/20/2016    K 4.6 10/31/2023     10/31/2023    CO2 24 10/31/2023    ANIONGAP 7 11/01/2014    BUN 9 10/31/2023    CREATININE 0.57 (L) 10/31/2023    GLUCOSE 97 11/01/2014    GLUF 112 (H) 10/13/2023    CALCIUM 8.5 10/31/2023    CORRECTEDCA 9.4 10/31/2023    AST 25 10/31/2023    ALT 14 10/31/2023    ALKPHOS 108 (H) 10/31/2023    PROT 6.8 08/20/2016    BILITOT 0.8 08/20/2016    EGFR 108 10/31/2023       BMP  Lab Results   Component Value Date    GLUCOSE 97 11/01/2014    CALCIUM 8.5 10/31/2023     08/20/2016    K 4.6 10/31/2023    CO2 24 10/31/2023     10/31/2023    BUN 9 10/31/2023    CREATININE 0.57 (L) 10/31/2023       Lipids  Lab Results   Component Value Date    CHOL 167 08/20/2016    CHOL 173 09/06/2014     Lab Results   Component Value Date    HDL 51 08/09/2022    HDL 52 05/04/2021    HDL 53 09/01/2018     Lab Results   Component Value Date    LDLCALC  "65 08/09/2022    LDLCALC 93 05/04/2021    LDLCALC 94 09/01/2018     Lab Results   Component Value Date    TRIG 99 08/09/2022    TRIG 152 (H) 05/04/2021    TRIG 119 09/01/2018     No results found for: \"CHOLHDL\"    Hemoglobin A1C  No results found for: \"HGBA1C\"    Fasting Glucose  Lab Results   Component Value Date    GLUF 112 (H) 10/13/2023       Insulin     Thyroid  Lab Results   Component Value Date    TSH 3.55 02/14/2023       Hepatic Function Panel  Lab Results   Component Value Date    ALT 14 10/31/2023    AST 25 10/31/2023    ALKPHOS 108 (H) 10/31/2023    BILITOT 0.8 08/20/2016       Celiac Disease Antibody Panel  No results found for: \"ENDOMYSIAL IGA\", \"GLIADIN IGA\", \"GLIADIN IGG\", \"IGA\", \"TISSUE TRANSGLUT AB\", \"TTG IGA\"   Iron  No results found for: \"IRON\", \"TIBC\", \"FERRITIN\"    Vitamins  No results found for: \"VITAMIN B2\"   No results found for: \"NICOTINAMIDE\", \"NICOTINIC ACID\"   No results found for: \"VITAMINB6\"  Lab Results   Component Value Date    [id number] 263 10/12/2023     No results found for: \"VITB5\"  No results found for: \"H1KFNGJL\"  No results found for: \"THYROGLB\"  No results found for: \"VITAMIN K\"   No results found for: \"25-HYDROXY VIT D\"   No components found for: \"VITAMINE\"     No follow-ups on file.    Clifton Griffith RD  [facility]  [address] [place] [id number]      "

## 2024-10-15 NOTE — TELEPHONE ENCOUNTER
Tab from [employer] calling to inform that kit for color guard screen could not be sent because the icd code is contraindicated possibly meaning that the insurance is not recognizing code for color rectal screen.  Ana Payton

## 2024-10-17 ENCOUNTER — TELEPHONE (OUTPATIENT)
Age: 49
End: 2024-10-17

## 2024-10-17 DIAGNOSIS — Z12.11 SCREENING FOR COLON CANCER: Primary | ICD-10-CM

## 2024-10-17 NOTE — TELEPHONE ENCOUNTER
Larisa from [employer] is calling in regards to Aminta's Cologuard order. The Dx code that is listed is not a designated diagnoses code for colorectal cancer screening.   She is calling to obtain the correct Dx code.     Please advise and return call to:  [phone]  Reference #: [id number]    Thank you!

## 2024-10-23 DIAGNOSIS — R56.9 SEIZURE-LIKE ACTIVITY (HCC): ICD-10-CM

## 2024-10-23 DIAGNOSIS — G40.802 REFLEX EPILEPSY (HCC): ICD-10-CM

## 2024-10-23 RX ORDER — LACOSAMIDE 50 MG/1
50 TABLET ORAL
Qty: 30 TABLET | Refills: 5 | Status: SHIPPED | OUTPATIENT
Start: 2024-10-23

## 2024-10-23 NOTE — TELEPHONE ENCOUNTER
Medication: Vimpat   [facility]  [id number] 09/27/2024 06/03/2024 Lacosamide (Tablet) 30.0 30 50 MG NA [facility] [employer] 4 / 5 [place]   1 [id number] 08/30/2024 08/29/2024 oxyCODONE HCL (Tablet) 15.0 4 5 MG 28.12 [place] [facility] Commercial Insurance 0 / 0 [place]   1 [id number] 08/23/2024 06/03/2024 Lacosamide (Tablet) 30.0 30 50 MG NA [facility] Commercial [employer] 3 / 5 [place]   1 [id number] 07/26/2024 06/03/2024 Lacosamide (Tablet) 30.0 30 50 MG [facility] Commercial [employer]   Active agreement on file -No    Refill must be reviewed and completed by the office or provider. The refill is unable to be approved or denied by the medication management team.

## 2024-10-25 ENCOUNTER — TELEPHONE (OUTPATIENT)
Dept: INTERNAL MEDICINE CLINIC | Facility: CLINIC | Age: 49
End: 2024-10-25

## 2024-10-25 NOTE — TELEPHONE ENCOUNTER
Pts  Lily called the office to see who in particular you would like the patient to see for the GYN referral as she probably will need sedation as well as other issues the patient may have to be able to be seen [phone]

## 2024-10-29 NOTE — TELEPHONE ENCOUNTER
Please see note below. If Doctor Wade could give the name of the GYN that Aminta could be seen for appointment

## 2024-10-30 PROBLEM — Z00.00 MEDICARE ANNUAL WELLNESS VISIT, SUBSEQUENT: Status: RESOLVED | Noted: 2019-09-29 | Resolved: 2024-10-30

## 2024-10-31 ENCOUNTER — HOSPITAL ENCOUNTER (OUTPATIENT)
Dept: BONE DENSITY | Facility: CLINIC | Age: 49
End: 2024-10-31
Payer: MEDICARE

## 2024-10-31 DIAGNOSIS — M81.0 OSTEOPOROSIS, UNSPECIFIED OSTEOPOROSIS TYPE, UNSPECIFIED PATHOLOGICAL FRACTURE PRESENCE: ICD-10-CM

## 2024-10-31 PROCEDURE — 77080 DXA BONE DENSITY AXIAL: CPT

## 2024-11-05 NOTE — TELEPHONE ENCOUNTER
Per chart review pt previous received her gyn exam at:    [facility] - [address]   [address] [facility]   [address]   [place] [id number]

## 2024-11-06 ENCOUNTER — TELEPHONE (OUTPATIENT)
Age: 49
End: 2024-11-06

## 2024-11-06 ENCOUNTER — OFFICE VISIT (OUTPATIENT)
Dept: INTERNAL MEDICINE CLINIC | Facility: CLINIC | Age: 49
End: 2024-11-06
Payer: MEDICARE

## 2024-11-06 VITALS
HEART RATE: 76 BPM | OXYGEN SATURATION: 96 % | TEMPERATURE: 97.9 F | DIASTOLIC BLOOD PRESSURE: 60 MMHG | SYSTOLIC BLOOD PRESSURE: 90 MMHG

## 2024-11-06 DIAGNOSIS — S82.899S CLOSED FRACTURE OF ANKLE, UNSPECIFIED LATERALITY, SEQUELA: ICD-10-CM

## 2024-11-06 DIAGNOSIS — F69 BEHAVIOR PROBLEM, ADULT: ICD-10-CM

## 2024-11-06 DIAGNOSIS — M81.0 OSTEOPOROSIS, UNSPECIFIED OSTEOPOROSIS TYPE, UNSPECIFIED PATHOLOGICAL FRACTURE PRESENCE: ICD-10-CM

## 2024-11-06 DIAGNOSIS — R46.89 ABNORMAL BEHAVIOR: ICD-10-CM

## 2024-11-06 DIAGNOSIS — F41.9 ANXIETY: ICD-10-CM

## 2024-11-06 DIAGNOSIS — B37.31 YEAST VAGINITIS: Primary | ICD-10-CM

## 2024-11-06 PROCEDURE — G2211 COMPLEX E/M VISIT ADD ON: HCPCS | Performed by: GENERAL ACUTE CARE HOSPITAL

## 2024-11-06 PROCEDURE — 99214 OFFICE O/P EST MOD 30 MIN: CPT | Performed by: GENERAL ACUTE CARE HOSPITAL

## 2024-11-06 RX ORDER — ESCITALOPRAM OXALATE 20 MG/1
20 TABLET ORAL DAILY
Qty: 90 TABLET | Refills: 1 | Status: SHIPPED | OUTPATIENT
Start: 2024-11-06

## 2024-11-06 NOTE — PROGRESS NOTES
Ambulatory Visit  Name: Aminta De La Torre      : 1975      MRN: [id number]  Encounter Provider: Radha Olvera MD  Encounter Date: 2024   Encounter department: [facility]    Assessment & Plan  Yeast vaginitis  Recently noticed some behavior of touching and manipulating her private area. Does not seem to be uncomfortable. Staff tried anti yeast powder seems to be helping a little. Schedule with gyn for exam.     Orders:    miconazole (MICOTIN) 2 % powder; Apply topically as needed for itching    Ambulatory Referral to Physical Therapy; Future    Abnormal behavior         Osteoporosis, unspecified osteoporosis type, unspecified pathological fracture presence  Reviewed dexa  Will start prolia  Check vit D level  Continue vit D  Shared decision making with her sister Franca and staff    Orders:    Vitamin D 25 hydroxy; Future    Behavior problem, adult  Seems to be more anxious recently. Rachel during her daily shower. Currently on celexa 20mg daily. Plan to try a higher dose. Discussed that at 40mg celexa will need to check EKG for Qtc, so will change it to lexapro so we do not need to worry about checking EKG.            Anxiety    Orders:    escitalopram (LEXAPRO) 20 mg tablet; Take 1 tablet (20 mg total) by mouth daily       History of Present Illness     HPI      Review of Systems        Objective     BP 90/60 (BP Location: Left arm, Patient Position: Sitting, Cuff Size: Adult)   Pulse 76   Temp 97.9 °F (36.6 °C) (Probe)   LMP  (LMP Unknown)   SpO2 96%     Physical Exam

## 2024-11-06 NOTE — ASSESSMENT & PLAN NOTE
Reviewed dexa  Will start prolia  Check vit D level  Continue vit D  Shared decision making with her sister Franca and staff    Orders:    Vitamin D 25 hydroxy; Future

## 2024-11-06 NOTE — ASSESSMENT & PLAN NOTE
Recently noticed some behavior of touching and manipulating her private area. Does not seem to be uncomfortable. Staff tried anti yeast powder seems to be helping a little. Schedule with gyn for exam.     Orders:    miconazole (MICOTIN) 2 % powder; Apply topically as needed for itching    Ambulatory Referral to Physical Therapy; Future

## 2024-11-06 NOTE — PATIENT INSTRUCTIONS
Per chart review pt previous received her gyn exam at:     [facility] - [address]   [address] [facility]   [address]   [place] [id number]

## 2024-11-06 NOTE — ASSESSMENT & PLAN NOTE
Seems to be more anxious recently. Rachel during her daily shower. Currently on celexa 20mg daily. Plan to try a higher dose. Discussed that at 40mg celexa will need to check EKG for Qtc, so will change it to lexapro so we do not need to worry about checking EKG.

## 2024-11-06 NOTE — TELEPHONE ENCOUNTER
Phy therapy  is for ankle Please correct dx on referral It states Yeast infection Has appt Nov 14

## 2024-11-07 ENCOUNTER — APPOINTMENT (OUTPATIENT)
Dept: LAB | Facility: AMBULARY SURGERY CENTER | Age: 49
End: 2024-11-07
Payer: MEDICARE

## 2024-11-07 DIAGNOSIS — E66.01 CLASS 3 SEVERE OBESITY DUE TO EXCESS CALORIES WITHOUT SERIOUS COMORBIDITY WITH BODY MASS INDEX (BMI) OF 40.0 TO 44.9 IN ADULT (HCC): ICD-10-CM

## 2024-11-07 DIAGNOSIS — G89.29 CHRONIC PAIN OF LEFT KNEE: ICD-10-CM

## 2024-11-07 DIAGNOSIS — E66.813 CLASS 3 SEVERE OBESITY DUE TO EXCESS CALORIES WITHOUT SERIOUS COMORBIDITY WITH BODY MASS INDEX (BMI) OF 40.0 TO 44.9 IN ADULT (HCC): ICD-10-CM

## 2024-11-07 DIAGNOSIS — E03.9 HYPOTHYROIDISM, UNSPECIFIED TYPE: ICD-10-CM

## 2024-11-07 DIAGNOSIS — M81.0 OSTEOPOROSIS, UNSPECIFIED OSTEOPOROSIS TYPE, UNSPECIFIED PATHOLOGICAL FRACTURE PRESENCE: ICD-10-CM

## 2024-11-07 DIAGNOSIS — R13.10 DYSPHAGIA, UNSPECIFIED TYPE: ICD-10-CM

## 2024-11-07 DIAGNOSIS — R41.89 COGNITIVE DECLINE: ICD-10-CM

## 2024-11-07 DIAGNOSIS — E53.8 B12 DEFICIENCY: ICD-10-CM

## 2024-11-07 DIAGNOSIS — M25.562 CHRONIC PAIN OF LEFT KNEE: ICD-10-CM

## 2024-11-07 LAB
25(OH)D3 SERPL-MCNC: 33.6 NG/ML (ref 30–100)
ANION GAP SERPL CALCULATED.3IONS-SCNC: 7 MMOL/L (ref 4–13)
BASOPHILS # BLD AUTO: 0.07 THOUSANDS/ÂΜL (ref 0–0.1)
BASOPHILS NFR BLD AUTO: 1 % (ref 0–1)
BUN SERPL-MCNC: 12 MG/DL (ref 5–25)
CALCIUM SERPL-MCNC: 9.1 MG/DL (ref 8.4–10.2)
CHLORIDE SERPL-SCNC: 104 MMOL/L (ref 96–108)
CHOLEST SERPL-MCNC: 158 MG/DL
CO2 SERPL-SCNC: 28 MMOL/L (ref 21–32)
CREAT SERPL-MCNC: 0.66 MG/DL (ref 0.6–1.3)
EOSINOPHIL # BLD AUTO: 0.06 THOUSAND/ÂΜL (ref 0–0.61)
EOSINOPHIL NFR BLD AUTO: 1 % (ref 0–6)
ERYTHROCYTE [DISTWIDTH] IN BLOOD BY AUTOMATED COUNT: 14.1 % (ref 11.6–15.1)
GFR SERPL CREATININE-BSD FRML MDRD: 102 ML/MIN/1.73SQ M
GLUCOSE P FAST SERPL-MCNC: 103 MG/DL (ref 65–99)
HCT VFR BLD AUTO: 49.6 % (ref 34.8–46.1)
HDLC SERPL-MCNC: 43 MG/DL
HGB BLD-MCNC: 15.7 G/DL (ref 11.5–15.4)
IMM GRANULOCYTES # BLD AUTO: 0.01 THOUSAND/UL (ref 0–0.2)
IMM GRANULOCYTES NFR BLD AUTO: 0 % (ref 0–2)
LDLC SERPL CALC-MCNC: 93 MG/DL (ref 0–100)
LYMPHOCYTES # BLD AUTO: 1.79 THOUSANDS/ÂΜL (ref 0.6–4.47)
LYMPHOCYTES NFR BLD AUTO: 36 % (ref 14–44)
MCH RBC QN AUTO: 33.8 PG (ref 26.8–34.3)
MCHC RBC AUTO-ENTMCNC: 31.7 G/DL (ref 31.4–37.4)
MCV RBC AUTO: 107 FL (ref 82–98)
MONOCYTES # BLD AUTO: 0.64 THOUSAND/ÂΜL (ref 0.17–1.22)
MONOCYTES NFR BLD AUTO: 13 % (ref 4–12)
NEUTROPHILS # BLD AUTO: 2.35 THOUSANDS/ÂΜL (ref 1.85–7.62)
NEUTS SEG NFR BLD AUTO: 49 % (ref 43–75)
NRBC BLD AUTO-RTO: 0 /100 WBCS
PLATELET # BLD AUTO: 183 THOUSANDS/UL (ref 149–390)
PMV BLD AUTO: 11.7 FL (ref 8.9–12.7)
POTASSIUM SERPL-SCNC: 4.2 MMOL/L (ref 3.5–5.3)
RBC # BLD AUTO: 4.64 MILLION/UL (ref 3.81–5.12)
SODIUM SERPL-SCNC: 139 MMOL/L (ref 135–147)
TRIGL SERPL-MCNC: 111 MG/DL
TSH SERPL DL<=0.05 MIU/L-ACNC: 0.69 UIU/ML (ref 0.45–4.5)
VIT B12 SERPL-MCNC: 247 PG/ML (ref 180–914)
WBC # BLD AUTO: 4.92 THOUSAND/UL (ref 4.31–10.16)

## 2024-11-07 PROCEDURE — 82607 VITAMIN B-12: CPT

## 2024-11-07 PROCEDURE — 80048 BASIC METABOLIC PNL TOTAL CA: CPT

## 2024-11-07 PROCEDURE — 36415 COLL VENOUS BLD VENIPUNCTURE: CPT

## 2024-11-07 PROCEDURE — 80061 LIPID PANEL: CPT

## 2024-11-07 PROCEDURE — 83918 ORGANIC ACIDS TOTAL QUANT: CPT

## 2024-11-07 PROCEDURE — 84443 ASSAY THYROID STIM HORMONE: CPT

## 2024-11-07 PROCEDURE — 85025 COMPLETE CBC W/AUTO DIFF WBC: CPT

## 2024-11-07 PROCEDURE — 82306 VITAMIN D 25 HYDROXY: CPT

## 2024-11-12 LAB — METHYLMALONATE SERPL-SCNC: 345 NMOL/L (ref 0–378)

## 2024-11-14 ENCOUNTER — TELEPHONE (OUTPATIENT)
Age: 49
End: 2024-11-14

## 2024-11-14 NOTE — TELEPHONE ENCOUNTER
Caller: Debbie (care taker)     Doctor: Jaun     Reason for call: Calling to get a sooner appt to have her ankle looked at- It is red near incision cite and patient is having pain. Offered appt for Monday but they would like to bring her in tomorrow     Call back#: [phone]

## 2024-11-18 ENCOUNTER — OFFICE VISIT (OUTPATIENT)
Dept: INTERNAL MEDICINE CLINIC | Facility: CLINIC | Age: 49
End: 2024-11-18
Payer: MEDICARE

## 2024-11-18 ENCOUNTER — OFFICE VISIT (OUTPATIENT)
Dept: OBGYN CLINIC | Facility: CLINIC | Age: 49
End: 2024-11-18

## 2024-11-18 ENCOUNTER — APPOINTMENT (OUTPATIENT)
Dept: RADIOLOGY | Age: 49
End: 2024-11-18
Payer: MEDICARE

## 2024-11-18 VITALS — WEIGHT: 155 LBS | HEIGHT: 55 IN | BODY MASS INDEX: 35.87 KG/M2

## 2024-11-18 VITALS — DIASTOLIC BLOOD PRESSURE: 50 MMHG | SYSTOLIC BLOOD PRESSURE: 100 MMHG | OXYGEN SATURATION: 99 % | HEART RATE: 71 BPM

## 2024-11-18 DIAGNOSIS — Z87.81 STATUS POST ORIF OF FRACTURE OF ANKLE: Primary | ICD-10-CM

## 2024-11-18 DIAGNOSIS — Z98.890 STATUS POST ORIF OF FRACTURE OF ANKLE: ICD-10-CM

## 2024-11-18 DIAGNOSIS — Z98.890 STATUS POST ORIF OF FRACTURE OF ANKLE: Primary | ICD-10-CM

## 2024-11-18 DIAGNOSIS — K59.00 CONSTIPATION, UNSPECIFIED CONSTIPATION TYPE: ICD-10-CM

## 2024-11-18 DIAGNOSIS — Z87.81 STATUS POST ORIF OF FRACTURE OF ANKLE: ICD-10-CM

## 2024-11-18 DIAGNOSIS — F69 BEHAVIOR PROBLEM, ADULT: Primary | ICD-10-CM

## 2024-11-18 DIAGNOSIS — K59.04 CHRONIC IDIOPATHIC CONSTIPATION: Primary | ICD-10-CM

## 2024-11-18 PROCEDURE — 99213 OFFICE O/P EST LOW 20 MIN: CPT | Performed by: GENERAL ACUTE CARE HOSPITAL

## 2024-11-18 PROCEDURE — 73600 X-RAY EXAM OF ANKLE: CPT

## 2024-11-18 PROCEDURE — 99024 POSTOP FOLLOW-UP VISIT: CPT | Performed by: ORTHOPAEDIC SURGERY

## 2024-11-18 PROCEDURE — G2211 COMPLEX E/M VISIT ADD ON: HCPCS | Performed by: GENERAL ACUTE CARE HOSPITAL

## 2024-11-18 RX ORDER — POLYETHYLENE GLYCOL 3350 17 G/17G
17 POWDER, FOR SOLUTION ORAL DAILY PRN
Qty: 510 G | Refills: 2 | Status: SHIPPED | OUTPATIENT
Start: 2024-11-18

## 2024-11-18 RX ORDER — DOCUSATE SODIUM 100 MG/1
100 CAPSULE, LIQUID FILLED ORAL 2 TIMES DAILY
Qty: 60 CAPSULE | Refills: 1 | Status: SHIPPED | OUTPATIENT
Start: 2024-11-18

## 2024-11-18 RX ORDER — BISACODYL 10 MG
10 SUPPOSITORY, RECTAL RECTAL DAILY PRN
Qty: 12 SUPPOSITORY | Refills: 0 | Status: SHIPPED | OUTPATIENT
Start: 2024-11-18

## 2024-11-18 NOTE — PROGRESS NOTES
Subjective   CHIEF COMPLAINT/REASON FOR VISIT  Aminta De La Torre is a 49 y.o. female who presents for  11 week post op follow-up after Orif Trimalleolar Fx, Open Reduction/treatment Of Tilleaux Fracture - Left on 8/29/2024     HISTORY OF PRESENT ILLNESS  History obtained by caretakers.  Caretakers are concerned about some redness on the lateral aspect of the ankle.  They deny any new injury or trauma.  They say patient has been wearing the cam boot.    Objective   PHYSICAL EXAMINATION  Left  Ankle  Surgical incisions are healed.  Some mild erythema noted around the area of the lateral malleolus but no obvious tenderness to palpation  Range of motion of the ankle appears stiff but intact  Unable to assess sensation due to patient mental status  Palpable pedal pulse                         Assessment & Plan   1. Status Post Orif Trimalleolar Fx, Open Reduction/treatment Of Tilleaux Fracture - Left    Patient is making appropriate progress from the date of their surgery  Advised the redness on the lateral part of the ankle is likely irritation from the ankle  Recommended weaning out of the cam boot over the next week or 2  We will plan to see her back at the 6 month post-operative waqar  If any issues, questions, or concerns arise between now and the next appointment, we have encouraged the patient contact our team.    Ankle ORIF PT Protocol  General Notes: The type of fracture and required fixation may dictate alterations to the general protocol below. Some patients may not progress as quickly as the protocol allows.    PHASE I  Goals: Wound healing and edema control Day 1  Foot wrapped in bulky Tamez dressing with plaster preventing movement of the leg  Elevate, take pain medication  Expect numbness in leg for 4-72 hours depending on the type of anesthesia used.  Wiggle Toes as able.  Hang operative extremity down for one minute every hour while awake then return to elevated position to encourage circulation.  Day  10-14  First follow-up in the office, dressing is changed. Sutures removed when wound healed.  Placed into cast boot  Start plantarflexion (downward movement of the foot), dorsiflexion (upward movement of the foot) motion 4- 5x per day out of the boot  May shower when sutures are removed but do not submerge in swimming pool, hot tub, bathtub, lake, ocean.  Non-weight bearing.    PHASE II  Goals: Improve AROM, Control edema 2-4 Weeks (non-weightbearing)  Initiate physical therapy  Edema control  Continue active motion exercises  Towel curls with toes  Standing hamstring curls.  Upper body conditioning.  Sleep in boot  4-6 Weeks (Non-Weightbearing)  Start scar massage if needed and wound fully healed.  Continue AROM and start gentle inversion/eversion  Initiate light resistance bands all planes  Initiate seated toe and heel raises    PHASE Ill  Goals: Normal ROM. Initiate gait 6-8 Weeks  Second follow-up in the office.  Advance weight bearing to full   Wean from crutches  May start pool activities if available and wound fully healed  Advance graduated resistance exercises  Advance AROM activities. May initiate AAROM/PROM as needed.  Begin proprioception/balance exercises when able to full weight bear  Initiate stationary cycling out of cast boot. 8-12 Weeks  Third follow up visit at 10-12 weeks post op.  Advance proprioception exercises.  Wean from boot into ASO  Gait training  Continue cycle. Initiate elliptical/sair climber as able    PHASE IV  Goals: Return to normal recreational/ sporting activities >12 Weeks  Initiate walk to run program.  Continue to retrain strength, power, endurance  Continue proprioception  Plyometric training and full weight lifting.  Sport Specific drills  Sprints  Wean out of ASO  Return to sport when functional progression passed.     Scribe Attestation      I,:   am acting as a scribe while in the presence of the attending physician.:       I,:   personally performed the services  described in this documentation    as scribed in my presence.:

## 2024-11-18 NOTE — ASSESSMENT & PLAN NOTE
In last visit we discussed some behavioral changes recently and said it could be arising from underlying anxiety.  She was on Celexa 20 mg daily.  We discussed trying a higher dose but at 40 mg an EKG needs to be monitored routinely for QTc prolongation.  Then we will change it to Lexapro so we do not need to worry about checking EKG regularly.  Today we discussed that it is too soon to see a difference. She has appointment with her [facility] at [facility] and in 2 days.

## 2024-11-18 NOTE — ASSESSMENT & PLAN NOTE
Start colace bid, prn miralax and suppository sent    Orders:    bisacodyl (DULCOLAX) 10 mg suppository; Insert 1 suppository (10 mg total) into the rectum daily as needed for constipation

## 2024-11-18 NOTE — ASSESSMENT & PLAN NOTE
Orders:    Wheelchair    Bath Benches    Elastic Bandages & Supports (Gait/Transfer Belt) MISC; Use in the morning    
  Orders:    ZINC OXIDE, TOPICAL, 10 % CREA; Apply topically 3 (three) times a day as needed (rash)    
  Orders:    aspirin (Aspirin Low Dose) 81 mg EC tablet; Take 1 tablet (81 mg total) by mouth 2 (two) times a day    
  Orders:    citalopram (CeleXA) 20 mg tablet; Take 0.5 tablets (10 mg total) by mouth 2 (two) times a day    
Continue lacosamide.  Follow-up with neurology         
Continue levothyroxine.  Monitor TSH         
Follow-up with Ortho.  Wears cam boot.  Nonweightbearing per Ortho         
Vaccinations: Give flu shot today.  Staff will check with her family about COVID-vaccine  Cognition: Follow-up with neurology  Urinary incontinence: Wears briefs  Mammogram: Ordered  Colon cancer screen: Cologuard ordered  DEXA: DEXA ordered  Healthy diet and regular exercise      Orders:    Mammo breast specimen bilateral; Future    Ambulatory Referral to Obstetrics / Gynecology; Future    Cologuard    
44.3

## 2024-11-18 NOTE — PROGRESS NOTES
Name: Aminta De La Torre      : 1975      MRN: [id number]  Encounter Provider: Radha Olvera MD  Encounter Date: 2024   Encounter department: [facility]  :  Assessment & Plan  Behavior problem, adult  In last visit we discussed some behavioral changes recently and said it could be arising from underlying anxiety.  She was on Celexa 20 mg daily.  We discussed trying a higher dose but at 40 mg an EKG needs to be monitored routinely for QTc prolongation.  Then we will change it to Lexapro so we do not need to worry about checking EKG regularly.  Today we discussed that it is too soon to see a difference. She has appointment with her psychiatrist at [facility] and in 2 days.           Constipation, unspecified constipation type  Start colace bid, prn miralax and suppository sent    Orders:    bisacodyl (DULCOLAX) 10 mg suppository; Insert 1 suppository (10 mg total) into the rectum daily as needed for constipation           History of Present Illness     HPI  Review of Systems       Objective   /50 (Patient Position: Sitting, Cuff Size: Large)   Pulse 71   LMP  (LMP Unknown)   SpO2 99%      Physical Exam

## 2024-11-20 NOTE — PROGRESS NOTES
PT Evaluation     Today's date: 2024  Patient name: Aminta De La Torre  : 1975  MRN: [id number]  Referring provider: Radha Olvera MD  Dx:   Encounter Diagnosis     ICD-10-CM    1. Status post ORIF of fracture of ankle  Z98.890     Z87.81       2. Decreased functional mobility and endurance  Z74.09           Start Time: 930  Stop Time: 1010  Total time in clinic (min): 40 minutes    Assessment  Impairments: abnormal gait, abnormal muscle tone, abnormal or restricted ROM, impaired balance, impaired physical strength, lacks appropriate home exercise program, pain with function, weight-bearing intolerance, participation limitations, activity limitations and endurance    Assessment details: Aminta is a 49 year old patient presenting to OPPT s/p ankle ORIF following trimalleolar fx - DOS 24. Upon evaluation they show impairments in the following areas: Increased L ankle swelling, decreased ankle/leg strength, decreased range of motion that is typically secondary to ORIF and prolonged immobilization in CAM Boot for past ~3 months. Patient's cognitive status and ability to follow directions complicate situation, with inability to formally assess ankle/LE strength with MMT secondary to this impairment. Sessions will likely have to emphasis functional movements with progression towards increased weight bearing on affected side as able. Patient and caregiver goals primarily directed towards improving functional mobility and endurance via transfers and gait - therefore believe patient is best suited for neurologic specialty rehabilitation office, especially when combined with cognitive deficits to ensure safety and in attempt to best reach goals of patient and caregivers. These impairments limit their ability to perform daily activities as well as their previous level of function causing decreased quality of life.    Patient requires continued skilled PT services in order to improve aforementioned impairments so  "that they are able to return to highest level of function possible. Without initiation of skilled PT services, patient will continue to have troubles returning to previous level of function following surgery.     Understanding of Dx/Px/POC: poor     Prognosis: fair    Goals  Short-Term (4 weeks)   - Improve ankle PROM by 5 degrees each direction   - Able to perform transfers at home at 50% PLOF   - Able to ambulate 10 ft with BUE support     Long-Term goals (8 weeks)   - Ambulate > 20 feet with BUE support   - Ankle PROM improved by 10 degrees compared to IE   - Transfers at 75% PLOF   - Able to perform sit <> stand with modAx1     Plan  Patient would benefit from: skilled physical therapy and PT eval    Frequency: 2x week  Plan of Care beginning date: 11/22/2024  Plan of Care expiration date: 1/22/2024  Treatment plan discussed with: patient        Subjective Evaluation    History of Present Illness  Mechanism of injury: Aminta is a 49 year old patient presenting to OPPT for evaluation s/p ORIF following trimalleolar ankle fx on L side performed on 8/29/24. Arrives with her two nursing staff. Wears the boot when she is doing transfers. Per caregiver, this is the 2nd time that her ankle was broken. Aminta is essentially non-verbal to this point. \"She will do transfers when she wants to do\".     Baseline level: Not very active. would walk to the bathroom, do transfers.     Last night, two staff to do the transfer and she was able to do it. Has been doing stand pivot transfers. Two person assist typically. Doesn't typically use a walker at the moment.     See protocol.     Per EMR: \"1. Status Post Orif Trimalleolar Fx, Open Reduction/treatment Of Tilleaux Fracture - Left     · Patient is making appropriate progress from the date of their surgery  · Advised the redness on the lateral part of the ankle is likely irritation from the ankle  · Recommended weaning out of the cam boot over the next week or 2  · We will plan to " "see her back at the 6 month post-operative waqar  · If any issues, questions, or concerns arise between now and the next appointment, we have encouraged the patient contact our team.\"         Objective     Gait: unable to be assessed     Single Limb Stance: unable to be assessed     Passive Range of Motion:   Dorsiflexion: R  L lacking 8 degrees knee bent   Plantarflexion: R  L 49   Inversion: R   L 18   Eversion: R   L 8     Special Tests: not assessed due to post op status   Windlass   Talar Tilt   Anterior Drawer   DF Eversion   Squeeze Test   Pina Test      Palpation:      Manual Muscle Testing: unable to test due to cognitive impairment  Ankle DF R  /5    L  /5   Ankle PF R  /5    L  /5   Ankle Inversion R  /5    L  /5   Ankle Eversion R  /5    L  /5    PHASE I  Goals: Wound healing and edema control Day 1  Foot wrapped in bulky Tamez dressing with plaster preventing movement of the leg  Elevate, take pain medication  Expect numbness in leg for 4-72 hours depending on the type of anesthesia used.  Wiggle Toes as able.  Hang operative extremity down for one minute every hour while awake then return to elevated position to encourage circulation.  Day 10-14  First follow-up in the office, dressing is changed. Sutures removed when wound healed.  Placed into cast boot  Start plantarflexion (downward movement of the foot), dorsiflexion (upward movement of the foot) motion 4- 5x per day out of the boot  May shower when sutures are removed but do not submerge in swimming pool, hot tub, bathtub, lake, ocean.  Non-weight bearing.     PHASE II  Goals: Improve AROM, Control edema 2-4 Weeks (non-weightbearing)  Initiate physical therapy  Edema control  Continue active motion exercises  Towel curls with toes  Standing hamstring curls.  Upper body conditioning.  Sleep in boot  4-6 Weeks (Non-Weightbearing)  Start scar massage if needed and wound fully healed.  Continue AROM and start gentle inversion/eversion  Initiate " light resistance bands all planes  Initiate seated toe and heel raises     PHASE Ill  Goals: Normal ROM. Initiate gait 6-8 Weeks  Second follow-up in the office.  Advance weight bearing to full   Wean from crutches  May start pool activities if available and wound fully healed  Advance graduated resistance exercises  Advance AROM activities. May initiate AAROM/PROM as needed.  Begin proprioception/balance exercises when able to full weight bear  Initiate stationary cycling out of cast boot. 8-12 Weeks  Third follow up visit at 10-12 weeks post op.  Advance proprioception exercises.  Wean from boot into ASO  Gait training  Continue cycle. Initiate elliptical/sair climber as able     PHASE IV  Goals: Return to normal recreational/ sporting activities >12 Weeks  Initiate walk to run program.  Continue to retrain strength, power, endurance  Continue proprioception  Plyometric training and full weight lifting.  Sport Specific drills  Sprints  Wean out of ASO  Return to sport when functional progression passed.      Precautions: T21, Osteoporosis, Neck pain, Hx seizure like activity, Fall risk       Manuals             Ankle PROM  MH             Ankle mobs PRN                                        Neuro Re-Ed             Static Balance              Dynamic Balance              Compliant Surface                           TB 4-way              Intrinsic foot strength                                                     Education  Edu to caregivers regarding performing PROM at home            Ther Ex             Gastroc S              Ankle alphabet              Ankle AROM              Seated hr/tr              Stand hr/tr              Seated rockerboard for rom                           Stand hip abd and ext              Stand march              Side stepping                                        Ther Activity                                       Gait Training                                       Modalities

## 2024-11-22 ENCOUNTER — EVALUATION (OUTPATIENT)
Dept: PHYSICAL THERAPY | Facility: CLINIC | Age: 49
End: 2024-11-22
Payer: MEDICARE

## 2024-11-22 DIAGNOSIS — Z98.890 STATUS POST ORIF OF FRACTURE OF ANKLE: Primary | ICD-10-CM

## 2024-11-22 DIAGNOSIS — Z87.81 STATUS POST ORIF OF FRACTURE OF ANKLE: Primary | ICD-10-CM

## 2024-11-22 DIAGNOSIS — Z74.09 DECREASED FUNCTIONAL MOBILITY AND ENDURANCE: ICD-10-CM

## 2024-11-22 PROCEDURE — 97140 MANUAL THERAPY 1/> REGIONS: CPT

## 2024-11-22 PROCEDURE — 97163 PT EVAL HIGH COMPLEX 45 MIN: CPT

## 2024-11-25 DIAGNOSIS — R56.9 SEIZURE-LIKE ACTIVITY (HCC): ICD-10-CM

## 2024-11-25 DIAGNOSIS — G40.802 REFLEX EPILEPSY (HCC): ICD-10-CM

## 2024-11-26 ENCOUNTER — EVALUATION (OUTPATIENT)
Dept: PHYSICAL THERAPY | Facility: CLINIC | Age: 49
End: 2024-11-26
Payer: MEDICARE

## 2024-11-26 DIAGNOSIS — Z87.81 STATUS POST ORIF OF FRACTURE OF ANKLE: Primary | ICD-10-CM

## 2024-11-26 DIAGNOSIS — Z74.09 DECREASED FUNCTIONAL MOBILITY AND ENDURANCE: ICD-10-CM

## 2024-11-26 DIAGNOSIS — R26.89 BALANCE DISORDER: ICD-10-CM

## 2024-11-26 DIAGNOSIS — Z98.890 STATUS POST ORIF OF FRACTURE OF ANKLE: Primary | ICD-10-CM

## 2024-11-26 PROCEDURE — 97163 PT EVAL HIGH COMPLEX 45 MIN: CPT

## 2024-11-26 PROCEDURE — 97112 NEUROMUSCULAR REEDUCATION: CPT

## 2024-11-26 RX ORDER — LACOSAMIDE 50 MG/1
50 TABLET ORAL
Qty: 90 TABLET | Refills: 1 | Status: SHIPPED | OUTPATIENT
Start: 2024-11-26

## 2024-11-26 NOTE — PROGRESS NOTES
PT Re-Evaluation     Today's date: 2024  Patient name: Aminta De La Torre  : 1975  MRN: [id number]  Referring provider: No ref. provider found  Dx: No diagnosis found.               Assessment  Impairments: abnormal gait, abnormal or restricted ROM, abnormal movement, activity intolerance, difficulty understanding, impaired balance, impaired physical strength, lacks appropriate home exercise program, safety issue, weight-bearing intolerance, poor posture , unable to perform ADL, attention deficits, participation limitations, activity limitations and endurance  Symptom irritability: moderate    Assessment details: Aminta is a 49 year old female presenting to OPPT s/p ankle ORIF following trimalleolar fx on 24. Pt has a significant PMH of reflex epilepsy, osteoporosis, trisomy 21 with cognitive impairment, and chronic pain of L knee and neck. Pt cleared by orthopedic physician for full weightbearing, weaning out of CAM boot in 1-2 weeks, and to begin gait training. Evaluation limited due to pt's inability to follow instructions. Upon evaluation, pt demonstrates decreased PROM of L ankle 2/2 ORIF and prolonged immbolization in CAM boot, decreased static and dynamic balance, and functional mobility needed for transfers and gait. Upon skin inspection, there's noted 3 pressure injuries most likely to wearing the CAM boot throughout the day. Minor redness and 2 skin tears noted on the L dorsum of foot and pressure injury on L lateral malleolus with noted dry black eschar. Nursing staff educated on only donning the CAM boot for weightbearing during transfers and to remove CAM boot while pt is sitting or lying in order to relieve pressure and improve skin integrity. In addition, nursing was educated on performing passive L ankle ROM to address deficits and to practice static standing tolerance with use of CAM boot. Nursing staff verbalized understanding. Pt was also able to perform static standing with modAx2  for 55 sec., chair <> mat transfer with maxAx2 and sidelying with maxAx1 which is below the pt's baseline level. Pt would benefit from further skilled PT sessions at 2x/wk for 8 weeks in order to increase WB s/p ORIF of L ankle, improve L ankle ROM, improve functional mobility with transfers and gait, and increase overall safety. These impairments limit the pt's ability to perform daily activities at baseline level leading to decreased safety and quality of life.       Radha Tyler, PT  Barriers to intervention: participation and behavior  Understanding of Dx/Px/POC: good (Decreased pt understanding due to cognitive impairment, good understanding of nursing staff)     Prognosis: fair    Goals  STG:  Patient will improve L ankle PROM by 5 deg in each direction within 4 weeks.   Patient will be able to perform transfers with mod assist x2 within 4 weeks.   Patient will be able to perform static standing with maxAx1 for 2 min or more within 4 weeks in order to improve LLE weightbearing.   Patient will be able to ambulate 10 ft with BUE support and modAx2 within 4 weeks.  Patient and nursing staff will be compliant with HEP within 4 weeks.     LTG:  Patient will be able to ambulate 20 ft or more with BUE support and modAx2 or less within 8 weeks.   Patient will be able to perform static standing with modAx1 or less for 5 minutes or more within 8 weeks.   Patient will be able to perform STS transfers with modAx1 within 8 weeks to demonstrate improved LE strength and functional mobility.   Patient will be able to perform transfers with modAx1 within 8 weeks.   Patient and nursing staff will be compliant with progressed HEP within 8 weeks.     Plan  Patient would benefit from: skilled physical therapy  Referral necessary: No    Planned therapy interventions: activity modification, ADL retraining, ADL training, balance, balance/weight bearing training, behavior modification, body mechanics training, strengthening, sensory  integrative techniques, postural training, patient/caregiver education, neuromuscular re-education, motor coordination training, flexibility, functional ROM exercises, gait training, graded activity, graded exercise, graded motor, home exercise program, wheelchair management, transfer training, therapeutic training, therapeutic exercise and therapeutic activities    Frequency: 2x week  Duration in weeks: 8  Plan of Care beginning date: 11/26/2024  Plan of Care expiration date: 1/21/2025  Treatment plan discussed with: caregiver and patient      Subjective Evaluation    History of Present Illness  Date of surgery: 8/29/2024  Mechanism of injury: Pt sustained a L trimalleolar ankle fx and underwent ORIF on 8/29/24. According to orthopedic doctor, pt is full weightbearing on LLE. She is be able to stand up, but requires max assistx2. Inconsistent performance due to pt fighting caregivers during activities. On Saturday, nursing staff got her up and she took several steps with 2 person max assist. Seems like she is moving the L more than the R. Baseline level is not active and pt requiring assistx2 for functional mobility including walking and transfers. Pt has sruthi lift, W/C, and shower chair available at home. Ramp to get in. Not doing any ankle exercises at this time. Wearing CAM boot throughout the day when she is out of bed. Pt has difficulty following commands due to cognitive impairment. Unable to express pain. Has not had any falls sincee the fx.           Not a recurrent problem   Quality of life: fair    Patient Goals  Patient goals for therapy: improved balance, increased strength, return to sport/leisure activities and increased motion  Patient goal: Improve walking, increase weightbearing on LLE, and  improve transfers  Pain  No pain reported    Social Support  Steps to enter house: no (Ramp to enter)  Stairs in house: no   Lives in: community-based residential facility (Group home)  Lives with: Nursing  "staff available.    Employment status: not working  Exercise history: Not very active    Treatments  Current treatment: physical therapy      Objective    Balance Test    6 Minute Walk Test (ft): Unable to assess due to cognitive impairment    2 Minute Walk Test (ft):    Gait Speed (ft/s):    5x Sit To Stand (s):    TUG    FGA  SEGURA        MCTSIB Number of Seconds   Feet Together, Eyes Open Unable to assess due to cognitive impairment    Feet Together, Eyes Closed    Feet Together, Eyes Open Foam    Feet Together, Eyes Closed Foam        Coordination Left Right   Heel To Shin Unable to assess due to cognitive impairment     Finger To Nose     Rapid Alternating Movement     UE     LE         Sensation Left Right   Kinesthesia Unable to assess due to cognitive impairment     Light Touch     Sharp/Dull     2 Point Discrimination       PROM (L):  Dorsifleixon: lacking 6 deg knee bent   Plantar flexion: 47 deg   Inversion: 23 deg   Eversion: 10 dg     Static standing and sitting balance:  Standin sec with modAx2  Sitting (unsupported): 2 min independently      Manual Muscle Testing - Hip Right Left   Flexion Unable to assess due to cognitive impairment     Extension     Abduction     External Rotation       Transfers    Sit To Stand Max Assist x 2    W/C To Bed Max Assist x 2   Sit To Supine Unable to assess due to pt refusal    Roll Unable to assess due to pt refusal        Gait Assessment:  Only able to perform 2 steps with maxAx2 and no AD, decreased upright posture, Decreased b/l heel strike, decreased initiation of step, decreased b/l Step Height, Decreased b/l Step Length, noted lack of active L knee extension during stance     See protocol.     Per EMR: \"1. Status Post Orif Trimalleolar Fx, Open Reduction/treatment Of Tilleaux Fracture - Left     · Patient is making appropriate progress from the date of their surgery  · Advised the redness on the lateral part of the ankle is likely irritation from the " "ankle  · Recommended weaning out of the cam boot over the next week or 2  · We will plan to see her back at the 6 month post-operative waqar  · If any issues, questions, or concerns arise between now and the next appointment, we have encouraged the patient contact our team.\"     Short Term Goal Expiration Date:(12/24/2024)  Long Term Goal Expiration Date: (01/21/2025)  POC Expiration Date: (01/25/2025)    Visit count: 1 of 10; PN due 12/24/2024    POC expires Unit limit Auth Expiration date PT/OT/ST + Visit Limit?   01/21/25 N/A N/A BOMN                           Visit/Unit Tracking  AUTH Status:  Date 11/26             BOMN Used 1              Remaining  9                   Precautions T21, Osteoporosis, Neck pain, Hx seizure like activity, Fall risk        Manuals 11/26        Skin inspection and caregiver education on promoting skin integrity with CAM boot                                Neuro Re-Ed  Pt and caregiver education on importance of LLE weightbearing to promote improved balance and functional mobility        Transfers  STS with maxAx2 x 2    Chair <> mat table        Static balance  Static standing with modAx2  X 2 (attempted more than 1 minute, but unable to sustain requiring maxAx2 beyond 1 min)    Static unsupported sitting on mat table x 2 min          Dynamic balance Walking with maxAx2 and CAM boot   X 5 min                                        Ther Ex        Ankle ROM  3 x 30 sec L ankle dorsiflexion stretch     Pt and caregiver education for HEP    Assessment of PROM of L ankle                                                                Ther Activity                        Gait Training                        Modalities                                      "

## 2024-11-26 NOTE — TELEPHONE ENCOUNTER
Medication: Vimpat   PDMP  10/23/2024 06/03/2024 Lacosamide (Tablet) 30.0 30 50 MG NA [facility] Medicare 5 / 5 [place]   2 [id number] 09/27/2024 06/03/2024 Lacosamide (Tablet) 30.0 30 50 MG NA [facility] Commercial Insurance 4 / 5 [place]   2 [id number] 08/30/2024 08/29/2024 oxyCODONE HCL (Tablet) 15.0 4 5 MG 28.12 [place] [facility] Commercial Insurance 0 / 0 [place]   2 [id number] 08/23/2024 06/03/2024 Lacosamide (Tablet) 30.0 30 50 MG NA [facility] Commercial Insurance 3 / 5 [place]   Active agreement on file -No       Refill must be reviewed and completed by the office or provider. The refill is unable to be approved or denied by the medication management team.

## 2024-11-29 ENCOUNTER — OFFICE VISIT (OUTPATIENT)
Dept: PHYSICAL THERAPY | Facility: CLINIC | Age: 49
End: 2024-11-29
Payer: MEDICARE

## 2024-11-29 DIAGNOSIS — Z87.81 STATUS POST ORIF OF FRACTURE OF ANKLE: Primary | ICD-10-CM

## 2024-11-29 DIAGNOSIS — Z98.890 STATUS POST ORIF OF FRACTURE OF ANKLE: Primary | ICD-10-CM

## 2024-11-29 DIAGNOSIS — R26.89 BALANCE DISORDER: ICD-10-CM

## 2024-11-29 DIAGNOSIS — Z74.09 DECREASED FUNCTIONAL MOBILITY AND ENDURANCE: ICD-10-CM

## 2024-11-29 PROCEDURE — 97116 GAIT TRAINING THERAPY: CPT

## 2024-11-29 PROCEDURE — 97112 NEUROMUSCULAR REEDUCATION: CPT

## 2024-11-29 NOTE — PROGRESS NOTES
Daily Note     Today's date: 2024  Patient name: Aminta De La Torre  : 1975  MRN: [id number]  Referring provider: No ref. provider found  Dx: No diagnosis found.               Subjective: Redness improving on L dorsum of foot. Caregivers have been removing CAM boot during prolonged sitting and lying. Were wondering if that are able to continue Vernon lift use. They have been noticing Aminta likes to cross her left leg once standing due to the position of comfort while sitting.       Objective: See treatment diary below      Assessment: Pt tolerated treatment well. Upon skin inspection, skin tears on the L dorsum of foot look better since last session with mild pink discoloration and improved healing of wound on L lateral malleolus. Provided continued education on the importance of pressure relief for proper skin healing. Pt performed static standing in the parallel bars this session to promote LLE weight bearing. Attempted standing with sneaker to initiate weaning out of the CAM boot. Pt was able to tolerate up to 3 minutes of standing with b/l UE support and modAx2. However, pt became tearful and CAM boot was donned for improved pt comfort. Pt was also able to initiate up to 2 steps with tactile cues for weight shifting. Pt demonstrated a scissoring gait pattern requiring therapist's foot in between feet to promote regular gait pattern. Addressed concerns of Vernon lift by educating on the prioritization of pt and caregiver safety with use of vernon lift as needed. Caregivers educated to promote LLE weightbearing as tolerated. Pt would benefit from further skilled PT sessions to address the following deficits and maximize patient's overall safety and quality of life.     Plan: Continue per plan of care.     Short Term Goal Expiration Date:(2024)  Long Term Goal Expiration Date: (2025)  POC Expiration Date: (2025)    Visit count: 2 of 10; PN due 2024    POC expires Unit limit Auth  Expiration date PT/OT/ST + Visit Limit?   01/21/25 N/A N/A BOMN                           Visit/Unit Tracking  AUTH Status:  Date 11/26 11/29            BOMN Used 1 2             Remaining  9 8                  Precautions T21, Osteoporosis, Neck pain, Hx seizure like activity, Fall risk        Manuals 11/26 11/29       Skin inspection and caregiver education on promoting skin integrity with CAM boot  Skin inspection and caregiver education on promoting skin integrity with CAM boot                               Neuro Re-Ed  Pt and caregiver education on importance of LLE weightbearing to promote improved balance and functional mobility  Pt and caregiver education on importance of LLE weightbearing to promote improved balance and functional mobility       Transfers  STS with maxAx2 x 2    Chair <> mat table  STS with mod-maxAx2 with b/l UE at // bars x 5      Static balance  Static standing with modAx2  X 2 (attempted more than 1 minute, but unable to sustain requiring maxAx2 beyond 1 min)    Static unsupported sitting on mat table x 2 min    Static standing with modAx2   Attempt 1: 3 minutes   Attempt 2: 2 minutes  Attempt 3: 30 seconds   Attempt 4: 10 sec   Attempt 5: 30 sec          Dynamic balance Walking with maxAx2 and CAM boot   X 5 min                                        Ther Ex        Ankle ROM  3 x 30 sec L ankle dorsiflexion stretch     Pt and caregiver education for HEP    Assessment of PROM of L ankle                                                                Ther Activity                        Gait Training  Walking with sneaker, maxAx2, b/l UE support  X5 minutes     Walking with CAM boot, maxAx2, b/l UE support  X 5 minutes     Tactile cues for decreased scissoring, weight shift, and step initiation       Education   Caregiver education on ways to decrease scissoring gait               Modalities

## 2024-12-02 ENCOUNTER — TELEPHONE (OUTPATIENT)
Age: 49
End: 2024-12-02

## 2024-12-02 ENCOUNTER — OFFICE VISIT (OUTPATIENT)
Dept: OBGYN CLINIC | Facility: CLINIC | Age: 49
End: 2024-12-02
Payer: MEDICARE

## 2024-12-02 ENCOUNTER — TELEPHONE (OUTPATIENT)
Dept: PHYSICAL THERAPY | Facility: CLINIC | Age: 49
End: 2024-12-02

## 2024-12-02 VITALS
HEIGHT: 55 IN | SYSTOLIC BLOOD PRESSURE: 110 MMHG | BODY MASS INDEX: 35.87 KG/M2 | WEIGHT: 155 LBS | DIASTOLIC BLOOD PRESSURE: 58 MMHG | HEART RATE: 75 BPM

## 2024-12-02 DIAGNOSIS — Z98.890 STATUS POST ORIF OF FRACTURE OF ANKLE: Primary | ICD-10-CM

## 2024-12-02 DIAGNOSIS — B37.31 YEAST VAGINITIS: ICD-10-CM

## 2024-12-02 DIAGNOSIS — Z87.81 STATUS POST ORIF OF FRACTURE OF ANKLE: Primary | ICD-10-CM

## 2024-12-02 PROCEDURE — 99213 OFFICE O/P EST LOW 20 MIN: CPT | Performed by: ORTHOPAEDIC SURGERY

## 2024-12-02 RX ORDER — GUAIFENESIN 200 MG/10ML
100 LIQUID ORAL 3 TIMES DAILY PRN
COMMUNITY
Start: 2024-11-30

## 2024-12-02 RX ORDER — SODIUM PHOSPHATE,MONO-DIBASIC 19G-7G/118
ENEMA (ML) RECTAL
COMMUNITY
Start: 2024-11-18

## 2024-12-02 NOTE — TELEPHONE ENCOUNTER
PT's , Jam, scheduled an E/R f/u for PT on 12/4 @ 1:15, however she will need to change that, since she has a staff meeting that time. I did offer to possibly look for a different day, but Jam said the appt needed to be a 2 day f/u from the E/R. Please call Jam per her request to advise if Dr Olvera is able to accommodate any other time on 12/4.    ThankYou

## 2024-12-02 NOTE — TELEPHONE ENCOUNTER
Spoke with one of Aminta's caregivers this morning 12/2/24 that was not present on Friday's PT treatment on 11/29. Caregiver states that she is canceling Aminta's PT treatment today due to bruising and swelling on the affected left foot that began on Saturday. Aminta is seeing the orthopedic physician today for follow-up and will call to reschedule as needed.

## 2024-12-02 NOTE — PROGRESS NOTES
Subjective   CHIEF COMPLAINT/REASON FOR VISIT  Aminta De La Torre is a 49 y.o. female who presents for 3 month post op follow-up after Orif Trimalleolar Fx, Open Reduction/treatment Of Tilleaux Fracture - Left on 8/29/2024     HISTORY OF PRESENT ILLNESS  Overall, she feels things are going well and progressing appropriately. Pain has been well controlled. She has been following the post-operative rehabilitation regimen without difficultly. Currently, she currently experiencing swelling and discoloration over the 3rd/4th distal metatarsal. Her caretaker is unsure of where the discoloration came from. She saw her on Saturday when there was no discoloration. Her staff did not report any discoloration from when they worked with her on Sunday. Slight redness noted over the lateral incision most likely from positioning.     Objective   PHYSICAL EXAMINATION  Left  Ankle  Surgical incisions are healed.     Musculoskeletal: Left Ankle   Skin Intact               Swelling/ecchymosis over dorsal aspect of the 3rd/4th metatarsal               TTP: None   Range of motion and strength intact   Sensation intact throughout Superficial peroneal, Deep peroneal, Tibial, Sural, Saphenous distributions              EHL/TA/PF motor function intact to testing.               BCR              Gait: unable to walk    Assessment & Plan   1. Status Post Orif Trimalleolar Fx, Open Reduction/treatment Of Tilleaux Fracture - Left    Patient is making appropriate progress from the date of their surgery  Recommends seeing podiatry for new foot issues that are not related to the ORIF of the ankle  We will plan to see her back at the as needed post-operative waqar  If any issues, questions, or concerns arise between now and the next appointment, we have encouraged the patient contact our team.        Scribe Attestation      I,:  Norman Robbins am acting as a scribe while in the presence of the attending physician.:       I,:  Mahamed Zamorano MD personally  performed the services described in this documentation    as scribed in my presence.:

## 2024-12-03 ENCOUNTER — OFFICE VISIT (OUTPATIENT)
Dept: INTERNAL MEDICINE CLINIC | Facility: CLINIC | Age: 49
End: 2024-12-03
Payer: MEDICARE

## 2024-12-03 ENCOUNTER — HOSPITAL ENCOUNTER (OUTPATIENT)
Dept: RADIOLOGY | Facility: HOSPITAL | Age: 49
Discharge: HOME | End: 2024-12-03
Payer: MEDICARE

## 2024-12-03 ENCOUNTER — TELEPHONE (OUTPATIENT)
Dept: INTERNAL MEDICINE CLINIC | Facility: CLINIC | Age: 49
End: 2024-12-03

## 2024-12-03 VITALS — SYSTOLIC BLOOD PRESSURE: 114 MMHG | DIASTOLIC BLOOD PRESSURE: 76 MMHG

## 2024-12-03 DIAGNOSIS — R05.1 ACUTE COUGH: ICD-10-CM

## 2024-12-03 DIAGNOSIS — M79.672 FOOT PAIN, LEFT: Primary | ICD-10-CM

## 2024-12-03 DIAGNOSIS — M81.0 OSTEOPOROSIS, UNSPECIFIED OSTEOPOROSIS TYPE, UNSPECIFIED PATHOLOGICAL FRACTURE PRESENCE: ICD-10-CM

## 2024-12-03 DIAGNOSIS — M79.672 FOOT PAIN, LEFT: ICD-10-CM

## 2024-12-03 PROCEDURE — G2211 COMPLEX E/M VISIT ADD ON: HCPCS

## 2024-12-03 PROCEDURE — 73630 X-RAY EXAM OF FOOT: CPT

## 2024-12-03 PROCEDURE — 99214 OFFICE O/P EST MOD 30 MIN: CPT

## 2024-12-03 RX ORDER — DEXTROMETHORPHAN HBR. AND GUAIFENESIN 10; 100 MG/5ML; MG/5ML
5 SOLUTION ORAL EVERY 12 HOURS
Qty: 300 ML | Refills: 1 | Status: SHIPPED | OUTPATIENT
Start: 2024-12-03

## 2024-12-03 NOTE — ASSESSMENT & PLAN NOTE
Patient started to have swelling, ecchymosis on plantar and dorsal aspect of third, fourth metatarsal on left foot.  Associated tenderness present.  Most likely in setting of osteoporosis, recent commencement of physical therapy after fracture of the ankle on the same side.    Plan:  X-ray left foot.  Rest, ice, compression, elevation.  Might need orthopedic reevaluation in case there is a metatarsal fracture.    Orders:    XR foot 3+ vw left; Future

## 2024-12-03 NOTE — PROGRESS NOTES
Name: Aminta De La Torre      : 1975      MRN: [id number]  Encounter Provider: Constantino Bernal MD  Encounter Date: 12/3/2024   [facility]  :  Assessment & Plan  Foot pain, left  Patient started to have swelling, ecchymosis on plantar and dorsal aspect of third, fourth metatarsal on left foot.  Associated tenderness present.  Most likely in setting of osteoporosis, recent commencement of physical therapy after fracture of the ankle on the same side.    Plan:  X-ray left foot.  Rest, ice, compression, elevation.  Might need orthopedic reevaluation in case there is a metatarsal fracture.    Orders:    XR foot 3+ vw left; Future    Acute cough    Orders:    dextromethorphan-guaiFENesin (QC Tussin DM Cough/Congestion)  mg/5 mL oral liquid; Take 5 mL by mouth every 12 (twelve) hours    Osteoporosis, unspecified osteoporosis type, unspecified pathological fracture presence  Patient was earlier nonweightbearing, recently started to have slight weightbearing status, involved in physical therapy after her ankle fracture.  Most recent DEXA scan shows osteoporosis.  This is most likely contributing to her left foot pain and there is a possibility of underlying fracture of her metatarsals.                History of Present Illness     Patient was brought in by her caregivers to the clinic for concerns of swelling, ecchymosis of her left foot.  Patient is nonverbal given her intellectual disability secondary to Down syndrome.  History was provided by the caregivers.    The caregivers mention that patient underwent orthopedic intervention about a month ago for left ankle fracture.  Later on Saturday, she started to have swelling of her left foot.  Just a couple days prior to that she began her physical therapy did this development.  They also noticed bruising associated with the swelling.  Since the patient is nonverbal, she did not communicate any pain or any event of injury  however they are not sure if she had injured herself during physical therapy or while she attempted to walk.      Review of Systems   Unable to perform ROS: Patient nonverbal          Objective   /76 (BP Location: Left arm, Patient Position: Sitting, Cuff Size: Large)   LMP  (LMP Unknown)      Physical Exam  Musculoskeletal:      Right foot: Normal.      Left foot: Decreased range of motion. Swelling, deformity, bunion, tenderness and bony tenderness present.      Comments: 2.5 cm ecchymosis noted on plantar aspect just at the base of second third and fourth metacarpophalangeal joint.  1.5 cm of ecchymosis noted on dorsum of foot at the same site.    Nonpitting swelling extending from forefoot until ankle.  No edema noted proximal to ankles.       Administrative Statements   I have spent a total time of 35 minutes in caring for this patient on the day of the visit/encounter including Diagnostic results, Prognosis, Risks and benefits of tx options, Instructions for management, Patient and family education, Impressions, Counseling / Coordination of care, Documenting in the medical record, Reviewing / ordering tests, medicine, procedures  , Obtaining or reviewing history  , and Documentation on paper form brought in by the caregiver . Topics discussed with the patient / family include symptom assessment and management, medication review, and medication adjustment.

## 2024-12-03 NOTE — ASSESSMENT & PLAN NOTE
Patient was earlier nonweightbearing, recently started to have slight weightbearing status, involved in physical therapy after her ankle fracture.  Most recent DEXA scan shows osteoporosis.  This is most likely contributing to her left foot pain and there is a possibility of underlying fracture of her metatarsals.

## 2024-12-03 NOTE — TELEPHONE ENCOUNTER
The provider would like to start Prolia on this patient. I submitted a benefit verification through [employer] however I received notification that her insurance is incorrect. We have her insurance as verified.    Can you please try to submit a prior authorization for the Prolia to see if it is required before we schedule an appointment. Thank you.

## 2024-12-03 NOTE — ASSESSMENT & PLAN NOTE
Orders:    dextromethorphan-guaiFENesin (QC Tussin DM Cough/Congestion)  mg/5 mL oral liquid; Take 5 mL by mouth every 12 (twelve) hours

## 2024-12-04 ENCOUNTER — RESULTS FOLLOW-UP (OUTPATIENT)
Dept: INTERNAL MEDICINE CLINIC | Facility: CLINIC | Age: 49
End: 2024-12-04

## 2024-12-04 RX ORDER — MICONAZOLE NITRATE ANTIFUNGAL POWDER 2 G/100G
POWDER TOPICAL
Qty: 85 G | Refills: 1 | Status: SHIPPED | OUTPATIENT
Start: 2024-12-04 | End: 2024-12-09

## 2024-12-05 ENCOUNTER — TELEPHONE (OUTPATIENT)
Dept: INTERNAL MEDICINE CLINIC | Facility: CLINIC | Age: 49
End: 2024-12-05

## 2024-12-05 NOTE — TELEPHONE ENCOUNTER
----- Message from Constantino Bernal MD sent at 12/5/2024 11:15 AM EST -----  Please let the caregiver know that the patient does not have any foot fracture. They can just continue with watchful activity, rest, ice, compression and elevation of the limb.

## 2024-12-06 ENCOUNTER — TELEPHONE (OUTPATIENT)
Dept: PHYSICAL THERAPY | Facility: CLINIC | Age: 49
End: 2024-12-06

## 2024-12-06 NOTE — TELEPHONE ENCOUNTER
Called at 0938 on 12/6/24 for an update on how patient is feeling after recent injury. Spoke with manager who said that they don't want to reschedule any PT appts at this time until they receive clearance from the pt's PCP despite normal imaging results. Will call back to reschedule once clearance is received.

## 2024-12-09 DIAGNOSIS — B37.31 YEAST VAGINITIS: ICD-10-CM

## 2024-12-09 DIAGNOSIS — H61.23 BILATERAL IMPACTED CERUMEN: ICD-10-CM

## 2024-12-09 DIAGNOSIS — E78.3 MIXED HYPERGLYCERIDEMIA: ICD-10-CM

## 2024-12-09 DIAGNOSIS — Z86.69 HISTORY OF IMPACTED CERUMEN: ICD-10-CM

## 2024-12-09 RX ORDER — ATORVASTATIN CALCIUM 10 MG/1
10 TABLET, FILM COATED ORAL DAILY
Qty: 30 TABLET | Refills: 5 | Status: SHIPPED | OUTPATIENT
Start: 2024-12-09 | End: 2025-06-07

## 2024-12-09 RX ORDER — MICONAZOLE NITRATE ANTIFUNGAL POWDER 2 G/100G
POWDER TOPICAL
Qty: 85 G | Refills: 1 | Status: SHIPPED | OUTPATIENT
Start: 2024-12-09

## 2024-12-09 RX ORDER — CARBAMIDE PEROXIDE 65 MG/ML
LIQUID TOPICAL
Qty: 15 ML | Refills: 0 | Status: SHIPPED | OUTPATIENT
Start: 2024-12-09

## 2024-12-10 ENCOUNTER — TELEPHONE (OUTPATIENT)
Dept: INTERNAL MEDICINE CLINIC | Facility: CLINIC | Age: 49
End: 2024-12-10

## 2024-12-10 NOTE — TELEPHONE ENCOUNTER
This message was not routed to us, but spoke with  regarding Prolia and scheduling. She stated they wanted to restart PT after results from xray came back 12/5 telephone encounter.  This is the encounter from PT. Please advise, thank you         Radha Tyler PT   Physical Therapist  Specialty: Physical Therapy     Telephone Encounter     Signed     Encounter Date: 12/6/2024     Signed         Called at 0938 on 12/6/24 for an update on how patient is feeling after recent injury. Spoke with manager who said that they don't want to reschedule any PT appts at this time until they receive clearance from the pt's PCP despite normal imaging results. Will call back to reschedule once clearance is received.             Electronically signed by Radha Tyler PT at 12/6/2024  9:46 A

## 2024-12-11 ENCOUNTER — OFFICE VISIT (OUTPATIENT)
Dept: PODIATRY | Facility: CLINIC | Age: 49
End: 2024-12-11
Payer: MEDICARE

## 2024-12-11 VITALS — HEIGHT: 55 IN | WEIGHT: 147 LBS | BODY MASS INDEX: 34.02 KG/M2

## 2024-12-11 DIAGNOSIS — I87.2 VENOUS INSUFFICIENCY: ICD-10-CM

## 2024-12-11 DIAGNOSIS — M79.672 FOOT PAIN, LEFT: ICD-10-CM

## 2024-12-11 DIAGNOSIS — B35.1 ONYCHOMYCOSIS: Primary | ICD-10-CM

## 2024-12-11 DIAGNOSIS — R60.1 GENERALIZED EDEMA: ICD-10-CM

## 2024-12-11 DIAGNOSIS — M79.674 PAIN IN TOES OF BOTH FEET: ICD-10-CM

## 2024-12-11 DIAGNOSIS — Q90.9 DOWN SYNDROME: ICD-10-CM

## 2024-12-11 DIAGNOSIS — M79.675 PAIN IN TOES OF BOTH FEET: ICD-10-CM

## 2024-12-11 PROCEDURE — RECHECK: Performed by: PODIATRIST

## 2024-12-11 PROCEDURE — 11721 DEBRIDE NAIL 6 OR MORE: CPT | Performed by: PODIATRIST

## 2024-12-11 NOTE — PROGRESS NOTES
Name: Aminta De La Torre      : 1975      MRN: [id number]  Encounter Provider: Jonathan Cruz DPM  Encounter Date: 2024   [facility]  :  Assessment & Plan  Onychomycosis       Debride mycotic nails and thin the nail plates x 10 with the use of a nail nipper manually and an electric Dremel bur was used to reduce the thickness of the nail beds and smoothed the distal aspect of the nails.   Generalized edema         Pain in toes of both feet         Venous insufficiency         Down syndrome         Foot pain, left       Was told that they could DC the Ace wrap to the left foot no pain with palpation over the lateral aspect or dorsal aspect of the foot.    Discussed proper shoe gear, daily inspections of feet, and general foot health with patient. Patient has Q8  findings and is recommended for at risk foot care every 9-10 weeks.    Patients most recent complete clinical foot exam was on: 10/2/2024      Return in about 10 weeks (around 2025).     History of Present Illness   HPI  Aminta De La Torre is a 49 y.o. female who presents with chief complaint of painful thick nails on both feet.  She was recently to her internal medicine doctor with pain on the left foot and had x-rays taken as well as an Ace wrap applied to the foot.  She was seen in her wheelchair today with staff present.Patient presents for at-risk foot care.  Patient has no acute concerns today.  Patient has significant lower extremity risk due to neuropathy, parasthesia, edema, and trophic skin changes to the lower extremity.   History obtained from: patient's Legal Guardian    Review of Systems  Medical History Reviewed by provider this encounter:     .  Current Outpatient Medications on File Prior to Visit   Medication Sig Dispense Refill    acetaminophen (TYLENOL) 325 mg tablet Take 1 tablet (325 mg total) by mouth every 6 (six) hours as needed for mild pain 30 tablet 0    Antifungal 2 %  powder APPLY TOPICALLY AS NEEDED FOR ITCHING 85 g 1    aspirin (Aspirin Low Dose) 81 mg EC tablet Take 1 tablet (81 mg total) by mouth 2 (two) times a day 180 tablet 3    atorvastatin (LIPITOR) 10 mg tablet TAKE 1 TABLET (10 MG TOTAL) BY MOUTH DAILY 30 tablet 5    bisacodyl (DULCOLAX) 10 mg suppository Insert 1 suppository (10 mg total) into the rectum daily as needed for constipation 12 suppository 0    Cholecalciferol 25 MCG (1000 UT) tablet Take 1 tablet (1,000 Units total) by mouth daily 90 tablet 1    dextromethorphan-guaiFENesin (QC Tussin DM Cough/Congestion)  mg/5 mL oral liquid Take 5 mL by mouth every 12 (twelve) hours 300 mL 1    dextromethorphan-guaiFENesin (ROBITUSSIN DM)  mg/5 mL syrup Take 5 mL by mouth 3 (three) times a day as needed for cough or congestion 237 mL 0    Diclofenac Sodium (GoodSense Arthritis Pain) 1 % Apply 2 g topically 4 (four) times a day To left knee 100 g 1    docusate sodium (COLACE) 100 mg capsule Take 1 capsule (100 mg total) by mouth 2 (two) times a day 60 capsule 1    Elastic Bandages & Supports (Gait/Transfer Belt) MISC Use in the morning 1 each 0    escitalopram (LEXAPRO) 20 mg tablet Take 1 tablet (20 mg total) by mouth daily 90 tablet 1    guaiFENesin (ROBITUSSIN) 100 MG/5ML oral liquid Take 100 mg by mouth 3 (three) times a day as needed for cough      Heating Pad PADS by Does not apply route 2 (two) times a day 1 each 0    Incontinence Supply Disposable (RA Adult Wipes) MISC Use daily 200 each 2    Incontinence Supply Disposable (Ultra-Soft Plus Briefs XXL) MISC Use if needed (incontinence) 200 each 2    lacosamide (VIMPAT) 50 mg TAKE 1 TABLET (50 MG TOTAL) BY MOUTH DAILY AT BEDTIME 90 tablet 1    levothyroxine 50 mcg tablet TAKE ONE TABLET BY MOUTH AT 7 AM 30 tablet 5    melatonin 3 mg Take 1 tablet (3 mg total) by mouth daily at bedtime 30 tablet 5    Mouthwashes (Biotene Dry Mouth) LIQD Apply 1 Swab to the mouth or throat 2 (two) times a day as needed  "(dry mouth) Use 5ml of solution with each swab 237 mL 0    nystatin powder Apply topically 3 (three) times a day as needed (rash) 60 g 0    polyethylene glycol (MIRALAX) 17 g packet Take 17 g by mouth daily as needed (constipation) Hold if patient has diarrhea 510 g 2    QC Earwax Removal 6.5 % otic solution Use 4 DROPS to both ears twice daily Tuesdays and Fridays, and then 4 days prior to ENT appt for softening of ear wax 4 DROPS TWICE DAILY x 4 days. 15 mL 0    sodium chloride (OCEAN) 0.65 % nasal spray 2 sprays into each nostril as needed for congestion or rhinitis 60 mL 0    sodium phosphate-biphosphate (FLEET) 7-19 g 118 mL enema APPLY RECTALLY AS NEEDED IF 3 ATTEMPTS WITH SUPPOSITORIES HAVE FAILED.   IF NO BOWEL MOVEMENT WITHIN 12 HOURS, PLEASE CALL SMD OR GO TO THE      ZINC OXIDE, TOPICAL, 10 % CREA Apply topically 3 (three) times a day as needed (rash) 113 g 3    acetaminophen (TYLENOL) 500 mg tablet Take 500 mg by mouth every 6 (six) hours as needed for mild pain With 600 mg ibuprofen      bacitracin topical ointment 500 units/g topical ointment Apply topically in the morning (Patient not taking: Reported on 12/10/2024)      Midazolam 5 MG/0.1ML SOLN For seizure longer than 5 min or cluster of seizures. Give 1 spray (5 mg) into 1 nostril; if no response, a second dose of 1 spray (5 mg) into the other nostril may be given 10 min. (Patient not taking: Reported on 12/10/2024) 2 each 1    ofloxacin (FLOXIN) 0.3 % otic solution Administer 10 drops into both ears 2 (two) times a day (Patient not taking: Reported on 12/10/2024) 10 mL 2     No current facility-administered medications on file prior to visit.         Objective   Ht 4' 5\" (1.346 m)   Wt 66.7 kg (147 lb)   LMP  (LMP Unknown)   BMI 36.79 kg/m²      Physical Exam  Vascular status is a faint 1/4 DP PT negative digital hair normal distal cooling slightly delayed capillary refill with edema present bilaterally.  The edema is +2 pitting and the " capillary refill is approximately 2 to 3 seconds.  There is slight dependent ru    Ortho no pain with palpation along the lateral aspect of the foot or even the dorsal aspect of the foot.    Neuro is unchanged from her appointment on 11 bor noted on the left extremity which improves with elevation.    Derm nails are brittle elongated hypertrophic white-yellow discoloration with subungual debris x 10.  There is an increased thickness and the nails are approximately 1 to 3 mm with most of the thickness present on the left foot.  There is a small eschar present on the dorsal aspect of the left foot right around the navicular cuboid joint.  No signs of any infection and no pain with palpation.    Neuro unchanged from last appointment 10/2/24    Administrative Statements   I have spent a total time of 15 minutes in caring for this patient on the day of the visit/encounter including Risks and benefits of tx options, Instructions for management, Patient and family education, Importance of tx compliance, Counseling / Coordination of care, Documenting in the medical record, and Obtaining or reviewing history  .

## 2024-12-12 ENCOUNTER — CLINICAL SUPPORT (OUTPATIENT)
Dept: INTERNAL MEDICINE CLINIC | Facility: CLINIC | Age: 49
End: 2024-12-12
Payer: MEDICARE

## 2024-12-12 DIAGNOSIS — M81.0 OSTEOPOROSIS, UNSPECIFIED OSTEOPOROSIS TYPE, UNSPECIFIED PATHOLOGICAL FRACTURE PRESENCE: Primary | ICD-10-CM

## 2024-12-12 PROCEDURE — 96372 THER/PROPH/DIAG INJ SC/IM: CPT

## 2024-12-13 ENCOUNTER — OFFICE VISIT (OUTPATIENT)
Dept: OBGYN CLINIC | Facility: CLINIC | Age: 49
End: 2024-12-13
Payer: MEDICARE

## 2024-12-13 ENCOUNTER — OFFICE VISIT (OUTPATIENT)
Dept: PHYSICAL THERAPY | Facility: CLINIC | Age: 49
End: 2024-12-13
Payer: MEDICARE

## 2024-12-13 VITALS — WEIGHT: 147 LBS | HEIGHT: 55 IN | BODY MASS INDEX: 34.02 KG/M2

## 2024-12-13 DIAGNOSIS — Z74.09 DECREASED FUNCTIONAL MOBILITY AND ENDURANCE: ICD-10-CM

## 2024-12-13 DIAGNOSIS — R26.89 BALANCE DISORDER: ICD-10-CM

## 2024-12-13 DIAGNOSIS — Z98.890 STATUS POST ORIF OF FRACTURE OF ANKLE: Primary | ICD-10-CM

## 2024-12-13 DIAGNOSIS — Z87.81 STATUS POST ORIF OF FRACTURE OF ANKLE: Primary | ICD-10-CM

## 2024-12-13 PROCEDURE — 97112 NEUROMUSCULAR REEDUCATION: CPT

## 2024-12-13 PROCEDURE — 99213 OFFICE O/P EST LOW 20 MIN: CPT | Performed by: ORTHOPAEDIC SURGERY

## 2024-12-13 NOTE — PROGRESS NOTES
Daily Note     Today's date: 2024  Patient name: Aminta De La Torre  : 1975  MRN: [id number]  Referring provider: Radha Olvera MD  Dx:   Encounter Diagnosis     ICD-10-CM    1. Status post ORIF of fracture of ankle  Z98.890     Z87.81       2. Decreased functional mobility and endurance  Z74.09       3. Balance disorder  R26.89                      Subjective: Saw the ortho today who said she is cleared for PT and further mobility. No longer wearing the CAM boot. So far has been doing Vernon lift transfers, but will start doing more transfers to encourage weightbearing.       Objective: See treatment diary below      Assessment: Pt tolerated the session well despite fatigue. Skin inspection was performed on the L foot and ankle. Improvements in skin integrity on the dorsum of the foot and lateral malleolus, but will continue to monitor healing. Pt was challenged with static balance in the parallel bars to encourage LLE weightbearing post ankle ORIF without the CAM boot. Pt tolerated well, but required maxAx1 to stand and then minAx1 and b/l UE support with // bars. Pt activity tolerance is decreased at this time with only being able to tolerate standing for 5 minutes maximum. Pt tried to take a few steps today, but continues to demonstrate scissoring. Caregivers educated to try to transfer and encourage weightbearing as she tolerates, but to prioritize safety of pt and caregiver. Both caregivers verbalized understanding. Pt would benefit from further skilled PT sessions to address deficits in endurance, balance, weightbearing, strength, and overall safety needed to maximize the pt's function and quality of life.       Plan: Continue per plan of care.      Short Term Goal Expiration Date:(2024)  Long Term Goal Expiration Date: (2025)  POC Expiration Date: (2025)    Visit count: 3 of 10; PN due 2024    POC expires Unit limit Auth Expiration date PT/OT/ST + Visit Limit?   25 N/A  N/A BOMN                           Visit/Unit Tracking  AUTH Status:  Date 11/26 11/29 12/13           BOMN Used 1 2 3            Remaining  9 8 7                 Precautions T21, Osteoporosis, Neck pain, Hx seizure like activity, Fall risk        Manuals 11/26 11/29 12/13      Skin inspection and caregiver education on promoting skin integrity with CAM boot  Skin inspection and caregiver education on promoting skin integrity with CAM boot  Skin inspection and caregiver education on promoting skin integrity                                Neuro Re-Ed  Pt and caregiver education on importance of LLE weightbearing to promote improved balance and functional mobility  Pt and caregiver education on importance of LLE weightbearing to promote improved balance and functional mobility  Pt and caregiver education on importance of LLE weightbearing to promote improved balance and functional mobility as well as trying to encourage pt to transfer instead of using sruthi lift      Transfers  STS with maxAx2 x 2    Chair <> mat table  STS with mod-maxAx2 with b/l UE at // bars x 5 STS   // bars   maxAx1   B/l UE  X 6      Static balance  Static standing with modAx2  X 2 (attempted more than 1 minute, but unable to sustain requiring maxAx2 beyond 1 min)    Static unsupported sitting on mat table x 2 min    Static standing with modAx2   Attempt 1: 3 minutes   Attempt 2: 2 minutes  Attempt 3: 30 seconds   Attempt 4: 10 sec   Attempt 5: 30 sec     Static standing with min-modAx1   B/l UE at // bars   Attempt 1:3 minutes   Attempt 2: 5 minutes   Attempt 3: 2 minutes   Attempt 4: 1 minutes   Attempt 5: 2 minutes     Static sitting balance with UE reaching to blazepods   X 5 min      Dynamic balance Walking with maxAx2 and CAM boot   X 5 min   Walking   // bars   B/l UE   modAx1   X 2 steps     Block with foot between pt's feet for scissoring                                      Ther Ex        Ankle ROM  3 x 30 sec L ankle dorsiflexion  stretch     Pt and caregiver education for HEP    Assessment of PROM of L ankle                                                                Ther Activity                        Gait Training  Walking with sneaker, maxAx2, b/l UE support  X5 minutes     Walking with CAM boot, maxAx2, b/l UE support  X 5 minutes     Tactile cues for decreased scissoring, weight shift, and step initiation       Education   Caregiver education on ways to decrease scissoring gait               Modalities

## 2024-12-13 NOTE — PROGRESS NOTES
Subjective   CHIEF COMPLAINT/REASON FOR VISIT  Aminta De La Torre is a 49 y.o. female who presents for  3.5 month post op follow-up after Orif Trimalleolar Fx, Open Reduction/treatment Of Tilleaux Fracture - Left on 8/29/2024     HISTORY OF PRESENT ILLNESS  Overall, she feels things are going well and progressing appropriately. Pain has been well controlled. She has been following the post-operative rehabilitation regimen without difficultly. Currently, she follows up today for her original follow up. She was here 11 days ago due to bruising on the dorsum of her foot. Since then, the bruising has resolved. She saw a podiatrist as well who recommended to restart physical therapy    Objective   PHYSICAL EXAMINATION  Musculoskeletal: Left Ankle              Skin Intact               Swelling/ecchymosis over dorsal aspect of the 3rd/4th metatarsal               TTP: None              Range of motion and strength intact              Sensation intact throughout Superficial peroneal, Deep peroneal, Tibial, Sural, Saphenous distributions              EHL/TA/PF motor function intact to testing.               BCR              Gait: unable to walk        Assessment & Plan   1. Status Post Orif Trimalleolar Fx, Open Reduction/treatment Of Tilleaux Fracture - Left    Patient is making appropriate progress from the date of their surgery  Recommends to resume physical therapy  We will plan to see her back at the as needed post-operative waqar  If any issues, questions, or concerns arise between now and the next appointment, we have encouraged the patient contact our team.        Scribe Attestation      I,:  Norman Robbins am acting as a scribe while in the presence of the attending physician.:       I,:  Mahamed Zamorano MD personally performed the services described in this documentation    as scribed in my presence.:

## 2024-12-20 ENCOUNTER — OFFICE VISIT (OUTPATIENT)
Dept: PHYSICAL THERAPY | Facility: CLINIC | Age: 49
End: 2024-12-20
Payer: MEDICARE

## 2024-12-20 DIAGNOSIS — Z87.81 STATUS POST ORIF OF FRACTURE OF ANKLE: Primary | ICD-10-CM

## 2024-12-20 DIAGNOSIS — Z98.890 STATUS POST ORIF OF FRACTURE OF ANKLE: Primary | ICD-10-CM

## 2024-12-20 DIAGNOSIS — R26.89 BALANCE DISORDER: ICD-10-CM

## 2024-12-20 DIAGNOSIS — Z74.09 DECREASED FUNCTIONAL MOBILITY AND ENDURANCE: ICD-10-CM

## 2024-12-20 PROCEDURE — 97110 THERAPEUTIC EXERCISES: CPT

## 2024-12-20 PROCEDURE — 97112 NEUROMUSCULAR REEDUCATION: CPT

## 2024-12-20 NOTE — PROGRESS NOTES
Daily Note     Today's date: 2024  Patient name: Aminta De La Torre  : 1975  MRN: [id number]  Referring provider: Radha Olvera MD  Dx:   Encounter Diagnosis     ICD-10-CM    1. Status post ORIF of fracture of ankle  Z98.890     Z87.81       2. Decreased functional mobility and endurance  Z74.09       3. Balance disorder  R26.89           Start Time: 1146  Stop Time: 1230  Total time in clinic (min): 44 minutes    Subjective: Pt had previous appointment before session and is very fatigued. Has not had lunch yet which may be contributing to fatigue. Caregiver states that they have been attempting to transfer her with MaxAx1 and 1 support person for safety to encourage LE weightbearing at home. Transferring about 2-3 times a day, but using Vernon lift for safety if pt is not helping with transfer at all.       Objective: See treatment diary below      Assessment: Pt tolerated the session fairly well despite complaints of fatigue. Skin inspection performed prior to session of L ankle. Improvements in skin integrity with no evident wounds on dorsum of the foot. Continues to have dry wound on L lateral malleolus, but it is demonstrating good healing since last session. Donned solo harness and attempted to stand within parallel bars, but pt was unable to perform stand this session due to significant fatigue. Prioritized static sitting balance and L ankle ROM, and hamstring flexibility. Utilized 2 towel rolls on L side to facilitate upright posture due to L trunk lean. Pt was able to participate in cone reaching and passing to promote forward weight shift in the w/c. Also performed dorsiflexion stretching to L ankle and gentle PROM in inversion and eversion to increase L ankle ROM post ORIF. Will reattempt standing and walking next session to promote LLE weightbearing and functional mobility. Caregiver educated to continue PROM of L ankle as able during dressing, to perform stand pivot transfers if safe and able,  and to monitor skin integrity daily. Caregiver verbalized understanding. Pt would benefit from further skilled PT sessions to address deficits in ROM, balance, gait, and overall safety needed to maximize the pt's function and quality of life.     Plan: Continue per plan of care.      Short Term Goal Expiration Date:(12/24/2024)  Long Term Goal Expiration Date: (01/21/2025)  POC Expiration Date: (01/25/2025)    Visit count: 4 of 10; PN due 12/24/2024    POC expires Unit limit Auth Expiration date PT/OT/ST + Visit Limit?   01/21/25 N/A N/A BOMN                           Visit/Unit Tracking  AUTH Status:  Date 11/26 11/29 12/13 12/20          BOMN Used 1 2 3 4           Remaining  9 8 7 6                Precautions T21, Osteoporosis, Neck pain, Hx seizure like activity, Fall risk        Manuals 11/26 11/29 12/13 12/20     Skin inspection and caregiver education on promoting skin integrity with CAM boot  Skin inspection and caregiver education on promoting skin integrity with CAM boot  Skin inspection and caregiver education on promoting skin integrity    Skin inspection and caregiver education on promoting skin integrity                             Neuro Re-Ed  Pt and caregiver education on importance of LLE weightbearing to promote improved balance and functional mobility  Pt and caregiver education on importance of LLE weightbearing to promote improved balance and functional mobility  Pt and caregiver education on importance of LLE weightbearing to promote improved balance and functional mobility as well as trying to encourage pt to transfer instead of using sruthi lift  Pt and caregiver education on importance of LLE weightbearing to promote improved balance and functional mobility as well as trying to encourage pt to transfer instead of using sruthi lif    Transfers  STS with maxAx2 x 2    Chair <> mat table  STS with mod-maxAx2 with b/l UE at // bars x 5 STS   // bars   maxAx1   B/l UE  X 6  Attempted STS, but  unable due to pt fatigue     Static balance  Static standing with modAx2  X 2 (attempted more than 1 minute, but unable to sustain requiring maxAx2 beyond 1 min)    Static unsupported sitting on mat table x 2 min    Static standing with modAx2   Attempt 1: 3 minutes   Attempt 2: 2 minutes  Attempt 3: 30 seconds   Attempt 4: 10 sec   Attempt 5: 30 sec     Static standing with min-modAx1   B/l UE at // bars   Attempt 1:3 minutes   Attempt 2: 5 minutes   Attempt 3: 2 minutes   Attempt 4: 1 minutes   Attempt 5: 2 minutes     Static sitting balance with UE reaching to blazepods   X 5 min  Static sitting   2 towel rolls on L to facilitate upright posture   Multidirectional reaching for cones and passing to caregiver   X10 min     Static sitting with dancing to [employer] to facilitate forward weight shift   X 5 min     Dynamic balance Walking with maxAx2 and CAM boot   X 5 min   Walking   // bars   B/l UE   modAx1   X 2 steps     Block with foot between pt's feet for scissoring                                      Ther Ex        Ankle ROM  3 x 30 sec L ankle dorsiflexion stretch     Pt and caregiver education for HEP    Assessment of PROM of L ankle    6 x 30 sec L ankle dorsiflexion stretch     2 x 30 sec R ankle dorsiflexion stretch     Gentle PROM in L ankle inversion and eversion   X 2 min     Sitting hamstring stretch   3 x 30 sec on L                                                             Ther Activity                        Gait Training  Walking with sneaker, maxAx2, b/l UE support  X5 minutes     Walking with CAM boot, maxAx2, b/l UE support  X 5 minutes     Tactile cues for decreased scissoring, weight shift, and step initiation       Education   Caregiver education on ways to decrease scissoring gait               Modalities

## 2024-12-20 NOTE — PROGRESS NOTES
Daily Note     Today's date: 2024  Patient name: Aminta De La Torre  : 1975  MRN: [id number]  Referring provider: Radha Olvera MD  Dx:   Encounter Diagnosis     ICD-10-CM    1. Status post ORIF of fracture of ankle  Z98.890     Z87.81       2. Decreased functional mobility and endurance  Z74.09       3. Balance disorder  R26.89           Start Time: 1300  Stop Time: 1345  Total time in clinic (min): 45 minutes    Subjective: Hasn't been doing much transferring at home. Using the sruthi lift more often than not. Mornings seem to be better for therapy as long as there's not other appointments the same day       Objective: See treatment diary below      Assessment: Pt tolerated the session very well today. Skin inspection performed prior to session of L ankle. Improvements in skin integrity with no evident wounds on dorsum of the foot. Continues to have dry wound on L lateral malleolus, but it is demonstrating good healing sine previous sessions. Was able to tolerate standing in the parallel bars with increased activity tolerance this session. Was able to walk a cumulative total of 10 ft this session with decreased scissoring since last session and decreased physical assistance needed to perform standing with b/l UE support. Pt continues to require maxAx1 to stand, but is able to stand with minAx1. Max verbal and tactile cues needed to facilitate step. Pt also exhibits increased L knee flexion in stance which may be due to tight hamstrings. Performed hamstring stretching to help increase tissue extensibility. Plan to progress walking as tolerated. Pt would benefit from further skilled PT sessions to address deficits in gait, balance, strength, endurance, activity tolerance, and overall safety needed to maximize the pt's function and quality of life.       Plan: Continue per plan of care.      Short Term Goal Expiration Date:(2024)  Long Term Goal Expiration Date: (2025)  POC Expiration Date:  (01/25/2025)    Visit count: 5 of 10; PN due 12/24/2024    POC expires Unit limit Auth Expiration date PT/OT/ST + Visit Limit?   01/21/25 N/A N/A BOMN                           Visit/Unit Tracking  AUTH Status:  Date 11/26 11/29 12/13 12/20 12/23         BOMN Used 1 2 3 4 5          Remaining  9 8 7 6 5               Precautions T21, Osteoporosis, Neck pain, Hx seizure like activity, Fall risk        Manuals 11/26 11/29 12/13 12/20 12/23    Skin inspection and caregiver education on promoting skin integrity with CAM boot  Skin inspection and caregiver education on promoting skin integrity with CAM boot  Skin inspection and caregiver education on promoting skin integrity    Skin inspection and caregiver education on promoting skin integrity  Skin inspection and caregiver education on promoting skin integrity                            Neuro Re-Ed  Pt and caregiver education on importance of LLE weightbearing to promote improved balance and functional mobility  Pt and caregiver education on importance of LLE weightbearing to promote improved balance and functional mobility  Pt and caregiver education on importance of LLE weightbearing to promote improved balance and functional mobility as well as trying to encourage pt to transfer instead of using sruthi lift   Pt and caregiver education on importance of LLE weightbearing to promote improved balance and functional mobility as well as trying to encourage pt to transfer instead of using sruthi lif   Transfers  STS with maxAx2 x 2    Chair <> mat table  STS with mod-maxAx2 with b/l UE at // bars x 5 STS   // bars   maxAx1   B/l UE  X 6  Attempted STS, but unable due to pt fatigue  STS   // bars   maxAx1   B/l UE on // bars   X 6   Static balance  Static standing with modAx2  X 2 (attempted more than 1 minute, but unable to sustain requiring maxAx2 beyond 1 min)    Static unsupported sitting on mat table x 2 min    Static standing with modAx2   Attempt 1: 3 minutes    Attempt 2: 2 minutes  Attempt 3: 30 seconds   Attempt 4: 10 sec   Attempt 5: 30 sec     Static standing with min-modAx1   B/l UE at // bars   Attempt 1:3 minutes   Attempt 2: 5 minutes   Attempt 3: 2 minutes   Attempt 4: 1 minutes   Attempt 5: 2 minutes     Static sitting balance with UE reaching to blazepods   X 5 min  Static sitting   2 towel rolls on L to facilitate upright posture   Multidirectional reaching for cones and passing to caregiver   X10 min     Static sitting with dancing to [employer] to facilitate forward weight shift   X 5 min  Static standing   With min/modAx1  B/l UE at //bars   Attempt 1: 5 min   Attempt 2: 5 min  Attempt 3: 2 min  Attempt 4: 5 min   Attempt 5: 5 min    Dynamic balance Walking with maxAx2 and CAM boot   X 5 min   Walking   // bars   B/l UE   modAx1   X 2 steps     Block with foot between pt's feet for scissoring   Walking   // bars   B/L UE   Round 1: 2 steps   Round 2: 4 steps   Round 3: 2 steps   Round 4: 8 steps     Block with foot between pt's feet to decrease scissoring and block at knee to prevent knee buckling on the left                                    Ther Ex     Education on increasing activity and stretching during dressing    Ankle ROM  3 x 30 sec L ankle dorsiflexion stretch     Pt and caregiver education for HEP    Assessment of PROM of L ankle    6 x 30 sec L ankle dorsiflexion stretch     2 x 30 sec R ankle dorsiflexion stretch     Gentle PROM in L ankle inversion and eversion   X 2 min     Sitting hamstring stretch   3 x 30 sec on L  2 x 30 sec L ankle dorsiflexion stretch     3 x 30 sec L hamstring stretch                                                                Ther Activity                        Gait Training  Walking with sneaker, maxAx2, b/l UE support  X5 minutes     Walking with CAM boot, maxAx2, b/l UE support  X 5 minutes     Tactile cues for decreased scissoring, weight shift, and step initiation       Education   Caregiver education  on ways to decrease scissoring gait               Modalities

## 2024-12-22 DIAGNOSIS — E03.9 HYPOTHYROIDISM, UNSPECIFIED TYPE: ICD-10-CM

## 2024-12-23 ENCOUNTER — OFFICE VISIT (OUTPATIENT)
Dept: PHYSICAL THERAPY | Facility: CLINIC | Age: 49
End: 2024-12-23
Payer: MEDICARE

## 2024-12-23 DIAGNOSIS — R26.89 BALANCE DISORDER: ICD-10-CM

## 2024-12-23 DIAGNOSIS — Z98.890 STATUS POST ORIF OF FRACTURE OF ANKLE: Primary | ICD-10-CM

## 2024-12-23 DIAGNOSIS — Z74.09 DECREASED FUNCTIONAL MOBILITY AND ENDURANCE: ICD-10-CM

## 2024-12-23 DIAGNOSIS — Z87.81 STATUS POST ORIF OF FRACTURE OF ANKLE: Primary | ICD-10-CM

## 2024-12-23 PROCEDURE — 97112 NEUROMUSCULAR REEDUCATION: CPT

## 2024-12-23 PROCEDURE — 97110 THERAPEUTIC EXERCISES: CPT

## 2024-12-24 RX ORDER — LEVOTHYROXINE SODIUM 50 UG/1
TABLET ORAL
Qty: 30 TABLET | Refills: 5 | Status: SHIPPED | OUTPATIENT
Start: 2024-12-24

## 2024-12-25 DIAGNOSIS — K59.04 CHRONIC IDIOPATHIC CONSTIPATION: ICD-10-CM

## 2024-12-25 RX ORDER — POLYETHYLENE GLYCOL 3350 17 G/17G
POWDER, FOR SOLUTION ORAL
Qty: 30 EACH | Refills: 5 | Status: SHIPPED | OUTPATIENT
Start: 2024-12-25

## 2024-12-26 ENCOUNTER — HOSPITAL ENCOUNTER (OUTPATIENT)
Dept: RADIOLOGY | Age: 49
Discharge: HOME | End: 2024-12-26
Payer: MEDICARE

## 2024-12-26 VITALS — WEIGHT: 147.05 LBS | BODY MASS INDEX: 34.03 KG/M2 | HEIGHT: 55 IN

## 2024-12-26 DIAGNOSIS — Z12.31 ENCOUNTER FOR SCREENING MAMMOGRAM FOR MALIGNANT NEOPLASM OF BREAST: ICD-10-CM

## 2024-12-26 PROCEDURE — 77063 BREAST TOMOSYNTHESIS BI: CPT

## 2024-12-26 PROCEDURE — 77067 SCR MAMMO BI INCL CAD: CPT

## 2024-12-26 NOTE — PROGRESS NOTES
Progress Note     Today's date: 2024  Patient name: Aminta De La Torre  : 1975  MRN: [id number]  Referring provider: Radha Olvera MD  Dx:   Encounter Diagnosis     ICD-10-CM    1. Status post ORIF of fracture of ankle  Z98.890     Z87.81       2. Decreased functional mobility and endurance  Z74.09       3. Balance disorder  R26.89           Start Time: 930  Stop Time: 1015  Total time in clinic (min): 45 minutes    Assessment:   Pt tolerated the session well today. Progress note performed this session to assess function and goals since IE. Pt has met 3/5 short term goals and is progressing with the other 2 short term goals at this time. Pt was able to achieve 1 LTG of static standing with modAx1 or less. Pt is progressing well with ankle ROM since initial evaluation and has been able to get to neutral dorsiflexion of L ankle to aid in walking. Pt currently has increased tightness of L hamstrings which may be impacting gait pattern. Pt still exhibits increased knee flexion with walking as well as decreased heel strike. Pt is making significnat improvements with her static balance being from 55 sec with modAx2 to 5 min with minAx1. In addition, pt is able to walk up to 10 ft with modAx1. However, requires max verbal and tactile cues for sequencing and technique due to cognitive deficits. Pt and caregiver provided with written HEP to increase compliance among caregivers. Caregiver verbalized understanding of exercises and to increase standing/transfers to 3x/day to increase LLE WB. Pt would benefit from further skilled PT sessions to address deficits in strength, balance, endurance, activity tolerance, and overall function needed to maximize the pt's quality of life.      Goals  STG:  Patient will improve L ankle PROM by 5 deg in each direction within 4 weeks. MET  Patient will be able to perform transfers with mod assist x2 within 4 weeks. NOT MET, progressing  Patient will be able to perform static standing  with maxAx1 for 2 min or more within 4 weeks in order to improve LLE weightbearing. MET  Patient will be able to ambulate 10 ft with BUE support and modAx2 within 4 weeks.MET, with max tactile cueing   Patient and nursing staff will be compliant with HEP within 4 weeks. NOT MET, progressing    LTG:  Patient will be able to ambulate 20 ft or more with BUE support and modAx2 or less within 8 weeks.   Patient will be able to perform static standing with modAx1 or less for 5 minutes or more within 8 weeks. MET  Patient will be able to perform STS transfers with modAx1 within 8 weeks to demonstrate improved LE strength and functional mobility.   Patient will be able to perform transfers with modAx1 within 8 weeks.   Patient and nursing staff will be compliant with progressed HEP within 8 weeks.     Plan  Patient would benefit from: skilled physical therapy  Referral necessary: No    Planned therapy interventions: activity modification, ADL retraining, ADL training, balance, balance/weight bearing training, behavior modification, body mechanics training, strengthening, sensory integrative techniques, postural training, patient/caregiver education, neuromuscular re-education, motor coordination training, flexibility, functional ROM exercises, gait training, graded activity, graded exercise, graded motor, home exercise program, wheelchair management, transfer training, therapeutic training, therapeutic exercise and therapeutic activities    Frequency: 2x week  Duration in weeks: 8  Plan of Care beginning date: 11/26/2024  Plan of Care expiration date: 1/21/2025  Treatment plan discussed with: caregiver and patient    Subjective: Caregivers have not been compliant with performing transfers with Aminta. Says that they are not all on the same page and some try and some don't. Still uses Vernon lift often.     Patient pain: None reported   Patient goals: Caregiver wants to decrease assistance level with transfers, help her walk  more, and improve ankle ROM     Objective: See treatment diary below    PROM (L):(IE)  Dorsifleixon: lacking 6 deg knee bent   Plantar flexion: 47 deg   Inversion: 23 deg   Eversion: 10 dg     :  Dorsiflexion: 0 deg knee bent (neutral)  Plantar flexion: 56 deg   Inversion: 24 deg   Eversion: 14 deg     Static standing and sitting balance:  Standin sec with modAx2 (IE)   Standin min with minAx1 ()    Skin inspection:   Improved skin integrity, continued wound on L lateral malleolus, but with observed scabbing and healing        Short Term Goal Expiration Date:(2024)  Long Term Goal Expiration Date: (2025)  POC Expiration Date: (2025)    Visit count: 6 of 10; PN due 2024    POC expires Unit limit Auth Expiration date PT/OT/ST + Visit Limit?   25 N/A N/A BOMN                           Visit/Unit Tracking  AUTH Status:  Date         BOMN Used 1 2 3 4 5 6         Remaining  9 8 7 6 5 4           Access Code: [email or web address]  URL: [email or web address]  Date: 2024  Prepared by: Radha Tyler    Program Notes  Passive dorsiflexion and hamstring stretching for caregiversTry to stand at least 3x/day for 1 min or more     Exercises  - Supine Hamstring Stretch  - 2 x daily - 7 x weekly - 1 sets - 3 reps - 30 hold  - Long Sitting Calf Stretch with Strap  - 1 x daily - 7 x weekly - 1 sets - 3 reps     Precautions T21, Osteoporosis, Neck pain, Hx seizure like activity, Fall risk        Manuals     Skin inspection and caregiver education on promoting skin integrity with CAM boot  Skin inspection and caregiver education on promoting skin integrity with CAM boot  Skin inspection and caregiver education on promoting skin integrity    Skin inspection and caregiver education on promoting skin integrity  Skin inspection and caregiver education on promoting skin integrity                            Neuro Re-Ed  Pt  and caregiver education on importance of LLE weightbearing to promote improved balance and functional mobility  Pt and caregiver education on importance of LLE weightbearing to promote improved balance and functional mobility  Pt and caregiver education on importance of LLE weightbearing to promote improved balance and functional mobility as well as trying to encourage pt to transfer instead of using sruthi lift   Pt and caregiver education on importance of LLE weightbearing to promote improved balance and functional mobility as well as trying to encourage pt to transfer instead of using sruthi lif   Transfers   STS with mod-maxAx2 with b/l UE at // bars x 5 STS   // bars   maxAx1   B/l UE  X 6  Attempted STS, but unable due to pt fatigue  STS   // bars   maxAx1   B/l UE on // bars   X 6   Static balance   Static standing with modAx2   Attempt 1: 3 minutes   Attempt 2: 2 minutes  Attempt 3: 30 seconds   Attempt 4: 10 sec   Attempt 5: 30 sec     Static standing with min-modAx1   B/l UE at // bars   Attempt 1:3 minutes   Attempt 2: 5 minutes   Attempt 3: 2 minutes   Attempt 4: 1 minutes   Attempt 5: 2 minutes     Static sitting balance with UE reaching to blazepods   X 5 min  Static sitting   2 towel rolls on L to facilitate upright posture   Multidirectional reaching for cones and passing to caregiver   X10 min     Static sitting with dancing to [email or web address] music to facilitate forward weight shift   X 5 min  Static standing   With min/modAx1  B/l UE at //bars   Attempt 1: 5 min   Attempt 2: 5 min  Attempt 3: 2 min  Attempt 4: 5 min   Attempt 5: 5 min    Dynamic balance Walking   // bars   10 ft   B/l UE   modAx1   X 2 laps     Block with foot between pt's feet for scissoring       Static standing   /// bars   5 min x 3   CGA-maxAx1   Walking   // bars   B/l UE   modAx1   X 2 steps     Block with foot between pt's feet for scissoring   Walking   // bars   B/L UE   Round 1: 2 steps   Round 2: 4 steps   Round 3: 2 steps    Round 4: 8 steps     Block with foot between pt's feet to decrease scissoring and block at knee to prevent knee buckling on the left                                    Ther Ex     Education on increasing activity and stretching during dressing    Ankle ROM  2 x 30 sec L ankle dorsiflexion stretch     2 x 30 sec L hamstring stretch     Review for HEP   6 x 30 sec L ankle dorsiflexion stretch     2 x 30 sec R ankle dorsiflexion stretch     Gentle PROM in L ankle inversion and eversion   X 2 min     Sitting hamstring stretch   3 x 30 sec on L  2 x 30 sec L ankle dorsiflexion stretch     3 x 30 sec L hamstring stretch                                                                Ther Activity                        Gait Training  Walking with sneaker, maxAx2, b/l UE support  X5 minutes     Walking with CAM boot, maxAx2, b/l UE support  X 5 minutes     Tactile cues for decreased scissoring, weight shift, and step initiation       Education   Caregiver education on ways to decrease scissoring gait               Modalities

## 2024-12-27 ENCOUNTER — EVALUATION (OUTPATIENT)
Dept: PHYSICAL THERAPY | Facility: CLINIC | Age: 49
End: 2024-12-27
Payer: MEDICARE

## 2024-12-27 DIAGNOSIS — Z74.09 DECREASED FUNCTIONAL MOBILITY AND ENDURANCE: ICD-10-CM

## 2024-12-27 DIAGNOSIS — Z98.890 STATUS POST ORIF OF FRACTURE OF ANKLE: Primary | ICD-10-CM

## 2024-12-27 DIAGNOSIS — R26.89 BALANCE DISORDER: ICD-10-CM

## 2024-12-27 DIAGNOSIS — Z87.81 STATUS POST ORIF OF FRACTURE OF ANKLE: Primary | ICD-10-CM

## 2024-12-27 PROCEDURE — 97110 THERAPEUTIC EXERCISES: CPT

## 2024-12-27 PROCEDURE — 97112 NEUROMUSCULAR REEDUCATION: CPT

## 2024-12-30 ENCOUNTER — OFFICE VISIT (OUTPATIENT)
Dept: PHYSICAL THERAPY | Facility: CLINIC | Age: 49
End: 2024-12-30
Payer: MEDICARE

## 2024-12-30 DIAGNOSIS — Z74.09 DECREASED FUNCTIONAL MOBILITY AND ENDURANCE: ICD-10-CM

## 2024-12-30 DIAGNOSIS — Z98.890 STATUS POST ORIF OF FRACTURE OF ANKLE: Primary | ICD-10-CM

## 2024-12-30 DIAGNOSIS — R26.89 BALANCE DISORDER: ICD-10-CM

## 2024-12-30 DIAGNOSIS — Z87.81 STATUS POST ORIF OF FRACTURE OF ANKLE: Primary | ICD-10-CM

## 2024-12-30 PROCEDURE — 97112 NEUROMUSCULAR REEDUCATION: CPT

## 2024-12-30 PROCEDURE — 97110 THERAPEUTIC EXERCISES: CPT

## 2024-12-30 NOTE — PROGRESS NOTES
"Daily Note     Today's date: 2024  Patient name: Aminta De La Torre  : 1975  MRN: [id number]  Referring provider: Radha Olvera MD  Dx:   Encounter Diagnosis     ICD-10-CM    1. Status post ORIF of fracture of ankle  Z98.890     Z87.81       2. Decreased functional mobility and endurance  Z74.09       3. Balance disorder  R26.89           Start Time: 1100  Stop Time: 1150  Total time in clinic (min): 50 minutes    Subjective: One caregiver is trying to do more transfers at the railing to encourage more LLE WB. Having trouble with getting WB at home. Also trying to do stretching during dressing. Stretching hasn't been very successful because Aminta fights the stretching. Also not having a lot of consistency with caregivers to maintain a good stretching routine.       Objective: See treatment diary below      Assessment: Pt tolerated the session well. Performed skin inspection prior to mobility. Wound on L lateral malleolus is continuing to heal nicely and no other bruising or skin changes noted. Pt has a L knee flexion and L hip ER contracture most likely due to prolonged sitting since ORIF in August. Caregiver and pt were educated on utilizing a pillow as wedge in between legs to promote decreased crossing of legs as well as pillow positioning to encourage knee extension. Caregiver verbalized understanding. Provided with written note regarding position HEP to increase consistency between caregivers. Pt was able to perform multiple bouts of standing with knee block to encourage L knee extension. Limited walking performed due to lack of WB on LLE. Trialed 2\" wedge under R foot to encourage LLE positioning and WB without much success. Pt would benefit from further skilled PT sessions to address deficits in activity tolerance, balance, strength, and overall mobility needed to maximize the pt's function and quality of life.     Plan: Continue per plan of care.  Progress treatment as tolerated.       Short Term " Goal Expiration Date:(12/24/2024)  Long Term Goal Expiration Date: (01/21/2025)  POC Expiration Date: (01/25/2025)    Visit count: 6 of 10; PN due 01/21/2025    POC expires Unit limit Auth Expiration date PT/OT/ST + Visit Limit?   01/21/25 N/A N/A BOMN                           Visit/Unit Tracking  AUTH Status:  Date 11/26 11/29 12/13 12/20 12/23 12/27  PN 12/30       BOMN Used 1 2 3 4 5 6 1        Remaining  9 8 7 6 5 4 9          Access Code: [email or web address]  URL: [email or web address]  Date: 12/27/2024  Prepared by: Radha Tyler    Program Notes  Passive dorsiflexion and hamstring stretching for caregiversTry to stand at least 3x/day for 1 min or more.  Use pillow as wedge in between thighs to prevent hip ER and place pillow under L ankle to encourage increased knee ext. Do NOT place pillow under knee.     Exercises  - Supine Hamstring Stretch  - 2 x daily - 7 x weekly - 1 sets - 3 reps - 30 hold  - Long Sitting Calf Stretch with Strap  - 1 x daily - 7 x weekly - 1 sets - 3 reps     Precautions T21, Osteoporosis, Neck pain, Hx seizure like activity, Fall risk        Manuals 12/27 12/30 12/13 12/20 12/23    Skin inspection and caregiver education on promoting skin integrity with CAM boot  Skin inspection and caregiver education on promoting skin integrity  Skin inspection and caregiver education on promoting skin integrity    Skin inspection and caregiver education on promoting skin integrity  Skin inspection and caregiver education on promoting skin integrity                            Neuro Re-Ed  Pt and caregiver education on importance of LLE weightbearing to promote improved balance and functional mobility  Pt and caregiver education on importance of LLE weightbearing to promote improved balance and functional mobility  Pt and caregiver education on importance of LLE weightbearing to promote improved balance and functional mobility as well as trying to encourage pt to transfer instead of using sruthi  "lift   Pt and caregiver education on importance of LLE weightbearing to promote improved balance and functional mobility as well as trying to encourage pt to transfer instead of using sruthi lif   Transfers    STS   // bars   maxAx1   B/l UE  X 6  Attempted STS, but unable due to pt fatigue  STS   // bars   maxAx1   B/l UE on // bars   X 6   Static balance    Static standing with min-modAx1   B/l UE at // bars   Attempt 1:3 minutes   Attempt 2: 5 minutes   Attempt 3: 2 minutes   Attempt 4: 1 minutes   Attempt 5: 2 minutes     Static sitting balance with UE reaching to blazepods   X 5 min  Static sitting   2 towel rolls on L to facilitate upright posture   Multidirectional reaching for cones and passing to caregiver   X10 min     Static sitting with dancing to [employer] to facilitate forward weight shift   X 5 min  Static standing   With min/modAx1  B/l UE at //bars   Attempt 1: 5 min   Attempt 2: 5 min  Attempt 3: 2 min  Attempt 4: 5 min   Attempt 5: 5 min    Dynamic balance Walking   // bars   10 ft   B/l UE   modAx1   X 2 laps     Block with foot between pt's feet for scissoring       Static standing   /// bars   5 min x 3   CGA-maxAx1  Static standing   // bars   B/l UE  5 min x 4   CGA-maxAx1     Block with foot between pt's feet for scissoring, attempted 2\" wooden wedge under R foot for increased L knee ext    Static standing  // bars   1 UE   High five   X 1 bout    Walking   // bars   4 steps x 3 bouts  minAx1 Walking   // bars   B/l UE   modAx1   X 2 steps     Block with foot between pt's feet for scissoring   Walking   // bars   B/L UE   Round 1: 2 steps   Round 2: 4 steps   Round 3: 2 steps   Round 4: 8 steps     Block with foot between pt's feet to decrease scissoring and block at knee to prevent knee buckling on the left                                    Ther Ex  Education on increasing activity and stretching during dressing due to L knee flexion contracture     Education on benefits of using " pillow wedge in between legs to decrease L hip ER    Education on increasing activity and stretching during dressing    Ankle ROM  2 x 30 sec L ankle dorsiflexion stretch     2 x 30 sec L hamstring stretch     Review for HEP 4 x 30 sec L ankle dorsiflexion stretch     6 x 30 sec L hamstring stretch     Review for HEP  6 x 30 sec L ankle dorsiflexion stretch     2 x 30 sec R ankle dorsiflexion stretch     Gentle PROM in L ankle inversion and eversion   X 2 min     Sitting hamstring stretch   3 x 30 sec on L  2 x 30 sec L ankle dorsiflexion stretch     3 x 30 sec L hamstring stretch                                                                Ther Activity                        Gait Training        Education                 Modalities

## 2025-01-02 PROBLEM — R05.1 ACUTE COUGH: Status: RESOLVED | Noted: 2024-12-03 | Resolved: 2025-01-02

## 2025-01-03 ENCOUNTER — OFFICE VISIT (OUTPATIENT)
Dept: PHYSICAL THERAPY | Facility: CLINIC | Age: 50
End: 2025-01-03
Payer: MEDICARE

## 2025-01-03 DIAGNOSIS — Z87.81 STATUS POST ORIF OF FRACTURE OF ANKLE: Primary | ICD-10-CM

## 2025-01-03 DIAGNOSIS — R26.89 BALANCE DISORDER: ICD-10-CM

## 2025-01-03 DIAGNOSIS — Z98.890 STATUS POST ORIF OF FRACTURE OF ANKLE: Primary | ICD-10-CM

## 2025-01-03 DIAGNOSIS — Z74.09 DECREASED FUNCTIONAL MOBILITY AND ENDURANCE: ICD-10-CM

## 2025-01-03 PROCEDURE — 97112 NEUROMUSCULAR REEDUCATION: CPT

## 2025-01-03 PROCEDURE — 97110 THERAPEUTIC EXERCISES: CPT

## 2025-01-03 NOTE — PROGRESS NOTES
Daily Note     Today's date: 1/3/2025  Patient name: Aminta De La Torre  : 1975  MRN: [id number]  Referring provider: Radha Olvera MD  Dx:   Encounter Diagnosis     ICD-10-CM    1. Status post ORIF of fracture of ankle  Z98.890     Z87.81       2. Decreased functional mobility and endurance  Z74.09       3. Balance disorder  R26.89           Start Time: 1100  Stop Time: 1145  Total time in clinic (min): 45 minutes    Subjective: Caregivers have been trying to do stretching, but haven't attempted positioning yet. Will relay to the other caregivers the plan for positioning. Believes earlier mornings or after lunch may be better for scheduling due to being upset when hungry.      Objective: See treatment diary below      Assessment: Pt tolerated the treatment well despite increased fatigue. Pt continues to require maxAx1 and b/l UE on // bars for STS transfers. Once standing, pt has progress to stand with CGA-minAx1 with b/l UE support for a max of 5 min each bout. Continues to have L knee flexion contracture which is limited heel contact during standing and ambulation, but demonstrating increased knee extension this session. Caregiver educated on positioning strategies to avoid crossing legs and to facilitate knee extension during sitting. Pt verbalized understanding. Pt demonstrated fatigue post-session. Pt would benefit from further skilled PT sessions to address deficits in endurance, strength, balance, activity tolerance, and overall safety need to maximize the pt's function and quality of life.       Plan: Continue per plan of care.  Progress treatment as tolerated.       Short Term Goal Expiration Date:(2024)  Long Term Goal Expiration Date: (2025)  POC Expiration Date: (2025)    Visit count: 6 of 10; PN due 2025    POC expires Unit limit Auth Expiration date PT/OT/ST + Visit Limit?   25 N/A N/A BOMN                           Visit/Unit Tracking  AUTH Status:  Date   12/13 12/20 12/23 12/27  PN 12/30 01/03      BOMN Used 1 2 3 4 5 6 1 2       Remaining  9 8 7 6 5 4 9 8         Access Code: [email or web address]  URL: [email or web address]  Date: 12/27/2024  Prepared by: Radha Tyler    Program Notes  Passive dorsiflexion and hamstring stretching for caregiversTry to stand at least 3x/day for 1 min or more.  Use pillow as wedge in between thighs to prevent hip ER and place pillow under L ankle to encourage increased knee ext. Do NOT place pillow under knee.     Exercises  - Supine Hamstring Stretch  - 2 x daily - 7 x weekly - 1 sets - 3 reps - 30 hold  - Long Sitting Calf Stretch with Strap  - 1 x daily - 7 x weekly - 1 sets - 3 reps     Precautions T21, Osteoporosis, Neck pain, Hx seizure like activity, Fall risk        Manuals 12/27 12/30 01/03 12/20 12/23    Skin inspection and caregiver education on promoting skin integrity with CAM boot  Skin inspection and caregiver education on promoting skin integrity   Skin inspection and caregiver education on promoting skin integrity  Skin inspection and caregiver education on promoting skin integrity                            Neuro Re-Ed  Pt and caregiver education on importance of LLE weightbearing to promote improved balance and functional mobility  Pt and caregiver education on importance of LLE weightbearing to promote improved balance and functional mobility  Pt and caregiver education on importance of LLE weightbearing to promote improved balance and functional mobility   Pt and caregiver education on importance of LLE weightbearing to promote improved balance and functional mobility as well as trying to encourage pt to transfer instead of using sruthi lif   Transfers    STS   // bars   maxAx1   B/l UE   X 7 Attempted STS, but unable due to pt fatigue  STS   // bars   maxAx1   B/l UE on // bars   X 6   Static balance     Static sitting   2 towel rolls on L to facilitate upright posture   Multidirectional reaching for cones and  "passing to caregiver   X10 min     Static sitting with dancing to [employer] to facilitate forward weight shift   X 5 min  Static standing   With min/modAx1  B/l UE at //bars   Attempt 1: 5 min   Attempt 2: 5 min  Attempt 3: 2 min  Attempt 4: 5 min   Attempt 5: 5 min    Dynamic balance Walking   // bars   10 ft   B/l UE   modAx1   X 2 laps     Block with foot between pt's feet for scissoring       Static standing   /// bars   5 min x 3   CGA-maxAx1  Static standing   // bars   B/l UE  5 min x 4   CGA-maxAx1     Block with foot between pt's feet for scissoring, attempted 2\" wooden wedge under R foot for increased L knee ext    Static standing  // bars   1 UE   High five   X 1 bout    Walking   // bars   4 steps x 3 bouts  minAx1 Static standing   // bars   B/l UE  5 min x 4   CGA-minAx1    Block with foot between pt's feet for scissoring, overpressure at knee to promote extension during stance   Walking   // bars   B/L UE   Round 1: 2 steps   Round 2: 4 steps   Round 3: 2 steps   Round 4: 8 steps     Block with foot between pt's feet to decrease scissoring and block at knee to prevent knee buckling on the left                                    Ther Ex  Education on increasing activity and stretching during dressing due to L knee flexion contracture     Education on benefits of using pillow wedge in between legs to decrease L hip ER  Pt education on all positioning with pillows to avoid crossing legs due improve Hip IR and knee extension   Education on increasing activity and stretching during dressing    Ankle ROM  2 x 30 sec L ankle dorsiflexion stretch     2 x 30 sec L hamstring stretch     Review for HEP 4 x 30 sec L ankle dorsiflexion stretch     6 x 30 sec L hamstring stretch     Review for HEP 6 x  1 min L hamstring stretch     Review for HEP 6 x 30 sec L ankle dorsiflexion stretch     2 x 30 sec R ankle dorsiflexion stretch     Gentle PROM in L ankle inversion and eversion   X 2 min     Sitting hamstring " stretch   3 x 30 sec on L  2 x 30 sec L ankle dorsiflexion stretch     3 x 30 sec L hamstring stretch                                                                Ther Activity                        Gait Training        Education                 Modalities

## 2025-01-06 ENCOUNTER — APPOINTMENT (OUTPATIENT)
Dept: PHYSICAL THERAPY | Facility: CLINIC | Age: 50
End: 2025-01-06
Payer: MEDICARE

## 2025-01-06 NOTE — PROGRESS NOTES
Daily Note     Today's date: 2025  Patient name: Aminta De La Torre  : 1975  MRN: [id number]  Referring provider: Radha Olvera MD  Dx: No diagnosis found.               Subjective: ***      Objective: See treatment diary below      Assessment: Pt tolerated the treatment ***. Pt performed biking on the Nu-step/walking on the treadmill to promote cardiovascular endurance/neural priming for the session. Pt was challenged with ***.  Pt is progressing with ***.  Pt had difficulty with ***. Pt demonstrated fatigue post-session. Pt would benefit from further skilled PT sessions to address deficits in endurance, strength, balance, activity tolerance, and overall safety need to maximize the pt's function and quality of life.         Plan: Continue per plan of care.  Progress treatment as tolerated.       Short Term Goal Expiration Date:(2024)  Long Term Goal Expiration Date: (2025)  POC Expiration Date: (2025)    Visit count: 3 of 10; PN due 2025    POC expires Unit limit Auth Expiration date PT/OT/ST + Visit Limit?   25 N/A N/A BOMN                           Visit/Unit Tracking  AUTH Status:  Date   PN      BOMN Used 1 2 3 4 5 6 1 2 3      Remaining  9 8 7 6 5 4 9 8 7        Access Code: [email or web address]  URL: [email or web address]  Date: 2024  Prepared by: Radha Tyler    Program Notes  Passive dorsiflexion and hamstring stretching for caregiversTry to stand at least 3x/day for 1 min or more.  Use pillow as wedge in between thighs to prevent hip ER and place pillow under L ankle to encourage increased knee ext. Do NOT place pillow under knee.     Exercises  - Supine Hamstring Stretch  - 2 x daily - 7 x weekly - 1 sets - 3 reps - 30 hold  - Long Sitting Calf Stretch with Strap  - 1 x daily - 7 x weekly - 1 sets - 3 reps     Precautions T21, Osteoporosis, Neck pain, Hx seizure like activity, Fall risk        Manuals   "12/30 01/03 01/06 12/23    Skin inspection and caregiver education on promoting skin integrity with CAM boot  Skin inspection and caregiver education on promoting skin integrity    Skin inspection and caregiver education on promoting skin integrity                            Neuro Re-Ed  Pt and caregiver education on importance of LLE weightbearing to promote improved balance and functional mobility  Pt and caregiver education on importance of LLE weightbearing to promote improved balance and functional mobility  Pt and caregiver education on importance of LLE weightbearing to promote improved balance and functional mobility   Pt and caregiver education on importance of LLE weightbearing to promote improved balance and functional mobility as well as trying to encourage pt to transfer instead of using sruthi lif   Transfers    STS   // bars   maxAx1   B/l UE   X 7  STS   // bars   maxAx1   B/l UE on // bars   X 6   Static balance      Static standing   With min/modAx1  B/l UE at //bars   Attempt 1: 5 min   Attempt 2: 5 min  Attempt 3: 2 min  Attempt 4: 5 min   Attempt 5: 5 min    Dynamic balance Walking   // bars   10 ft   B/l UE   modAx1   X 2 laps     Block with foot between pt's feet for scissoring       Static standing   /// bars   5 min x 3   CGA-maxAx1  Static standing   // bars   B/l UE  5 min x 4   CGA-maxAx1     Block with foot between pt's feet for scissoring, attempted 2\" wooden wedge under R foot for increased L knee ext    Static standing  // bars   1 UE   High five   X 1 bout    Walking   // bars   4 steps x 3 bouts  minAx1 Static standing   // bars   B/l UE  5 min x 4   CGA-minAx1    Block with foot between pt's feet for scissoring, overpressure at knee to promote extension during stance   Walking   // bars   B/L UE   Round 1: 2 steps   Round 2: 4 steps   Round 3: 2 steps   Round 4: 8 steps     Block with foot between pt's feet to decrease scissoring and block at knee to prevent knee buckling on the " left                                    Ther Ex  Education on increasing activity and stretching during dressing due to L knee flexion contracture     Education on benefits of using pillow wedge in between legs to decrease L hip ER  Pt education on all positioning with pillows to avoid crossing legs due improve Hip IR and knee extension   Education on increasing activity and stretching during dressing    Ankle ROM  2 x 30 sec L ankle dorsiflexion stretch     2 x 30 sec L hamstring stretch     Review for HEP 4 x 30 sec L ankle dorsiflexion stretch     6 x 30 sec L hamstring stretch     Review for HEP 6 x  1 min L hamstring stretch     Review for HEP  2 x 30 sec L ankle dorsiflexion stretch     3 x 30 sec L hamstring stretch                                                                Ther Activity                        Gait Training        Education                 Modalities

## 2025-01-10 ENCOUNTER — OFFICE VISIT (OUTPATIENT)
Dept: PHYSICAL THERAPY | Facility: CLINIC | Age: 50
End: 2025-01-10
Payer: MEDICARE

## 2025-01-10 DIAGNOSIS — Z74.09 DECREASED FUNCTIONAL MOBILITY AND ENDURANCE: ICD-10-CM

## 2025-01-10 DIAGNOSIS — Z87.81 STATUS POST ORIF OF FRACTURE OF ANKLE: Primary | ICD-10-CM

## 2025-01-10 DIAGNOSIS — Z98.890 STATUS POST ORIF OF FRACTURE OF ANKLE: Primary | ICD-10-CM

## 2025-01-10 DIAGNOSIS — R26.89 BALANCE DISORDER: ICD-10-CM

## 2025-01-10 PROCEDURE — 97110 THERAPEUTIC EXERCISES: CPT

## 2025-01-10 PROCEDURE — 97112 NEUROMUSCULAR REEDUCATION: CPT

## 2025-01-10 NOTE — PROGRESS NOTES
Daily Note     Today's date: 1/10/2025  Patient name: Aminta De La Torre  : 1975  MRN: [id number]  Referring provider: Radha Olvera MD  Dx:   Encounter Diagnosis     ICD-10-CM    1. Status post ORIF of fracture of ankle  Z98.890     Z87.81       2. Decreased functional mobility and endurance  Z74.09       3. Balance disorder  R26.89           Start Time: 1100  Stop Time: 1145  Total time in clinic (min): 45 minutes    Subjective: Caregivers have been trying to stretch LLE hamstring and foot into dorsiflexion. Pt was not comfortable with pillow as a wedge to prevent crossing legs, but attempted as able. Having a difficult time standing with Aminta at home with lack of equipment and pt motivation. Pt happy to come to therapy today and very motivated.        Objective: See treatment diary below      Assessment: Pt tolerated the session very well today. Pt is demonstrating excellent progress with standing tolerance, LE ROM, and ability to walk within parallel bars. Pt able to stand for up to 10 minutes at a time with b/l UE support and CGA. Pt able to walk up to 8ft x2 with modAx1 to facilitate step and prevent scissoring during walking. Hamstring also demonstrating improved flexibility with PROM and improved heel contact during stance. Will progress mobility as tolerated. Pt would benefit from further skilled PT sessions to address deficits in act tolerance, strength, balance, and overall safety needed to maximize the pt's function and quality of life.       Plan: Continue per plan of care.  Progress treatment as tolerated.       Short Term Goal Expiration Date:(2024)  Long Term Goal Expiration Date: (2025)  POC Expiration Date: (2025)    Visit count: 4 of 10; PN due 2025    POC expires Unit limit Auth Expiration date PT/OT/ST + Visit Limit?   25 N/A N/A BOMN                           Visit/Unit Tracking  AUTH Status:  Date 11/26 11/29 12/13 12/20 12/23 12/27  PN 12/30 01/03 01/10      BOMN Used 1 2 3 4 5 6 1 2 3      Remaining  9 8 7 6 5 4 9 8 7        Access Code: [email or web address]  URL: [email or web address]  Date: 12/27/2024  Prepared by: Radha Tyler    Program Notes  Passive dorsiflexion and hamstring stretching for caregiversTry to stand at least 3x/day for 1 min or more.  Use pillow as wedge in between thighs to prevent hip ER and place pillow under L ankle to encourage increased knee ext. Do NOT place pillow under knee.     Exercises  - Supine Hamstring Stretch  - 2 x daily - 7 x weekly - 1 sets - 3 reps - 30 hold  - Long Sitting Calf Stretch with Strap  - 1 x daily - 7 x weekly - 1 sets - 3 reps     Precautions T21, Osteoporosis, Neck pain, Hx seizure like activity, Fall risk        Manuals 12/27 12/30 01/03 01/10 12/23    Skin inspection and caregiver education on promoting skin integrity with CAM boot  Skin inspection and caregiver education on promoting skin integrity    Skin inspection and caregiver education on promoting skin integrity                            Neuro Re-Ed  Pt and caregiver education on importance of LLE weightbearing to promote improved balance and functional mobility  Pt and caregiver education on importance of LLE weightbearing to promote improved balance and functional mobility  Pt and caregiver education on importance of LLE weightbearing to promote improved balance and functional mobility   Pt and caregiver education on importance of LLE weightbearing to promote improved balance and functional mobility as well as trying to encourage pt to transfer instead of using sruthi lif   Transfers    STS   // bars   maxAx1   B/l UE   X 7 STS  // bars   maxAx1   B/l UE   X 5 STS   // bars   maxAx1   B/l UE on // bars   X 6   Static balance      Static standing   With min/modAx1  B/l UE at //bars   Attempt 1: 5 min   Attempt 2: 5 min  Attempt 3: 2 min  Attempt 4: 5 min   Attempt 5: 5 min    Dynamic balance Walking   // bars   10 ft   B/l UE   modAx1   X 2 laps  "    Block with foot between pt's feet for scissoring       Static standing   /// bars   5 min x 3   CGA-maxAx1  Static standing   // bars   B/l UE  5 min x 4   CGA-maxAx1     Block with foot between pt's feet for scissoring, attempted 2\" wooden wedge under R foot for increased L knee ext    Static standing  // bars   1 UE   High five   X 1 bout    Walking   // bars   4 steps x 3 bouts  minAx1 Static standing   // bars   B/l UE  5 min x 4   CGA-minAx1    Block with foot between pt's feet for scissoring, overpressure at knee to promote extension during stance  Static standing   // bars   B/l UE  10 min x 2   CGA-minAx1  Block with foot between pt's feet for scissoring, overpressure at knee to promote extension during stance     Static standing with UE reach to high five   X 2    Walking   // bars   8 ft x2 bouts  modAx1  Tactile cues for weightshift and propulsion     Walking   // bars   B/L UE   Round 1: 2 steps   Round 2: 4 steps   Round 3: 2 steps   Round 4: 8 steps     Block with foot between pt's feet to decrease scissoring and block at knee to prevent knee buckling on the left                                    Ther Ex  Education on increasing activity and stretching during dressing due to L knee flexion contracture     Education on benefits of using pillow wedge in between legs to decrease L hip ER  Pt education on all positioning with pillows to avoid crossing legs due improve Hip IR and knee extension  Education to caregivers about continued stretching to decrease L knee flexion contracture Education on increasing activity and stretching during dressing    Ankle ROM  2 x 30 sec L ankle dorsiflexion stretch     2 x 30 sec L hamstring stretch     Review for HEP 4 x 30 sec L ankle dorsiflexion stretch     6 x 30 sec L hamstring stretch     Review for HEP 6 x  1 min L hamstring stretch     Review for HEP 3  x  1 min L hamstring stretch     2 x 30 sec L ankle dorsiflexion stretch  2 x 30 sec L ankle dorsiflexion " stretch     3 x 30 sec L hamstring stretch                                                                Ther Activity                        Gait Training        Education                 Modalities

## 2025-01-13 NOTE — PROGRESS NOTES
Daily Note     Today's date: 1/15/2025  Patient name: Aminta De La Torre  : 1975  MRN: [id number]  Referring provider: Radha Olvera MD  Dx:   Encounter Diagnosis     ICD-10-CM    1. Status post ORIF of fracture of ankle  Z98.890     Z87.81       2. Decreased functional mobility and endurance  Z74.09       3. Balance disorder  R26.89           Start Time: 1015  Stop Time: 1107  Total time in clinic (min): 52 minutes    Subjective: Aminta tearful to start the session, some caregivers are stretching and trying to transfer her without sruthi lift, but still inconsistent, one caregiver sat with Aminta on edge of bed to sit without legs crossing and to promote postural control, want to start having her sit up in the recliner vs reclining back to avoid crossing of the legs. Caregiver believes 11am will be the best time for therapy to get her on a good schedule.      Objective: See treatment diary below      Assessment: Pt tolerated the treatment well. Pt continues to be a maxAx1 to stand. However, is progressing very well with standing balance with CGA and walking 3 x 10ft with minAx1 and blocking of foot to avoid scissoring. Pt and caregivers educated to continue stretching routine 2x/day and to promote knee extension when sitting as pt tolerates to help with knee flexion contracture. Caregivers verbalized understanding. Pt demonstrated fatigue post-session. Pt would benefit from further skilled PT sessions to address deficits in endurance, strength, balance, activity tolerance, and overall safety need to maximize the pt's function and quality of life.       Plan: Continue per plan of care.  Progress treatment as tolerated.       Short Term Goal Expiration Date:(2024)  Long Term Goal Expiration Date: (2025)  POC Expiration Date: (2025)    Visit count: 4 of 10; PN due 2025    POC expires Unit limit Auth Expiration date PT/OT/ST + Visit Limit?   25 N/A N/A BOMN                            Visit/Unit Tracking  AUTH Status:  Date 11/26 11/29 12/13 12/20 12/23 12/27  PN 12/30 01/03 01/10 01/15    BOMN Used 1 2 3 4 5 6 1 2 3 4     Remaining  9 8 7 6 5 4 9 8 7 6       Access Code: [email or web address]  URL: [email or web address]  Date: 12/27/2024  Prepared by: Radha Tyler    Program Notes  Passive dorsiflexion and hamstring stretching for caregiversTry to stand at least 3x/day for 1 min or more.  Use pillow as wedge in between thighs to prevent hip ER and place pillow under L ankle to encourage increased knee ext. Do NOT place pillow under knee.     Exercises  - Supine Hamstring Stretch  - 2 x daily - 7 x weekly - 1 sets - 3 reps - 30 hold  - Long Sitting Calf Stretch with Strap  - 1 x daily - 7 x weekly - 1 sets - 3 reps     Precautions T21, Osteoporosis, Neck pain, Hx seizure like activity, Fall risk        Manuals 12/27 12/30 01/03 01/10 01/15    Skin inspection and caregiver education on promoting skin integrity with CAM boot  Skin inspection and caregiver education on promoting skin integrity                               Neuro Re-Ed  Pt and caregiver education on importance of LLE weightbearing to promote improved balance and functional mobility  Pt and caregiver education on importance of LLE weightbearing to promote improved balance and functional mobility  Pt and caregiver education on importance of LLE weightbearing to promote improved balance and functional mobility   Pt and caregiver education on importance of LLE weightbearing to promote improved balance and functional mobility     Education to caregivers about promoting static balance with sitting without back support    Transfers    STS   // bars   maxAx1   B/l UE   X 7 STS  // bars   maxAx1   B/l UE   X 5 STS  // bars   maxAx1   B/l UE   X 4   Static balance         Dynamic balance Walking   // bars   10 ft   B/l UE   modAx1   X 2 laps     Block with foot between pt's feet for scissoring       Static standing   /// bars   5 min x 3  "  CGA-maxAx1  Static standing   // bars   B/l UE  5 min x 4   CGA-maxAx1     Block with foot between pt's feet for scissoring, attempted 2\" wooden wedge under R foot for increased L knee ext    Static standing  // bars   1 UE   High five   X 1 bout    Walking   // bars   4 steps x 3 bouts  minAx1 Static standing   // bars   B/l UE  5 min x 4   CGA-minAx1    Block with foot between pt's feet for scissoring, overpressure at knee to promote extension during stance  Static standing   // bars   B/l UE  10 min x 2   CGA-minAx1  Block with foot between pt's feet for scissoring, overpressure at knee to promote extension during stance     Static standing with UE reach to high five   X 2    Walking   // bars   8 ft x2 bouts  modAx1  Tactile cues for weightshift and propulsion     Static standing   // bars   B/l UE  8  min x 2   CGA-minAx1  Block with foot between pt's feet for scissoring, overpressure at knee to promote extension during stance     Walking   // bars   10 ft x3 bouts  Min-modAx1  Tactile cues for weightshift and propulsion                                   Ther Ex  Education on increasing activity and stretching during dressing due to L knee flexion contracture     Education on benefits of using pillow wedge in between legs to decrease L hip ER  Pt education on all positioning with pillows to avoid crossing legs due improve Hip IR and knee extension  Education to caregivers about continued stretching to decrease L knee flexion contracture Education to caregivers about continued stretching to decrease L knee flexion contracture   Ankle ROM  2 x 30 sec L ankle dorsiflexion stretch     2 x 30 sec L hamstring stretch     Review for HEP 4 x 30 sec L ankle dorsiflexion stretch     6 x 30 sec L hamstring stretch     Review for HEP 6 x  1 min L hamstring stretch     Review for HEP 3  x  1 min L hamstring stretch     2 x 30 sec L ankle dorsiflexion stretch  3  x  1 min L hamstring stretch     2 x 30 sec L ankle " dorsiflexion stretch                                                            Ther Activity                        Gait Training        Education                 Modalities

## 2025-01-15 ENCOUNTER — OFFICE VISIT (OUTPATIENT)
Dept: PHYSICAL THERAPY | Facility: CLINIC | Age: 50
End: 2025-01-15
Payer: MEDICARE

## 2025-01-15 DIAGNOSIS — Z87.81 STATUS POST ORIF OF FRACTURE OF ANKLE: Primary | ICD-10-CM

## 2025-01-15 DIAGNOSIS — R26.89 BALANCE DISORDER: ICD-10-CM

## 2025-01-15 DIAGNOSIS — Z98.890 STATUS POST ORIF OF FRACTURE OF ANKLE: Primary | ICD-10-CM

## 2025-01-15 DIAGNOSIS — Z74.09 DECREASED FUNCTIONAL MOBILITY AND ENDURANCE: ICD-10-CM

## 2025-01-15 PROCEDURE — 97112 NEUROMUSCULAR REEDUCATION: CPT

## 2025-01-15 PROCEDURE — 97110 THERAPEUTIC EXERCISES: CPT

## 2025-01-16 ENCOUNTER — TELEPHONE (OUTPATIENT)
Dept: OBGYN CLINIC | Facility: CLINIC | Age: 50
End: 2025-01-16

## 2025-01-16 NOTE — TELEPHONE ENCOUNTER
Requested return call to confirm appointment date 02/18 /25 rescheduled to 02/25/25 at 9:45am due to provider schedule change

## 2025-01-17 ENCOUNTER — OFFICE VISIT (OUTPATIENT)
Dept: PHYSICAL THERAPY | Facility: CLINIC | Age: 50
End: 2025-01-17
Payer: MEDICARE

## 2025-01-17 DIAGNOSIS — Z98.890 STATUS POST ORIF OF FRACTURE OF ANKLE: Primary | ICD-10-CM

## 2025-01-17 DIAGNOSIS — Z74.09 DECREASED FUNCTIONAL MOBILITY AND ENDURANCE: ICD-10-CM

## 2025-01-17 DIAGNOSIS — Z87.81 STATUS POST ORIF OF FRACTURE OF ANKLE: Primary | ICD-10-CM

## 2025-01-17 DIAGNOSIS — R26.89 BALANCE DISORDER: ICD-10-CM

## 2025-01-17 PROCEDURE — 97112 NEUROMUSCULAR REEDUCATION: CPT

## 2025-01-17 PROCEDURE — 97110 THERAPEUTIC EXERCISES: CPT

## 2025-01-17 NOTE — PROGRESS NOTES
Daily Note     Today's date: 2025  Patient name: Aminta De La Torre  : 1975  MRN: [id number]  Referring provider: Radha Olvera MD  Dx:   Encounter Diagnosis     ICD-10-CM    1. Status post ORIF of fracture of ankle  Z98.890     Z87.81       2. Decreased functional mobility and endurance  Z74.09       3. Balance disorder  R26.89           Start Time: 1015  Stop Time: 1100  Total time in clinic (min): 45 minutes    Subjective: Caregivers have been trying to stretch a lot more, having her sit up in the recliner with her feet down to avoid crossing legs as much as possible.      Objective: See treatment diary below      Assessment: Pt tolerated the session well today. Pt continues to require maxAx1 to stand. Progressing with standing balance with CGA and b/l UE support. Pt demonstrated increased difficulty with LLE WB today requiring increase tactile cueing. Pt able to walk 10ft x 2 this session with minAx1. Knee flexion ROM seems to be improving with improved HEP compliance. Pt would benefit from further skilled PT sessions to address deficits in ROM, strength, balance, and endurance needed to maximize the pt's function and quality of life.       Plan: Continue per plan of care.  Progress treatment as tolerated.       Short Term Goal Expiration Date:(2024)  Long Term Goal Expiration Date: (2025)  POC Expiration Date: (2025)    Visit count: 5 of 10; PN due 2025    POC expires Unit limit Auth Expiration date PT/OT/ST + Visit Limit?   25 N/A N/A BOMN                           Visit/Unit Tracking  AUTH Status:  Date 11/26 11/29 12/13 12/20 12/23 12/27  PN 12/30 01/03 01/10 01/15 01/17   BOMN Used 1 2 3 4 5 6 1 2 3 4 5    Remaining  9 8 7 6 5 4 9 8 7 6 5      Access Code: [email or web address]  URL: [email or web address]  Date: 2024  Prepared by: Radha Tyler    Program Notes  Passive dorsiflexion and hamstring stretching for caregiversTry to stand at least 3x/day for 1 min or  "more.  Use pillow as wedge in between thighs to prevent hip ER and place pillow under L ankle to encourage increased knee ext. Do NOT place pillow under knee.     Exercises  - Supine Hamstring Stretch  - 2 x daily - 7 x weekly - 1 sets - 3 reps - 30 hold  - Long Sitting Calf Stretch with Strap  - 1 x daily - 7 x weekly - 1 sets - 3 reps     Precautions T21, Osteoporosis, Neck pain, Hx seizure like activity, Fall risk        Manuals 01/17 12/30 01/03 01/10 01/15     Skin inspection and caregiver education on promoting skin integrity                               Neuro Re-Ed  Pt and caregiver education on importance of LLE weightbearing to promote improved balance and functional mobility  Pt and caregiver education on importance of LLE weightbearing to promote improved balance and functional mobility  Pt and caregiver education on importance of LLE weightbearing to promote improved balance and functional mobility   Pt and caregiver education on importance of LLE weightbearing to promote improved balance and functional mobility     Education to caregivers about promoting static balance with sitting without back support    Transfers  STS  // bars   B/l UE   maxAx1  x5  STS   // bars   maxAx1   B/l UE   X 7 STS  // bars   maxAx1   B/l UE   X 5 STS  // bars   maxAx1   B/l UE   X 4   Static balance         Dynamic balance Walking   // bars   10 ft   B/l UE   Min Ax1   X 2 laps     Block with foot between pt's feet for scissoring       Static standing   /// bars   10 min x 3   CGA--minAx1  Static standing   // bars   B/l UE  5 min x 4   CGA-maxAx1     Block with foot between pt's feet for scissoring, attempted 2\" wooden wedge under R foot for increased L knee ext    Static standing  // bars   1 UE   High five   X 1 bout    Walking   // bars   4 steps x 3 bouts  minAx1 Static standing   // bars   B/l UE  5 min x 4   CGA-minAx1    Block with foot between pt's feet for scissoring, overpressure at knee to promote extension " during stance  Static standing   // bars   B/l UE  10 min x 2   CGA-minAx1  Block with foot between pt's feet for scissoring, overpressure at knee to promote extension during stance     Static standing with UE reach to high five   X 2    Walking   // bars   8 ft x2 bouts  modAx1  Tactile cues for weightshift and propulsion     Static standing   // bars   B/l UE  8  min x 2   CGA-minAx1  Block with foot between pt's feet for scissoring, overpressure at knee to promote extension during stance     Walking   // bars   10 ft x3 bouts  Min-modAx1  Tactile cues for weightshift and propulsion                                   Ther Ex  Education on increasing activity and stretching during dressing due to L knee flexion contracture     Education on benefits of using pillow wedge in between legs to decrease L hip ER  Pt education on all positioning with pillows to avoid crossing legs due improve Hip IR and knee extension  Education to caregivers about continued stretching to decrease L knee flexion contracture Education to caregivers about continued stretching to decrease L knee flexion contracture   Ankle ROM  2 x 30 sec L ankle dorsiflexion stretch     4 x 1 min L hamstring stretch     Review for HEP 4 x 30 sec L ankle dorsiflexion stretch     6 x 30 sec L hamstring stretch     Review for HEP 6 x  1 min L hamstring stretch     Review for HEP 3  x  1 min L hamstring stretch     2 x 30 sec L ankle dorsiflexion stretch  3  x  1 min L hamstring stretch     2 x 30 sec L ankle dorsiflexion stretch                                                            Ther Activity                        Gait Training        Education                 Modalities

## 2025-01-17 NOTE — PROGRESS NOTES
Progress Update    Today's date: 2025  Patient name: Aminta De La Torre  : 1975  MRN: [id number]  Referring provider: Radha Olvera MD  Dx: No diagnosis found.             Assessment:       Plan: Continue per plan of care.  Progress treatment as tolerated.      Goals  STG:  Patient will improve L ankle PROM by 5 deg in each direction within 4 weeks. MET  Patient will be able to perform transfers with mod assist x2 within 4 weeks. NOT MET, progressing  Patient will be able to perform static standing with maxAx1 for 2 min or more within 4 weeks in order to improve LLE weightbearing. MET  Patient will be able to ambulate 10 ft with BUE support and modAx2 within 4 weeks.MET, with max tactile cueing   Patient and nursing staff will be compliant with HEP within 4 weeks. NOT MET, progressing    LTG:  Patient will be able to ambulate 20 ft or more with BUE support and modAx2 or less within 8 weeks.   Patient will be able to perform static standing with modAx1 or less for 5 minutes or more within 8 weeks. MET  Patient will be able to perform STS transfers with modAx1 within 8 weeks to demonstrate improved LE strength and functional mobility.   Patient will be able to perform transfers with modAx1 within 8 weeks.   Patient and nursing staff will be compliant with progressed HEP within 8 weeks.     Plan  Patient would benefit from: skilled physical therapy  Referral necessary: No    Planned therapy interventions: activity modification, ADL retraining, ADL training, balance, balance/weight bearing training, behavior modification, body mechanics training, strengthening, sensory integrative techniques, postural training, patient/caregiver education, neuromuscular re-education, motor coordination training, flexibility, functional ROM exercises, gait training, graded activity, graded exercise, graded motor, home exercise program, wheelchair management, transfer training, therapeutic training, therapeutic exercise and  therapeutic activities    Frequency: 2x week  Duration in weeks: 8  Plan of Care beginning date: 2024  Plan of Care expiration date: 2025  Treatment plan discussed with: caregiver and patient    Subjective: ***    Patient pain:   Patient goals:    Objective: See treatment diary below    PROM (L):(IE)  Dorsifleixon: lacking 6 deg knee bent   Plantar flexion: 47 deg   Inversion: 23 deg   Eversion: 10 dg     :  Dorsiflexion: 0 deg knee bent (neutral)  Plantar flexion: 56 deg   Inversion: 24 deg   Eversion: 14 deg     Static standing and sitting balance:  Standin sec with modAx2 (IE)   Standin min with minAx1 ()    Skin inspection:   Improved skin integrity, continued wound on L lateral malleolus, but with observed scabbing and healing        Short Term Goal Expiration Date: ***  Long Term Goal Expiration Date: ***  POC Expiration Date: ***    Visit count: 3 of 10; PN due ***    POC expires Unit limit Auth Expiration date PT/OT/ST + Visit Limit?   25 N/A N/A BOMN                           Visit/Unit Tracking  AUTH Status:  Date   PN 12/30 01/03 01/10 01/15 01/17   BOMN Used 1 2 3 4 5 6 1 2 3 1 2    Remaining  9 8 7 6 5 4 9 8 7 9 8     AUTH Status:  Date 01/20             BOMN Used 3              Remaining  7                  Access Code: [email or web address]  URL: [email or web address]  Date: 2024  Prepared by: Radha Tyler    Program Notes  Passive dorsiflexion and hamstring stretching for caregiversTry to stand at least 3x/day for 1 min or more.  Use pillow as wedge in between thighs to prevent hip ER and place pillow under L ankle to encourage increased knee ext. Do NOT place pillow under knee.     Exercises  - Supine Hamstring Stretch  - 2 x daily - 7 x weekly - 1 sets - 3 reps - 30 hold  - Long Sitting Calf Stretch with Strap  - 1 x daily - 7 x weekly - 1 sets - 3 reps     Precautions T21, Osteoporosis, Neck pain, Hx seizure like  "activity, Fall risk        Manuals 01/17 12/30 01/03 01/10 01/15     Skin inspection and caregiver education on promoting skin integrity                               Neuro Re-Ed  Pt and caregiver education on importance of LLE weightbearing to promote improved balance and functional mobility  Pt and caregiver education on importance of LLE weightbearing to promote improved balance and functional mobility  Pt and caregiver education on importance of LLE weightbearing to promote improved balance and functional mobility   Pt and caregiver education on importance of LLE weightbearing to promote improved balance and functional mobility     Education to caregivers about promoting static balance with sitting without back support    Transfers  STS  // bars   B/l UE   maxAx1  x5  STS   // bars   maxAx1   B/l UE   X 7 STS  // bars   maxAx1   B/l UE   X 5 STS  // bars   maxAx1   B/l UE   X 4   Static balance         Dynamic balance Walking   // bars   10 ft   B/l UE   Min Ax1   X 2 laps     Block with foot between pt's feet for scissoring       Static standing   /// bars   10 min x 3   CGA--minAx1  Static standing   // bars   B/l UE  5 min x 4   CGA-maxAx1     Block with foot between pt's feet for scissoring, attempted 2\" wooden wedge under R foot for increased L knee ext    Static standing  // bars   1 UE   High five   X 1 bout    Walking   // bars   4 steps x 3 bouts  minAx1 Static standing   // bars   B/l UE  5 min x 4   CGA-minAx1    Block with foot between pt's feet for scissoring, overpressure at knee to promote extension during stance  Static standing   // bars   B/l UE  10 min x 2   CGA-minAx1  Block with foot between pt's feet for scissoring, overpressure at knee to promote extension during stance     Static standing with UE reach to high five   X 2    Walking   // bars   8 ft x2 bouts  modAx1  Tactile cues for weightshift and propulsion     Static standing   // bars   B/l UE  8  min x 2   CGA-minAx1  Block with foot " between pt's feet for scissoring, overpressure at knee to promote extension during stance     Walking   // bars   10 ft x3 bouts  Min-modAx1  Tactile cues for weightshift and propulsion                                   Ther Ex  Education on increasing activity and stretching during dressing due to L knee flexion contracture     Education on benefits of using pillow wedge in between legs to decrease L hip ER  Pt education on all positioning with pillows to avoid crossing legs due improve Hip IR and knee extension  Education to caregivers about continued stretching to decrease L knee flexion contracture Education to caregivers about continued stretching to decrease L knee flexion contracture   Ankle ROM  2 x 30 sec L ankle dorsiflexion stretch     4 x 1 min L hamstring stretch     Review for HEP 4 x 30 sec L ankle dorsiflexion stretch     6 x 30 sec L hamstring stretch     Review for HEP 6 x  1 min L hamstring stretch     Review for HEP 3  x  1 min L hamstring stretch     2 x 30 sec L ankle dorsiflexion stretch  3  x  1 min L hamstring stretch     2 x 30 sec L ankle dorsiflexion stretch                                                            Ther Activity                        Gait Training        Education                 Modalities

## 2025-01-20 ENCOUNTER — APPOINTMENT (OUTPATIENT)
Dept: PHYSICAL THERAPY | Facility: CLINIC | Age: 50
End: 2025-01-20
Payer: MEDICARE

## 2025-01-20 NOTE — TELEPHONE ENCOUNTER
-Please get labs today, I will send you a message on your portal with your results.   -I will send paperwork and results to Dr. Macario  -Please let me know if you have any questions.   Please advise

## 2025-01-22 ENCOUNTER — HOSPITAL ENCOUNTER (EMERGENCY)
Facility: HOSPITAL | Age: 50
Discharge: HOME | End: 2025-01-22
Attending: EMERGENCY MEDICINE | Admitting: EMERGENCY MEDICINE
Payer: MEDICARE

## 2025-01-22 ENCOUNTER — OFFICE VISIT (OUTPATIENT)
Dept: PHYSICAL THERAPY | Facility: CLINIC | Age: 50
End: 2025-01-22
Payer: MEDICARE

## 2025-01-22 VITALS — HEART RATE: 77 BPM | OXYGEN SATURATION: 97 % | RESPIRATION RATE: 16 BRPM | TEMPERATURE: 98.5 F

## 2025-01-22 DIAGNOSIS — G89.29 CHRONIC PAIN OF LEFT KNEE: ICD-10-CM

## 2025-01-22 DIAGNOSIS — K59.04 CHRONIC IDIOPATHIC CONSTIPATION: ICD-10-CM

## 2025-01-22 DIAGNOSIS — R26.89 BALANCE DISORDER: ICD-10-CM

## 2025-01-22 DIAGNOSIS — M25.562 CHRONIC PAIN OF LEFT KNEE: ICD-10-CM

## 2025-01-22 DIAGNOSIS — Z74.09 DECREASED FUNCTIONAL MOBILITY AND ENDURANCE: ICD-10-CM

## 2025-01-22 DIAGNOSIS — Z87.81 STATUS POST ORIF OF FRACTURE OF ANKLE: Primary | ICD-10-CM

## 2025-01-22 DIAGNOSIS — Z98.890 STATUS POST ORIF OF FRACTURE OF ANKLE: Primary | ICD-10-CM

## 2025-01-22 DIAGNOSIS — K59.00 CONSTIPATION: Primary | ICD-10-CM

## 2025-01-22 PROCEDURE — 99284 EMERGENCY DEPT VISIT MOD MDM: CPT | Performed by: EMERGENCY MEDICINE

## 2025-01-22 PROCEDURE — 99282 EMERGENCY DEPT VISIT SF MDM: CPT

## 2025-01-22 PROCEDURE — 97110 THERAPEUTIC EXERCISES: CPT

## 2025-01-22 PROCEDURE — 97112 NEUROMUSCULAR REEDUCATION: CPT

## 2025-01-22 RX ORDER — BISACODYL 10 MG
10 SUPPOSITORY, RECTAL RECTAL DAILY
Qty: 12 SUPPOSITORY | Refills: 0 | Status: SHIPPED | OUTPATIENT
Start: 2025-01-22 | End: 2025-02-03

## 2025-01-22 RX ORDER — DOCUSATE SODIUM 100 MG/1
100 CAPSULE, LIQUID FILLED ORAL 2 TIMES DAILY
Qty: 60 CAPSULE | Refills: 1 | Status: SHIPPED | OUTPATIENT
Start: 2025-01-22

## 2025-01-22 RX ORDER — DICLOFENAC SODIUM 10 MG/G
GEL TOPICAL
Qty: 100 G | Refills: 1 | Status: SHIPPED | OUTPATIENT
Start: 2025-01-22
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
## 2025-01-22 NOTE — ED ATTENDING ATTESTATION
1/22/2025  IJeet MD, saw and evaluated the patient. I have discussed the patient with the resident/non-physician practitioner and agree with the resident's/non-physician practitioner's findings, Plan of Care, and MDM as documented in the resident's/non-physician practitioner's note, except where noted. All available labs and Radiology studies were reviewed.  I was present for key portions of any procedure(s) performed by the resident/non-physician practitioner and I was immediately available to provide assistance.       At this point I agree with the current assessment done in the Emergency Department.  I have conducted an independent evaluation of this patient a history and physical is as follows:    ED Course  ED Course as of 01/22/25 1613   Wed Jan 22, 2025   1556 Per resident h&p 51 YO F h/o Down's syndrome; h/o heart murmur; coming from group home for constipation; last BM 1/14/25 /117 this afternoon; nonverbal; appetite has been NL no vomiting; no displays of discomfort; on polyethylene glycol; O: vitals WNL RRR CTA abd soft NT; no suprapubic tenderness; no malodorous urine no overt distress I/P 1. BP determination;      Emergency Department Note- Aminta De La Torre 50 y.o. female MRN: [id number]    Unit/Bed#: ED 07 Encounter: [id number]    Aminta De La Torre is a 50 y.o. female who presents with   Chief Complaint   Patient presents with    Constipation     Per personal care staff  patient has not had BM since Jan. 14th.  Also, per staff pt with high blood pressure         History of Present Illness   HPI:  Aminta De La Torre is a 50 y.o. female who presents for evaluation of:  Constipation at her living facility.  Patient has not had a bowel movement since January 14, 2025.  Her appetite has been normal and she has not been nauseous or vomiting.  She does not appear to be in any discomfort according to her caretaker.  Patient has a history notable for Down syndrome.  Patient's blood pressure was  reportedly elevated earlier today.  Patient is unable to provide any further history or review of systems secondary to her being nonverbal from her Down syndrome.    Review of Systems   Unable to perform ROS: Patient nonverbal (Patient cannot provide review of systems secondary to being nonverbal from her Down syndrome.)       Historical Information   Past Medical History:   Diagnosis Date    Allergic     Anxiety     Arthritis     Community acquired pneumonia     Last Assessed:1/22/2013    Encounter for PPD test 09/26/2023    Encounter for vaccination 09/26/2023    Tdap given at this visit    Leukopenia     Last Assessed:3/5/2014    Osteoporosis     Preop examination 09/29/2019    Sleep apnea     Trisomy 21, Down syndrome      Past Surgical History:   Procedure Laterality Date    NC OPEN TX TRIMALLEOLAR ANKLE FX W/O FIXJ PST LIP Left 8/29/2024    Procedure: ORIF TRIMALLEOLAR FX, OPEN REDUCTION/TREATMENT OF TILLEAUX FRACTURE;  Surgeon: Mahamed Zamorano MD;  Location: [facility] OR;  Service: Orthopedics    TONSILLECTOMY AND ADENOIDECTOMY       Social History   Social History     Substance and Sexual Activity   Alcohol Use Not Currently    Comment: PATIENT DOES NOT DRINK     Social History     Substance and Sexual Activity   Drug Use Never     Social History     Tobacco Use   Smoking Status Never    Passive exposure: Past   Smokeless Tobacco Never     Family History:   Family History   Problem Relation Age of Onset    Cancer Mother     No Known Problems Sister     No Known Problems Brother     No Known Problems Brother        Meds/Allergies   PTA meds:   Prior to Admission Medications   Prescriptions Last Dose Informant Patient Reported? Taking?   Antifungal 2 % powder   No No   Sig: APPLY TOPICALLY AS NEEDED FOR ITCHING   Cholecalciferol 25 MCG (1000 UT) tablet  Care Giver No No   Sig: Take 1 tablet (1,000 Units total) by mouth daily   Diclofenac Sodium (GoodSense Arthritis Pain) 1 %   No No   Sig: Apply 2 g topically 4  (four) times a day To left knee   Elastic Bandages & Supports (Gait/Transfer Belt) MISC   No No   Sig: Use in the morning   Heating Pad PADS  Care Giver No No   Sig: by Does not apply route 2 (two) times a day   Incontinence Supply Disposable (RA Adult Wipes) MISC  Care Giver No No   Sig: Use daily   Incontinence Supply Disposable (Ultra-Soft Plus Briefs XXL) MISC  Care Giver No No   Sig: Use if needed (incontinence)   Midazolam 5 MG/0.1ML SOLN  Care Giver No No   Sig: For seizure longer than 5 min or cluster of seizures. Give 1 spray (5 mg) into 1 nostril; if no response, a second dose of 1 spray (5 mg) into the other nostril may be given 10 min.   Patient not taking: Reported on 11/18/2024   Mouthwashes (Biotene Dry Mouth) LIQD  Care Giver No No   Sig: Apply 1 Swab to the mouth or throat 2 (two) times a day as needed (dry mouth) Use 5ml of solution with each swab   QC Earwax Removal 6.5 % otic solution   No No   Sig: Use 4 DROPS to both ears twice daily Tuesdays and Fridays, and then 4 days prior to ENT appt for softening of ear wax 4 DROPS TWICE DAILY x 4 days.   ZINC OXIDE, TOPICAL, 10 % CREA   No No   Sig: Apply topically 3 (three) times a day as needed (rash)   acetaminophen (TYLENOL) 325 mg tablet   No No   Sig: Take 1 tablet (325 mg total) by mouth every 6 (six) hours as needed for mild pain   acetaminophen (TYLENOL) 500 mg tablet   Yes No   Sig: Take 500 mg by mouth every 6 (six) hours as needed for mild pain With 600 mg ibuprofen   aspirin (Aspirin Low Dose) 81 mg EC tablet   No No   Sig: Take 1 tablet (81 mg total) by mouth 2 (two) times a day   atorvastatin (LIPITOR) 10 mg tablet   No No   Sig: TAKE 1 TABLET (10 MG TOTAL) BY MOUTH DAILY   bacitracin topical ointment 500 units/g topical ointment  Care Giver Yes No   Sig: Apply topically in the morning   Patient not taking: Reported on 12/13/2024   bisacodyl (DULCOLAX) 10 mg suppository   No No   Sig: Insert 1 suppository (10 mg total) into the rectum  daily as needed for constipation   dextromethorphan-guaiFENesin (QC Tussin DM Cough/Congestion)  mg/5 mL oral liquid   No No   Sig: Take 5 mL by mouth every 12 (twelve) hours   dextromethorphan-guaiFENesin (ROBITUSSIN DM)  mg/5 mL syrup  Care Giver No No   Sig: Take 5 mL by mouth 3 (three) times a day as needed for cough or congestion   docusate sodium (COLACE) 100 mg capsule   No No   Sig: Take 1 capsule (100 mg total) by mouth 2 (two) times a day   escitalopram (LEXAPRO) 20 mg tablet   No No   Sig: Take 1 tablet (20 mg total) by mouth daily   guaiFENesin (ROBITUSSIN) 100 MG/5ML oral liquid   Yes No   Sig: Take 100 mg by mouth 3 (three) times a day as needed for cough   lacosamide (VIMPAT) 50 mg   No No   Sig: TAKE 1 TABLET (50 MG TOTAL) BY MOUTH DAILY AT BEDTIME   levothyroxine 50 mcg tablet   No No   Sig: TAKE ONE TABLET BY MOUTH AT 7 AM   melatonin 3 mg  Care Giver No No   Sig: Take 1 tablet (3 mg total) by mouth daily at bedtime   nystatin powder   No No   Sig: Apply topically 3 (three) times a day as needed (rash)   ofloxacin (FLOXIN) 0.3 % otic solution  Care Giver No No   Sig: Administer 10 drops into both ears 2 (two) times a day   Patient not taking: Reported on 2024   polyethylene glycol (MIRALAX) 17 g   No No   Sig: TAKE 17 G BY MOUTH DAILY AS NEEDED (CONSTIPATION) HOLD IF PATIENT HAS DIARRHEA   sodium chloride (OCEAN) 0.65 % nasal spray  Care Giver No No   Si sprays into each nostril as needed for congestion or rhinitis   sodium phosphate-biphosphate (FLEET) 7-19 g 118 mL enema   Yes No   Sig: APPLY RECTALLY AS NEEDED IF 3 ATTEMPTS WITH SUPPOSITORIES HAVE FAILED.   IF NO BOWEL MOVEMENT WITHIN 12 HOURS, PLEASE CALL SMD OR GO TO THE      Facility-Administered Medications: None     Allergies   Allergen Reactions    Levetiracetam Drowsiness and Hives     And disorientation    Depakote [Valproic Acid] Drowsiness    Lamictal [Lamotrigine] Rash    Dust Mite Extract Other (See Comments)      Per patient's medical appointment form, no reaction listed    Molds & Smuts Other (See Comments)     Per patient's medical appointment form, no reaction listed    Other Other (See Comments)     Volporic acid, levitracetam, Per patient's medical appointment form, no reaction listed    Pollen Extract Sneezing       Objective   First Vitals:   Pulse: 77 (01/22/25 1549)  Temperature: 98.5 °F (36.9 °C) (01/22/25 1549)  Temp Source: Tympanic (01/22/25 1549)  Respirations: 16 (01/22/25 1549)  SpO2: 97 % (01/22/25 1549)    Current Vitals:   Pulse: 77 (01/22/25 1549)  Temperature: 98.5 °F (36.9 °C) (01/22/25 1549)  Temp Source: Tympanic (01/22/25 1549)  Respirations: 16 (01/22/25 1549)  SpO2: 97 % (01/22/25 1549)    No intake or output data in the 24 hours ending 01/22/25 1613    Invasive Devices       None                   Physical Exam  Vitals and nursing note reviewed.   Constitutional:       General: She is not in acute distress.     Appearance: Normal appearance. She is well-developed.   HENT:      Head: Normocephalic and atraumatic.      Right Ear: External ear normal.      Left Ear: External ear normal.      Nose: Nose normal.      Mouth/Throat:      Pharynx: No oropharyngeal exudate.   Eyes:      Conjunctiva/sclera: Conjunctivae normal.      Pupils: Pupils are equal, round, and reactive to light.   Cardiovascular:      Rate and Rhythm: Normal rate and regular rhythm.   Pulmonary:      Effort: Pulmonary effort is normal. No respiratory distress.   Abdominal:      General: Abdomen is flat. There is no distension.      Palpations: Abdomen is soft.   Musculoskeletal:         General: No deformity. Normal range of motion.      Cervical back: Normal range of motion and neck supple.   Skin:     General: Skin is warm and dry.      Capillary Refill: Capillary refill takes less than 2 seconds.   Neurological:      Mental Status: She is alert. Mental status is at baseline. She is disoriented.      Coordination: Coordination  "abnormal.      Gait: Gait abnormal.   Psychiatric:      Comments: Thought content, decision-making capacity, and judgment are impaired secondary to Down syndrome.           Medical Decision Makin.  Constipation: Plan to prescribe a suppository to promote bowel evacuation at her home.  2.  Mild hypertension: Follow-up with primary care provider.    No results found for this or any previous visit (from the past 36 hours).  No orders to display         Portions of the record may have been created with voice recognition software. Occasional wrong word or \"sound a like\" substitutions may have occurred due to the inherent limitations of voice recognition software.  Read the chart carefully and recognize, using context, where substitutions have occurred.        Critical Care Time  Procedures      "

## 2025-01-22 NOTE — ED PROVIDER NOTES
Time reflects when diagnosis was documented in both MDM as applicable and the Disposition within this note       Time User Action Codes Description Comment    1/22/2025  4:11 PM Larisa Mckeon Add [K59.00] Constipation           ED Disposition       ED Disposition   Discharge    Condition   Stable    Date/Time   Wed Jan 22, 2025  4:13 PM    Comment   Aminta De La Torre discharge to home/self care.                   Assessment & Plan       Medical Decision Making  Patient presents for evaluation of constipation.  Patient does not appear to be in any distress.  Her abdomen is soft and nondistended.  Suppositories are sent to her pharmacy.  Discussed with her aide that she can increase her MiraLAX dose every 4 hours as needed until she has bowel movement.  Blood pressure was unable to be obtained because patient would not tolerate it.  Discussed with staff she should follow-up with PCP in regards to elevated blood pressure reading.  They were given return precautions and patient was discharged in stable condition.    Risk  OTC drugs.             Medications - No data to display    ED Risk Strat Scores                          SBIRT 22yo+      Flowsheet Row Most Recent Value   Initial Alcohol Screen: US AUDIT-C     1. How often do you have a drink containing alcohol? 0 Filed at: 01/22/2025 1556   2. How many drinks containing alcohol do you have on a typical day you are drinking?  0 Filed at: 01/22/2025 1556   3a. Male UNDER 65: How often do you have five or more drinks on one occasion? 0 Filed at: 01/22/2025 1556   3b. FEMALE Any Age, or MALE 65+: How often do you have 4 or more drinks on one occassion? 0 Filed at: 01/22/2025 1556   Audit-C Score 0 Filed at: 01/22/2025 1556   XIMENA: How many times in the past year have you...    Used an illegal drug or used a prescription medication for non-medical reasons? Never Filed at: 01/22/2025 1556                            History of Present Illness       Chief Complaint   Patient  presents with    Constipation     Per personal care staff  patient has not had BM since Jan. 14th.  Also, per staff pt with high blood pressure       Past Medical History:   Diagnosis Date    Allergic     Anxiety     Arthritis     Community acquired pneumonia     Last Assessed:1/22/2013    Encounter for PPD test 09/26/2023    Encounter for vaccination 09/26/2023    Tdap given at this visit    Leukopenia     Last Assessed:3/5/2014    Osteoporosis     Preop examination 09/29/2019    Sleep apnea     Trisomy 21, Down syndrome       Past Surgical History:   Procedure Laterality Date    NE OPEN TX TRIMALLEOLAR ANKLE FX W/O FIXJ PST LIP Left 8/29/2024    Procedure: ORIF TRIMALLEOLAR FX, OPEN REDUCTION/TREATMENT OF TILLEAUX FRACTURE;  Surgeon: Mahamed Zamorano MD;  Location: [facility];  Service: Orthopedics    TONSILLECTOMY AND ADENOIDECTOMY        Family History   Problem Relation Age of Onset    Cancer Mother     No Known Problems Sister     No Known Problems Brother     No Known Problems Brother       Social History     Tobacco Use    Smoking status: Never     Passive exposure: Past    Smokeless tobacco: Never   Vaping Use    Vaping status: Never Used   Substance Use Topics    Alcohol use: Not Currently     Comment: PATIENT DOES NOT DRINK    Drug use: Never      E-Cigarette/Vaping    E-Cigarette Use Never User       E-Cigarette/Vaping Substances    Nicotine No     THC No     CBD No     Flavoring No       I have reviewed and agree with the history as documented.     HPI    Patient is a 50-year-old history of Down syndrome who presents for constipation.  Patient states her at bedside providing history.  She states that she has not had a normal bowel movement in 14 days.  She has had small liquid bowel movements.  She is on a bowel regimen of MiraLAX 1 time per day.  She is nonverbal with does not appear to be in pain per staff.  She has been repository in the past.  No history of abdominal surgeries.  She has been eating  and drinking normally.  She has had no vomiting.  The doctor at the facility also sent her over for evaluation of an elevated blood pressure reading.      Review of Systems   Unable to perform ROS: Patient nonverbal           Objective       ED Triage Vitals [01/22/25 1549]   Temperature Pulse BP Respirations SpO2 Patient Position - Orthostatic VS   98.5 °F (36.9 °C) 77 -- 16 97 % --      Temp Source Heart Rate Source BP Location FiO2 (%) Pain Score    Tympanic Monitor -- -- --      Vitals      Date and Time Temp Pulse SpO2 Resp BP Pain Score FACES Pain Rating User   01/22/25 1549 98.5 °F (36.9 °C) 77 97 % 16 -- Unable to obtain BP at this time d/t patient moving -- 0 TLM            Physical Exam  Vitals and nursing note reviewed.   HENT:      Head: Normocephalic and atraumatic.      Nose: No congestion or rhinorrhea.      Mouth/Throat:      Mouth: Mucous membranes are moist.   Eyes:      Extraocular Movements: Extraocular movements intact.   Cardiovascular:      Rate and Rhythm: Normal rate and regular rhythm.   Pulmonary:      Effort: Pulmonary effort is normal.   Abdominal:      General: There is no distension.      Palpations: Abdomen is soft.      Tenderness: There is no abdominal tenderness. There is no rebound.   Musculoskeletal:         General: Normal range of motion.      Cervical back: Normal range of motion.   Skin:     General: Skin is warm and dry.      Capillary Refill: Capillary refill takes less than 2 seconds.   Neurological:      Mental Status: She is alert.         Results Reviewed       None            No orders to display       Procedures    ED Medication and Procedure Management   Prior to Admission Medications   Prescriptions Last Dose Informant Patient Reported? Taking?   Antifungal 2 % powder   No No   Sig: APPLY TOPICALLY AS NEEDED FOR ITCHING   Elastic Bandages & Supports (Gait/Transfer Belt) MISC   No No   Sig: Use in the morning   Heating Pad PADS  Care Giver No No   Sig: by Does not  apply route 2 (two) times a day   Incontinence Supply Disposable (RA Adult Wipes) MISC  Care Giver No No   Sig: Use daily   Incontinence Supply Disposable (Ultra-Soft Plus Briefs XXL) MISC  Care Giver No No   Sig: Use if needed (incontinence)   Midazolam 5 MG/0.1ML SOLN  Care Giver No No   Sig: For seizure longer than 5 min or cluster of seizures. Give 1 spray (5 mg) into 1 nostril; if no response, a second dose of 1 spray (5 mg) into the other nostril may be given 10 min.   Patient not taking: Reported on 11/18/2024   Mouthwashes (Biotene Dry Mouth) LIQD  Care Giver No No   Sig: Apply 1 Swab to the mouth or throat 2 (two) times a day as needed (dry mouth) Use 5ml of solution with each swab   QC Earwax Removal 6.5 % otic solution   No No   Sig: Use 4 DROPS to both ears twice daily Tuesdays and Fridays, and then 4 days prior to ENT appt for softening of ear wax 4 DROPS TWICE DAILY x 4 days.   ZINC OXIDE, TOPICAL, 10 % CREA   No No   Sig: Apply topically 3 (three) times a day as needed (rash)   acetaminophen (TYLENOL) 325 mg tablet   No No   Sig: Take 1 tablet (325 mg total) by mouth every 6 (six) hours as needed for mild pain   acetaminophen (TYLENOL) 500 mg tablet   Yes No   Sig: Take 500 mg by mouth every 6 (six) hours as needed for mild pain With 600 mg ibuprofen   aspirin (Aspirin Low Dose) 81 mg EC tablet   No No   Sig: Take 1 tablet (81 mg total) by mouth 2 (two) times a day   atorvastatin (LIPITOR) 10 mg tablet   No No   Sig: TAKE 1 TABLET (10 MG TOTAL) BY MOUTH DAILY   bacitracin topical ointment 500 units/g topical ointment  Care Giver Yes No   Sig: Apply topically in the morning   Patient not taking: Reported on 12/13/2024   bisacodyl (DULCOLAX) 10 mg suppository   No No   Sig: Insert 1 suppository (10 mg total) into the rectum daily as needed for constipation   dextromethorphan-guaiFENesin (QC Tussin DM Cough/Congestion)  mg/5 mL oral liquid   No No   Sig: Take 5 mL by mouth every 12 (twelve) hours    dextromethorphan-guaiFENesin (ROBITUSSIN DM)  mg/5 mL syrup  Care Giver No No   Sig: Take 5 mL by mouth 3 (three) times a day as needed for cough or congestion   escitalopram (LEXAPRO) 20 mg tablet   No No   Sig: Take 1 tablet (20 mg total) by mouth daily   guaiFENesin (ROBITUSSIN) 100 MG/5ML oral liquid   Yes No   Sig: Take 100 mg by mouth 3 (three) times a day as needed for cough   lacosamide (VIMPAT) 50 mg   No No   Sig: TAKE 1 TABLET (50 MG TOTAL) BY MOUTH DAILY AT BEDTIME   levothyroxine 50 mcg tablet   No No   Sig: TAKE ONE TABLET BY MOUTH AT 7 AM   melatonin 3 mg  Care Giver No No   Sig: Take 1 tablet (3 mg total) by mouth daily at bedtime   nystatin powder   No No   Sig: Apply topically 3 (three) times a day as needed (rash)   ofloxacin (FLOXIN) 0.3 % otic solution  Care Giver No No   Sig: Administer 10 drops into both ears 2 (two) times a day   Patient not taking: Reported on 2024   polyethylene glycol (MIRALAX) 17 g   No No   Sig: TAKE 17 G BY MOUTH DAILY AS NEEDED (CONSTIPATION) HOLD IF PATIENT HAS DIARRHEA   sodium chloride (OCEAN) 0.65 % nasal spray  Care Giver No No   Si sprays into each nostril as needed for congestion or rhinitis   sodium phosphate-biphosphate (FLEET) 7-19 g 118 mL enema   Yes No   Sig: APPLY RECTALLY AS NEEDED IF 3 ATTEMPTS WITH SUPPOSITORIES HAVE FAILED.   IF NO BOWEL MOVEMENT WITHIN 12 HOURS, PLEASE CALL SMD OR GO TO THE      Facility-Administered Medications: None     Discharge Medication List as of 2025  4:13 PM        START taking these medications    Details   !! bisacodyl (DULCOLAX) 10 mg suppository Insert 1 suppository (10 mg total) into the rectum daily, Starting Wed 2025, Normal       !! - Potential duplicate medications found. Please discuss with provider.        CONTINUE these medications which have NOT CHANGED    Details   !! acetaminophen (TYLENOL) 325 mg tablet Take 1 tablet (325 mg total) by mouth every 6 (six) hours as needed for mild  pain, Starting Thu 8/29/2024, Normal      !! acetaminophen (TYLENOL) 500 mg tablet Take 500 mg by mouth every 6 (six) hours as needed for mild pain With 600 mg ibuprofen, Historical Med      Antifungal 2 % powder APPLY TOPICALLY AS NEEDED FOR ITCHING, Normal      aspirin (Aspirin Low Dose) 81 mg EC tablet Take 1 tablet (81 mg total) by mouth 2 (two) times a day, Starting Mon 9/30/2024, Normal      atorvastatin (LIPITOR) 10 mg tablet TAKE 1 TABLET (10 MG TOTAL) BY MOUTH DAILY, Starting Mon 12/9/2024, Until Sat 6/7/2025, Normal      bacitracin topical ointment 500 units/g topical ointment Apply topically in the morning, Starting Fri 4/5/2024, Historical Med      !! bisacodyl (DULCOLAX) 10 mg suppository Insert 1 suppository (10 mg total) into the rectum daily as needed for constipation, Starting Mon 11/18/2024, Normal      dextromethorphan-guaiFENesin (QC Tussin DM Cough/Congestion)  mg/5 mL oral liquid Take 5 mL by mouth every 12 (twelve) hours, Starting Tue 12/3/2024, Normal      dextromethorphan-guaiFENesin (ROBITUSSIN DM)  mg/5 mL syrup Take 5 mL by mouth 3 (three) times a day as needed for cough or congestion, Starting Wed 10/18/2023, Normal      Elastic Bandages & Supports (Gait/Transfer Belt) MISC Use in the morning, Starting Mon 9/30/2024, Print      escitalopram (LEXAPRO) 20 mg tablet Take 1 tablet (20 mg total) by mouth daily, Starting Wed 11/6/2024, Normal      guaiFENesin (ROBITUSSIN) 100 MG/5ML oral liquid Take 100 mg by mouth 3 (three) times a day as needed for cough, Starting Sat 11/30/2024, Historical Med      Heating Pad PADS by Does not apply route 2 (two) times a day, Starting Mon 6/24/2019, Normal      !! Incontinence Supply Disposable (RA Adult Wipes) MISC Use daily, Starting Mon 10/24/2022, Normal      !! Incontinence Supply Disposable (Ultra-Soft Plus Briefs XXL) MISC Use if needed (incontinence), Starting Wed 11/15/2023, Normal      lacosamide (VIMPAT) 50 mg TAKE 1 TABLET (50 MG  TOTAL) BY MOUTH DAILY AT BEDTIME, Starting Tue 11/26/2024, Normal      levothyroxine 50 mcg tablet TAKE ONE TABLET BY MOUTH AT 7 AM, Normal      melatonin 3 mg Take 1 tablet (3 mg total) by mouth daily at bedtime, Starting Fri 4/12/2024, Normal      Midazolam 5 MG/0.1ML SOLN For seizure longer than 5 min or cluster of seizures. Give 1 spray (5 mg) into 1 nostril; if no response, a second dose of 1 spray (5 mg) into the other nostril may be given 10 min., Normal      Mouthwashes (Biotene Dry Mouth) LIQD Apply 1 Swab to the mouth or throat 2 (two) times a day as needed (dry mouth) Use 5ml of solution with each swab, Starting Wed 10/18/2023, Normal      nystatin powder Apply topically 3 (three) times a day as needed (rash), Normal      ofloxacin (FLOXIN) 0.3 % otic solution Administer 10 drops into both ears 2 (two) times a day, Starting Thu 4/18/2024, Normal      polyethylene glycol (MIRALAX) 17 g TAKE 17 G BY MOUTH DAILY AS NEEDED (CONSTIPATION) HOLD IF PATIENT HAS DIARRHEA, Normal      QC Earwax Removal 6.5 % otic solution Use 4 DROPS to both ears twice daily Tuesdays and Fridays, and then 4 days prior to ENT appt for softening of ear wax 4 DROPS TWICE DAILY x 4 days., Normal      sodium chloride (OCEAN) 0.65 % nasal spray 2 sprays into each nostril as needed for congestion or rhinitis, Starting Wed 10/18/2023, Normal      sodium phosphate-biphosphate (FLEET) 7-19 g 118 mL enema APPLY RECTALLY AS NEEDED IF 3 ATTEMPTS WITH SUPPOSITORIES HAVE FAILED.   IF NO BOWEL MOVEMENT WITHIN 12 HOURS, PLEASE CALL [facility] OR GO TO THE, Historical Med      ZINC OXIDE, TOPICAL, 10 % CREA Apply topically 3 (three) times a day as needed (rash), Starting Mon 9/30/2024, Normal      Cholecalciferol 25 MCG (1000 UT) tablet Take 1 tablet (1,000 Units total) by mouth daily, Starting Tue 3/12/2024, Normal      Diclofenac Sodium (GoodSense Arthritis Pain) 1 % Apply 2 g topically 4 (four) times a day To left knee, Normal      docusate sodium  (COLACE) 100 mg capsule Take 1 capsule (100 mg total) by mouth 2 (two) times a day, Starting Mon 11/18/2024, Normal       !! - Potential duplicate medications found. Please discuss with provider.        No discharge procedures on file.  ED SEPSIS DOCUMENTATION   Time reflects when diagnosis was documented in both MDM as applicable and the Disposition within this note       Time User Action Codes Description Comment    1/22/2025  4:11 PM Larisa Mckeon Add [K59.00] Constipation                  Larisa Mckeon MD  01/26/25 0057

## 2025-01-22 NOTE — DISCHARGE INSTRUCTIONS
You were seen in the Emergency Department today for constipation.    You may increase the Miralax to 2 capfuls until patient has a bowel movement.     Please follow up with your primary care doctor.  Please return to the Emergency Department if you experience worsening of your current symptoms, abdominal pain, vomiting, or any other concerning symptoms.

## 2025-01-23 ENCOUNTER — APPOINTMENT (OUTPATIENT)
Dept: PHYSICAL THERAPY | Facility: CLINIC | Age: 50
End: 2025-01-23
Payer: MEDICARE

## 2025-01-23 DIAGNOSIS — Z78.9 HEALTH MAINTENANCE ALTERATION: ICD-10-CM

## 2025-01-23 NOTE — TELEPHONE ENCOUNTER
Patient called to request a refill for their Cholecalciferol advised a refill was requested on 1/23/25 and is pending approval. Patient verbalized understanding and is in agreement.

## 2025-01-23 NOTE — TELEPHONE ENCOUNTER
Reason for call:   [x] Refill   [] Prior Auth  [] Other:     Office:   [x] PCP/Provider - was ordered by old Pcp, now sees Dr Olvera  [] Specialty/Provider -     Medication: Cholecalciferol 25 MCG (1000 UT) tablet     Dose/Frequency: Take 1 tablet (1,000 Units total) by mouth daily     Quantity: 90    Pharmacy: [facility] - [place], [place] - [address] [phone]     Does the patient have enough for 3 days?   [] Yes   [x] No - Send as HP to POD

## 2025-01-23 NOTE — PROGRESS NOTES
Progress Note     Today's date: 2025  Patient name: Aminta De La Torre  : 1975  MRN: [id number]  Referring provider: Radha Olvera MD  Dx:   Encounter Diagnosis     ICD-10-CM    1. Status post ORIF of fracture of ankle  Z98.890     Z87.81       2. Decreased functional mobility and endurance  Z74.09       3. Balance disorder  R26.89           Start Time: 1015  Stop Time: 1100  Total time in clinic (min): 45 minutes    Assessment: Pt tolerated the session well today. Performed progress update this session to assess progress toward LTG. Pt has met 4/5 STG and 3/5 LTG. Although pt is not able to transfer with modAx1, pt is initiating forward weight shift with min tactile cues. Pt continues to require maxAx1 for STS with b/l UE support at // bars. Plan to progress standing outside of // bars to aid in progressing with transfers. L ankle PROM is improved based on goniometric measurement since IE and last progress update with neutral dorsiflexion needed to achieve foot flat during gait. Pt has demonstrated improved knee extension since previous sessions which also has been aiding in heel contact. Pt continues to lack 18 deg of extension due to knee flexion contracture. However, pt has had this contracture at baseline. Will continue to stretch and weightbearing to encourage knee extension as able. Pt has progressed well with static standing balance. Pt able to hold static standing for up to 30 sec with CGA and over 5 minutes with minAx1. Pt able to walk up to 10ft with minAx1 needed for weight shift. Pt demonstrates decreased scissoring gait and improved LLE weightbearing since IE. Due to significant progress and increased compliance with HEP from caregivers, Aminta would benefit from further skilled PT sessions at 2x/wk for 8 weeks to increased functional mobility, balance, strength, and overall safety. Pt requires the solo harness located at our clinic to ensure safety with challenging her function and would most  likely decline without continued therapy.    Plan: Continue per plan of care.  Progress treatment as tolerated.      New Goals (01/24/25):  STG:  - Patient will improve knee extension by at least 5 degrees within 4 weeks to demonstrate improved knee ROM with knee contracture.   - Patient and caregivers will be compliant with initial HEP within 4 weeks.   - Patient will be able to walk up to 15 ft consecutively with minAx1 and b/l UE support within 4 weeks to improve gait and functional mobility.     LTG:  - Patient will be able to walk at least 10 ft with use of RW and assistance with hand  on walker within 8 weeks to improve gait and functional mobility.  - Patient will be able to perform a STS transfer with modAx1 within 8 weeks to demonstrate improved LE strength and power.  - Patient and caregivers will be compliant with progressed HEP within 8 weeks.       Goals  STG:  Patient will improve L ankle PROM by 5 deg in each direction within 4 weeks. MET  Patient will be able to perform transfers with mod assist x2 within 4 weeks. NOT MET, progressing  Patient will be able to perform static standing with maxAx1 for 2 min or more within 4 weeks in order to improve LLE weightbearing. MET (01/24/25)  Patient will be able to ambulate 10 ft with BUE support and modAx2 within 4 weeks.MET (01/24/25)  Patient and nursing staff will be compliant with HEP within 4 weeks. MET (01/24/25)    LTG:  Patient will be able to ambulate 20 ft or more with BUE support and modAx2 or less within 8 weeks. MET (not consecutively)  Patient will be able to perform static standing with modAx1 or less for 5 minutes or more within 8 weeks. MET  Patient will be able to perform STS transfers with modAx1 within 8 weeks to demonstrate improved LE strength and functional mobility. NOT MET, progressing  Patient will be able to perform transfers with modAx1 within 8 weeks. NOT MET, progressing  Patient and nursing staff will be compliant with  progressed HEP within 8 weeks. MET    Plan  Patient would benefit from: skilled physical therapy  Referral necessary: No    Planned therapy interventions: activity modification, ADL retraining, ADL training, balance, balance/weight bearing training, behavior modification, body mechanics training, strengthening, sensory integrative techniques, postural training, patient/caregiver education, neuromuscular re-education, motor coordination training, flexibility, functional ROM exercises, gait training, graded activity, graded exercise, graded motor, home exercise program, wheelchair management, transfer training, therapeutic training, therapeutic exercise and therapeutic activities    Frequency: 2x week  Duration in weeks: 8  Plan of Care beginning date: 2025  Plan of Care expiration date: 2025  Treatment plan discussed with: caregiver and patient    Subjective: Pt went to the ER this week due to constipation for 8 days. Pt has been able to have a bowel movement since starting medication that was prescribed at ER visit which has been helping her mood and comfort. No pain noted today, although pt nonverbal.    Patient pain: none noted   Patient goals: increase independence to decrease load on caregivers, improve walking     Objective: See treatment diary below    PROM (L):(IE)  Dorsifleixon: lacking 6 deg knee bent   Plantar flexion: 47 deg   Inversion: 23 deg   Eversion: 10 dg     :  Dorsiflexion: 0 deg knee bent (neutral)  Plantar flexion: 56 deg   Inversion: 24 deg   Eversion: 14 deg     : 0 deg knee bent (neutral)   Plantar flex: 60 deg   Inversion: 32 deg  Eversion: 10 deg  Knee flexion: 110 deg   Knee extension: lacking 18 deg     Static standing and sitting balance:  Standin sec with modAx2 (IE)   Standin min with minAx1 ()           Short Term Goal Expiration Date:(2025)  Long Term Goal Expiration Date: (2025)  POC Expiration Date: (2025)    Visit count: 4 of  10; PN due 01/25/2025    POC expires Unit limit Auth Expiration date PT/OT/ST + Visit Limit?   01/21/25 N/A N/A BOMN   03/21/25 N/A 12/31/25 BOMN                     Visit/Unit Tracking  AUTH Status:  Date 11/26 11/29 12/13 12/20 12/23 12/27  PN 12/30 01/03 01/10 01/15 01/17   BOMN Used 1 2 3 4 5 6 1 2 3 1 2    Remaining  9 8 7 6 5 4 9 8 7 9 8     AUTH Status:  Date 01/22 01/24            BOMN Used 3 4             Remaining  7 6                 Access Code: [email or web address]  URL: [email or web address]  Date: 12/27/2024  Prepared by: Radha Tyler    Program Notes  Passive dorsiflexion and hamstring stretching for caregiversTry to stand at least 3x/day for 1 min or more.  Use pillow as wedge in between thighs to prevent hip ER and place pillow under L ankle to encourage increased knee ext. Do NOT place pillow under knee.     Exercises  - Supine Hamstring Stretch  - 2 x daily - 7 x weekly - 1 sets - 3 reps - 30 hold  - Long Sitting Calf Stretch with Strap  - 1 x daily - 7 x weekly - 1 sets - 3 reps     Precautions T21, Osteoporosis, Neck pain, Hx seizure like activity, Fall risk        Manuals 01/17 01/22 01/24 01/10 01/15                                   Neuro Re-Ed  Pt and caregiver education on importance of LLE weightbearing to promote improved balance and functional mobility  Skin inspection of L lateral malleolus    Caregiver education about importance of LLE weightbearing and stretching to improve mobility  Pt and caregiver education on importance of LLE weightbearing to promote improved balance and functional mobility   Pt and caregiver education on importance of LLE weightbearing to promote improved balance and functional mobility     Education to caregivers about promoting static balance with sitting without back support    Transfers  STS  // bars   B/l UE   maxAx1  x5 STS  // bars   B/l UE   Mod/maxAx1  x5 STS   // bars   maxAx1   B/l UE   X 7 STS  // bars   maxAx1   B/l UE   X 5 STS  // bars   maxAx1    B/l UE   X 4   Static balance         Dynamic balance Walking   // bars   10 ft   B/l UE   Min Ax1   X 2 laps     Block with foot between pt's feet for scissoring       Static standing   /// bars   10 min x 3   CGA--minAx1  Walking   // bars   5 ft   B/l UE   Mod Ax1   X 2     Block with foot between pt's feet for scissoring     Static standing   /// bars   5 min x 2   minAx1--modAx1  Static standing   // bars   B/l UE  5 min x 4   CGA-minAx1    Walking   // bars   10 ft   B/l UE   Min Ax1   X 3    Block with foot between pt's feet for scissoring  Static standing   // bars   B/l UE  10 min x 2   CGA-minAx1  Block with foot between pt's feet for scissoring, overpressure at knee to promote extension during stance     Static standing with UE reach to high five   X 2    Walking   // bars   8 ft x2 bouts  modAx1  Tactile cues for weightshift and propulsion     Static standing   // bars   B/l UE  8  min x 2   CGA-minAx1  Block with foot between pt's feet for scissoring, overpressure at knee to promote extension during stance     Walking   // bars   10 ft x3 bouts  Min-modAx1  Tactile cues for weightshift and propulsion                                   Ther Ex   Pt education on continued stretching to improve knee extension and maintain ankle ROM    Pt education on ROM findings  Education to caregivers about continued stretching to decrease L knee flexion contracture Education to caregivers about continued stretching to decrease L knee flexion contracture   Ankle ROM  2 x 30 sec L ankle dorsiflexion stretch     4 x 1 min L hamstring stretch     Review for HEP 6 x  1 min L hamstring stretch     2 x 1 min L ankle dorsiflexion stretch     1 x 1 min L hip adductor stretch     1 x  1 min L hamstring stretch     2 x 30 sec L ankle dorsiflexion stretch  3  x  1 min L hamstring stretch     2 x 30 sec L ankle dorsiflexion stretch  3  x  1 min L hamstring stretch     2 x 30 sec L ankle dorsiflexion stretch                                                             Ther Activity                        Gait Training        Education                 Modalities

## 2025-01-24 ENCOUNTER — EVALUATION (OUTPATIENT)
Dept: PHYSICAL THERAPY | Facility: CLINIC | Age: 50
End: 2025-01-24
Payer: MEDICARE

## 2025-01-24 DIAGNOSIS — Z98.890 STATUS POST ORIF OF FRACTURE OF ANKLE: Primary | ICD-10-CM

## 2025-01-24 DIAGNOSIS — Z87.81 STATUS POST ORIF OF FRACTURE OF ANKLE: Primary | ICD-10-CM

## 2025-01-24 DIAGNOSIS — R26.89 BALANCE DISORDER: ICD-10-CM

## 2025-01-24 DIAGNOSIS — Z74.09 DECREASED FUNCTIONAL MOBILITY AND ENDURANCE: ICD-10-CM

## 2025-01-24 PROCEDURE — 97110 THERAPEUTIC EXERCISES: CPT

## 2025-01-24 PROCEDURE — 97112 NEUROMUSCULAR REEDUCATION: CPT

## 2025-01-24 RX ORDER — CHOLECALCIFEROL (VITAMIN D3) 25 MCG
1000 TABLET ORAL EVERY MORNING
Qty: 100 TABLET | Refills: 2 | Status: SHIPPED | OUTPATIENT
Start: 2025-01-24

## 2025-01-27 ENCOUNTER — OFFICE VISIT (OUTPATIENT)
Dept: PHYSICAL THERAPY | Facility: CLINIC | Age: 50
End: 2025-01-27
Payer: MEDICARE

## 2025-01-27 DIAGNOSIS — Z74.09 DECREASED FUNCTIONAL MOBILITY AND ENDURANCE: ICD-10-CM

## 2025-01-27 DIAGNOSIS — Z87.81 STATUS POST ORIF OF FRACTURE OF ANKLE: Primary | ICD-10-CM

## 2025-01-27 DIAGNOSIS — Z98.890 STATUS POST ORIF OF FRACTURE OF ANKLE: Primary | ICD-10-CM

## 2025-01-27 DIAGNOSIS — R26.89 BALANCE DISORDER: ICD-10-CM

## 2025-01-27 DIAGNOSIS — K59.04 CHRONIC IDIOPATHIC CONSTIPATION: ICD-10-CM

## 2025-01-27 PROCEDURE — 97112 NEUROMUSCULAR REEDUCATION: CPT

## 2025-01-27 RX ORDER — DOCUSATE SODIUM 100 MG/1
100 CAPSULE, LIQUID FILLED ORAL 2 TIMES DAILY
Qty: 60 CAPSULE | Refills: 1 | OUTPATIENT
Start: 2025-01-27

## 2025-01-27 NOTE — PROGRESS NOTES
Daily Note     Today's date: 2025  Patient name: Aminta De La Torre  : 1975  MRN: [id number]  Referring provider: Radha Olvera MD  Dx:   Encounter Diagnosis     ICD-10-CM    1. Status post ORIF of fracture of ankle  Z98.890     Z87.81       2. Decreased functional mobility and endurance  Z74.09       3. Balance disorder  R26.89           Start Time: 1015  Stop Time: 1100  Total time in clinic (min): 45 minutes    Subjective: No new complaints       Objective: See treatment diary below      Assessment: Pt tolerated the treatment well. Pt was able to walk up to 20ft this session with ability to turn directions in the parallel bars. Pt required assistance to guide hand to parallel bars to assist in turning as well as minAx1 to promote weight shifting need to perform a step. Attempted to perform STS on elevated mat table, but pt was fatigued at end of session requiring maxAx1 to perform. Will reattempt next session. Pt demonstrated fatigue post-session. Pt would benefit from further skilled PT sessions to address deficits in endurance, strength, balance, activity tolerance, and overall safety needed to maximize the pt's function and quality of life.       Plan: Continue per plan of care.  Progress treatment as tolerated.       Short Term Goal Expiration Date:(2025)  Long Term Goal Expiration Date: (2025)  POC Expiration Date: (2025)    Visit count: 1 of 10; PN due 2025    POC expires Unit limit Auth Expiration date PT/OT/ST + Visit Limit?   25 N/A N/A BOMN   25 N/A 25 BOMN                     Visit/Unit Tracking  AUTH Status:  Date 11/26 11/29 12/13 12/20 12/23 12/27  PN 12/30 01/03 01/10 01/15 01/17   BOMN Used 1 2 3 4 5 6 1 2 3 1 2    Remaining  9 8 7 6 5 4 9 8 7 9 8     AUTH Status:  Date   PN            BOMN Used 3 4 1            Remaining  7 6 9                Access Code: [email or web address]  URL: [email or web address]  Date: 2024  Prepared  by: Radha Tyler    Program Notes  Passive dorsiflexion and hamstring stretching for caregiversTry to stand at least 3x/day for 1 min or more.  Use pillow as wedge in between thighs to prevent hip ER and place pillow under L ankle to encourage increased knee ext. Do NOT place pillow under knee.     Exercises  - Supine Hamstring Stretch  - 2 x daily - 7 x weekly - 1 sets - 3 reps - 30 hold  - Long Sitting Calf Stretch with Strap  - 1 x daily - 7 x weekly - 1 sets - 3 reps     Precautions T21, Osteoporosis, Neck pain, Hx seizure like activity, Fall risk        Manuals 01/17 01/22 01/24 01/27 01/15                                   Neuro Re-Ed  Pt and caregiver education on importance of LLE weightbearing to promote improved balance and functional mobility  Skin inspection of L lateral malleolus    Caregiver education about importance of LLE weightbearing and stretching to improve mobility  Pt and caregiver education on importance of LLE weightbearing to promote improved balance and functional mobility   Pt and caregiver education on importance of LLE weightbearing to promote improved balance and functional mobility     Education to caregivers about promoting static balance with sitting without back support    Transfers  STS  // bars   B/l UE   maxAx1  x5 STS  // bars   B/l UE   Mod/maxAx1  x5 STS   // bars   maxAx1   B/l UE   X 7 STS   // bars   maxAx1   B/l UE   X 5     Sit pivot transfer from w/c <>mat table   maxAx1 STS  // bars   maxAx1   B/l UE   X 4   Static balance         Dynamic balance Walking   // bars   10 ft   B/l UE   Min Ax1   X 2 laps     Block with foot between pt's feet for scissoring       Static standing   /// bars   10 min x 3   CGA--minAx1  Walking   // bars   5 ft   B/l UE   Mod Ax1   X 2     Block with foot between pt's feet for scissoring     Static standing   /// bars   5 min x 2   minAx1--modAx1  Static standing   // bars   B/l UE  5 min x 4   CGA-minAx1    Walking   // bars   10 ft   B/l  UE   Min Ax1   X 3    Block with foot between pt's feet for scissoring  Static standing   // bars   B/l UE   4 x 5 min     Walking   // bars   10 ft x 1     Walking and turning in // bars   20 ft   X 2   minAx1     Block with foot between pt's feet for scissoring  Static standing   // bars   B/l UE  8  min x 2   CGA-minAx1  Block with foot between pt's feet for scissoring, overpressure at knee to promote extension during stance     Walking   // bars   10 ft x3 bouts  Min-modAx1  Tactile cues for weightshift and propulsion                                   Ther Ex   Pt education on continued stretching to improve knee extension and maintain ankle ROM    Pt education on ROM findings   Education to caregivers about continued stretching to decrease L knee flexion contracture   Ankle ROM  2 x 30 sec L ankle dorsiflexion stretch     4 x 1 min L hamstring stretch     Review for HEP 6 x  1 min L hamstring stretch     2 x 1 min L ankle dorsiflexion stretch     1 x 1 min L hip adductor stretch     1 x  1 min L hamstring stretch     2 x 30 sec L ankle dorsiflexion stretch   3  x  1 min L hamstring stretch     2 x 30 sec L ankle dorsiflexion stretch                                                            Ther Activity                        Gait Training        Education                 Modalities

## 2025-01-28 ENCOUNTER — APPOINTMENT (OUTPATIENT)
Dept: PHYSICAL THERAPY | Facility: CLINIC | Age: 50
End: 2025-01-28
Payer: MEDICARE

## 2025-01-29 ENCOUNTER — APPOINTMENT (OUTPATIENT)
Dept: PHYSICAL THERAPY | Facility: CLINIC | Age: 50
End: 2025-01-29
Payer: MEDICARE

## 2025-01-29 ENCOUNTER — OFFICE VISIT (OUTPATIENT)
Dept: INTERNAL MEDICINE CLINIC | Facility: CLINIC | Age: 50
End: 2025-01-29
Payer: MEDICARE

## 2025-01-29 VITALS
HEART RATE: 62 BPM | DIASTOLIC BLOOD PRESSURE: 70 MMHG | TEMPERATURE: 98.2 F | SYSTOLIC BLOOD PRESSURE: 128 MMHG | OXYGEN SATURATION: 98 %

## 2025-01-29 DIAGNOSIS — R56.9 SEIZURE-LIKE ACTIVITY (HCC): ICD-10-CM

## 2025-01-29 DIAGNOSIS — K59.04 CHRONIC IDIOPATHIC CONSTIPATION: ICD-10-CM

## 2025-01-29 DIAGNOSIS — E03.9 HYPOTHYROIDISM, UNSPECIFIED TYPE: ICD-10-CM

## 2025-01-29 DIAGNOSIS — E66.01 CLASS 3 SEVERE OBESITY DUE TO EXCESS CALORIES WITHOUT SERIOUS COMORBIDITY WITH BODY MASS INDEX (BMI) OF 40.0 TO 44.9 IN ADULT (HCC): ICD-10-CM

## 2025-01-29 DIAGNOSIS — K59.00 CONSTIPATION, UNSPECIFIED CONSTIPATION TYPE: Primary | ICD-10-CM

## 2025-01-29 DIAGNOSIS — Q90.9 DEMENTIA DUE TO DOWN SYNDROME (HCC): ICD-10-CM

## 2025-01-29 DIAGNOSIS — E66.813 CLASS 3 SEVERE OBESITY DUE TO EXCESS CALORIES WITHOUT SERIOUS COMORBIDITY WITH BODY MASS INDEX (BMI) OF 40.0 TO 44.9 IN ADULT (HCC): ICD-10-CM

## 2025-01-29 DIAGNOSIS — F02.80 DEMENTIA DUE TO DOWN SYNDROME (HCC): ICD-10-CM

## 2025-01-29 PROCEDURE — 99214 OFFICE O/P EST MOD 30 MIN: CPT | Performed by: INTERNAL MEDICINE

## 2025-01-29 PROCEDURE — G2211 COMPLEX E/M VISIT ADD ON: HCPCS | Performed by: INTERNAL MEDICINE

## 2025-01-29 NOTE — PATIENT INSTRUCTIONS
Problem List Items Addressed This Visit          Nervous and Auditory    Dementia due to Down syndrome ([facility])    Patient being seen for acute issue, can follow-up with PCP for further management            Neurology/Sleep    Seizure-like activity ([facility])    Patient being seen for acute issue, can follow-up with PCP for further management            Other    Class 3 severe obesity due to excess calories without serious comorbidity with body mass index (BMI) of 40.0 to 44.9 in adult ([facility])      Patient being seen for acute issue, can follow-up with PCP for further management         Constipation - Primary    As per PCP, continue the Colace twice a day, the MiraLAX as needed, she is also use a suppository in the past which could be used.  Discussed the neck step would be enema, and return to the emergency room if not having bowel movements

## 2025-01-29 NOTE — ASSESSMENT & PLAN NOTE
As per PCP, continue the Colace twice a day, the MiraLAX as needed, she is also use a suppository in the past which could be used.  Discussed the neck step would be enema, and return to the emergency room if not having bowel movements

## 2025-01-29 NOTE — PROGRESS NOTES
Daily Note     Today's date: 2025  Patient name: Aminta De La Torre  : 1975  MRN: [id number]  Referring provider: Radha Olvera MD  Dx:   Encounter Diagnosis     ICD-10-CM    1. Status post ORIF of fracture of ankle  Z98.890     Z87.81       2. Decreased functional mobility and endurance  Z74.09       3. Balance disorder  R26.89           Start Time: 1015  Stop Time: 1100  Total time in clinic (min): 45 minutes    Subjective: Caregiver tried to stand her up yesterday, but pt was not able to stand without maxAx1      Objective: See treatment diary below      Assessment: Pt tolerated the treatment very well. Pt progressing well with ambulation in the // bars with ability to perform 2 laps with turns this session with b/l UE support and minAx1 for weight shift. Performed passive stretching to improve L knee flexion and ankle dorsiflexion. Pt demonstrated fatigue post-session. Pt would benefit from further skilled PT sessions to address deficits in endurance, strength, balance, activity tolerance, and overall safety needed to maximize the pt's function and quality of life.       Plan: Continue per plan of care.  Progress treatment as tolerated.       Short Term Goal Expiration Date:(2025)  Long Term Goal Expiration Date: (2025)  POC Expiration Date: (2025)    Visit count: 1 of 10; PN due 2025    POC expires Unit limit Auth Expiration date PT/OT/ST + Visit Limit?   25 N/A N/A BOMN   25 N/A 25 BOMN                     Visit/Unit Tracking  AUTH Status:  Date 11/26 11/29 12/13 12/20 12/23 12/27  PN 12/30 01/03 01/10 01/15 01/17   BOMN Used 1 2 3 4 5 6 1 2 3 1 2    Remaining  9 8 7 6 5 4 9 8 7 9 8     AUTH Status:  Date 01/22 01/24  PN           BOMN Used 3 4 1 2           Remaining  7 6 9 8               Access Code: [email or web address]  URL: [email or web address]  Date: 2024  Prepared by: Radha Tyler    Program Notes  Passive dorsiflexion and hamstring  stretching for caregiversTry to stand at least 3x/day for 1 min or more.  Use pillow as wedge in between thighs to prevent hip ER and place pillow under L ankle to encourage increased knee ext. Do NOT place pillow under knee.     Exercises  - Supine Hamstring Stretch  - 2 x daily - 7 x weekly - 1 sets - 3 reps - 30 hold  - Long Sitting Calf Stretch with Strap  - 1 x daily - 7 x weekly - 1 sets - 3 reps     Precautions T21, Osteoporosis, Neck pain, Hx seizure like activity, Fall risk        Manuals 01/17 01/22 01/24 01/27 01/31                                   Neuro Re-Ed  Pt and caregiver education on importance of LLE weightbearing to promote improved balance and functional mobility  Skin inspection of L lateral malleolus    Caregiver education about importance of LLE weightbearing and stretching to improve mobility  Pt and caregiver education on importance of LLE weightbearing to promote improved balance and functional mobility   Pt and caregiver education on importance of LLE weightbearing to promote improved balance and functional mobility    Transfers  STS  // bars   B/l UE   maxAx1  x5 STS  // bars   B/l UE   Mod/maxAx1  x5 STS   // bars   maxAx1   B/l UE   X 7 STS   // bars   maxAx1   B/l UE   X 5     Sit pivot transfer from w/c <>mat table   maxAx1 STS   // bars   maxAx1   B/l UE   X 5    Static balance         Dynamic balance Walking   // bars   10 ft   B/l UE   Min Ax1   X 2 laps     Block with foot between pt's feet for scissoring       Static standing   /// bars   10 min x 3   CGA--minAx1  Walking   // bars   5 ft   B/l UE   Mod Ax1   X 2     Block with foot between pt's feet for scissoring     Static standing   /// bars   5 min x 2   minAx1--modAx1  Static standing   // bars   B/l UE  5 min x 4   CGA-minAx1    Walking   // bars   10 ft   B/l UE   Min Ax1   X 3    Block with foot between pt's feet for scissoring  Static standing   // bars   B/l UE   4 x 5 min     Walking   // bars   10 ft x 1      Walking and turning in // bars   20 ft   X 2   minAx1     Block with foot between pt's feet for scissoring  Walking and turning in // bars   20 ft total  X 3 laps  minAx1     Block with foot between pt's feet for scissoring     Static standing   // bars   B/l UE   4 x 5 min                                    Ther Ex   Pt education on continued stretching to improve knee extension and maintain ankle ROM    Pt education on ROM findings   Pt education on continued stretching to improve knee extension and maintain ankle ROM   Ankle ROM  2 x 30 sec L ankle dorsiflexion stretch     4 x 1 min L hamstring stretch     Review for HEP 6 x  1 min L hamstring stretch     2 x 1 min L ankle dorsiflexion stretch     1 x 1 min L hip adductor stretch     1 x  1 min L hamstring stretch     2 x 30 sec L ankle dorsiflexion stretch   3 x  1 min L hamstring stretch     3 x 1 min L ankle dorsiflexion stretch                                                            Ther Activity                        Gait Training        Education                 Modalities

## 2025-01-29 NOTE — PROGRESS NOTES
Name: Aminta De La Torre      : 1975      MRN: [id number]  Encounter Provider: Chinedu Deleon MD  Encounter Date: 2025   Encounter department: [facility]  :  Assessment & Plan  Constipation, unspecified constipation type  As per PCP, continue the Colace twice a day, the MiraLAX as needed, she is also use a suppository in the past which could be used.  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  Patient was in the emergency room  for constipation, it was recommended she do the MiraLAX every 4 hours as needed until having bowel movement.  She did have a bowel movement .  She has has some small bowel movements since then. She also tried a suppository.    Blood pressure was also elevated on the day she went to the emergency room, it is fine today      Review of Systems   Unable to perform ROS: Patient nonverbal       Objective   /70 (BP Location: Right arm, Patient Position: Sitting, Cuff Size: Large)   Pulse 62   Temp 98.2 °F (36.8 °C) (Tympanic)   LMP  (LMP Unknown)   SpO2 98%      Physical Exam  Constitutional:       General: She is not in acute distress.  Cardiovascular:      Rate and Rhythm: Normal rate and regular rhythm.   Pulmonary:      Effort: No respiratory distress.   Abdominal:      Palpations: Abdomen is soft.      Comments: No  grimacing or adverse reaction to abdominal exam   Skin:     Coloration: Skin is not jaundiced or pale.

## 2025-01-30 RX ORDER — LEVOTHYROXINE SODIUM 50 UG/1
TABLET ORAL
Qty: 30 TABLET | Refills: 5 | Status: SHIPPED | OUTPATIENT
Start: 2025-01-30 | End: 2025-02-03 | Stop reason: SDUPTHER

## 2025-01-30 RX ORDER — POLYETHYLENE GLYCOL 3350 17 G/17G
POWDER, FOR SOLUTION ORAL
Qty: 30 EACH | Refills: 5 | Status: SHIPPED | OUTPATIENT
Start: 2025-01-30 | End: 2025-02-03 | Stop reason: SDUPTHER

## 2025-01-30 NOTE — TELEPHONE ENCOUNTER
The supervisor from the [facility] called and states that they need a standing order for the miralax  It needs to be more specific on the dosing. They need to know after how many days of constipation to give it to her.       Supervisor states that patient also needs an order for tussin as needed. They state that she has a bad cough. Patient's roommate has covid.         [facility] - [place], [place] - [address]

## 2025-01-31 ENCOUNTER — OFFICE VISIT (OUTPATIENT)
Dept: PHYSICAL THERAPY | Facility: CLINIC | Age: 50
End: 2025-01-31
Payer: MEDICARE

## 2025-01-31 DIAGNOSIS — Z87.81 STATUS POST ORIF OF FRACTURE OF ANKLE: Primary | ICD-10-CM

## 2025-01-31 DIAGNOSIS — R26.89 BALANCE DISORDER: ICD-10-CM

## 2025-01-31 DIAGNOSIS — Z98.890 STATUS POST ORIF OF FRACTURE OF ANKLE: Primary | ICD-10-CM

## 2025-01-31 DIAGNOSIS — Z74.09 DECREASED FUNCTIONAL MOBILITY AND ENDURANCE: ICD-10-CM

## 2025-01-31 PROCEDURE — 97110 THERAPEUTIC EXERCISES: CPT

## 2025-01-31 PROCEDURE — 97112 NEUROMUSCULAR REEDUCATION: CPT

## 2025-02-03 ENCOUNTER — OFFICE VISIT (OUTPATIENT)
Dept: INTERNAL MEDICINE CLINIC | Facility: CLINIC | Age: 50
End: 2025-02-03
Payer: MEDICARE

## 2025-02-03 ENCOUNTER — OFFICE VISIT (OUTPATIENT)
Dept: PHYSICAL THERAPY | Facility: CLINIC | Age: 50
End: 2025-02-03
Payer: MEDICARE

## 2025-02-03 VITALS
HEART RATE: 101 BPM | SYSTOLIC BLOOD PRESSURE: 128 MMHG | TEMPERATURE: 97.9 F | DIASTOLIC BLOOD PRESSURE: 76 MMHG | OXYGEN SATURATION: 100 %

## 2025-02-03 DIAGNOSIS — L08.9 SKIN INFECTION: ICD-10-CM

## 2025-02-03 DIAGNOSIS — Z98.890 STATUS POST ORIF OF FRACTURE OF ANKLE: Primary | ICD-10-CM

## 2025-02-03 DIAGNOSIS — R68.2 DRY MOUTH: ICD-10-CM

## 2025-02-03 DIAGNOSIS — Z87.81 STATUS POST ORIF OF FRACTURE OF ANKLE: Primary | ICD-10-CM

## 2025-02-03 DIAGNOSIS — E03.9 HYPOTHYROIDISM, UNSPECIFIED TYPE: ICD-10-CM

## 2025-02-03 DIAGNOSIS — M25.569 CHRONIC KNEE PAIN, UNSPECIFIED LATERALITY: Primary | ICD-10-CM

## 2025-02-03 DIAGNOSIS — M25.562 CHRONIC PAIN OF LEFT KNEE: ICD-10-CM

## 2025-02-03 DIAGNOSIS — G89.29 CHRONIC PAIN OF LEFT KNEE: ICD-10-CM

## 2025-02-03 DIAGNOSIS — R26.89 BALANCE DISORDER: ICD-10-CM

## 2025-02-03 DIAGNOSIS — K59.04 CHRONIC IDIOPATHIC CONSTIPATION: ICD-10-CM

## 2025-02-03 DIAGNOSIS — Z86.69 HISTORY OF IMPACTED CERUMEN: ICD-10-CM

## 2025-02-03 DIAGNOSIS — Z74.09 DECREASED FUNCTIONAL MOBILITY AND ENDURANCE: ICD-10-CM

## 2025-02-03 DIAGNOSIS — F41.9 ANXIETY: ICD-10-CM

## 2025-02-03 DIAGNOSIS — T14.8XXA BLISTER OF SKIN: ICD-10-CM

## 2025-02-03 DIAGNOSIS — R21 RASH: ICD-10-CM

## 2025-02-03 DIAGNOSIS — E78.3 MIXED HYPERGLYCERIDEMIA: ICD-10-CM

## 2025-02-03 DIAGNOSIS — S82.892A CLOSED FRACTURE OF LEFT ANKLE, INITIAL ENCOUNTER: ICD-10-CM

## 2025-02-03 DIAGNOSIS — Z78.9 HEALTH MAINTENANCE ALTERATION: ICD-10-CM

## 2025-02-03 DIAGNOSIS — F51.01 PRIMARY INSOMNIA: ICD-10-CM

## 2025-02-03 DIAGNOSIS — H61.23 BILATERAL IMPACTED CERUMEN: ICD-10-CM

## 2025-02-03 DIAGNOSIS — K59.00 CONSTIPATION: ICD-10-CM

## 2025-02-03 DIAGNOSIS — J06.9 ACUTE URI: ICD-10-CM

## 2025-02-03 DIAGNOSIS — K59.00 CONSTIPATION, UNSPECIFIED CONSTIPATION TYPE: ICD-10-CM

## 2025-02-03 DIAGNOSIS — G89.29 CHRONIC KNEE PAIN, UNSPECIFIED LATERALITY: Primary | ICD-10-CM

## 2025-02-03 PROCEDURE — 97110 THERAPEUTIC EXERCISES: CPT

## 2025-02-03 PROCEDURE — 99214 OFFICE O/P EST MOD 30 MIN: CPT | Performed by: GENERAL ACUTE CARE HOSPITAL

## 2025-02-03 PROCEDURE — 97112 NEUROMUSCULAR REEDUCATION: CPT

## 2025-02-03 PROCEDURE — G2211 COMPLEX E/M VISIT ADD ON: HCPCS | Performed by: GENERAL ACUTE CARE HOSPITAL

## 2025-02-03 RX ORDER — ASPIRIN 81 MG/1
81 TABLET ORAL 2 TIMES DAILY
Qty: 180 TABLET | Refills: 3 | Status: SHIPPED | OUTPATIENT
Start: 2025-02-03

## 2025-02-03 RX ORDER — FLUORIDE TOOTHPASTE
1 TOOTHPASTE DENTAL 2 TIMES DAILY PRN
Qty: 237 ML | Refills: 0 | Status: SHIPPED | OUTPATIENT
Start: 2025-02-03

## 2025-02-03 RX ORDER — LEVOTHYROXINE SODIUM 50 UG/1
50 TABLET ORAL DAILY
Qty: 30 TABLET | Refills: 5 | Status: SHIPPED | OUTPATIENT
Start: 2025-02-03

## 2025-02-03 RX ORDER — GINSENG 100 MG
1 CAPSULE ORAL 3 TIMES DAILY PRN
Qty: 30 G | Refills: 0 | Status: SHIPPED | OUTPATIENT
Start: 2025-02-03

## 2025-02-03 RX ORDER — CHOLECALCIFEROL (VITAMIN D3) 25 MCG
1000 TABLET ORAL EVERY MORNING
Qty: 100 TABLET | Refills: 2 | Status: SHIPPED | OUTPATIENT
Start: 2025-02-03

## 2025-02-03 RX ORDER — ESCITALOPRAM OXALATE 20 MG/1
20 TABLET ORAL DAILY
Qty: 90 TABLET | Refills: 1 | Status: SHIPPED | OUTPATIENT
Start: 2025-02-03 | End: 2025-02-12

## 2025-02-03 RX ORDER — POLYETHYLENE GLYCOL 3350 17 G/17G
POWDER, FOR SOLUTION ORAL
Qty: 30 EACH | Refills: 5 | Status: SHIPPED | OUTPATIENT
Start: 2025-02-03 | End: 2025-02-11

## 2025-02-03 RX ORDER — DOCUSATE SODIUM 100 MG/1
100 CAPSULE, LIQUID FILLED ORAL 2 TIMES DAILY
Qty: 60 CAPSULE | Refills: 1 | Status: SHIPPED | OUTPATIENT
Start: 2025-02-03

## 2025-02-03 RX ORDER — AZITHROMYCIN 250 MG/1
TABLET, FILM COATED ORAL
Qty: 6 TABLET | Refills: 0 | Status: SHIPPED | OUTPATIENT
Start: 2025-02-03 | End: 2025-02-08

## 2025-02-03 RX ORDER — ACETAMINOPHEN 500 MG
500 TABLET ORAL EVERY 6 HOURS PRN
Qty: 90 TABLET | Refills: 1 | Status: SHIPPED | OUTPATIENT
Start: 2025-02-03

## 2025-02-03 RX ORDER — ATORVASTATIN CALCIUM 10 MG/1
10 TABLET, FILM COATED ORAL DAILY
Qty: 30 TABLET | Refills: 5 | Status: SHIPPED | OUTPATIENT
Start: 2025-02-03 | End: 2025-08-02

## 2025-02-03 RX ORDER — NYSTATIN 100000 [USP'U]/G
POWDER TOPICAL 3 TIMES DAILY PRN
Qty: 60 G | Refills: 0 | Status: SHIPPED | OUTPATIENT
Start: 2025-02-03

## 2025-02-03 RX ORDER — POLYETHYLENE GLYCOL 3350 17 G/17G
17 POWDER, FOR SOLUTION ORAL DAILY PRN
Qty: 30 EACH | Refills: 5 | Status: SHIPPED | OUTPATIENT
Start: 2025-02-03 | End: 2025-02-03

## 2025-02-03 RX ORDER — BISACODYL 10 MG
10 SUPPOSITORY, RECTAL RECTAL DAILY PRN
Qty: 12 SUPPOSITORY | Refills: 0 | Status: SHIPPED | OUTPATIENT
Start: 2025-02-03

## 2025-02-03 NOTE — PROGRESS NOTES
Daily Note     Today's date: 2/3/2025  Patient name: Aminta De La Torre  : 1975  MRN: [id number]  Referring provider: Radha Olvera MD  Dx:   Encounter Diagnosis     ICD-10-CM    1. Status post ORIF of fracture of ankle  Z98.890     Z87.81       2. Decreased functional mobility and endurance  Z74.09       3. Balance disorder  R26.89           Start Time: 1108  Stop Time: 1146  Total time in clinic (min): 38 minutes    Subjective: Didn't sleep well last night      Objective: See treatment diary below      Assessment: Pt tolerated the treatment poorly this session due to decreased tolerance with standing and limited walking tolerance. Noted a purple fluid-filled blister on R index finger. Educated caregiver to keep an eye on it and to avoid any extra pressure on finger at this time to allow for healing. Caregiver verbalized understanding. Pt able to perform 3 bouts of standing this session requiring maxAx1 for STS and mod/max assist for standing/ambulation. Performed PROM to L knee and ankle to facilitate ROM and contracture management. Pt have been tired and hungry due to it being close to lunch time. Suggested giving pt a snack before next PT session to aid with energy. Will attempt session at 11am on Friday, but may need to adjust scheduling if act tolerance continues to be a barrier. Pt demonstrated fatigue post-session. Pt would benefit from further skilled PT sessions to address deficits in endurance, strength, balance, activity tolerance, and overall safety needed to maximize the pt's function and quality of life.        Plan: Continue per plan of care.  Progress treatment as tolerated.       Short Term Goal Expiration Date:(2025)  Long Term Goal Expiration Date: (2025)  POC Expiration Date: (2025)    Visit count: 3 of 10; PN due 2025    POC expires Unit limit Auth Expiration date PT/OT/ST + Visit Limit?   25 N/A N/A BOMN   25 N/A 25 BOMN                      Visit/Unit Tracking  AUTH Status:  Date 11/26 11/29 12/13 12/20 12/23 12/27  PN 12/30 01/03 01/10 01/15 01/17   BOMN Used 1 2 3 4 5 6 1 2 3 1 2    Remaining  9 8 7 6 5 4 9 8 7 9 8     AUTH Status:  Date 01/22 01/24  PN 01/27 01/31 02/03         BOMN Used 3 4 1 2 3          Remaining  7 6 9 8 7              Access Code: [email or web address]  URL: [email or web address]  Date: 12/27/2024  Prepared by: Radha Tyler    Program Notes  Passive dorsiflexion and hamstring stretching for caregiversTry to stand at least 3x/day for 1 min or more.  Use pillow as wedge in between thighs to prevent hip ER and place pillow under L ankle to encourage increased knee ext. Do NOT place pillow under knee.     Exercises  - Supine Hamstring Stretch  - 2 x daily - 7 x weekly - 1 sets - 3 reps - 30 hold  - Long Sitting Calf Stretch with Strap  - 1 x daily - 7 x weekly - 1 sets - 3 reps     Precautions T21, Osteoporosis, Neck pain, Hx seizure like activity, Fall risk        Manuals 01/17 01/22 01/24 01/27 01/31                                   Neuro Re-Ed  Pt and caregiver education on importance of LLE weightbearing to promote improved balance and functional mobility  Skin inspection of L lateral malleolus    Caregiver education about importance of LLE weightbearing and stretching to improve mobility  Pt and caregiver education on importance of LLE weightbearing to promote improved balance and functional mobility   Pt and caregiver education on importance of LLE weightbearing to promote improved balance and functional mobility    Transfers  STS  // bars   B/l UE   maxAx1  x5 STS  // bars   B/l UE   Mod/maxAx1  x5 STS   // bars   maxAx1   B/l UE   X 7 STS   // bars   maxAx1   B/l UE   X 5     Sit pivot transfer from w/c <>mat table   maxAx1 STS   // bars   maxAx1   B/l UE   X 5    Static balance         Dynamic balance Walking   // bars   10 ft   B/l UE   Min Ax1   X 2 laps     Block with foot between pt's feet for scissoring        Static standing   /// bars   10 min x 3   CGA--minAx1  Walking   // bars   5 ft   B/l UE   Mod Ax1   X 2     Block with foot between pt's feet for scissoring     Static standing   /// bars   5 min x 2   minAx1--modAx1  Static standing   // bars   B/l UE  5 min x 4   CGA-minAx1    Walking   // bars   10 ft   B/l UE   Min Ax1   X 3    Block with foot between pt's feet for scissoring  Static standing   // bars   B/l UE   4 x 5 min     Walking   // bars   10 ft x 1     Walking and turning in // bars   20 ft   X 2   minAx1     Block with foot between pt's feet for scissoring  Walking and turning in // bars   20 ft total  X 3 laps  minAx1     Block with foot between pt's feet for scissoring     Static standing   // bars   B/l UE   4 x 5 min                                    Ther Ex   Pt education on continued stretching to improve knee extension and maintain ankle ROM    Pt education on ROM findings   Pt education on continued stretching to improve knee extension and maintain ankle ROM   Ankle ROM  2 x 30 sec L ankle dorsiflexion stretch     4 x 1 min L hamstring stretch     Review for HEP 6 x  1 min L hamstring stretch     2 x 1 min L ankle dorsiflexion stretch     1 x 1 min L hip adductor stretch     1 x  1 min L hamstring stretch     2 x 30 sec L ankle dorsiflexion stretch   3 x  1 min L hamstring stretch     3 x 1 min L ankle dorsiflexion stretch                                                            Ther Activity                        Gait Training        Education                 Modalities

## 2025-02-03 NOTE — PROGRESS NOTES
Name: Aminta De La Torre      : 1975      MRN: [id number]  Encounter Provider: Radha Olvera MD  Encounter Date: 2/3/2025   Enc[facility]: [facility]  :  Assessment & Plan  Acute URI  For almost ten days without improvement. Continue to have productive cough. Start zpack.     Orders:    azithromycin (Zithromax) 250 mg tablet; Take 2 tablets (500 mg total) by mouth daily for 1 day, THEN 1 tablet (250 mg total) daily for 4 days.    sodium chloride (OCEAN) 0.65 % nasal spray; 2 sprays into each nostril as needed for congestion or rhinitis    Anxiety    Orders:    escitalopram (LEXAPRO) 20 mg tablet; Take 1 tablet (20 mg total) by mouth daily    Chronic idiopathic constipation  Current bowel regimen: colace twice a day; miralax twice a week; dulcolax suppository prn.     Orders:    docusate sodium (COLACE) 100 mg capsule; Take 1 capsule (100 mg total) by mouth 2 (two) times a day    polyethylene glycol (MIRALAX) 17 g; Twice a week, hold for diarrhea    History of impacted cerumen    Orders:    carbamide peroxide (QC Earwax Removal) 6.5 % otic solution; Twice a day on  and Friday    Bilateral impacted cerumen    Orders:    carbamide peroxide (QC Earwax Removal) 6.5 % otic solution; Twice a day on  and Friday    Chronic pain of left knee    Orders:    Diclofenac Sodium (GoodSense Arthritis Pain) 1 %; Apply 2 g topically 4 (four) times a day To left knee    Closed fracture of left ankle, initial encounter  F/u with ortho.   Still on ASA. Advice caregiver to check with ortho how much longer to continue this for DVT prevention.     Orders:    aspirin (Aspirin Low Dose) 81 mg EC tablet; Take 1 tablet (81 mg total) by mouth 2 (two) times a day    Constipation  Current bowel regimen: colace twice a day; miralax twice a week; dulcolax suppository prn.          Constipation, unspecified constipation type  Current bowel regimen: colace twice a day; miralax twice a week; dulcolax  suppository prn.     Orders:    bisacodyl (DULCOLAX) 10 mg suppository; Insert 1 suppository (10 mg total) into the rectum daily as needed for constipation    Dry mouth    Orders:    Mouthwashes (Biotene Dry Mouth) LIQD; Apply 1 Swab to the mouth or throat 2 (two) times a day as needed (dry mouth) Use 5ml of solution with each swab    Health maintenance alteration    Orders:    cholecalciferol (VITAMIN D3) 1,000 units tablet; Take 1 tablet (1,000 Units total) by mouth every morning At 8AM    Hypothyroidism, unspecified type  Monitor tsh.   Continue current dose.     Orders:    levothyroxine 50 mcg tablet; Take 1 tablet (50 mcg total) by mouth daily    Mixed hyperglyceridemia    Orders:    atorvastatin (LIPITOR) 10 mg tablet; Take 1 tablet (10 mg total) by mouth daily    Primary insomnia    Orders:    melatonin 3 mg; Take 1 tablet (3 mg total) by mouth daily at bedtime    Rash  Several blisters noticed on hand.   Clear fluid filled no sign of infection.   Unclear etiology. Burn injury? Caregivers says there is no heater that's within reach to patient.  Monitor for now. Refer to derm.      Orders:    nystatin powder; Apply topically 3 (three) times a day as needed (rash)    ZINC OXIDE, TOPICAL, 10 % CREA; Apply topically 3 (three) times a day as needed (rash)    Chronic knee pain, unspecified laterality    Orders:    acetaminophen (TYLENOL) 500 mg tablet; Take 1 tablet (500 mg total) by mouth every 6 (six) hours as needed for mild pain or fever With 600 mg ibuprofen    Skin infection    Orders:    bacitracin topical ointment 500 units/g topical ointment; Apply 1 large application topically 3 (three) times a day as needed for wound care (skin infection)    Blister of skin    Orders:    Ambulatory Referral to Dermatology; Future           History of Present Illness   HPI  Dry cough since 1/25  Nasal congestion  Home covid negative  Denies SOB, wheezing    Several blisters noticed on hand    Needs order for new  wheelchair  Needs order for a larger bed. Currently sleeps on twin sized bed. Caregiver concerned of falling from bed with pt rolling at night.   Also needs all med to re-filled to a new pharmacy.   Review of Systems    Objective   /76 (BP Location: Left arm, Patient Position: Sitting, Cuff Size: Standard)   Pulse 101   Temp 97.9 °F (36.6 °C) (Tympanic)   LMP  (LMP Unknown)   SpO2 100%      Physical Exam

## 2025-02-04 NOTE — ASSESSMENT & PLAN NOTE
Current bowel regimen: colace twice a day; miralax twice a week; dulcolax suppository prn.     Orders:    docusate sodium (COLACE) 100 mg capsule; Take 1 capsule (100 mg total) by mouth 2 (two) times a day    polyethylene glycol (MIRALAX) 17 g; Twice a week, hold for diarrhea

## 2025-02-04 NOTE — ASSESSMENT & PLAN NOTE
For almost ten days without improvement. Continue to have productive cough. Start zpack.     Orders:    azithromycin (Zithromax) 250 mg tablet; Take 2 tablets (500 mg total) by mouth daily for 1 day, THEN 1 tablet (250 mg total) daily for 4 days.    sodium chloride (OCEAN) 0.65 % nasal spray; 2 sprays into each nostril as needed for congestion or rhinitis

## 2025-02-04 NOTE — ASSESSMENT & PLAN NOTE
Orders:    Mouthwashes (Biotene Dry Mouth) LIQD; Apply 1 Swab to the mouth or throat 2 (two) times a day as needed (dry mouth) Use 5ml of solution with each swab

## 2025-02-04 NOTE — ASSESSMENT & PLAN NOTE
Several blisters noticed on hand.   Clear fluid filled no sign of infection.   Unclear etiology. Burn injury? Caregivers says there is no heater that's within reach to patient.  Monitor for now. Refer to derm.      Orders:    nystatin powder; Apply topically 3 (three) times a day as needed (rash)    ZINC OXIDE, TOPICAL, 10 % CREA; Apply topically 3 (three) times a day as needed (rash)

## 2025-02-04 NOTE — ASSESSMENT & PLAN NOTE
F/u with ortho.   Still on ASA. Advice caregiver to check with ortho how much longer to continue this for DVT prevention.     Orders:    aspirin (Aspirin Low Dose) 81 mg EC tablet; Take 1 tablet (81 mg total) by mouth 2 (two) times a day

## 2025-02-04 NOTE — ASSESSMENT & PLAN NOTE
Current bowel regimen: colace twice a day; miralax twice a week; dulcolax suppository prn.

## 2025-02-04 NOTE — ASSESSMENT & PLAN NOTE
Orders:    Diclofenac Sodium (GoodSense Arthritis Pain) 1 %; Apply 2 g topically 4 (four) times a day To left knee

## 2025-02-04 NOTE — ASSESSMENT & PLAN NOTE
Current bowel regimen: colace twice a day; miralax twice a week; dulcolax suppository prn.     Orders:    bisacodyl (DULCOLAX) 10 mg suppository; Insert 1 suppository (10 mg total) into the rectum daily as needed for constipation

## 2025-02-04 NOTE — ASSESSMENT & PLAN NOTE
Monitor tsh.   Continue current dose.     Orders:    levothyroxine 50 mcg tablet; Take 1 tablet (50 mcg total) by mouth daily

## 2025-02-05 DIAGNOSIS — J06.9 ACUTE URI: ICD-10-CM

## 2025-02-06 RX ORDER — AZITHROMYCIN 250 MG/1
TABLET, FILM COATED ORAL
Qty: 6 TABLET | Refills: 0 | OUTPATIENT
Start: 2025-02-06 | End: 2025-02-10

## 2025-02-06 NOTE — PROGRESS NOTES
Daily Note     Today's date: 2025  Patient name: Aminta De La Torre  : 1975  MRN: [id number]  Referring provider: Radha Olvera MD  Dx:   Encounter Diagnosis     ICD-10-CM    1. Status post ORIF of fracture of ankle  Z98.890     Z87.81       2. Decreased functional mobility and endurance  Z74.09       3. Balance disorder  R26.89           Start Time: 1100  Stop Time: 1145  Total time in clinic (min): 45 minutes    Subjective: Has been doing more transfers at home without the sruthi lift, going to the dermatologist to get finger looked at for pressure injuries and blisters      Objective: See treatment diary below      Assessment: Pt tolerated the treatment fairly well despite complaining of pain in L index finger. Noted purple wound on L lateral aspect of finger and broken blister on medial aspect of index finger. Educated to monitor and clean as able. Caregiver verbalized understanding. Pt was able to perform STS from elevated mat table with modAx1. Emphasized unsupported sitting balance on mat table with reaching for cones. However, pt very distracted this session. Pt was able to go on Nu-step requiring max verbal cues and mod tactile cues to push down on pedals with feet to facilitate cardiovascular endurance and ROM. Pt demonstrated fatigue post-session. Pt would benefit from further skilled PT sessions to address deficits in endurance, strength, balance, activity tolerance, and overall safety needed to maximize the pt's function and quality of life.        Plan: Continue per plan of care.  Progress treatment as tolerated.       Short Term Goal Expiration Date:(2025)  Long Term Goal Expiration Date: (2025)  POC Expiration Date: (2025)    Visit count: 4 of 10; PN due 2025    POC expires Unit limit Auth Expiration date PT/OT/ST + Visit Limit?   25 N/A N/A BOMN   25 N/A 25 BOMN                     Visit/Unit Tracking  AUTH Status:  Date   12/27  PN 12/30 01/03 01/10 01/15 01/17   BOMN Used 1 2 3 4 5 6 1 2 3 1 2    Remaining  9 8 7 6 5 4 9 8 7 9 8     AUTH Status:  Date 01/22 01/24  PN 01/27 01/31 02/03 02/07        BOMN Used 3 4 1 2 3 4         Remaining  7 6 9 8 7 6             Access Code: [email or web address]  URL: [email or web address]  Date: 12/27/2024  Prepared by: Radha Tyler    Program Notes  Passive dorsiflexion and hamstring stretching for caregiversTry to stand at least 3x/day for 1 min or more.  Use pillow as wedge in between thighs to prevent hip ER and place pillow under L ankle to encourage increased knee ext. Do NOT place pillow under knee.     Exercises  - Supine Hamstring Stretch  - 2 x daily - 7 x weekly - 1 sets - 3 reps - 30 hold  - Long Sitting Calf Stretch with Strap  - 1 x daily - 7 x weekly - 1 sets - 3 reps     Precautions T21, Osteoporosis, Neck pain, Hx seizure like activity, Fall risk        Manuals 02/07 01/22 01/24 01/27 01/31                                   Neuro Re-Ed  Skin inspection of L index finger  Skin inspection of L lateral malleolus    Caregiver education about importance of LLE weightbearing and stretching to improve mobility  Pt and caregiver education on importance of LLE weightbearing to promote improved balance and functional mobility   Pt and caregiver education on importance of LLE weightbearing to promote improved balance and functional mobility    Transfers  Sit-pivot transfer from W/C <> mat table   W/C <> Nu-step  maxAx1     STS from elevated mat table   X 8   Mod-maxAx1 STS  // bars   B/l UE   Mod/maxAx1  x5 STS   // bars   maxAx1   B/l UE   X 7 STS   // bars   maxAx1   B/l UE   X 5     Sit pivot transfer from w/c <>mat table   maxAx1 STS   // bars   maxAx1   B/l UE   X 5    Static balance  Unsupported sitting balance on mat table   X 20 min     Reaching for cones     Static standing   X 8 (30s-1min duration)   Min-modAx1       Dynamic balance  Walking   // bars   5 ft   B/l UE   Mod Ax1   X  2     Block with foot between pt's feet for scissoring     Static standing   /// bars   5 min x 2   minAx1--modAx1  Static standing   // bars   B/l UE  5 min x 4   CGA-minAx1    Walking   // bars   10 ft   B/l UE   Min Ax1   X 3    Block with foot between pt's feet for scissoring  Static standing   // bars   B/l UE   4 x 5 min     Walking   // bars   10 ft x 1     Walking and turning in // bars   20 ft   X 2   minAx1     Block with foot between pt's feet for scissoring  Walking and turning in // bars   20 ft total  X 3 laps  minAx1     Block with foot between pt's feet for scissoring     Static standing   // bars   B/l UE   4 x 5 min                                    Ther Ex   Pt education on continued stretching to improve knee extension and maintain ankle ROM    Pt education on ROM findings   Pt education on continued stretching to improve knee extension and maintain ankle ROM   Ankle ROM   6 x  1 min L hamstring stretch     2 x 1 min L ankle dorsiflexion stretch     1 x 1 min L hip adductor stretch     1 x  1 min L hamstring stretch     2 x 30 sec L ankle dorsiflexion stretch   3 x  1 min L hamstring stretch     3 x 1 min L ankle dorsiflexion stretch    Nu-step L1 x 8 min     ModAx1 for propulsion                                                        Ther Activity                        Gait Training        Education                 Modalities

## 2025-02-07 ENCOUNTER — OFFICE VISIT (OUTPATIENT)
Dept: PHYSICAL THERAPY | Facility: CLINIC | Age: 50
End: 2025-02-07
Payer: MEDICARE

## 2025-02-07 ENCOUNTER — TELEPHONE (OUTPATIENT)
Age: 50
End: 2025-02-07

## 2025-02-07 DIAGNOSIS — Z74.09 DECREASED FUNCTIONAL MOBILITY AND ENDURANCE: ICD-10-CM

## 2025-02-07 DIAGNOSIS — Z98.890 STATUS POST ORIF OF FRACTURE OF ANKLE: Primary | ICD-10-CM

## 2025-02-07 DIAGNOSIS — R26.89 BALANCE DISORDER: ICD-10-CM

## 2025-02-07 DIAGNOSIS — Z87.81 STATUS POST ORIF OF FRACTURE OF ANKLE: Primary | ICD-10-CM

## 2025-02-07 PROCEDURE — 97112 NEUROMUSCULAR REEDUCATION: CPT

## 2025-02-07 PROCEDURE — 97110 THERAPEUTIC EXERCISES: CPT

## 2025-02-07 NOTE — TELEPHONE ENCOUNTER
[facility] called to get more specific instructions.     The carbamide peroxide (QC Earwax Removal) 6.5 % otic solution, how many drop should be given on Tuesday and Friday? Is it both ears?      The polyethylene glycol (MIRALAX) 17 g, is it 17 g each time on the two days a week its given?      Please resend the scripts with moire detail for the facility.

## 2025-02-10 ENCOUNTER — OFFICE VISIT (OUTPATIENT)
Dept: PHYSICAL THERAPY | Facility: CLINIC | Age: 50
End: 2025-02-10
Payer: MEDICARE

## 2025-02-10 DIAGNOSIS — Z98.890 STATUS POST ORIF OF FRACTURE OF ANKLE: Primary | ICD-10-CM

## 2025-02-10 DIAGNOSIS — R26.89 BALANCE DISORDER: ICD-10-CM

## 2025-02-10 DIAGNOSIS — Z87.81 STATUS POST ORIF OF FRACTURE OF ANKLE: Primary | ICD-10-CM

## 2025-02-10 DIAGNOSIS — Z74.09 DECREASED FUNCTIONAL MOBILITY AND ENDURANCE: ICD-10-CM

## 2025-02-10 PROCEDURE — 97112 NEUROMUSCULAR REEDUCATION: CPT

## 2025-02-10 NOTE — TELEPHONE ENCOUNTER
Larisa with [facility] pharmacy called is following up on previous message. Needs to know how many drops in the ear and which ear or is it both. And then with Miralax directions needs to say how much is given and to mix with liquid (4-8 ounces of favorite liquid) states directions need to be exact due to being in a group home. Larisa can be reached at [phone]

## 2025-02-10 NOTE — PROGRESS NOTES
Daily Note     Today's date: 2/10/2025  Patient name: Aminta De La Torre  : 1975  MRN: [id number]  Referring provider: Radha Olvera MD  Dx:   Encounter Diagnosis     ICD-10-CM    1. Status post ORIF of fracture of ankle  Z98.890     Z87.81       2. Decreased functional mobility and endurance  Z74.09       3. Balance disorder  R26.89           Start Time: 1108  Stop Time: 1145  Total time in clinic (min): 37 minutes    Subjective: Although pt can't verbally express pain, pt is gesturing to L index finger and is tearful. Caregiver says that supervisor is going to make an appt with dermatology to look at it. Has dentist later this afternoon.      Objective: See treatment diary below      Assessment: Pt tolerated the treatment fair. Performed skin inspection of L index finger with noted pressure injury on lateral L index finger that's dark purple in color and opened blister on medial aspect of index finger. Placed bandaid on finger for the session to increase comfort when holding onto parallel bars and then removed at end of session to avoid any choking hazards. Caregiver advised to make appt with either PCP or dermatology as soon as possible to decrease risk of infection or further skin breakdown. Educated it may be helpful to clean the wound, put on cream, and bandaid and cover with glove so that pt doesn't ingest any cream/bandaid. Pt able to perform 4 bouts of standing and able to walk up to 5 steps before becoming tearful most likely due to finger pain. Pt continues to require maxAx1 for STS and min-modAx1 for standing. Pt demonstrated fatigue post-session. Pt would benefit from further skilled PT sessions to address deficits in endurance, strength, balance, activity tolerance, and overall safety needed to maximize the pt's function and quality of life.       Plan: Continue per plan of care.  Progress treatment as tolerated.       Short Term Goal Expiration Date:(2025)  Long Term Goal Expiration Date:  (03/21/2025)  POC Expiration Date: (03/21/2025)    Visit count: 5 of 10; PN due 02/21/2025    POC expires Unit limit Auth Expiration date PT/OT/ST + Visit Limit?   01/21/25 N/A N/A BOMN   03/21/25 N/A 12/31/25 BOMN                     Visit/Unit Tracking  AUTH Status:  Date 11/26 11/29 12/13 12/20 12/23 12/27  PN 12/30 01/03 01/10 01/15 01/17   BOMN Used 1 2 3 4 5 6 1 2 3 1 2    Remaining  9 8 7 6 5 4 9 8 7 9 8     AUTH Status:  Date 01/22 01/24  PN 01/27 01/31 02/03 02/07 02/10       BOMN Used 3 4 1 2 3 4 5        Remaining  7 6 9 8 7 6 5            Access Code: [email or web address]  URL: [email or web address]  Date: 12/27/2024  Prepared by: Radha Tyler    Program Notes  Passive dorsiflexion and hamstring stretching for caregiversTry to stand at least 3x/day for 1 min or more.  Use pillow as wedge in between thighs to prevent hip ER and place pillow under L ankle to encourage increased knee ext. Do NOT place pillow under knee.     Exercises  - Supine Hamstring Stretch  - 2 x daily - 7 x weekly - 1 sets - 3 reps - 30 hold  - Long Sitting Calf Stretch with Strap  - 1 x daily - 7 x weekly - 1 sets - 3 reps     Precautions T21, Osteoporosis, Neck pain, Hx seizure like activity, Fall risk        Manuals 02/07 02/10 01/24 01/27 01/31                                   Neuro Re-Ed  Skin inspection of L index finger   Pt and caregiver education on importance of LLE weightbearing to promote improved balance and functional mobility   Pt and caregiver education on importance of LLE weightbearing to promote improved balance and functional mobility    Transfers  Sit-pivot transfer from W/C <> mat table   W/C <> Nu-step  maxAx1     STS from elevated mat table   X 8   Mod-maxAx1 maxAx1   B/l UE   X 5  STS   // bars   maxAx1   B/l UE   X 7 STS   // bars   maxAx1   B/l UE   X 5     Sit pivot transfer from w/c <>mat table   maxAx1 STS   // bars   maxAx1   B/l UE   X 5    Static balance  Unsupported sitting balance on mat table   X  20 min     Reaching for cones     Static standing   X 8 (30s-1min duration)   Min-modAx1       Dynamic balance  Static standing   // bars   B/l UE  2 min x 4   minAx1- modAx1    Walking   // bars   2 ft   B/l UE   Mod-maxAx1   X 3    Block with foot between pt's feet for scissoring  Static standing   // bars   B/l UE  5 min x 4   CGA-minAx1    Walking   // bars   10 ft   B/l UE   Min Ax1   X 3    Block with foot between pt's feet for scissoring  Static standing   // bars   B/l UE   4 x 5 min     Walking   // bars   10 ft x 1     Walking and turning in // bars   20 ft   X 2   minAx1     Block with foot between pt's feet for scissoring  Walking and turning in // bars   20 ft total  X 3 laps  minAx1     Block with foot between pt's feet for scissoring     Static standing   // bars   B/l UE   4 x 5 min                                    Ther Ex  Skin inspection of L index finger, education on importance of cleaning to reduce risk of infection, and possible use cream/bandaid to keep the wound covered with glove to reduce her putting in her mouth     Ed on ways pain may be impacting function and performance  Pt education on continued stretching to improve knee extension and maintain ankle ROM    Pt education on ROM findings   Pt education on continued stretching to improve knee extension and maintain ankle ROM   Ankle ROM   3 x  1 min L hamstring stretch     2 x 1 min  L ankle dorsiflexion stretch  1 x  1 min L hamstring stretch     2 x 30 sec L ankle dorsiflexion stretch   3 x  1 min L hamstring stretch     3 x 1 min L ankle dorsiflexion stretch    Nu-step L1 x 8 min     ModAx1 for propulsion                                                        Ther Activity                        Gait Training        Education                 Modalities

## 2025-02-11 DIAGNOSIS — K59.04 CHRONIC IDIOPATHIC CONSTIPATION: ICD-10-CM

## 2025-02-11 DIAGNOSIS — H61.23 BILATERAL IMPACTED CERUMEN: ICD-10-CM

## 2025-02-11 DIAGNOSIS — Z86.69 HISTORY OF IMPACTED CERUMEN: ICD-10-CM

## 2025-02-11 DIAGNOSIS — F41.9 ANXIETY: ICD-10-CM

## 2025-02-11 RX ORDER — POLYETHYLENE GLYCOL 3350 17 G/17G
POWDER, FOR SOLUTION ORAL
Qty: 30 EACH | Refills: 5 | Status: SHIPPED | OUTPATIENT
Start: 2025-02-11

## 2025-02-11 NOTE — TELEPHONE ENCOUNTER
Update sent to pharmacy.    Attempted to call to let Larisa know but line disconnected.    LM to return call with any questions.

## 2025-02-11 NOTE — TELEPHONE ENCOUNTER
Uptodate rx instruction.   Miralax 17g twice a week,   Ear drop is 5 drop each ear on Tue and Fri.

## 2025-02-12 RX ORDER — ESCITALOPRAM OXALATE 20 MG/1
20 TABLET ORAL DAILY
Qty: 90 TABLET | Refills: 1 | Status: SHIPPED | OUTPATIENT
Start: 2025-02-12

## 2025-02-14 ENCOUNTER — OFFICE VISIT (OUTPATIENT)
Dept: PHYSICAL THERAPY | Facility: CLINIC | Age: 50
End: 2025-02-14
Payer: MEDICARE

## 2025-02-14 DIAGNOSIS — Z87.81 STATUS POST ORIF OF FRACTURE OF ANKLE: Primary | ICD-10-CM

## 2025-02-14 DIAGNOSIS — Z74.09 DECREASED FUNCTIONAL MOBILITY AND ENDURANCE: ICD-10-CM

## 2025-02-14 DIAGNOSIS — R26.89 BALANCE DISORDER: ICD-10-CM

## 2025-02-14 DIAGNOSIS — Z98.890 STATUS POST ORIF OF FRACTURE OF ANKLE: Primary | ICD-10-CM

## 2025-02-14 PROCEDURE — 97112 NEUROMUSCULAR REEDUCATION: CPT

## 2025-02-14 PROCEDURE — 97150 GROUP THERAPEUTIC PROCEDURES: CPT

## 2025-02-14 NOTE — PROGRESS NOTES
Daily Note     Today's date: 2025  Patient name: Aminta De La Torre  : 1975  MRN: [id number]  Referring provider: Radha Olvera MD  Dx:   Encounter Diagnosis     ICD-10-CM    1. Status post ORIF of fracture of ankle  Z98.890     Z87.81       2. Decreased functional mobility and endurance  Z74.09       3. Balance disorder  R26.89           Start Time: 1100  Stop Time: 1145  Total time in clinic (min): 45 minutes    Individual Treatment: [id number], [id number]  Therapeutic Group: [id number]    Subjective: Getting cream for finger wounds and have appts set up, may want to change appts       Objective: See treatment diary below      Assessment: Pt tolerated the treatment fairly well despite some expression of pain in L index finger. Pt able to perform 3 bouts of walking this session. On one bout, able to walk full length of 10ft // bars and turn around to the other end. Pt demonstrated decreased need for facilitation for step this session. Pt continues to require maxAx1 for STS transfers. Pt demonstrated fatigue post-session. Pt would benefit from further skilled PT sessions to address deficits in endurance, strength, balance, activity tolerance, and overall safety needed to maximize the pt's function and quality of life.       Plan: Continue per plan of care.  Progress treatment as tolerated.       Short Term Goal Expiration Date:(2025)  Long Term Goal Expiration Date: (2025)  POC Expiration Date: (2025)    Visit count: 6 of 10; PN due 2025    POC expires Unit limit Auth Expiration date PT/OT/ST + Visit Limit?   25 N/A N/A BOMN   25 N/A 25 BOMN                     Visit/Unit Tracking  AUTH Status:  Date 11/26 11/29 12/13 12/20 12/23 12/27  PN 12/30 01/03 01/10 01/15 01/17   BOMN Used 1 2 3 4 5 6 1 2 3 1 2    Remaining  9 8 7 6 5 4 9 8 7 9 8     AUTH Status:  Date   PN 01/27 01/31 02/03 02/07 02/10 02/14      BOMN Used 3 4 1 2 3 4 5 6       Remaining  7 6 9 8 7 6  5 4           Access Code: [email or web address]  URL: [email or web address]  Date: 12/27/2024  Prepared by: Radha Tyler    Program Notes  Passive dorsiflexion and hamstring stretching for caregiversTry to stand at least 3x/day for 1 min or more.  Use pillow as wedge in between thighs to prevent hip ER and place pillow under L ankle to encourage increased knee ext. Do NOT place pillow under knee.     Exercises  - Supine Hamstring Stretch  - 2 x daily - 7 x weekly - 1 sets - 3 reps - 30 hold  - Long Sitting Calf Stretch with Strap  - 1 x daily - 7 x weekly - 1 sets - 3 reps     Precautions T21, Osteoporosis, Neck pain, Hx seizure like activity, Fall risk        Manuals 02/07 02/10 02/14 01/27 01/31                                   Neuro Re-Ed  Skin inspection of L index finger   Skin inspection of L index finger   Pt and caregiver education on importance of LLE weightbearing to promote improved balance and functional mobility    Transfers  Sit-pivot transfer from W/C <> mat table   W/C <> Nu-step  maxAx1     STS from elevated mat table   X 8   Mod-maxAx1 maxAx1   B/l UE   X 5  STS   // bars   B/l UE   maxAx1   X 5  STS   // bars   maxAx1   B/l UE   X 5     Sit pivot transfer from w/c <>mat table   maxAx1 STS   // bars   maxAx1   B/l UE   X 5    Static balance  Unsupported sitting balance on mat table   X 20 min     Reaching for cones     Static standing   X 8 (30s-1min duration)   Min-modAx1       Dynamic balance  Static standing   // bars   B/l UE  2 min x 4   minAx1- modAx1    Walking   // bars   2 ft   B/l UE   Mod-maxAx1   X 3    Block with foot between pt's feet for scissoring  Static standing   // bars   B/l UE  2 min x 4   minAx1- modAx1    Walking and turning in // bars   20 ft   X 1   Min-modAx1     Walking   // bars   10 ft x 2 Static standing   // bars   B/l UE   4 x 5 min     Walking   // bars   10 ft x 1     Walking and turning in // bars   20 ft   X 2   minAx1     Block with foot between pt's feet  for scissoring  Walking and turning in // bars   20 ft total  X 3 laps  minAx1     Block with foot between pt's feet for scissoring     Static standing   // bars   B/l UE   4 x 5 min                                    Ther Ex  Skin inspection of L index finger, education on importance of cleaning to reduce risk of infection, and possible use cream/bandaid to keep the wound covered with glove to reduce her putting in her mouth     Ed on ways pain may be impacting function and performance    Pt education on continued stretching to improve knee extension and maintain ankle ROM   Ankle ROM   3 x  1 min L hamstring stretch     2 x 1 min  L ankle dorsiflexion stretch  3 x  1 min L hamstring stretch     2 x 1 min  L ankle dorsiflexion stretch   3 x  1 min L hamstring stretch     3 x 1 min L ankle dorsiflexion stretch    Nu-step L1 x 8 min     ModAx1 for propulsion                                                        Ther Activity                        Gait Training        Education                 Modalities

## 2025-02-14 NOTE — PROGRESS NOTES
"Daily Note     Today's date: 2025  Patient name: Aminta De La Torre  : 1975  MRN: [id number]  Referring provider: Radha Olvera MD  Dx:   Encounter Diagnosis     ICD-10-CM    1. Status post ORIF of fracture of ankle  Z98.890     Z87.81       2. Decreased functional mobility and endurance  Z74.09       3. Balance disorder  R26.89           Start Time: 1100  Stop Time: 1145  Total time in clinic (min): 45 minutes    Subjective: Pt continues to look at left index finger saying \"ouch,\" hasn't received ointment yet     Objective: See treatment diary below      Assessment: Pt tolerated the treatment fair. Pt continues to be limited by left index finger wounds and fatigue this session. Pt continues to be maxAx1 for STS. Pt had decreased act tolerance for standing and walking this session with poor anterior weight shift. Pt able to stand with b/l UE support and min-modAx1. Pt able to walk up to 10 ft before becoming too fatigued to maintain upright. Pt would benefit from further skilled PT sessions to address deficits in endurance, strength, balance, activity tolerance, and overall safety needed to maximize the pt's function and quality of life.       Plan: Continue per plan of care.  Progress treatment as tolerated.       Short Term Goal Expiration Date:(2025)  Long Term Goal Expiration Date: (2025)  POC Expiration Date: (2025)    Visit count: 7 of 10; PN due 2025    POC expires Unit limit Auth Expiration date PT/OT/ST + Visit Limit?   25 N/A N/A BOMN   25 N/A 25 BOMN                     Visit/Unit Tracking  AUTH Status:  Date   PN 12/30 01/03 01/10 01/15 01/17   BOMN Used 1 2 3 4 5 6 1 2 3 1 2    Remaining  9 8 7 6 5 4 9 8 7 9 8     AUTH Status:  Date   PN 01/27 01/31 02/03 02/07 02/10 02/14 02/17     BOMN Used 3 4 1 2 3 4 5 6 7      Remaining  7 6 9 8 7 6 5 4 3          Access Code: [email or web address]  URL: " [email or web address]  Date: 12/27/2024  Prepared by: Radha Tyler    Program Notes  Passive dorsiflexion and hamstring stretching for caregiversTry to stand at least 3x/day for 1 min or more.  Use pillow as wedge in between thighs to prevent hip ER and place pillow under L ankle to encourage increased knee ext. Do NOT place pillow under knee.     Exercises  - Supine Hamstring Stretch  - 2 x daily - 7 x weekly - 1 sets - 3 reps - 30 hold  - Long Sitting Calf Stretch with Strap  - 1 x daily - 7 x weekly - 1 sets - 3 reps     Precautions T21, Osteoporosis, Neck pain, Hx seizure like activity, Fall risk        Manuals 02/07 02/10 02/14 02/17 01/31                                   Neuro Re-Ed  Skin inspection of L index finger   Skin inspection of L index finger  Skin inspection of L index finger  Pt and caregiver education on importance of LLE weightbearing to promote improved balance and functional mobility    Transfers  Sit-pivot transfer from W/C <> mat table   W/C <> Nu-step  maxAx1     STS from elevated mat table   X 8   Mod-maxAx1 maxAx1   B/l UE   X 5  STS   // bars   B/l UE   maxAx1   X 5  STS   // bars   B/l UE   maxAx1   X 5  STS   // bars   maxAx1   B/l UE   X 5    Static balance  Unsupported sitting balance on mat table   X 20 min     Reaching for cones     Static standing   X 8 (30s-1min duration)   Min-modAx1       Dynamic balance  Static standing   // bars   B/l UE  2 min x 4   minAx1- modAx1    Walking   // bars   2 ft   B/l UE   Mod-maxAx1   X 3    Block with foot between pt's feet for scissoring  Static standing   // bars   B/l UE  2 min x 4   minAx1- modAx1    Walking and turning in // bars   20 ft   X 1   Min-modAx1     Walking   // bars   10 ft x 2 Static standing   // bars   B/l UE  2 min x 4   minAx1- modAx1    Walking   // bars   10 ft x 2  5 ft x 2  Walking and turning in // bars   20 ft total  X 3 laps  minAx1     Block with foot between pt's feet for scissoring     Static  standing   // bars   B/l UE   4 x 5 min                                    Ther Ex  Skin inspection of L index finger, education on importance of cleaning to reduce risk of infection, and possible use cream/bandaid to keep the wound covered with glove to reduce her putting in her mouth     Ed on ways pain may be impacting function and performance    Pt education on continued stretching to improve knee extension and maintain ankle ROM   Ankle ROM   3 x  1 min L hamstring stretch     2 x 1 min  L ankle dorsiflexion stretch  3 x  1 min L hamstring stretch     2 x 1 min  L ankle dorsiflexion stretch  5 x  1 min L hamstring stretch     5 x 1 min  L ankle dorsiflexion stretch  3 x  1 min L hamstring stretch     3 x 1 min L ankle dorsiflexion stretch    Nu-step L1 x 8 min     ModAx1 for propulsion                                                        Ther Activity                        Gait Training        Education                 Modalities

## 2025-02-17 ENCOUNTER — OFFICE VISIT (OUTPATIENT)
Dept: PHYSICAL THERAPY | Facility: CLINIC | Age: 50
End: 2025-02-17
Payer: MEDICARE

## 2025-02-17 DIAGNOSIS — Z87.81 STATUS POST ORIF OF FRACTURE OF ANKLE: Primary | ICD-10-CM

## 2025-02-17 DIAGNOSIS — R26.89 BALANCE DISORDER: ICD-10-CM

## 2025-02-17 DIAGNOSIS — Z98.890 STATUS POST ORIF OF FRACTURE OF ANKLE: Primary | ICD-10-CM

## 2025-02-17 DIAGNOSIS — Z74.09 DECREASED FUNCTIONAL MOBILITY AND ENDURANCE: ICD-10-CM

## 2025-02-17 PROCEDURE — 97110 THERAPEUTIC EXERCISES: CPT

## 2025-02-17 PROCEDURE — 97112 NEUROMUSCULAR REEDUCATION: CPT
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
## 2025-02-21 ENCOUNTER — EVALUATION (OUTPATIENT)
Dept: PHYSICAL THERAPY | Facility: CLINIC | Age: 50
End: 2025-02-21
Payer: MEDICARE

## 2025-02-21 DIAGNOSIS — R26.89 BALANCE DISORDER: ICD-10-CM

## 2025-02-21 DIAGNOSIS — Z87.81 STATUS POST ORIF OF FRACTURE OF ANKLE: Primary | ICD-10-CM

## 2025-02-21 DIAGNOSIS — Z98.890 STATUS POST ORIF OF FRACTURE OF ANKLE: Primary | ICD-10-CM

## 2025-02-21 DIAGNOSIS — Z74.09 DECREASED FUNCTIONAL MOBILITY AND ENDURANCE: ICD-10-CM

## 2025-02-21 PROCEDURE — 97110 THERAPEUTIC EXERCISES: CPT

## 2025-02-21 PROCEDURE — 97112 NEUROMUSCULAR REEDUCATION: CPT

## 2025-02-24 ENCOUNTER — OFFICE VISIT (OUTPATIENT)
Dept: PHYSICAL THERAPY | Facility: CLINIC | Age: 50
End: 2025-02-24
Payer: MEDICARE

## 2025-02-24 DIAGNOSIS — Z98.890 STATUS POST ORIF OF FRACTURE OF ANKLE: Primary | ICD-10-CM

## 2025-02-24 DIAGNOSIS — Z87.81 STATUS POST ORIF OF FRACTURE OF ANKLE: Primary | ICD-10-CM

## 2025-02-24 DIAGNOSIS — R26.89 BALANCE DISORDER: ICD-10-CM

## 2025-02-24 DIAGNOSIS — Z74.09 DECREASED FUNCTIONAL MOBILITY AND ENDURANCE: ICD-10-CM

## 2025-02-24 PROCEDURE — 97530 THERAPEUTIC ACTIVITIES: CPT

## 2025-02-24 PROCEDURE — 97110 THERAPEUTIC EXERCISES: CPT

## 2025-02-24 PROCEDURE — 97112 NEUROMUSCULAR REEDUCATION: CPT

## 2025-02-24 NOTE — PROGRESS NOTES
Daily Note     Today's date: 2025  Patient name: Aminta De La Torre  : 1975  MRN: [id number]  Referring provider: Radha Olvera MD  Dx:   Encounter Diagnosis     ICD-10-CM    1. Status post ORIF of fracture of ankle  Z98.890     Z87.81       2. Decreased functional mobility and endurance  Z74.09       3. Balance disorder  R26.89           Start Time: 1100  Stop Time: 1155  Total time in clinic (min): 55 minutes    Subjective: No new complaints this session      Objective: See treatment diary below      Assessment: Pt tolerated the treatment well with improved act tolerance since last session. Pt was able to perform 2 laps in 10ft parallel bars of walking with turning. Pt required min-modAx1 for walking and maxAx1 for STS. Skin integrity looks improved on L index finger as well. Pt demonstrated fatigue post-session. Pt would benefit from further skilled PT sessions to address deficits in endurance, strength, balance, activity tolerance, and overall safety needed to maximize the pt's function and quality of life.       Plan: Continue per plan of care.  Progress treatment as tolerated.       Short Term Goal Expiration Date:(2025)  Long Term Goal Expiration Date: (2025)  POC Expiration Date: (2025)    Visit count: 2 of 10; PN due 2025    POC expires Unit limit Auth Expiration date PT/OT/ST + Visit Limit?   25 N/A N/A BOMN   25 N/A 25 BOMN                     Visit/Unit Tracking  AUTH Status:  Date 11/26 11/29 12/13 12/20 12/23 12/27  PN 12/30 01/03 01/10 01/15 01/17   BOMN Used 1 2 3 4 5 6 1 2 3 1 2    Remaining  9 8 7 6 5 4 9 8 7 9 8     AUTH Status:  Date 01/22 01/24  PN 01/27 01/31 02/03 02/07 02/10 02/14 02/17 02/21  PN    BOMN Used 3 4 1 2 3 4 5 6 7 1 2    Remaining  7 6 9 8 7 6 5 4 3 9 8        Access Code: [email or web address]  URL: [email or web address]  Date: 2024  Prepared by: Radha Tyler    Program Notes  Passive dorsiflexion and hamstring  stretching for caregiversTry to stand at least 3x/day for 1 min or more.  Use pillow as wedge in between thighs to prevent hip ER and place pillow under L ankle to encourage increased knee ext. Do NOT place pillow under knee.     Exercises  - Supine Hamstring Stretch  - 2 x daily - 7 x weekly - 1 sets - 3 reps - 30 hold  - Long Sitting Calf Stretch with Strap  - 1 x daily - 7 x weekly - 1 sets - 3 reps     Precautions T21, Osteoporosis, Neck pain, Hx seizure like activity, Fall risk        Manuals 02/24 02/10 02/14 02/17 02/21                                   Neuro Re-Ed  Skin inspection of L index finger       Skin inspection of L index finger  Skin inspection of L index finger  Skin inspection of L index finger    Transfers  maxAx1   B/l UE   X 5  maxAx1   B/l UE   X 5  STS   // bars   B/l UE   maxAx1   X 5  STS   // bars   B/l UE   maxAx1   X 5  STS   // bars   B/l UE   maxAx1   X 3    Static balance  Static standing   // bars   B/l UE  10 min x 1  minAx1- modAx1    5 min x 3       Dynamic balance Walking and turning in // bars   20 ft   X 2  Min-modAx1          Static standing   // bars   B/l UE  2 min x 4   minAx1- modAx1    Walking   // bars   2 ft   B/l UE   Mod-maxAx1   X 3    Block with foot between pt's feet for scissoring  Static standing   // bars   B/l UE  2 min x 4   minAx1- modAx1    Walking and turning in // bars   20 ft   X 1   Min-modAx1     Walking   // bars   10 ft x 2 Static standing   // bars   B/l UE  2 min x 4   minAx1- modAx1    Walking   // bars   10 ft x 2  5 ft x 2  Static standing   // bars   B/l UE  2 min x 2   modAx1- maxAx1    Walking   // bars   5 ft x 1                                     Ther Ex  Skin inspection of L index finger, education on importance of cleaning to reduce risk of infection, and possible use cream/bandaid to keep the wound covered with glove to reduce her putting in her mouth     Ed on ways pain may be impacting function and performance    Ed on ways  hunger/fatigue may be impacting function and performance     Education on continuing stretching/ROM HEP daily to improve knee and ankle ROM     Education on continuing to encourage daily activity with pt as tolerated (emphasis on safety for caregivers and pt)    Education on goniometric measurements of L ankle and knee    Ankle ROM  3 x  1 min L hamstring stretch     3 x 1 min  L ankle dorsiflexion stretch  3 x  1 min L hamstring stretch     2 x 1 min  L ankle dorsiflexion stretch  3 x  1 min L hamstring stretch     2 x 1 min  L ankle dorsiflexion stretch  5 x  1 min L hamstring stretch     5 x 1 min  L ankle dorsiflexion stretch  3 x  1 min L hamstring stretch     3 x 1 min  L ankle dorsiflexion stretch    Nu-step                                                        Ther Activity Repair of L leg rest on wheelchair to aid in wheelchair propulsion                        Gait Training        Education                 Modalities

## 2025-02-25 ENCOUNTER — OFFICE VISIT (OUTPATIENT)
Dept: OBGYN CLINIC | Facility: CLINIC | Age: 50
End: 2025-02-25
Payer: MEDICARE

## 2025-02-25 ENCOUNTER — APPOINTMENT (OUTPATIENT)
Dept: RADIOLOGY | Age: 50
End: 2025-02-25
Payer: MEDICARE

## 2025-02-25 VITALS — HEIGHT: 55 IN | WEIGHT: 147 LBS | BODY MASS INDEX: 34.02 KG/M2

## 2025-02-25 DIAGNOSIS — Z87.81 STATUS POST ORIF OF FRACTURE OF ANKLE: Primary | ICD-10-CM

## 2025-02-25 DIAGNOSIS — Z98.890 STATUS POST ORIF OF FRACTURE OF ANKLE: ICD-10-CM

## 2025-02-25 DIAGNOSIS — Z87.81 STATUS POST ORIF OF FRACTURE OF ANKLE: ICD-10-CM

## 2025-02-25 DIAGNOSIS — Z98.890 STATUS POST ORIF OF FRACTURE OF ANKLE: Primary | ICD-10-CM

## 2025-02-25 PROCEDURE — 99214 OFFICE O/P EST MOD 30 MIN: CPT | Performed by: ORTHOPAEDIC SURGERY

## 2025-02-25 PROCEDURE — 73600 X-RAY EXAM OF ANKLE: CPT

## 2025-02-25 NOTE — PROGRESS NOTES
Subjective   CHIEF COMPLAINT/REASON FOR VISIT  Aminta De La Torre is a 50 y.o. female who presents for 6 month post op follow-up after Orif Trimalleolar Fx, Open Reduction/treatment Of Tilleaux Fracture - Left on 8/29/2024     HISTORY OF PRESENT ILLNESS  Overall, she feels things are going well and progressing appropriately.  History obtained by caregiver.  Caregiver denies any concerns at this time.    Objective   PHYSICAL EXAMINATION  Musculoskeletal: Left Ankle   Skin Intact . Surgical incisions well healed              Swelling/ecchymosis over nothing              TTP: None   Range of motion and strength intac   Sensation intact throughout Superficial peroneal, Deep peroneal, Tibial, Sural, Saphenous distributions              EHL/TA/PF motor function intact to testing.               BCR              Gait: Antalgic         Radiographs of the left ankle demonstrate status post stable open external fixation of the distal tibia and fibular fractures.  Hardware intact    Assessment & Plan   1. Status Post Orif Trimalleolar Fx, Open Reduction/treatment Of Tilleaux Fracture - Left    Patient is making appropriate progress from the date of their surgery  Reviewed x-rays today which show stable hardware and age-appropriate healing of trimalleolar fracture  Recommended weightbearing as tolerated and full range of motion  We will plan to see her back as needed  If any issues, questions, or concerns arise between now and the next appointment, we have encouraged the patient contact our team.        Scribe Attestation      I,:  Zander Colvin PA-C am acting as a scribe while in the presence of the attending physician.:       I,:  Mahamed Zamorano MD personally performed the services described in this documentation    as scribed in my presence.:

## 2025-02-28 ENCOUNTER — OFFICE VISIT (OUTPATIENT)
Dept: PHYSICAL THERAPY | Facility: CLINIC | Age: 50
End: 2025-02-28
Payer: MEDICARE

## 2025-02-28 DIAGNOSIS — R26.89 BALANCE DISORDER: ICD-10-CM

## 2025-02-28 DIAGNOSIS — Z87.81 STATUS POST ORIF OF FRACTURE OF ANKLE: Primary | ICD-10-CM

## 2025-02-28 DIAGNOSIS — Z74.09 DECREASED FUNCTIONAL MOBILITY AND ENDURANCE: ICD-10-CM

## 2025-02-28 DIAGNOSIS — Z98.890 STATUS POST ORIF OF FRACTURE OF ANKLE: Primary | ICD-10-CM

## 2025-02-28 PROCEDURE — 97112 NEUROMUSCULAR REEDUCATION: CPT

## 2025-02-28 PROCEDURE — 97110 THERAPEUTIC EXERCISES: CPT

## 2025-02-28 NOTE — PROGRESS NOTES
Daily Note     Today's date: 2025  Patient name: Aminta De La Torre  : 1975  MRN: [id number]  Referring provider: Radha Olvera MD  Dx:   Encounter Diagnosis     ICD-10-CM    1. Status post ORIF of fracture of ankle  Z98.890     Z87.81       2. Decreased functional mobility and endurance  Z74.09       3. Balance disorder  R26.89           Start Time: 1100  Stop Time: 1145  Total time in clinic (min): 45 minutes    Subjective: No new complaints this session, just took a nap so has energy today       Objective: See treatment diary below      Assessment: Pt tolerated the treatment well. Pt performed biking on the Nu-step/walking on the treadmill to promote cardiovascular endurance/neural priming for the session. Pt was challenged with walking in the parallel bars with ability to turn for 20 ft of walking consecutively x 2. Pt required maxAx1 for STS this session and minAx1 for standing, and modAx1 for initiation of step. Pt demonstrated fatigue post-session. Pt would benefit from further skilled PT sessions to address deficits in endurance, strength, balance, activity tolerance, and overall safety needed to maximize the pt's function and quality of life.       Plan: Continue per plan of care.  Progress treatment as tolerated.       Short Term Goal Expiration Date:(2025)  Long Term Goal Expiration Date: (2025)  POC Expiration Date: (2025)    Visit count: 3 of 10; PN due 2025    POC expires Unit limit Auth Expiration date PT/OT/ST + Visit Limit?   25 N/A N/A BOMN   25 N/A 25 BOMN                     Visit/Unit Tracking  AUTH Status:  Date 11/26 11/29 12/13 12/20 12/23 12/27  PN 12/30 01/03 01/10 01/15 01/17   BOMN Used 1 2 3 4 5 6 1 2 3 1 2    Remaining  9 8 7 6 5 4 9 8 7 9 8     AUTH Status:  Date   PN 01/27 01/31 02/03 02/07 02/10 02/14 02/17 02/21  PN    BOMN Used 3 4 1 2 3 4 5 6 7 1 2    Remaining  7 6 9 8 7 6 5 4 3 9 8        AUTH Status:  Date               BOMN Used 3              Remaining  7               Access Code: [email or web address]  URL: [email or web address]  Date: 12/27/2024  Prepared by: Radha Tyler    Program Notes  Passive dorsiflexion and hamstring stretching for caregiversTry to stand at least 3x/day for 1 min or more.  Use pillow as wedge in between thighs to prevent hip ER and place pillow under L ankle to encourage increased knee ext. Do NOT place pillow under knee.     Exercises  - Supine Hamstring Stretch  - 2 x daily - 7 x weekly - 1 sets - 3 reps - 30 hold  - Long Sitting Calf Stretch with Strap  - 1 x daily - 7 x weekly - 1 sets - 3 reps     Precautions T21, Osteoporosis, Neck pain, Hx seizure like activity, Fall risk        Manuals 02/24 02/28 02/14 02/17 02/21                                   Neuro Re-Ed  Skin inspection of L index finger      Skin inspection of L index finger  Skin inspection of L index finger  Skin inspection of L index finger  Skin inspection of L index finger    Transfers  maxAx1   B/l UE   X 5  maxAx1   B/l UE   X 5  STS   // bars   B/l UE   maxAx1   X 5  STS   // bars   B/l UE   maxAx1   X 5  STS   // bars   B/l UE   maxAx1   X 3    Static balance  Static standing   // bars   B/l UE  10 min x 1  minAx1- modAx1    5 min x 3 Static standing   // bars   B/l UE  5 min x 4  minAx1      Dynamic balance Walking and turning in // bars   20 ft   X 2  Min-modAx1          Walking and turning in // bars   20 ft   X 2  modAx1  Static standing   // bars   B/l UE  2 min x 4   minAx1- modAx1    Walking and turning in // bars   20 ft   X 1   Min-modAx1     Walking   // bars   10 ft x 2 Static standing   // bars   B/l UE  2 min x 4   minAx1- modAx1    Walking   // bars   10 ft x 2  5 ft x 2  Static standing   // bars   B/l UE  2 min x 2   modAx1- maxAx1    Walking   // bars   5 ft x 1                                     Ther Ex     Ed on ways hunger/fatigue may be impacting function and performance     Education on  continuing stretching/ROM HEP daily to improve knee and ankle ROM     Education on continuing to encourage daily activity with pt as tolerated (emphasis on safety for caregivers and pt)    Education on goniometric measurements of L ankle and knee    Ankle ROM  3 x  1 min L hamstring stretch     3 x 1 min  L ankle dorsiflexion stretch  3 x  1 min L hamstring stretch     3 x 1 min  L ankle dorsiflexion stretch  3 x  1 min L hamstring stretch     2 x 1 min  L ankle dorsiflexion stretch  5 x  1 min L hamstring stretch     5 x 1 min  L ankle dorsiflexion stretch  3 x  1 min L hamstring stretch     3 x 1 min  L ankle dorsiflexion stretch    Nu-step                                                        Ther Activity Repair of L leg rest on wheelchair to aid in wheelchair propulsion                        Gait Training        Education                 Modalities

## 2025-03-03 ENCOUNTER — OFFICE VISIT (OUTPATIENT)
Dept: PHYSICAL THERAPY | Facility: CLINIC | Age: 50
End: 2025-03-03
Payer: MEDICARE

## 2025-03-03 DIAGNOSIS — R26.89 BALANCE DISORDER: ICD-10-CM

## 2025-03-03 DIAGNOSIS — Z98.890 STATUS POST ORIF OF FRACTURE OF ANKLE: Primary | ICD-10-CM

## 2025-03-03 DIAGNOSIS — Z86.69 HISTORY OF IMPACTED CERUMEN: ICD-10-CM

## 2025-03-03 DIAGNOSIS — H61.23 BILATERAL IMPACTED CERUMEN: ICD-10-CM

## 2025-03-03 DIAGNOSIS — Z74.09 DECREASED FUNCTIONAL MOBILITY AND ENDURANCE: ICD-10-CM

## 2025-03-03 DIAGNOSIS — Z87.81 STATUS POST ORIF OF FRACTURE OF ANKLE: Primary | ICD-10-CM

## 2025-03-03 PROCEDURE — 97112 NEUROMUSCULAR REEDUCATION: CPT

## 2025-03-03 NOTE — PROGRESS NOTES
Daily Note     Today's date: 3/3/2025  Patient name: Aminta De La Torre  : 1975  MRN: [id number]  Referring provider: Radha Olvera MD  Dx:   Encounter Diagnosis     ICD-10-CM    1. Status post ORIF of fracture of ankle  Z98.890     Z87.81       2. Decreased functional mobility and endurance  Z74.09       3. Balance disorder  R26.89           Start Time: 1109  Stop Time: 1145  Total time in clinic (min): 36 minutes    Subjective: Has been walking better at home      Objective: See treatment diary below      Assessment: Pt tolerated the treatment well. Pt was able to perform STS with modAx1 for 1 STS, but maxAx1 for all other transfers this session. Pt performed walking in parallel bars with modAx1 for step initiation. Pt demonstrated decreased LLE weightbearing this session requiring tactile cues for weight shift. Pt performing standing balance with CGA-minAx1. Pt demonstrated fatigue post-session. Pt would benefit from further skilled PT sessions to address deficits in endurance, strength, balance, activity tolerance, and overall safety needed to maximize the pt's function and quality of life.       Plan: Continue per plan of care.  Progress treatment as tolerated.       Short Term Goal Expiration Date:(2025)  Long Term Goal Expiration Date: (2025)  POC Expiration Date: (2025)    Visit count: 4 of 10; PN due 2025    POC expires Unit limit Auth Expiration date PT/OT/ST + Visit Limit?   25 N/A N/A BOMN   25 N/A 25 BOMN                     Visit/Unit Tracking  AUTH Status:  Date 11/26 11/29 12/13 12/20 12/23 12/27  PN 12/30 01/03 01/10 01/15 01/17   BOMN Used 1 2 3 4 5 6 1 2 3 1 2    Remaining  9 8 7 6 5 4 9 8 7 9 8     AUTH Status:  Date 01/22 01/24  PN 01/27 01/31 02/03 02/07 02/10 02/14 02/17 02/21  PN    BOMN Used 3 4 1 2 3 4 5 6 7 1 2    Remaining  7 6 9 8 7 6 5 4 3 9 8        AUTH Status:  Date 2/28 3/3            BOMN Used 3 4             Remaining  7 6               Access Code: [email or web address]  URL: [email or web address]  Date: 12/27/2024  Prepared by: Radha Tyler    Program Notes  Passive dorsiflexion and hamstring stretching for caregiversTry to stand at least 3x/day for 1 min or more.  Use pillow as wedge in between thighs to prevent hip ER and place pillow under L ankle to encourage increased knee ext. Do NOT place pillow under knee.     Exercises  - Supine Hamstring Stretch  - 2 x daily - 7 x weekly - 1 sets - 3 reps - 30 hold  - Long Sitting Calf Stretch with Strap  - 1 x daily - 7 x weekly - 1 sets - 3 reps     Precautions T21, Osteoporosis, Neck pain, Hx seizure like activity, Fall risk        Manuals 02/24 02/28 03/03 02/17 02/21                                   Neuro Re-Ed  Skin inspection of L index finger      Skin inspection of L index finger  Skin inspection of L index finger  Skin inspection of L index finger  Skin inspection of L index finger    Transfers  maxAx1   B/l UE   X 5  maxAx1   B/l UE   X 5  STS   // bars   B/l UE   maxAx1   X 4    modAx1  X 1 STS   // bars   B/l UE   maxAx1   X 5  STS   // bars   B/l UE   maxAx1   X 3    Static balance  Static standing   // bars   B/l UE  10 min x 1  minAx1- modAx1    5 min x 3 Static standing   // bars   B/l UE  5 min x 4  minAx1 Static standing   // bars   B/l UE  2 min x 1  CGA    5 min x 3  MinAx1     Dynamic balance Walking and turning in // bars   20 ft   X 2  Min-modAx1          Walking and turning in // bars   20 ft   X 2  modAx1  Walking and turning in // bars   20 ft   X 2  Min-modAx1     Walking   // bars   10 ft x 1 Static standing   // bars   B/l UE  2 min x 4   minAx1- modAx1    Walking   // bars   10 ft x 2  5 ft x 2  Static standing   // bars   B/l UE  2 min x 2   modAx1- maxAx1    Walking   // bars   5 ft x 1                                     Ther Ex   Education on importance of stretching and transferring for increased HEP compliance and function   Ed on ways hunger/fatigue may be  impacting function and performance     Education on continuing stretching/ROM HEP daily to improve knee and ankle ROM     Education on continuing to encourage daily activity with pt as tolerated (emphasis on safety for caregivers and pt)    Education on goniometric measurements of L ankle and knee    Ankle ROM  3 x  1 min L hamstring stretch     3 x 1 min  L ankle dorsiflexion stretch  3 x  1 min L hamstring stretch     3 x 1 min  L ankle dorsiflexion stretch  2 x  1 min L hamstring stretch     2 x 1 min  L ankle dorsiflexion stretch  5 x  1 min L hamstring stretch     5 x 1 min  L ankle dorsiflexion stretch  3 x  1 min L hamstring stretch     3 x 1 min  L ankle dorsiflexion stretch    Nu-step                                                        Ther Activity Repair of L leg rest on wheelchair to aid in wheelchair propulsion                        Gait Training        Education                 Modalities

## 2025-03-04 RX ORDER — CARBAMIDE PEROXIDE 6.5 %
DROPS OTIC (EAR)
Qty: 15 ML | Refills: 0 | Status: SHIPPED | OUTPATIENT
Start: 2025-03-04

## 2025-03-06 PROBLEM — J06.9 ACUTE URI: Status: RESOLVED | Noted: 2023-10-18 | Resolved: 2025-03-06

## 2025-03-06 NOTE — PROGRESS NOTES
Daily Note     Today's date: 3/7/2025  Patient name: Aminta De La Torre  : 1975  MRN: [id number]  Referring provider: Radha Olvera MD  Dx:   Encounter Diagnosis     ICD-10-CM    1. Status post ORIF of fracture of ankle  Z98.890     Z87.81       2. Decreased functional mobility and endurance  Z74.09       3. Balance disorder  R26.89           Start Time: 1102  Stop Time: 1145  Total time in clinic (min): 43 minutes    Subjective: Aminta is emotional today      Objective: See treatment diary below      Assessment: Pt tolerated the treatment poorly. Pt tearful from start of session in the waiting room, but able to continue with encouragement and positive reinforcement. Pt continues to require maxAx1 for standing. Pt CGA-modAx1 for standing with b/l UE. Due to being tearful and lack of WB on L foot, performed skin inspection with no noted abnormalities. Pt responded well to PROM of L ankle and knee to improve knee extension in stance. Pt demonstrated fatigue post-session. Pt would benefit from further skilled PT sessions to address deficits in endurance, strength, balance, activity tolerance, and overall safety needed to maximize the pt's function and quality of life.       Plan: Continue per plan of care.  Progress treatment as tolerated.       Short Term Goal Expiration Date:(2025)  Long Term Goal Expiration Date: (2025)  POC Expiration Date: (2025)    Visit count: 5 of 10; PN due 2025    POC expires Unit limit Auth Expiration date PT/OT/ST + Visit Limit?   25 N/A N/A BOMN   25 N/A 25 BOMN                     Visit/Unit Tracking  AUTH Status:  Date 11/26 11/29 12/13 12/20 12/23 12/27  PN 12/30 01/03 01/10 01/15 01/17   BOMN Used 1 2 3 4 5 6 1 2 3 1 2    Remaining  9 8 7 6 5 4 9 8 7 9 8     AUTH Status:  Date   PN 01/27 01/31 02/03 02/07 02/10 02/14 02/17 02/21  PN    BOMN Used 3 4 1 2 3 4 5 6 7 1 2    Remaining  7 6 9 8 7 6 5 4 3 9 8        AUTH Status:  Date  2/28 3/3 3/7           BOMN Used 3 4 5            Remaining  7 6 5             Access Code: [email or web address]  URL: [email or web address]  Date: 12/27/2024  Prepared by: Radha Tyler    Program Notes  Passive dorsiflexion and hamstring stretching for caregiversTry to stand at least 3x/day for 1 min or more.  Use pillow as wedge in between thighs to prevent hip ER and place pillow under L ankle to encourage increased knee ext. Do NOT place pillow under knee.     Exercises  - Supine Hamstring Stretch  - 2 x daily - 7 x weekly - 1 sets - 3 reps - 30 hold  - Long Sitting Calf Stretch with Strap  - 1 x daily - 7 x weekly - 1 sets - 3 reps     Precautions T21, Osteoporosis, Neck pain, Hx seizure like activity, Fall risk        Manuals 02/24 02/28 03/03 03/07 02/21                                   Neuro Re-Ed  Skin inspection of L index finger      Skin inspection of L index finger  Skin inspection of L index finger  Skin inspection of L ankle  Skin inspection of L index finger    Transfers  maxAx1   B/l UE   X 5  maxAx1   B/l UE   X 5  STS   // bars   B/l UE   maxAx1   X 4    modAx1  X 1 STS   // bars   B/l UE   maxAx1   X 5 STS   // bars   B/l UE   maxAx1   X 3    Static balance  Static standing   // bars   B/l UE  10 min x 1  minAx1- modAx1    5 min x 3 Static standing   // bars   B/l UE  5 min x 4  minAx1 Static standing   // bars   B/l UE  2 min x 1  CGA    5 min x 3  MinAx1 Static standing   // bars   B/l UE  2 min x 3  CGA     Static standing   // bars   B/l UE  5 min x 4  modAx1    Dynamic balance Walking and turning in // bars   20 ft   X 2  Min-modAx1          Walking and turning in // bars   20 ft   X 2  modAx1  Walking and turning in // bars   20 ft   X 2  Min-modAx1     Walking   // bars   10 ft x 1 Walking and turning in // bars   20 ft   X 1  modAx1     Walking   // bars   10 ft x 2 Static standing   // bars   B/l UE  2 min x 2   modAx1- maxAx1    Walking   // bars   5 ft x 1                                      Ther Ex   Education on importance of stretching and transferring for increased HEP compliance and function   Ed on ways hunger/fatigue may be impacting function and performance     Education on continuing stretching/ROM HEP daily to improve knee and ankle ROM     Education on continuing to encourage daily activity with pt as tolerated (emphasis on safety for caregivers and pt)    Education on goniometric measurements of L ankle and knee    Ankle ROM  3 x  1 min L hamstring stretch     3 x 1 min  L ankle dorsiflexion stretch  3 x  1 min L hamstring stretch     3 x 1 min  L ankle dorsiflexion stretch  2 x  1 min L hamstring stretch     2 x 1 min  L ankle dorsiflexion stretch  4 x  1 min L hamstring stretch     4 x 1 min  L ankle dorsiflexion stretch  3 x  1 min L hamstring stretch     3 x 1 min  L ankle dorsiflexion stretch    Nu-step                                                        Ther Activity Repair of L leg rest on wheelchair to aid in wheelchair propulsion                        Gait Training        Education                 Modalities

## 2025-03-07 ENCOUNTER — OFFICE VISIT (OUTPATIENT)
Dept: PHYSICAL THERAPY | Facility: CLINIC | Age: 50
End: 2025-03-07
Payer: MEDICARE

## 2025-03-07 DIAGNOSIS — R26.89 BALANCE DISORDER: ICD-10-CM

## 2025-03-07 DIAGNOSIS — Z87.81 STATUS POST ORIF OF FRACTURE OF ANKLE: Primary | ICD-10-CM

## 2025-03-07 DIAGNOSIS — Z74.09 DECREASED FUNCTIONAL MOBILITY AND ENDURANCE: ICD-10-CM

## 2025-03-07 DIAGNOSIS — Z98.890 STATUS POST ORIF OF FRACTURE OF ANKLE: Primary | ICD-10-CM

## 2025-03-07 PROCEDURE — 97112 NEUROMUSCULAR REEDUCATION: CPT

## 2025-03-07 PROCEDURE — 97110 THERAPEUTIC EXERCISES: CPT

## 2025-03-07 NOTE — PROGRESS NOTES
Daily Note     Today's date: 3/10/2025  Patient name: Aminta De La Torre  : 1975  MRN: [id number]  Referring provider: Radha Olvera MD  Dx:   Encounter Diagnosis     ICD-10-CM    1. Status post ORIF of fracture of ankle  Z98.890     Z87.81       2. Decreased functional mobility and endurance  Z74.09       3. Balance disorder  R26.89           Start Time: 1100  Stop Time: 1144  Total time in clinic (min): 44 minutes    Subjective: No new complaints, still emotional today      Objective: See treatment diary below      Assessment: Pt tolerated the treatment well. Pt was able to walk up to 40 ft consecutively with b/l UE support in the parallel bars and min-modAx1 by therapist which is them most she has been able to tolerate. Pt continues to require maxAx1 for STS transfers. Standing balance required CGA-modAx1 depending prema fatigue level. Pt demonstrated fatigue post-session. Pt would benefit from further skilled PT sessions to address deficits in endurance, strength, balance, activity tolerance, and overall safety needed to maximize the pt's function and quality of life.       Plan: Continue per plan of care.  Progress treatment as tolerated.       Short Term Goal Expiration Date:(2025)  Long Term Goal Expiration Date: (2025)  POC Expiration Date: (2025)    Visit count: 6 of 10; PN due 2025    POC expires Unit limit Auth Expiration date PT/OT/ST + Visit Limit?   25 N/A N/A BOMN   25 N/A 25 BOMN                     Visit/Unit Tracking  AUTH Status:  Date 11/26 11/29 12/13 12/20 12/23 12/27  PN 12/30 01/03 01/10 01/15 01/17   BOMN Used 1 2 3 4 5 6 1 2 3 1 2    Remaining  9 8 7 6 5 4 9 8 7 9 8     AUTH Status:  Date 01/22 01/24  PN 01/27 01/31 02/03 02/07 02/10 02/14 02/17 02/21  PN    BOMN Used 3 4 1 2 3 4 5 6 7 1 2    Remaining  7 6 9 8 7 6 5 4 3 9 8        AUTH Status:  Date 2/28 3/3 3/7 3/10          BOMN Used 3 4 5 6           Remaining  7 6 5 4            Access  Code: [email or web address]  URL: [email or web address]  Date: 12/27/2024  Prepared by: Radha Tyler    Program Notes  Passive dorsiflexion and hamstring stretching for caregiversTry to stand at least 3x/day for 1 min or more.  Use pillow as wedge in between thighs to prevent hip ER and place pillow under L ankle to encourage increased knee ext. Do NOT place pillow under knee.     Exercises  - Supine Hamstring Stretch  - 2 x daily - 7 x weekly - 1 sets - 3 reps - 30 hold  - Long Sitting Calf Stretch with Strap  - 1 x daily - 7 x weekly - 1 sets - 3 reps     Precautions T21, Osteoporosis, Neck pain, Hx seizure like activity, Fall risk        Manuals 02/24 02/28 03/03 03/07 03/10                                   Neuro Re-Ed  Skin inspection of L index finger      Skin inspection of L index finger  Skin inspection of L index finger  Skin inspection of L ankle     Transfers  maxAx1   B/l UE   X 5  maxAx1   B/l UE   X 5  STS   // bars   B/l UE   maxAx1   X 4    modAx1  X 1 STS   // bars   B/l UE   maxAx1   X 5 STS   // bars   B/l UE   maxAx1   X 4   Static balance  Static standing   // bars   B/l UE  10 min x 1  minAx1- modAx1    5 min x 3 Static standing   // bars   B/l UE  5 min x 4  minAx1 Static standing   // bars   B/l UE  2 min x 1  CGA    5 min x 3  MinAx1 Static standing   // bars   B/l UE  2 min x 3  CGA     Static standing   // bars   B/l UE  5 min x 4  modAx1 Static standing   // bars   B/l UE  2 min x 3  CGA     Static standing   // bars   B/l UE  5 min x 4  minAx1   Dynamic balance Walking and turning in // bars   20 ft   X 2  Min-modAx1          Walking and turning in // bars   20 ft   X 2  modAx1  Walking and turning in // bars   20 ft   X 2  Min-modAx1     Walking   // bars   10 ft x 1 Walking and turning in // bars   20 ft   X 1  modAx1     Walking   // bars   10 ft x 2 Walking and turning in // bars   20 ft   X 2 consecutively  Min-modAx1                                    Ther Ex   Education on  importance of stretching and transferring for increased HEP compliance and function   Education on importance of stretching and transferring for increased HEP compliance and function    Ankle ROM  3 x  1 min L hamstring stretch     3 x 1 min  L ankle dorsiflexion stretch  3 x  1 min L hamstring stretch     3 x 1 min  L ankle dorsiflexion stretch  2 x  1 min L hamstring stretch     2 x 1 min  L ankle dorsiflexion stretch  4 x  1 min L hamstring stretch     4 x 1 min  L ankle dorsiflexion stretch  1 x  1 min L hamstring stretch     1 x 1 min  L ankle dorsiflexion stretch    Nu-step                                                        Ther Activity Repair of L leg rest on wheelchair to aid in wheelchair propulsion                        Gait Training        Education                 Modalities

## 2025-03-10 ENCOUNTER — OFFICE VISIT (OUTPATIENT)
Dept: PHYSICAL THERAPY | Facility: CLINIC | Age: 50
End: 2025-03-10
Payer: MEDICARE

## 2025-03-10 DIAGNOSIS — Z74.09 DECREASED FUNCTIONAL MOBILITY AND ENDURANCE: ICD-10-CM

## 2025-03-10 DIAGNOSIS — Z98.890 STATUS POST ORIF OF FRACTURE OF ANKLE: Primary | ICD-10-CM

## 2025-03-10 DIAGNOSIS — R26.89 BALANCE DISORDER: ICD-10-CM

## 2025-03-10 DIAGNOSIS — Z87.81 STATUS POST ORIF OF FRACTURE OF ANKLE: Primary | ICD-10-CM

## 2025-03-10 PROCEDURE — 97112 NEUROMUSCULAR REEDUCATION: CPT

## 2025-03-10 PROCEDURE — 97110 THERAPEUTIC EXERCISES: CPT

## 2025-03-11 ENCOUNTER — CLINICAL SUPPORT (OUTPATIENT)
Dept: NUTRITION | Facility: HOSPITAL | Age: 50
End: 2025-03-11
Attending: INTERNAL MEDICINE
Payer: MEDICARE

## 2025-03-11 VITALS — WEIGHT: 142.5 LBS | HEIGHT: 55 IN | BODY MASS INDEX: 32.98 KG/M2

## 2025-03-11 DIAGNOSIS — E66.9 NON MORBID OBESITY: ICD-10-CM

## 2025-03-11 DIAGNOSIS — G89.29 CHRONIC PAIN OF LEFT KNEE: ICD-10-CM

## 2025-03-11 DIAGNOSIS — M25.562 CHRONIC PAIN OF LEFT KNEE: ICD-10-CM

## 2025-03-11 NOTE — PROGRESS NOTES
Follow-Up Nutrition Assessment Form    Patient Name: Aminta De La Torre    YOB: 1975    Sex: Female      Follow Up Date: 10/15/2024  Start Time: 10:30am Stop Time: 11:am Total Minutes: 30 mins     Data:  Present at session: self and caretakers from institution   Parent/Patient Concerns: PT is doing physical therapy 2 x week for previous ankle fx   Medical Dx/Reason for Referral: E66.9 (ICD-10-CM)    Past Medical History:   Diagnosis Date    Allergic     Community acquired pneumonia     Last Assessed:1/22/2013    Encounter for PPD test 09/26/2023    Encounter for vaccination 09/26/2023    Tdap given at this visit    Leukopenia     Last Assessed:3/5/2014    Osteoporosis     Preop examination 09/29/2019    Trisomy 21, Down syndrome        Current Outpatient Medications   Medication Sig Dispense Refill    acetaminophen (TYLENOL) 325 mg tablet Take 1 tablet (325 mg total) by mouth every 6 (six) hours as needed for mild pain 30 tablet 0    acetaminophen (TYLENOL) 500 mg tablet Take 500 mg by mouth every 6 (six) hours as needed for mild pain With 600 mg ibuprofen      aspirin (Aspirin Low Dose) 81 mg EC tablet Take 1 tablet (81 mg total) by mouth 2 (two) times a day 180 tablet 3    atorvastatin (LIPITOR) 10 mg tablet Take 1 tablet (10 mg total) by mouth daily 30 tablet 5    bacitracin topical ointment 500 units/g topical ointment Apply topically in the morning (Patient not taking: Reported on 8/27/2024)      carbamide peroxide (DEBROX) 6.5 % otic solution Use 4 gtts to both ears bid Tuesdays and Fridays, and then 4 days prior to ENT appt for softening of ear wax 4 gtts bid x 4 days. 15 mL 0    Cholecalciferol 25 MCG (1000 UT) tablet Take 1 tablet (1,000 Units total) by mouth daily 90 tablet 1    citalopram (CeleXA) 20 mg tablet Take 0.5 tablets (10 mg total) by mouth 2 (two) times a day      dextromethorphan-guaiFENesin (ROBITUSSIN DM)  mg/5 mL syrup Take 5 mL by mouth 3 (three) times a day as needed for  "cough or congestion 237 mL 0    Diclofenac Sodium (GoodSense Arthritis Pain) 1 % Apply 2 g topically 4 (four) times a day To left knee 100 g 1    Elastic Bandages & Supports (Gait/Transfer Belt) MISC Use in the morning 1 each 0    Heating Pad PADS by Does not apply route 2 (two) times a day 1 each 0    ibuprofen (MOTRIN) 600 mg tablet Take 600 mg by mouth every 6 (six) hours as needed      Incontinence Supply Disposable (RA Adult Wipes) MISC Use daily 200 each 2    Incontinence Supply Disposable (Ultra-Soft Plus Briefs XXL) MISC Use if needed (incontinence) 200 each 2    lacosamide (VIMPAT) 50 mg Take 1 tablet (50 mg total) by mouth daily at bedtime 30 tablet 5    levothyroxine 50 mcg tablet TAKE ONE TABLET BY MOUTH AT 7 AM 30 tablet 5    melatonin 3 mg Take 1 tablet (3 mg total) by mouth daily at bedtime 30 tablet 5    Midazolam 5 MG/0.1ML SOLN For seizure longer than 5 min or cluster of seizures. Give 1 spray (5 mg) into 1 nostril; if no response, a second dose of 1 spray (5 mg) into the other nostril may be given 10 min. (Patient not taking: Reported on 8/27/2024) 2 each 1    Mouthwashes (Biotene Dry Mouth) LIQD Apply 1 Swab to the mouth or throat 2 (two) times a day as needed (dry mouth) Use 5ml of solution with each swab 237 mL 0    nystatin powder Apply topically 3 (three) times a day as needed (rash) 60 g 0    ofloxacin (FLOXIN) 0.3 % otic solution Administer 10 drops into both ears 2 (two) times a day (Patient not taking: Reported on 9/30/2024) 10 mL 2    sodium chloride (OCEAN) 0.65 % nasal spray 2 sprays into each nostril as needed for congestion or rhinitis 60 mL 0    ZINC OXIDE, TOPICAL, 10 % CREA Apply topically 3 (three) times a day as needed (rash) 113 g 3     No current facility-administered medications for this visit.        Additional Meds/Supplements:    Barriers to Learning:    Labs:    Height: Ht Readings from Last 3 Encounters:   10/15/24 4' 5\" (1.346 m)   09/13/24 4' 5\" (1.346 m)   09/03/24 4' " "5\" (1.346 m)      Weight: Wt Readings from Last 10 Encounters:   10/15/24 70.3 kg (155 lb)   09/13/24 71.7 kg (158 lb)   09/03/24 71.7 kg (158 lb)   08/29/24 71.7 kg (158 lb)   08/28/24 71.7 kg (158 lb)   08/27/24 71.7 kg (158 lb)   08/27/24 71.7 kg (158 lb)   08/26/24 71.7 kg (158 lb)   07/17/24 71.8 kg (158 lb 4.8 oz)   07/10/24 70.9 kg (156 lb 4.8 oz)     Estimated body mass index is 38.8 kg/m² as calculated from the following:    Height as of this encounter: 4' 5\" (1.346 m).    Weight as of this encounter: 70.3 kg (155 lb).   Wt. Change Since Last Visit: [x]Yes     []No  Amount: Today's weight 142lbs, PT lost 13lbs/8.3% wt. Loss x 5 months      Energy Needs: Mifflinx 1.4 minus 585=486qyl, 52g .8g/kg, 1292mL 20mL/kg   Pain Screen: Are you having pain now?       Previous Goals or Goals Achieved:     Limit juice and ice tea to half a cup by f/u-10/15 met continue by f/u, 3/11/25 met has been drinking water and switched to crystal lite juices  no sugar added    2. Increase water intake, consider 1 cup before and after each meal by f/u-10/15 met, continue by f/u, 3/11/25  met continue by f/u   3. Limit added sugar from ice cream, cakes, yogurt, consider pureed fruit with whip cream or Greek yogurt, unsweetened applesauce as a sweet treat instead by f/u-10/15 improving, continue by f/u will switch to unsweetened applesauce- 3/11/25 met has made all those changes, continue by f/u  4. Switch to low fat dairy, consider skim or 1% milk or low fat cheese, low fat yogurt by /fu-10/15 not met, continue to switch to skim or 1% milk by f/u-3/11/25 improving, typically switched to fat free milk but recently had 2% milk, continue by f/u  5. Increase fiber intake using whole grains, fresh veggies and fruits by f/u- 10/15 met continue by f/u-3/11/25 met continue by f/u       New Goals:   1.   2.   3.       Initial PES:      Overweight/obesity  related to Physical inactivity as evidenced by  BMI more than normative standard for age " and sex (obesity-grade II 35-39.9)-continue      New PES:       New Problem List:  1.   2.   3.       Assessment:  Aminta is accompanied by care takers from facility for nutrition f/u appointment. Aminta continues with limited activity d/t hx of ankle fx and receives PT 2 x week. Continues with concerns with constipation/diarrhea, sometimes goes a week with out BM, care taker reported constipation medication increased to twice a day but plans for possibly 3 x day. Encouraged adequate fluid, fiber and generally healthy eating. Aminta is on pureed diet, has made diet changes and has been able to lose weight. Recommend f/u in 6 months.       Medical Nutrition Therapy Intervention:  [x]Individualized Meal Plan- Reinforced balancing meals using MyPlate method, avoiding concentrated sweets and sweetened beverages, provide low fat diary and increase fruit and vegetable intake.  []Understanding Lab Values   []Basic Pathophysiology of Disease []Food/Medication Interactions   []Food Diary []Exercise   []Lifestyle/Behavior Modification Techniques []Medication, Mechanism of Action   []Label Reading []Self Blood Glucose Monitoring   []Weight/BMI Goals []Other -    Other Notes:        Comprehension: []Excellent  []Very Good  [x]Good  []Fair   []Poor    Receptivity: []Excellent  []Very Good  [x]Good  []Fair   []Poor    Expected Compliance: []Excellent  []Very Good  [x]Good  []Fair   []Poor      Labs:  CMP  Lab Results   Component Value Date     08/20/2016    K 4.6 10/31/2023     10/31/2023    CO2 24 10/31/2023    ANIONGAP 7 11/01/2014    BUN 9 10/31/2023    CREATININE 0.57 (L) 10/31/2023    GLUCOSE 97 11/01/2014    GLUF 112 (H) 10/13/2023    CALCIUM 8.5 10/31/2023    CORRECTEDCA 9.4 10/31/2023    AST 25 10/31/2023    ALT 14 10/31/2023    ALKPHOS 108 (H) 10/31/2023    PROT 6.8 08/20/2016    BILITOT 0.8 08/20/2016    EGFR 108 10/31/2023       BMP  Lab Results   Component Value Date    GLUCOSE 97 11/01/2014    CALCIUM 8.5  "10/31/2023     08/20/2016    K 4.6 10/31/2023    CO2 24 10/31/2023     10/31/2023    BUN 9 10/31/2023    CREATININE 0.57 (L) 10/31/2023       Lipids  Lab Results   Component Value Date    CHOL 167 08/20/2016    CHOL 173 09/06/2014     Lab Results   Component Value Date    HDL 51 08/09/2022    HDL 52 05/04/2021    HDL 53 09/01/2018     Lab Results   Component Value Date    LDLCALC 65 08/09/2022    LDLCALC 93 05/04/2021    LDLCALC 94 09/01/2018     Lab Results   Component Value Date    TRIG 99 08/09/2022    TRIG 152 (H) 05/04/2021    TRIG 119 09/01/2018     No results found for: \"CHOLHDL\"    Hemoglobin A1C  No results found for: \"HGBA1C\"    Fasting Glucose  Lab Results   Component Value Date    GLUF 112 (H) 10/13/2023       Insulin     Thyroid  Lab Results   Component Value Date    TSH 3.55 02/14/2023       Hepatic Function Panel  Lab Results   Component Value Date    ALT 14 10/31/2023    AST 25 10/31/2023    ALKPHOS 108 (H) 10/31/2023    BILITOT 0.8 08/20/2016       Celiac Disease Antibody Panel  No results found for: \"ENDOMYSIAL IGA\", \"GLIADIN IGA\", \"GLIADIN IGG\", \"IGA\", \"TISSUE TRANSGLUT AB\", \"TTG IGA\"   Iron  No results found for: \"IRON\", \"TIBC\", \"FERRITIN\"    Vitamins  No results found for: \"VITAMIN B2\"   No results found for: \"NICOTINAMIDE\", \"NICOTINIC ACID\"   No results found for: \"VITAMINB6\"  Lab Results   Component Value Date    LYLRTXEW54 263 10/12/2023     No results found for: \"VITB5\"  No results found for: \"A6TRJVAW\"  No results found for: \"THYROGLB\"  No results found for: \"VITAMIN K\"   No results found for: \"25-HYDROXY VIT D\"   No components found for: \"VITAMINE\"     No follow-ups on file.    Clifton Griffith RD  [facility]  [address] [place] [phone]    "

## 2025-03-12 ENCOUNTER — OFFICE VISIT (OUTPATIENT)
Dept: PODIATRY | Facility: CLINIC | Age: 50
End: 2025-03-12
Payer: MEDICARE

## 2025-03-12 DIAGNOSIS — B35.1 ONYCHOMYCOSIS: ICD-10-CM

## 2025-03-12 DIAGNOSIS — R60.1 GENERALIZED EDEMA: ICD-10-CM

## 2025-03-12 DIAGNOSIS — L85.3 XEROSIS OF SKIN: Primary | ICD-10-CM

## 2025-03-12 DIAGNOSIS — I87.2 VENOUS INSUFFICIENCY: ICD-10-CM

## 2025-03-12 DIAGNOSIS — Q90.9 DOWN SYNDROME: ICD-10-CM

## 2025-03-12 PROCEDURE — 11721 DEBRIDE NAIL 6 OR MORE: CPT | Performed by: PODIATRIST

## 2025-03-12 PROCEDURE — 99212 OFFICE O/P EST SF 10 MIN: CPT | Performed by: PODIATRIST

## 2025-03-12 NOTE — PROGRESS NOTES
Name: Aminta De La Torre      : 1975      MRN: [id number]  Encounter Provider: Jonathan Cruz DPM  Encounter Date: 3/12/2025   Encounter department: [facility] P[facility] [place]  :  Assessment & Plan  Xerosis of skin    Orders:    dimethicone 1 % cream; Apply topically daily At 8 PM    Onychomycosis       Debride mycotic nails and thin the nail plates x 6 with the use of a nail nipper manually and an electric Dremel bur was used to reduce the thickness of the nail beds and smoothed the distal aspect of the nails.   Generalized edema         Venous insufficiency         Down syndrome         Discussed proper shoe gear, daily inspections of feet, and general foot health with patient. Patient has Q8  findings and is recommended for at risk foot care every 9-10 weeks.    Patients most recent complete clinical foot exam was on: 2024      Return in about 10 weeks (around 2025).     History of Present Illness   HPI  Aminta De La Torre is a 50 y.o. female who presents with chief complaint of painful thick nails on both feet to staff.  She was seated in her wheelchair today with her feet down.  Staff is asking for a thicker lotion than what she currently has.Patient presents for at-risk foot care.  Patient has no acute concerns today.  Patient has significant lower extremity risk due to neuropathy, parasthesia, edema, and trophic skin changes to the lower extremity.   History obtained from: patient's Legal Guardian and patient's caregiver    Review of Systems  Medical History Reviewed by provider this encounter:     .  Current Outpatient Medications on File Prior to Visit   Medication Sig Dispense Refill    acetaminophen (TYLENOL) 500 mg tablet Take 1 tablet (500 mg total) by mouth every 6 (six) hours as needed for mild pain or fever With 600 mg ibuprofen 90 tablet 1    aspirin (Aspirin Low Dose) 81 mg EC tablet Take 1 tablet (81 mg total) by mouth 2 (two) times a day 180 tablet 3    atorvastatin  (LIPITOR) 10 mg tablet Take 1 tablet (10 mg total) by mouth daily 30 tablet 5    bacitracin topical ointment 500 units/g topical ointment Apply 1 large application topically 3 (three) times a day as needed for wound care (skin infection) 30 g 0    bisacodyl (DULCOLAX) 10 mg suppository Insert 1 suppository (10 mg total) into the rectum daily as needed for constipation 12 suppository 0    Carbamide Peroxide (Ear Wax Drops) 6.5 % SOLN INSTILL 5 DROPS INTO EACH EAR, TWICE A WEEK ON TUESDAYS AND FRIDAY. DX: WAX REMOVAL 15 mL 0    cholecalciferol (VITAMIN D3) 1,000 units tablet Take 1 tablet (1,000 Units total) by mouth every morning At 8AM 100 tablet 2    Diclofenac Sodium (GoodSense Arthritis Pain) 1 % Apply 2 g topically 4 (four) times a day To left knee 100 g 1    docusate sodium (COLACE) 100 mg capsule Take 1 capsule (100 mg total) by mouth 2 (two) times a day 60 capsule 1    Elastic Bandages & Supports (Gait/Transfer Belt) MISC Use in the morning 1 each 0    escitalopram (LEXAPRO) 20 mg tablet TAKE 1 TABLET (20 MG TOTAL) BY MOUTH DAILY 90 tablet 1    Heating Pad PADS by Does not apply route 2 (two) times a day 1 each 0    Incontinence Supply Disposable (RA Adult Wipes) MISC Use daily 200 each 2    Incontinence Supply Disposable (Ultra-Soft Plus Briefs XXL) MISC Use if needed (incontinence) 200 each 2    lacosamide (VIMPAT) 50 mg TAKE 1 TABLET (50 MG TOTAL) BY MOUTH DAILY AT BEDTIME 90 tablet 1    levothyroxine 50 mcg tablet Take 1 tablet (50 mcg total) by mouth daily 30 tablet 5    melatonin 3 mg Take 1 tablet (3 mg total) by mouth daily at bedtime 30 tablet 5    Mouthwashes (Biotene Dry Mouth) LIQD Apply 1 Swab to the mouth or throat 2 (two) times a day as needed (dry mouth) Use 5ml of solution with each swab 237 mL 0    nystatin powder Apply topically 3 (three) times a day as needed (rash) 60 g 0    polyethylene glycol (MIRALAX) 17 g Give 17g twice a week, hold for diarrhea 30 each 5    sodium chloride (OCEAN)  0.65 % nasal spray 2 sprays into each nostril as needed for congestion or rhinitis 60 mL 0    ZINC OXIDE, TOPICAL, 10 % CREA Apply topically 3 (three) times a day as needed (rash) 113 g 3     No current facility-administered medications on file prior to visit.      Social History     Tobacco Use    Smoking status: Never     Passive exposure: Past    Smokeless tobacco: Never   Vaping Use    Vaping status: Never Used   Substance and Sexual Activity    Alcohol use: Not Currently     Comment: PATIENT DOES NOT DRINK    Drug use: Never    Sexual activity: Never        Objective   LMP  (LMP Unknown)      Physical Exam  Vascular status is 0/4 DP PT negative digital hair delayed capillary refill and normal distal cooling bilaterally.  Capillary refill is approximately 3 to 4 seconds.  There is pitting edema present bilaterally of +2.    Derm nails are brittle elongated hypertrophic white-yellow discoloration with subungual debris x 6.  There is an increased thickness and the nails are approximately 1 to 2 mm.  There is white flaky tissue present on the plantar aspect of both feet with no signs of any fissures or dried blood present.  Dependent rubor is also present bilaterally.  Loss of turgor and thinning of the skin is noted bilaterally.    Ortho and neuro are unchanged from her visit on 10-24.    Administrative Statements   I have spent a total time of 15 minutes in caring for this patient on the day of the visit/encounter including Risks and benefits of tx options, Instructions for management, Patient and family education, Importance of tx compliance, Risk factor reductions, Counseling / Coordination of care, Documenting in the medical record, and Obtaining or reviewing history  .

## 2025-03-14 ENCOUNTER — APPOINTMENT (OUTPATIENT)
Dept: PHYSICAL THERAPY | Facility: CLINIC | Age: 50
End: 2025-03-14
Payer: MEDICARE

## 2025-03-14 NOTE — PROGRESS NOTES
Daily Note     Today's date: 3/14/2025  Patient name: Aminta De La Torre  : 1975  MRN: [id number]  Referring provider: Radha Olvera MD  Dx: No diagnosis found.               Subjective: ***      Objective: See treatment diary below      Assessment: Pt tolerated the treatment ***. Pt performed biking on the Nu-step/walking on the treadmill to promote cardiovascular endurance/neural priming for the session. Pt was challenged with ***.  Pt is progressing with ***.  Pt had difficulty with ***. Pt demonstrated fatigue post-session. Pt would benefit from further skilled PT sessions to address deficits in endurance, strength, balance, activity tolerance, and overall safety needed to maximize the pt's function and quality of life.       Plan: Continue per plan of care.  Progress treatment as tolerated.       Short Term Goal Expiration Date:(2025)  Long Term Goal Expiration Date: (2025)  POC Expiration Date: (2025)    Visit count: 7 of 10; PN due 2025    POC expires Unit limit Auth Expiration date PT/OT/ST + Visit Limit?   25 N/A N/A BOMN   25 N/A 25 BOMN                     Visit/Unit Tracking  AUTH Status:  Date 11/26 11/29 12/13 12/20 12/23 12/27  PN 12/30 01/03 01/10 01/15 01/17   BOMN Used 1 2 3 4 5 6 1 2 3 1 2    Remaining  9 8 7 6 5 4 9 8 7 9 8     AUTH Status:  Date 01/22 01/24  PN 01/27 01/31 02/03 02/07 02/10 02/14 02/17 02/21  PN    BOMN Used 3 4 1 2 3 4 5 6 7 1 2    Remaining  7 6 9 8 7 6 5 4 3 9 8        AUTH Status:  Date 2/28 3/3 3/7 3/10 3/14         BOMN Used 3 4 5 6 7          Remaining  7 6 5 4 3           Access Code: [email or web address]  URL: [email or web address]  Date: 2024  Prepared by: Radha Tyler    Program Notes  Passive dorsiflexion and hamstring stretching for caregiversTry to stand at least 3x/day for 1 min or more.  Use pillow as wedge in between thighs to prevent hip ER and place pillow under L ankle to encourage increased knee ext.  Do NOT place pillow under knee.     Exercises  - Supine Hamstring Stretch  - 2 x daily - 7 x weekly - 1 sets - 3 reps - 30 hold  - Long Sitting Calf Stretch with Strap  - 1 x daily - 7 x weekly - 1 sets - 3 reps     Precautions T21, Osteoporosis, Neck pain, Hx seizure like activity, Fall risk        Manuals 03/14 02/28 03/03 03/07 03/10                                   Neuro Re-Ed   Skin inspection of L index finger  Skin inspection of L index finger  Skin inspection of L ankle     Transfers   maxAx1   B/l UE   X 5  STS   // bars   B/l UE   maxAx1   X 4    modAx1  X 1 STS   // bars   B/l UE   maxAx1   X 5 STS   // bars   B/l UE   maxAx1   X 4   Static balance   Static standing   // bars   B/l UE  5 min x 4  minAx1 Static standing   // bars   B/l UE  2 min x 1  CGA    5 min x 3  MinAx1 Static standing   // bars   B/l UE  2 min x 3  CGA     Static standing   // bars   B/l UE  5 min x 4  modAx1 Static standing   // bars   B/l UE  2 min x 3  CGA     Static standing   // bars   B/l UE  5 min x 4  minAx1   Dynamic balance  Walking and turning in // bars   20 ft   X 2  modAx1  Walking and turning in // bars   20 ft   X 2  Min-modAx1     Walking   // bars   10 ft x 1 Walking and turning in // bars   20 ft   X 1  modAx1     Walking   // bars   10 ft x 2 Walking and turning in // bars   20 ft   X 2 consecutively  Min-modAx1                                    Ther Ex   Education on importance of stretching and transferring for increased HEP compliance and function   Education on importance of stretching and transferring for increased HEP compliance and function    Ankle ROM   3 x  1 min L hamstring stretch     3 x 1 min  L ankle dorsiflexion stretch  2 x  1 min L hamstring stretch     2 x 1 min  L ankle dorsiflexion stretch  4 x  1 min L hamstring stretch     4 x 1 min  L ankle dorsiflexion stretch  1 x  1 min L hamstring stretch     1 x 1 min  L ankle dorsiflexion stretch    Nu-step                                                         Ther Activity                        Gait Training        Education                 Modalities

## 2025-03-17 ENCOUNTER — OFFICE VISIT (OUTPATIENT)
Dept: PHYSICAL THERAPY | Facility: CLINIC | Age: 50
End: 2025-03-17
Payer: MEDICARE

## 2025-03-17 DIAGNOSIS — R26.89 BALANCE DISORDER: ICD-10-CM

## 2025-03-17 DIAGNOSIS — Z74.09 DECREASED FUNCTIONAL MOBILITY AND ENDURANCE: ICD-10-CM

## 2025-03-17 DIAGNOSIS — Z87.81 STATUS POST ORIF OF FRACTURE OF ANKLE: Primary | ICD-10-CM

## 2025-03-17 DIAGNOSIS — Z98.890 STATUS POST ORIF OF FRACTURE OF ANKLE: Primary | ICD-10-CM

## 2025-03-17 PROCEDURE — 97112 NEUROMUSCULAR REEDUCATION: CPT

## 2025-03-17 PROCEDURE — 97110 THERAPEUTIC EXERCISES: CPT

## 2025-03-17 NOTE — PROGRESS NOTES
Daily Note     Today's date: 3/17/2025  Patient name: Aminta De La Torre  : 1975  MRN: [id number]  Referring provider: Radha Olvera MD  Dx:   Encounter Diagnosis     ICD-10-CM    1. Status post ORIF of fracture of ankle  Z98.890     Z87.81       2. Decreased functional mobility and endurance  Z74.09       3. Balance disorder  R26.89           Start Time: 1100  Stop Time: 1140  Total time in clinic (min): 40 minutes    Subjective: No new complaints, very happy today       Objective: See treatment diary below      Assessment: Pt tolerated the treatment well. Pt was able to walk up to 40 ft consecutively with b/l UE support in the parallel bars and min-modAx1 by therapist which is them most she has been able to tolerate. Pt continues to require maxAx1 for STS transfers. Pt demonstrated fatigue post-session. Pt would benefit from further skilled PT sessions to address deficits in endurance, strength, balance, activity tolerance, and overall safety needed to maximize the pt's function and quality of life.       Plan: Continue per plan of care.  Progress note during next visit.  Progress treatment as tolerated.       Short Term Goal Expiration Date:(2025)  Long Term Goal Expiration Date: (2025)  POC Expiration Date: (2025)    Visit count: 7 of 10; PN due 2025    POC expires Unit limit Auth Expiration date PT/OT/ST + Visit Limit?   25 N/A N/A BOMN   25 N/A 25 BOMN                     Visit/Unit Tracking  AUTH Status:  Date 11/26 11/29 12/13 12/20 12/23 12/27  PN 12/30 01/03 01/10 01/15 01/17   BOMN Used 1 2 3 4 5 6 1 2 3 1 2    Remaining  9 8 7 6 5 4 9 8 7 9 8     AUTH Status:  Date 01/22 01/24  PN 01/27 01/31 02/03 02/07 02/10 02/14 02/17 02/21  PN    BOMN Used 3 4 1 2 3 4 5 6 7 1 2    Remaining  7 6 9 8 7 6 5 4 3 9 8        AUTH Status:  Date 2/28 3/3 3/7 3/10 3/17         BOMN Used 3 4 5 6 7          Remaining  7 6 5 4 3           Access Code: [email or web address]  URL:  [email or web address]  Date: 12/27/2024  Prepared by: Radha Tyler    Program Notes  Passive dorsiflexion and hamstring stretching for caregiversTry to stand at least 3x/day for 1 min or more.  Use pillow as wedge in between thighs to prevent hip ER and place pillow under L ankle to encourage increased knee ext. Do NOT place pillow under knee.     Exercises  - Supine Hamstring Stretch  - 2 x daily - 7 x weekly - 1 sets - 3 reps - 30 hold  - Long Sitting Calf Stretch with Strap  - 1 x daily - 7 x weekly - 1 sets - 3 reps     Precautions T21, Osteoporosis, Neck pain, Hx seizure like activity, Fall risk        Manuals 03/14 03/17 03/03 03/07 03/10                                   Neuro Re-Ed    Skin inspection of L index finger  Skin inspection of L ankle     Transfers   STS   // bars   B/l UE   maxAx1   X 5 STS   // bars   B/l UE   maxAx1   X 4    modAx1  X 1 STS   // bars   B/l UE   maxAx1   X 5 STS   // bars   B/l UE   maxAx1   X 4   Static balance   Static standing   // bars   B/l UE  5 min x 3   CGA Static standing   // bars   B/l UE  2 min x 1  CGA    5 min x 3  MinAx1 Static standing   // bars   B/l UE  2 min x 3  CGA     Static standing   // bars   B/l UE  5 min x 4  modAx1 Static standing   // bars   B/l UE  2 min x 3  CGA     Static standing   // bars   B/l UE  5 min x 4  minAx1   Dynamic balance  Walking and turning in // bars   20 ft   X 2 consecutively  Min-modAx1  Walking and turning in // bars   20 ft   X 2  Min-modAx1     Walking   // bars   10 ft x 1 Walking and turning in // bars   20 ft   X 1  modAx1     Walking   // bars   10 ft x 2 Walking and turning in // bars   20 ft   X 2 consecutively  Min-modAx1                                    Ther Ex  Education on importance of stretching and transferring for increased HEP compliance and function  Education on importance of stretching and transferring for increased HEP compliance and function   Education on importance of stretching and  transferring for increased HEP compliance and function    Ankle ROM   1 x  1 min L hamstring stretch     1 x 1 min  L ankle dorsiflexion stretch  2 x  1 min L hamstring stretch     2 x 1 min  L ankle dorsiflexion stretch  4 x  1 min L hamstring stretch     4 x 1 min  L ankle dorsiflexion stretch  1 x  1 min L hamstring stretch     1 x 1 min  L ankle dorsiflexion stretch    Nu-step                                                        Ther Activity                        Gait Training        Education                 Modalities

## 2025-03-18 DIAGNOSIS — K59.04 CHRONIC IDIOPATHIC CONSTIPATION: ICD-10-CM

## 2025-03-18 RX ORDER — POLYETHYLENE GLYCOL 3350 17 G/17G
POWDER, FOR SOLUTION ORAL
Qty: 30 EACH | Refills: 5 | Status: SHIPPED | OUTPATIENT
Start: 2025-03-18

## 2025-03-20 NOTE — PROGRESS NOTES
Progress Note     Today's date: 3/21/2025  Patient name: Aminta De La Torre  : 1975  MRN: [id number]  Referring provider: Radha Olvera MD  Dx:   Encounter Diagnosis     ICD-10-CM    1. Status post ORIF of fracture of ankle  Z98.890     Z87.81       2. Decreased functional mobility and endurance  Z74.09       3. Balance disorder  R26.89           Start Time: 1015  Stop Time: 1100  Total time in clinic (min): 45 minutes    Assessment: Pt tolerated the session well today. Performed progress update this session to assess progress towards long term goals. Pt has met 2/3 STG and 1/3 LTG at this time. Pt and caregivers have demonstrated a great improvement in increasing frequency of HEP and transfers as able without the sruthi lift. Although pt did not meet goal of knee ROM, pt improved knee extension by 2 deg. However, this knee flexion contracture has been chronic which may impact the progression of ROM. Pt continues to require maxAx1 for STS at this point due to poor anterior weight shift. However, may be limited by cognitive deficits. Pt has been able to walk up to 20 ft consecutively with minAx1. Pt demonstrated excellent progress with static standing with CGA and b/l UE support for 3 minutes consecutively this session. Attempted balance with RW, but unable to follow instructions enough to maintain safety.     Discussed transitioning to maintenance therapy 2x/week for 1 month at this time to continue to address balance deficits, safety concerns, endurance, and functional mobility. At this time, plan to transition to maintenance program. Due to cognitive deficits, still being considered at risk for falls in the home,  and inability to participate in safe independent exercise or ability to join traditional gym it would not be safe for pt to complete a home exercise program that would appropriately address pts deficits in a safe manner in the home. maintenance programming would be an important opportunity to provide  them with the guidance and equipment necessary to facilitate a safe and effective environment for exercise so that they are able to maintain their current level of function with a reduced risk for falls. Harness system and safety equipment during therapy can help provide adequate challenge to maintain current balance and endurance levels. throughout therapy pt has utilized the SOLO harness on all visits to address the pts balance impairments in a safe manner. They require the skilled supervision and facilitation from the physical therapist to effectively exercise with safe and correct form and decrease risk of falls. For these reasons, pt would benefit from skilled physical therapy maintenance programming to continue to maintain their current level of function and reduced risk for falls.     NEW SKILLED MAINTENANCE GOALS (3/21/25):  - Patient will be able to maintain static balance with ability to stand with b/l UE support and CGA for at least 1 minute in 4 weeks.   - Patient will maintain ROM of L ankle dorsiflexion in neutral to aid in ambulation in 4 weeks.   - Patient and caregivers will maintain compliance with HEP in 4 weeks.   - Patient will be able to maintain aerobic capacity of walking at least 15 ft consecutively with minAx1 and b/l UE support in 4 weeks.     Goals (01/24/25):  STG:  - Patient will improve knee extension by at least 5 degrees within 4 weeks to demonstrate improved knee ROM with knee contracture. NOT MET, progressing   - Patient and caregivers will be compliant with initial HEP within 4 weeks. MET  - Patient will be able to walk up to 15 ft consecutively with minAx1 and b/l UE support within 4 weeks to improve gait and functional mobility. MET    LTG:  - Patient will be able to walk at least 10 ft with use of RW and assistance with hand  on walker within 8 weeks to improve gait and functional mobility. NOT MET  - Patient will be able to perform a STS transfer with modAx1 within 8 weeks  to demonstrate improved LE strength and power. NOT MET  - Patient and caregivers will be compliant with progressed HEP within 8 weeks. MET      Plan  Patient would benefit from: skilled maintenance physical therapy  Referral necessary: No    Planned therapy interventions: activity modification, ADL retraining, ADL training, balance, balance/weight bearing training, behavior modification, body mechanics training, strengthening, sensory integrative techniques, postural training, patient/caregiver education, neuromuscular re-education, motor coordination training, flexibility, functional ROM exercises, gait training, graded activity, graded exercise, graded motor, home exercise program, wheelchair management, transfer training, therapeutic training, therapeutic exercise and therapeutic activities    Frequency: 2x week  Duration in weeks: 4  Plan of Care beginning date: 03/21/2025  Plan of Care expiration date: 04/18/2025  Treatment plan discussed with: caregiver and patient    Subjective: Not standing with support at home, but caregivers can tell she's making progress here     Patient pain: 0/10   Patient goals: maintain her current level of function    Objective: See treatment diary below      PROM (L):(IE)  Dorsifleixon: lacking 6 deg knee bent   Plantar flexion: 47 deg   Inversion: 23 deg   Eversion: 10 dg     12/27:  Dorsiflexion: 0 deg knee bent (neutral)  Plantar flexion: 56 deg   Inversion: 24 deg   Eversion: 14 deg     01/24: 0 deg knee bent (neutral)   Plantar flex: 60 deg   Inversion: 32 deg  Eversion: 10 deg  Knee flexion: 110 deg   Knee extension: lacking 18 deg     02/21: DF 0 deg knee bent (neutral)   Plantar flex: 58 deg   Inversion: 30 deg  Eversion: 10 deg  Knee flexion: 112 deg   Knee extension: lacking 16 deg     3/21: DF 0 deg   Plantar flex: 62 deg   Inversion: 30 deg   Eversion: 10 deg   Knee flexion: 110 deg   Knee extension: lacking 20 deg (pt fighting)    Static standing and sitting  balance:  Standin sec with modAx2 (IE)   Standin min with minAx1 ()  Standin min with modAx1 ()  Standing b/l UE CGA: 8 sec, 13 sec, 3 min (3/21)       Short Term Goal Expiration Date: (2025)  Long Term Goal Expiration Date: (2025)  POC Expiration Date: (2025)    Visit count: 8 of 10; PN due 2025    POC expires Unit limit Auth Expiration date PT/OT/ST + Visit Limit?   25 N/A N/A BOMN   25 N/A 25 BOMN   25 N/A 25 BOMN               Visit/Unit Tracking  AUTH Status:  Date 11/26 11/29 12/13 12/20 12/23 12/27  PN 12/30 01/03 01/10 01/15 01/17   BOMN Used 1 2 3 4 5 6 1 2 3 1 2    Remaining  9 8 7 6 5 4 9 8 7 9 8     AUTH Status:  Date 01/22 01/24  PN 01/27 01/31 02/03 02/07 02/10 02/14 02/17 02/21  PN    BOMN Used 3 4 1 2 3 4 5 6 7 1 2    Remaining  7 6 9 8 7 6 5 4 3 9 8        AUTH Status:  Date 2/28 3/3 3/7 3/10 3/17 3/21  PN        BOMN Used 3 4 5 6 7 8         Remaining  7 6 5 4 3 2          Access Code: [email or web address]  URL: [email or web address]  Date: 2024  Prepared by: Radha Tyler    Program Notes  Passive dorsiflexion and hamstring stretching for caregiversTry to stand at least 3x/day for 1 min or more.  Use pillow as wedge in between thighs to prevent hip ER and place pillow under L ankle to encourage increased knee ext. Do NOT place pillow under knee.     Exercises  - Supine Hamstring Stretch  - 2 x daily - 7 x weekly - 1 sets - 3 reps - 30 hold  - Long Sitting Calf Stretch with Strap  - 1 x daily - 7 x weekly - 1 sets - 3 reps     Precautions T21, Osteoporosis, Neck pain, Hx seizure like activity, Fall risk        Manuals 03/14 03/17 03/21 03/07 03/10                                   Neuro Re-Ed     Skin inspection of L ankle     Transfers   STS   // bars   B/l UE   maxAx1   X 5 STS  // bars   B/l UE   maxAX1   X 3  STS   // bars   B/l UE   maxAx1   X 5 STS   // bars   B/l UE   maxAx1   X 4   Static balance   Static  standing   // bars   B/l UE  5 min x 3   CGA Static standing   // bars   B/l UE  3 min x 1  CGA    5 min x 3  MinAx1 Static standing   // bars   B/l UE  2 min x 3  CGA     Static standing   // bars   B/l UE  5 min x 4  modAx1 Static standing   // bars   B/l UE  2 min x 3  CGA     Static standing   // bars   B/l UE  5 min x 4  minAx1   Dynamic balance  Walking and turning in // bars   20 ft   X 2 consecutively  Min-modAx1  Walking   // bars   10 ft x 1 Walking and turning in // bars   20 ft   X 1  modAx1     Walking   // bars   10 ft x 2 Walking and turning in // bars   20 ft   X 2 consecutively  Min-modAx1                                    Ther Ex  Education on importance of stretching and transferring for increased HEP compliance and function  Education on importance of stretching and transferring for increased HEP compliance and function     ROM testing   Education on importance of stretching and transferring for increased HEP compliance and function    Ankle ROM   1 x  1 min L hamstring stretch     1 x 1 min  L ankle dorsiflexion stretch  2 x  1 min L hamstring stretch     2 x 1 min  L ankle dorsiflexion stretch  4 x  1 min L hamstring stretch     4 x 1 min  L ankle dorsiflexion stretch  1 x  1 min L hamstring stretch     1 x 1 min  L ankle dorsiflexion stretch    Nu-step                                                        Ther Activity                        Gait Training        Education                 Modalities

## 2025-03-21 ENCOUNTER — EVALUATION (OUTPATIENT)
Dept: PHYSICAL THERAPY | Facility: CLINIC | Age: 50
End: 2025-03-21
Payer: MEDICARE

## 2025-03-21 DIAGNOSIS — Z98.890 STATUS POST ORIF OF FRACTURE OF ANKLE: Primary | ICD-10-CM

## 2025-03-21 DIAGNOSIS — Z74.09 DECREASED FUNCTIONAL MOBILITY AND ENDURANCE: ICD-10-CM

## 2025-03-21 DIAGNOSIS — R26.89 BALANCE DISORDER: ICD-10-CM

## 2025-03-21 DIAGNOSIS — M25.562 CHRONIC PAIN OF LEFT KNEE: ICD-10-CM

## 2025-03-21 DIAGNOSIS — Z87.81 STATUS POST ORIF OF FRACTURE OF ANKLE: Primary | ICD-10-CM

## 2025-03-21 DIAGNOSIS — G89.29 CHRONIC PAIN OF LEFT KNEE: ICD-10-CM

## 2025-03-21 PROCEDURE — 97110 THERAPEUTIC EXERCISES: CPT

## 2025-03-21 PROCEDURE — 97112 NEUROMUSCULAR REEDUCATION: CPT

## 2025-03-21 RX ORDER — DICLOFENAC SODIUM 10 MG/G
GEL TOPICAL
Qty: 100 G | Refills: 1 | OUTPATIENT
Start: 2025-03-21

## 2025-03-24 ENCOUNTER — OFFICE VISIT (OUTPATIENT)
Dept: PHYSICAL THERAPY | Facility: CLINIC | Age: 50
End: 2025-03-24
Payer: MEDICARE

## 2025-03-24 DIAGNOSIS — R26.89 BALANCE DISORDER: ICD-10-CM

## 2025-03-24 DIAGNOSIS — Z74.09 DECREASED FUNCTIONAL MOBILITY AND ENDURANCE: ICD-10-CM

## 2025-03-24 DIAGNOSIS — Z87.81 STATUS POST ORIF OF FRACTURE OF ANKLE: Primary | ICD-10-CM

## 2025-03-24 DIAGNOSIS — Z98.890 STATUS POST ORIF OF FRACTURE OF ANKLE: Primary | ICD-10-CM

## 2025-03-24 PROCEDURE — 97110 THERAPEUTIC EXERCISES: CPT

## 2025-03-24 PROCEDURE — 97112 NEUROMUSCULAR REEDUCATION: CPT

## 2025-03-24 NOTE — PROGRESS NOTES
Skilled Maintenance Note     Today's date: 3/24/2025  Patient name: Aminta De La Torre  : 1975  MRN: [id number]  Referring provider: Radha Olvera MD  Dx:   Encounter Diagnosis     ICD-10-CM    1. Status post ORIF of fracture of ankle  Z98.890     Z87.81       2. Decreased functional mobility and endurance  Z74.09       3. Balance disorder  R26.89           Start Time: 1100  Stop Time: 1145  Total time in clinic (min): 45 minutes    Subjective: No new complaints this session, a little agitated today       Objective: See treatment diary below      Assessment: Pt tolerated the treatment poorly with increased fatigue and act tolerance. Pt required maxAx1 for STS transfers this session. Able to stand with minAx1 for up to 5 min this session, but would sit without chair behind her requiring maxAx1 and solo harness to maintain safety. Able to perform some static balance interventions including beach ball toss and cone reach, but with poor attention. Pt able to walk 20 ft at end of session with modAx1. Pt demonstrated fatigue post-session. Pt would benefit from further skilled PT sessions to address deficits in endurance, strength, balance, activity tolerance, and overall safety needed to maximize the pt's function and quality of life.       Plan: Continue per plan of care.  Progress treatment as tolerated.       Short Term Goal Expiration Date: (2025)  Long Term Goal Expiration Date: (2025)  POC Expiration Date: (2025)    Visit count: 1 of 10; PN due 2025    POC expires Unit limit Auth Expiration date PT/OT/ST + Visit Limit?   25 N/A N/A BOMN   25 N/A 25 BOMN   25 N/A 25 BOMN               Visit/Unit Tracking  AUTH Status:  Date 11/26 11/29 12/13 12/20 12/23 12/27  PN 12/30 01/03 01/10 01/15 01/17   BOMN Used 1 2 3 4 5 6 1 2 3 1 2    Remaining  9 8 7 6 5 4 9 8 7 9 8     AUTH Status:  Date   PN 01/27 01/31 02/03 02/07 02/10 02/14 02/17 02/21  PN    BOMN  Used 3 4 1 2 3 4 5 6 7 1 2    Remaining  7 6 9 8 7 6 5 4 3 9 8        AUTH Status:  Date 2/28 3/3 3/7 3/10 3/17 3/21  PN 3/24       BOMN Used 3 4 5 6 7 8 1        Remaining  7 6 5 4 3 2 9         Access Code: [email or web address]  URL: [email or web address]  Date: 12/27/2024  Prepared by: Radha Tyler    Program Notes  Passive dorsiflexion and hamstring stretching for caregiversTry to stand at least 3x/day for 1 min or more.  Use pillow as wedge in between thighs to prevent hip ER and place pillow under L ankle to encourage increased knee ext. Do NOT place pillow under knee.     Exercises  - Supine Hamstring Stretch  - 2 x daily - 7 x weekly - 1 sets - 3 reps - 30 hold  - Long Sitting Calf Stretch with Strap  - 1 x daily - 7 x weekly - 1 sets - 3 reps     Precautions T21, Osteoporosis, Neck pain, Hx seizure like activity, Fall risk        Manuals 03/14 03/17 03/21 03/24 03/10                                   Neuro Re-Ed         Transfers   STS   // bars   B/l UE   maxAx1   X 5 STS  // bars   B/l UE   maxAX1   X 3  STS  // bars   B/l UE   X 4 STS   // bars   B/l UE   maxAx1   X 4   Static balance   Static standing   // bars   B/l UE  5 min x 3   CGA Static standing   // bars   B/l UE  3 min x 1  CGA    5 min x 3  MinAx1 Static standing   // bars   B/l UE  5 min x 4  Min-modAx1    Static sitting   W/ beach ball toss   X 6 tosses  W/cone reach   X 5 min  Static standing   // bars   B/l UE  2 min x 3  CGA     Static standing   // bars   B/l UE  5 min x 4  minAx1   Dynamic balance  Walking and turning in // bars   20 ft   X 2 consecutively  Min-modAx1  Walking   // bars   10 ft x 1 Walking   // bars   10ft x 1   20 ft with turn x 1     Walking and turning in // bars   20 ft   X 2 consecutively  Min-modAx1                                    Ther Ex  Education on importance of stretching and transferring for increased HEP compliance and function  Education on importance of stretching and transferring for increased HEP  compliance and function     ROM testing  Education on importance of stretching and transferring for increased HEP compliance and function  Education on importance of stretching and transferring for increased HEP compliance and function    Ankle ROM   1 x  1 min L hamstring stretch     1 x 1 min  L ankle dorsiflexion stretch  2 x  1 min L hamstring stretch     2 x 1 min  L ankle dorsiflexion stretch  2 x  1 min L hamstring stretch     2 x 1 min  L ankle dorsiflexion stretch  1 x  1 min L hamstring stretch     1 x 1 min  L ankle dorsiflexion stretch    Nu-step                                                        Ther Activity                        Gait Training        Education                 Modalities

## 2025-03-26 NOTE — PROGRESS NOTES
Skilled Maintenance Note     Today's date: 3/28/2025  Patient name: Aminta De La Torre  : 1975  MRN: [id number]  Referring provider: Radha Olvera MD  Dx:   Encounter Diagnosis     ICD-10-CM    1. Status post ORIF of fracture of ankle  Z98.890     Z87.81       2. Decreased functional mobility and endurance  Z74.09       3. Balance disorder  R26.89           Start Time: 1107  Stop Time: 1145  Total time in clinic (min): 38 minutes    Subjective: Not agitated today, but not very attentive according to caregiver.       Objective: See treatment diary below      Assessment: Pt tolerated the treatment fair. Pt had increased fatigue and lack of attention this session. Pt performed STS with maxAx1, walking with modAx1 and b/l UE, and standing with min-modAx1. Pt had difficulty maintaining grasp on railings this session requiring verbal and tactile cueing to maintain safety. Pt had increased attention with ball toss activity for static sitting balance without trunk support. Pt demonstrated fatigue post-session. Pt would benefit from further skilled PT sessions to address deficits in endurance, strength, balance, activity tolerance, and overall safety needed to maximize the pt's function and quality of life.       Plan: Continue per plan of care.  Progress treatment as tolerated.       Short Term Goal Expiration Date: (2025)  Long Term Goal Expiration Date: (2025)  POC Expiration Date: (2025)    Visit count: 2 of 10; PN due 2025    POC expires Unit limit Auth Expiration date PT/OT/ST + Visit Limit?   25 N/A N/A BOMN   25 N/A 25 BOMN   25 N/A 25 BOMN               Visit/Unit Tracking  AUTH Status:  Date 11/26 11/29 12/13 12/20 12/23 12/27  PN 12/30 01/03 01/10 01/15 01/17   BOMN Used 1 2 3 4 5 6 1 2 3 1 2    Remaining  9 8 7 6 5 4 9 8 7 9 8     AUTH Status:  Date 01/22 01/24  PN 01/27 01/31 02/03 02/07 02/10 02/14 02/17 02/21  PN    BOMN Used 3 4 1 2 3 4 5 6 7 1 2     Remaining  7 6 9 8 7 6 5 4 3 9 8        AUTH Status:  Date 2/28 3/3 3/7 3/10 3/17 3/21  PN 3/24 3/28      BOMN Used 3 4 5 6 7 8 1 2       Remaining  7 6 5 4 3 2 9 8        Access Code: [email or web address]  URL: [email or web address]  Date: 12/27/2024  Prepared by: Radha Tyler    Program Notes  Passive dorsiflexion and hamstring stretching for caregiversTry to stand at least 3x/day for 1 min or more.  Use pillow as wedge in between thighs to prevent hip ER and place pillow under L ankle to encourage increased knee ext. Do NOT place pillow under knee.     Exercises  - Supine Hamstring Stretch  - 2 x daily - 7 x weekly - 1 sets - 3 reps - 30 hold  - Long Sitting Calf Stretch with Strap  - 1 x daily - 7 x weekly - 1 sets - 3 reps     Precautions T21, Osteoporosis, Neck pain, Hx seizure like activity, Fall risk        Manuals 03/14 03/17 03/21 03/24 03/28                                   Neuro Re-Ed         Transfers   STS   // bars   B/l UE   maxAx1   X 5 STS  // bars   B/l UE   maxAX1   X 3  STS  // bars   B/l UE   X 4 STS   // bars   B/l UE   X 5    Static balance   Static standing   // bars   B/l UE  5 min x 3   CGA Static standing   // bars   B/l UE  3 min x 1  CGA    5 min x 3  MinAx1 Static standing   // bars   B/l UE  5 min x 4  Min-modAx1    Static sitting   W/ beach ball toss   X 6 tosses  W/cone reach   X 5 min  Static standing   // bars   B/l UE  2 min x 4  Min-modAx1    Static sitting   W/ beach ball toss   X 10 tosses   Dynamic balance  Walking and turning in // bars   20 ft   X 2 consecutively  Min-modAx1  Walking   // bars   10 ft x 1 Walking   // bars   10ft x 1   20 ft with turn x 1     Walking   // bars   10ft x 1   20 ft with turn x 1                                   Ther Ex  Education on importance of stretching and transferring for increased HEP compliance and function  Education on importance of stretching and transferring for increased HEP compliance and function     ROM testing  Education  on importance of stretching and transferring for increased HEP compliance and function  Education on importance of stretching and transferring for increased HEP compliance and function    Ankle ROM   1 x  1 min L hamstring stretch     1 x 1 min  L ankle dorsiflexion stretch  2 x  1 min L hamstring stretch     2 x 1 min  L ankle dorsiflexion stretch  2 x  1 min L hamstring stretch     2 x 1 min  L ankle dorsiflexion stretch  2 x  1 min L hamstring stretch     2 x 1 min  L ankle dorsiflexion stretch    Nu-step                                                        Ther Activity                        Gait Training        Education                 Modalities

## 2025-03-28 ENCOUNTER — OFFICE VISIT (OUTPATIENT)
Dept: PHYSICAL THERAPY | Facility: CLINIC | Age: 50
End: 2025-03-28
Payer: MEDICARE

## 2025-03-28 DIAGNOSIS — Z74.09 DECREASED FUNCTIONAL MOBILITY AND ENDURANCE: ICD-10-CM

## 2025-03-28 DIAGNOSIS — Z98.890 STATUS POST ORIF OF FRACTURE OF ANKLE: Primary | ICD-10-CM

## 2025-03-28 DIAGNOSIS — R26.89 BALANCE DISORDER: ICD-10-CM

## 2025-03-28 DIAGNOSIS — Z87.81 STATUS POST ORIF OF FRACTURE OF ANKLE: Primary | ICD-10-CM

## 2025-03-28 PROCEDURE — 97112 NEUROMUSCULAR REEDUCATION: CPT

## 2025-03-28 PROCEDURE — 97110 THERAPEUTIC EXERCISES: CPT

## 2025-03-31 ENCOUNTER — OFFICE VISIT (OUTPATIENT)
Dept: PHYSICAL THERAPY | Facility: CLINIC | Age: 50
End: 2025-03-31
Payer: MEDICARE

## 2025-03-31 DIAGNOSIS — Z87.81 STATUS POST ORIF OF FRACTURE OF ANKLE: Primary | ICD-10-CM

## 2025-03-31 DIAGNOSIS — Z98.890 STATUS POST ORIF OF FRACTURE OF ANKLE: Primary | ICD-10-CM

## 2025-03-31 PROCEDURE — 97110 THERAPEUTIC EXERCISES: CPT

## 2025-03-31 PROCEDURE — 97112 NEUROMUSCULAR REEDUCATION: CPT

## 2025-03-31 NOTE — PROGRESS NOTES
Skilled Maintenance Note     Today's date: 3/31/2025  Patient name: Aminta De La Torre  : 1975  MRN: [id number]  Referring provider: Radha Olvera MD  Dx:   Encounter Diagnosis     ICD-10-CM    1. Status post ORIF of fracture of ankle  Z98.890     Z87.81           Start Time: 1108  Stop Time: 1143  Total time in clinic (min): 35 minutes    Subjective: Didn't talk today, you're the only one she's talking to      Objective: See treatment diary below      Assessment: Pt tolerated the treatment poorly. Pt very tearful and agitated today. Performed skin inspection. No noted redness or areas of concern on the LLE. Tender to palpation on the lateral aspect of the L knee, but inconsistent response. Very resistive with LE stretching today with inability to perform knee extension. Educated on potential benefit of ice pack if knee is inflamed and bothering her. Attempted step navigation this session, but unable to initiate step or maintain static stance despite assistance. Will re-attempt to progress treatment as able. Pt demonstrated fatigue post-session. Pt would benefit from further skilled PT sessions to address deficits in endurance, strength, balance, activity tolerance, and overall safety needed to maximize the pt's function and quality of life.       Plan: Continue per plan of care.  Progress treatment as tolerated.       Short Term Goal Expiration Date: (2025)  Long Term Goal Expiration Date: (2025)  POC Expiration Date: (2025)    Visit count: 2 of 10; PN due 2025    POC expires Unit limit Auth Expiration date PT/OT/ST + Visit Limit?   25 N/A N/A BOMN   25 N/A 25 BOMN   25 N/A 25 BOMN               Visit/Unit Tracking  AUTH Status:  Date 11/26 11/29 12/13 12/20 12/23 12/27  PN 12/30 01/03 01/10 01/15 01/17   BOMN Used 1 2 3 4 5 6 1 2 3 1 2    Remaining  9 8 7 6 5 4 9 8 7 9 8     AUTH Status:  Date 01/22 01/24  PN 01/27 01/31 02/03 02/07 02/10 02/14 02/17 02/21  PN  2/24   BOMN Used 3 4 1 2 3 4 5 6 7 1 2    Remaining  7 6 9 8 7 6 5 4 3 9 8        AUTH Status:  Date 2/28 3/3 3/7 3/10 3/17 3/21  PN 3/24 3/28 3/31     BOMN Used 3 4 5 6 7 8 1 2 3      Remaining  7 6 5 4 3 2 9 8 7       Access Code: [email or web address]  URL: [email or web address]  Date: 12/27/2024  Prepared by: Radha Tyler    Program Notes  Passive dorsiflexion and hamstring stretching for caregiversTry to stand at least 3x/day for 1 min or more.  Use pillow as wedge in between thighs to prevent hip ER and place pillow under L ankle to encourage increased knee ext. Do NOT place pillow under knee.     Exercises  - Supine Hamstring Stretch  - 2 x daily - 7 x weekly - 1 sets - 3 reps - 30 hold  - Long Sitting Calf Stretch with Strap  - 1 x daily - 7 x weekly - 1 sets - 3 reps     Precautions T21, Osteoporosis, Neck pain, Hx seizure like activity, Fall risk        Manuals 03/31 03/17 03/21 03/24 03/28                                   Neuro Re-Ed         Transfers  STS  Stair railings   MaxAx1   X 2     STS   // bars   B/l UE   maxAx1   X 2  STS   // bars   B/l UE   maxAx1   X 5 STS  // bars   B/l UE   maxAX1   X 3  STS  // bars   B/l UE   X 4 STS   // bars   B/l UE   X 5    Static balance  Static standing   // bars   B/l UE  5 min x 1  Min-modAx1 Static standing   // bars   B/l UE  5 min x 3   CGA Static standing   // bars   B/l UE  3 min x 1  CGA    5 min x 3  MinAx1 Static standing   // bars   B/l UE  5 min x 4  Min-modAx1    Static sitting   W/ beach ball toss   X 6 tosses  W/cone reach   X 5 min  Static standing   // bars   B/l UE  2 min x 4  Min-modAx1    Static sitting   W/ beach ball toss   X 10 tosses   Dynamic balance  Walking and turning in // bars   20 ft   X 2 consecutively  Min-modAx1  Walking   // bars   10 ft x 1 Walking   // bars   10ft x 1   20 ft with turn x 1     Walking   // bars   10ft x 1   20 ft with turn x 1                                   Ther Ex Skin inspection/palpalpation of LLE      Education on importance of stretching and transferring for increased HEP compliance and function     Education on ice vs heat for pain management  Education on importance of stretching and transferring for increased HEP compliance and function  Education on importance of stretching and transferring for increased HEP compliance and function     ROM testing  Education on importance of stretching and transferring for increased HEP compliance and function  Education on importance of stretching and transferring for increased HEP compliance and function    Ankle ROM  3 x  1 min L hamstring stretch     3 x 1 min  L ankle dorsiflexion stretch  1 x  1 min L hamstring stretch     1 x 1 min  L ankle dorsiflexion stretch  2 x  1 min L hamstring stretch     2 x 1 min  L ankle dorsiflexion stretch  2 x  1 min L hamstring stretch     2 x 1 min  L ankle dorsiflexion stretch  2 x  1 min L hamstring stretch     2 x 1 min  L ankle dorsiflexion stretch    Nu-step                                                        Ther Activity                        Gait Training        Education                 Modalities

## 2025-04-03 ENCOUNTER — RA CDI HCC (OUTPATIENT)
Dept: OTHER | Facility: HOSPITAL | Age: 50
End: 2025-04-03

## 2025-04-03 DIAGNOSIS — G40.802 REFLEX EPILEPSY (HCC): ICD-10-CM

## 2025-04-03 DIAGNOSIS — R56.9 SEIZURE-LIKE ACTIVITY (HCC): ICD-10-CM

## 2025-04-03 NOTE — TELEPHONE ENCOUNTER
4/3/25  3:13 PM  Note      Medication: Lacosamide   PDMP    01/20/2025 11/26/2024        Lacosamide (Tablet)       30.0       30          50 MG   NA         [facility] Medicare            2 / 1       PA            1             [id number]              12/27/2024 11/26/2024        Lacosamide (Tablet)       30.0       30          50 MG       NA         [facility] Medicare            1 / 1       PA                 [id number]/26/202411/26/2024Lacosamide (Tablet)30.55377 [facility]At[facility]  Active agreement on file -N

## 2025-04-03 NOTE — TELEPHONE ENCOUNTER
Medication: lacosamide    Dose/Frequency: Daily     Quantity: 90    [facility]    Office:   [] PCP/Provider -   [x] Speciality/Provider -     Does the patient have enough for 3 days?   [x] Yes   [] No - Send as HP to WOODY Vallecillo called and requested refills after scheduling patient.

## 2025-04-04 ENCOUNTER — OFFICE VISIT (OUTPATIENT)
Dept: PHYSICAL THERAPY | Facility: CLINIC | Age: 50
End: 2025-04-04
Payer: MEDICARE

## 2025-04-04 DIAGNOSIS — Z87.81 STATUS POST ORIF OF FRACTURE OF ANKLE: Primary | ICD-10-CM

## 2025-04-04 DIAGNOSIS — Z74.09 DECREASED FUNCTIONAL MOBILITY AND ENDURANCE: ICD-10-CM

## 2025-04-04 DIAGNOSIS — Z98.890 STATUS POST ORIF OF FRACTURE OF ANKLE: Primary | ICD-10-CM

## 2025-04-04 DIAGNOSIS — R26.89 BALANCE DISORDER: ICD-10-CM

## 2025-04-04 PROCEDURE — 97112 NEUROMUSCULAR REEDUCATION: CPT

## 2025-04-04 PROCEDURE — 97110 THERAPEUTIC EXERCISES: CPT

## 2025-04-04 RX ORDER — LACOSAMIDE 50 MG/1
50 TABLET ORAL
Qty: 90 TABLET | Refills: 1 | Status: ON HOLD | OUTPATIENT
Start: 2025-04-04

## 2025-04-07 ENCOUNTER — APPOINTMENT (OUTPATIENT)
Dept: PHYSICAL THERAPY | Facility: CLINIC | Age: 50
End: 2025-04-07
Payer: MEDICARE

## 2025-04-08 ENCOUNTER — OFFICE VISIT (OUTPATIENT)
Dept: OBGYN CLINIC | Facility: HOSPITAL | Age: 50
End: 2025-04-08
Payer: MEDICARE

## 2025-04-08 VITALS — BODY MASS INDEX: 35.67 KG/M2 | HEIGHT: 55 IN

## 2025-04-08 DIAGNOSIS — G89.29 CHRONIC PAIN OF LEFT KNEE: Primary | ICD-10-CM

## 2025-04-08 DIAGNOSIS — M25.562 CHRONIC PAIN OF LEFT KNEE: Primary | ICD-10-CM

## 2025-04-08 PROCEDURE — 20610 DRAIN/INJ JOINT/BURSA W/O US: CPT | Performed by: PHYSICIAN ASSISTANT

## 2025-04-08 PROCEDURE — 99213 OFFICE O/P EST LOW 20 MIN: CPT | Performed by: ORTHOPAEDIC SURGERY

## 2025-04-08 RX ORDER — TRIAMCINOLONE ACETONIDE 40 MG/ML
40 INJECTION, SUSPENSION INTRA-ARTICULAR; INTRAMUSCULAR
Status: COMPLETED | OUTPATIENT
Start: 2025-04-08 | End: 2025-04-08

## 2025-04-08 RX ORDER — LIDOCAINE HYDROCHLORIDE 10 MG/ML
4 INJECTION, SOLUTION INFILTRATION; PERINEURAL
Status: COMPLETED | OUTPATIENT
Start: 2025-04-08 | End: 2025-04-08

## 2025-04-08 RX ORDER — OXYCODONE HYDROCHLORIDE 5 MG/1
5 TABLET ORAL EVERY 4 HOURS PRN
COMMUNITY
End: 2025-04-09

## 2025-04-08 RX ORDER — IBUPROFEN 600 MG/1
600 TABLET, FILM COATED ORAL EVERY 6 HOURS PRN
COMMUNITY

## 2025-04-08 RX ADMIN — LIDOCAINE HYDROCHLORIDE 4 ML: 10 INJECTION, SOLUTION INFILTRATION; PERINEURAL at 08:45

## 2025-04-08 RX ADMIN — TRIAMCINOLONE ACETONIDE 40 MG: 40 INJECTION, SUSPENSION INTRA-ARTICULAR; INTRAMUSCULAR at 08:45

## 2025-04-08 NOTE — ASSESSMENT & PLAN NOTE
Repeat corticosteroid injection performed today.  Follow-up in 3 months as needed  Orders:    Diclofenac Sodium (GoodSense Arthritis Pain) 1 %; Apply 2 g topically 4 (four) times a day To left knee

## 2025-04-08 NOTE — PROGRESS NOTES
Surgery:    DOS:       Assessment:  Assessment & Plan  Chronic pain of left knee  Repeat corticosteroid injection performed today.  Follow-up in 3 months as needed  Orders:    Diclofenac Sodium (GoodSense Arthritis Pain) 1 %; Apply 2 g topically 4 (four) times a day To left knee         Plan:  Diagnostics reviewed and physical exam performed.  Diagnosis, treatment options and associated risks were discussed with the patient including no treatment, nonsurgical treatment and potential for surgical intervention.  The patient was given the opportunity to ask questions regarding each.      To do next visit:  No follow-ups on file.    The above stated was discussed in layman's terms and the patient expressed understanding.  All questions were answered to the patient's satisfaction.         Subjective:   Aminta De La Torre is a 50 y.o. female who presents with caregiver for left knee injection.  This was last performed in May of last year.  She has been having some stiffness with therapy so she presented for repeat injection.      Review of systems negative unless otherwise specified in HPI      Past Medical History:   Diagnosis Date    Allergic     Anxiety     Arthritis     Community acquired pneumonia     Last Assessed:1/22/2013    Encounter for PPD test 09/26/2023    Encounter for vaccination 09/26/2023    Tdap given at this visit    Leukopenia     Last Assessed:3/5/2014    Osteoporosis     Preop examination 09/29/2019    Sleep apnea     Trisomy 21, Down syndrome        Past Surgical History:   Procedure Laterality Date    ME OPEN TX TRIMALLEOLAR ANKLE FX W/O FIXJ PST LIP Left 8/29/2024    Procedure: ORIF TRIMALLEOLAR FX, OPEN REDUCTION/TREATMENT OF TILLEAUX FRACTURE;  Surgeon: Mahamed Zamorano MD;  Location: [facility] OR;  Service: Orthopedics    TONSILLECTOMY AND ADENOIDECTOMY         Family History   Problem Relation Age of Onset    Cancer Mother     No Known Problems Sister     No Known Problems Brother     No Known  Problems Brother        Social History     Occupational History    Not on file   Tobacco Use    Smoking status: Never     Passive exposure: Past    Smokeless tobacco: Never   Vaping Use    Vaping status: Never Used   Substance and Sexual Activity    Alcohol use: Not Currently     Comment: PATIENT DOES NOT DRINK    Drug use: Never    Sexual activity: Never         Current Outpatient Medications:     acetaminophen (TYLENOL) 500 mg tablet, Take 1 tablet (500 mg total) by mouth every 6 (six) hours as needed for mild pain or fever With 600 mg ibuprofen, Disp: 90 tablet, Rfl: 1    aspirin (Aspirin Low Dose) 81 mg EC tablet, Take 1 tablet (81 mg total) by mouth 2 (two) times a day, Disp: 180 tablet, Rfl: 3    atorvastatin (LIPITOR) 10 mg tablet, Take 1 tablet (10 mg total) by mouth daily, Disp: 30 tablet, Rfl: 5    bacitracin topical ointment 500 units/g topical ointment, Apply 1 large application topically 3 (three) times a day as needed for wound care (skin infection), Disp: 30 g, Rfl: 0    bisacodyl (DULCOLAX) 10 mg suppository, Insert 1 suppository (10 mg total) into the rectum daily as needed for constipation, Disp: 12 suppository, Rfl: 0    Carbamide Peroxide (Ear Wax Drops) 6.5 % SOLN, INSTILL 5 DROPS INTO EACH EAR, TWICE A WEEK ON TUESDAYS AND FRIDAY. DX: WAX REMOVAL, Disp: 15 mL, Rfl: 0    cholecalciferol (VITAMIN D3) 1,000 units tablet, Take 1 tablet (1,000 Units total) by mouth every morning At 8AM, Disp: 100 tablet, Rfl: 2    Diclofenac Sodium (GoodSense Arthritis Pain) 1 %, Apply 2 g topically 4 (four) times a day To left knee, Disp: 100 g, Rfl: 1    dimethicone 1 % cream, Apply topically daily At 8 PM, Disp: 85 g, Rfl: 4    docusate sodium (COLACE) 100 mg capsule, Take 1 capsule (100 mg total) by mouth 2 (two) times a day, Disp: 60 capsule, Rfl: 1    Elastic Bandages & Supports (Gait/Transfer Belt) MISC, Use in the morning, Disp: 1 each, Rfl: 0    escitalopram (LEXAPRO) 20 mg tablet, TAKE 1 TABLET (20 MG  TOTAL) BY MOUTH DAILY, Disp: 90 tablet, Rfl: 1    Heating Pad PADS, by Does not apply route 2 (two) times a day, Disp: 1 each, Rfl: 0    ibuprofen (MOTRIN) 600 mg tablet, Take 600 mg by mouth every 6 (six) hours as needed, Disp: , Rfl:     Incontinence Supply Disposable (RA Adult Wipes) MISC, Use daily, Disp: 200 each, Rfl: 2    Incontinence Supply Disposable (Ultra-Soft Plus Briefs XXL) MISC, Use if needed (incontinence), Disp: 200 each, Rfl: 2    lacosamide (VIMPAT) 50 mg, Take 1 tablet (50 mg total) by mouth daily at bedtime, Disp: 90 tablet, Rfl: 1    levothyroxine 50 mcg tablet, Take 1 tablet (50 mcg total) by mouth daily, Disp: 30 tablet, Rfl: 5    melatonin 3 mg, Take 1 tablet (3 mg total) by mouth daily at bedtime, Disp: 30 tablet, Rfl: 5    Mouthwashes (Biotene Dry Mouth) LIQD, Apply 1 Swab to the mouth or throat 2 (two) times a day as needed (dry mouth) Use 5ml of solution with each swab, Disp: 237 mL, Rfl: 0    nystatin powder, Apply topically 3 (three) times a day as needed (rash), Disp: 60 g, Rfl: 0    oxyCODONE (ROXICODONE) 5 immediate release tablet, Take 5 mg by mouth every 4 (four) hours as needed, Disp: , Rfl:     polyethylene glycol (MIRALAX) 17 g, Give 17g 3 times a week on Tue, Fri, Sun. Hold for diarrhea, Disp: 30 each, Rfl: 5    sodium chloride (OCEAN) 0.65 % nasal spray, 2 sprays into each nostril as needed for congestion or rhinitis, Disp: 60 mL, Rfl: 0    ZINC OXIDE, TOPICAL, 10 % CREA, Apply topically 3 (three) times a day as needed (rash), Disp: 113 g, Rfl: 3    Allergies   Allergen Reactions    Levetiracetam Drowsiness and Hives     And disorientation    Depakote [Valproic Acid] Drowsiness    Lamictal [Lamotrigine] Rash    Dust Mite Extract Other (See Comments)     Per patient's medical appointment form, no reaction listed    Molds & Smuts Other (See Comments)     Per patient's medical appointment form, no reaction listed    Other Other (See Comments)     Volporic acid, levitracetam, Per  "patient's medical appointment form, no reaction listed    Pollen Extract Sneezing          There were no vitals filed for this visit.    Objective:  Gen: No acute distress, resting comfortably in bed  HEENT: Eyes clear, moist mucus membranes, hearing intact  Respiratory: No audible wheezing or stridor  Cardiovascular: Well Perfused peripherally, 2+ distal pulse  Abdomen: nondistended, no peritoneal signs                     Left knee:  Decreased range of motion  Knee is stable  Patient does not withdraw from exam to suggest tenderness    Diagnostics, reviewed and taken today if performed as documented:        I personally reviewed the pertinent films in PACS and interpretation is as follows:  No new imaging      Large joint arthrocentesis: L knee  Universal Protocol:  Consent given by: guardian  Patient identity confirmed: provided demographic data  Supporting Documentation  Indications: pain   Procedure Details  Location: knee - L knee  Needle size: 22 G  Ultrasound guidance: no  Approach: anterolateral  Medications administered: 4 mL lidocaine 1 %; 40 mg triamcinolone acetonide 40 mg/mL    Patient tolerance: patient tolerated the procedure well with no immediate complications  Dressing:  Sterile dressing applied            Portions of the record may have been created with voice recognition software.  Occasional wrong word or \"sound a like\" substitutions may have occurred due to the inherent limitations of voice recognition software.  Read the chart carefully and recognize, using context, where substitutions have occurred.  "

## 2025-04-08 NOTE — PROGRESS NOTES
Name: Aminta De La Torre      : 1975      MRN: [id number]  Encounter Provider: Radha Olvera MD  Encounter Date: 2025   [facility]  :  Assessment & Plan  Osteoporosis, unspecified osteoporosis type, unspecified pathological fracture presence  1st prolis 2024  Continue prolia every 6 months          Chronic idiopathic constipation  Changed miralax to 3 times a day.   Soft stool after taking miralax.     Orders:    polyethylene glycol (MIRALAX) 17 g; Give 17g 3 times a week on Tue, Fri, Sun. Hold for diarrhea    Colon cancer screening    Orders:    Cologuard    Hypothyroidism, unspecified type  Recent TSH at goal  Continue current dose    Orders:    TSH, 3rd generation with Free T4 reflex; Future    Mixed hyperlipidemia  Ordered lipid panel to follow  Orders:    Comprehensive metabolic panel; Future    Lipid Panel With Direct LDL; Future    Macrocytic  Stable. B12 checked wnl. no anemia.   Orders:    CBC and differential; Future           History of Present Illness   HPI  GETTING PT for her ankle  Had knee injection  Blinsters gone  Review of Systems    Objective   /66 (BP Location: Left arm, Patient Position: Sitting, Cuff Size: Standard)   Pulse 72   Temp (!) 96.7 °F (35.9 °C) (Tympanic)   LMP  (LMP Unknown)   SpO2 100%      Physical Exam

## 2025-04-09 ENCOUNTER — OFFICE VISIT (OUTPATIENT)
Dept: INTERNAL MEDICINE CLINIC | Facility: CLINIC | Age: 50
End: 2025-04-09
Payer: MEDICARE

## 2025-04-09 VITALS
TEMPERATURE: 96.7 F | DIASTOLIC BLOOD PRESSURE: 66 MMHG | HEART RATE: 72 BPM | SYSTOLIC BLOOD PRESSURE: 122 MMHG | OXYGEN SATURATION: 100 %

## 2025-04-09 DIAGNOSIS — D75.89 MACROCYTIC: ICD-10-CM

## 2025-04-09 DIAGNOSIS — E78.2 MIXED HYPERLIPIDEMIA: ICD-10-CM

## 2025-04-09 DIAGNOSIS — K59.04 CHRONIC IDIOPATHIC CONSTIPATION: ICD-10-CM

## 2025-04-09 DIAGNOSIS — E03.9 HYPOTHYROIDISM, UNSPECIFIED TYPE: ICD-10-CM

## 2025-04-09 DIAGNOSIS — Z12.11 COLON CANCER SCREENING: ICD-10-CM

## 2025-04-09 DIAGNOSIS — M81.0 OSTEOPOROSIS, UNSPECIFIED OSTEOPOROSIS TYPE, UNSPECIFIED PATHOLOGICAL FRACTURE PRESENCE: Primary | ICD-10-CM

## 2025-04-09 PROCEDURE — 99214 OFFICE O/P EST MOD 30 MIN: CPT | Performed by: GENERAL ACUTE CARE HOSPITAL

## 2025-04-09 PROCEDURE — G2211 COMPLEX E/M VISIT ADD ON: HCPCS | Performed by: GENERAL ACUTE CARE HOSPITAL

## 2025-04-09 RX ORDER — POLYETHYLENE GLYCOL 3350 17 G/17G
POWDER, FOR SOLUTION ORAL
Qty: 30 EACH | Refills: 5 | Status: SHIPPED | OUTPATIENT
Start: 2025-04-09

## 2025-04-09 NOTE — ASSESSMENT & PLAN NOTE
1st prolis 12/12/2024  Continue prolia every 6 months    
1st prolis 12/12/2024  Continue prolia every 6 months          
Changed miralax to 3 times a day.   Soft stool after taking miralax.     Orders:    polyethylene glycol (MIRALAX) 17 g; Give 17g 3 times a week on Tue, Fri, Sun. Hold for diarrhea    
Ordered lipid panel to follow  Orders:    Comprehensive metabolic panel; Future    Lipid Panel With Direct LDL; Future    
Recent TSH at goal  Continue current dose    Orders:    TSH, 3rd generation with Free T4 reflex; Future    
No

## 2025-04-11 ENCOUNTER — APPOINTMENT (OUTPATIENT)
Dept: PHYSICAL THERAPY | Facility: CLINIC | Age: 50
End: 2025-04-11
Payer: MEDICARE

## 2025-04-11 NOTE — PROGRESS NOTES
Skilled Maintenance Note     Today's date: 2025  Patient name: Aminta De La Torre  : 1975  MRN: [id number]  Referring provider: Radha Olvera MD  Dx: No diagnosis found.               Subjective: ***      Objective: See treatment diary below      Assessment:Pt tolerated the treatment ***. Pt performed biking on the Nu-step/walking on the treadmill to promote cardiovascular endurance/neural priming for the session. Pt was challenged with ***.  Pt is progressing with ***.  Pt had difficulty with ***. Pt demonstrated fatigue post-session. Pt would benefit from further skilled PT sessions to address deficits in endurance, strength, balance, activity tolerance, and overall safety needed to maximize the pt's function and quality of life.        Plan: Continue per plan of care.  Progress note during next visit.  Progress treatment as tolerated.       Short Term Goal Expiration Date: (2025)  Long Term Goal Expiration Date: (2025)  POC Expiration Date: (2025)    Visit count: 5 of 10; PN due 2025    POC expires Unit limit Auth Expiration date PT/OT/ST + Visit Limit?   25 N/A N/A BOMN   25 N/A 25 BOMN   25 N/A 25 BOMN               Visit/Unit Tracking  AUTH Status:  Date 11/26 11/29 12/13 12/20 12/23 12/27  PN 12/30 01/03 01/10 01/15 01/17   BOMN Used 1 2 3 4 5 6 1 2 3 1 2    Remaining  9 8 7 6 5 4 9 8 7 9 8     AUTH Status:  Date 01/22 01/24  PN 01/27 01/31 02/03 02/07 02/10 02/14 02/17 02/21  PN    BOMN Used 3 4 1 2 3 4 5 6 7 1 2    Remaining  7 6 9 8 7 6 5 4 3 9 8        AUTH Status:  Date 2/28 3/3 3/7 3/10 3/17 3/21  PN 3/24 3/28 3/31 4/   BOMN Used 3 4 5 6 7 8 1 2 3 4 5    Remaining  7 6 5 4 3 2 9 8 7 6 5     Access Code: [email or web address]  URL: [email or web address]  Date: 2024  Prepared by: Radha Tyler    Program Notes  Passive dorsiflexion and hamstring stretching for caregiversTry to stand at least 3x/day for 1 min or more.  Use pillow  as wedge in between thighs to prevent hip ER and place pillow under L ankle to encourage increased knee ext. Do NOT place pillow under knee.     Exercises  - Supine Hamstring Stretch  - 2 x daily - 7 x weekly - 1 sets - 3 reps - 30 hold  - Long Sitting Calf Stretch with Strap  - 1 x daily - 7 x weekly - 1 sets - 3 reps     Precautions T21, Osteoporosis, Neck pain, Hx seizure like activity, Fall risk        Manuals 03/31 04/04 04/14 03/24 03/28                                   Neuro Re-Ed         Transfers  STS  Stair railings   MaxAx1   X 2     STS   // bars   B/l UE   maxAx1   X 2  STS   // bars   B/l UE   maxAx1   X 4  STS  // bars   B/l UE   X 4 STS   // bars   B/l UE   X 5    Static balance  Static standing   // bars   B/l UE  5 min x 1  Min-modAx1 Static standing   // bars   B/l UE  2 min x 3   Min-modAx1  Static standing   // bars   B/l UE  5 min x 4  Min-modAx1    Static sitting   W/ beach ball toss   X 6 tosses  W/cone reach   X 5 min  Static standing   // bars   B/l UE  2 min x 4  Min-modAx1    Static sitting   W/ beach ball toss   X 10 tosses   Dynamic balance  Walking and turning in // bars   4 ft x 1   modAx1   Walking   // bars   10ft x 1   20 ft with turn x 1     Walking   // bars   10ft x 1   20 ft with turn x 1                                   Ther Ex Skin inspection/palpalpation of LLE     Education on importance of stretching and transferring for increased HEP compliance and function     Education on ice vs heat for pain management  Education on importance of stretching and transferring for increased HEP compliance and function     Pt education on ice for decreasing inflammation  Education on importance of stretching and transferring for increased HEP compliance and function  Education on importance of stretching and transferring for increased HEP compliance and function    Ankle ROM  3 x  1 min L hamstring stretch     3 x 1 min  L ankle dorsiflexion stretch  3 x  1 min L hamstring stretch     2  x 1 min  L ankle dorsiflexion stretch   2 x  1 min L hamstring stretch     2 x 1 min  L ankle dorsiflexion stretch  2 x  1 min L hamstring stretch     2 x 1 min  L ankle dorsiflexion stretch    Nu-step                                                        Ther Activity                        Gait Training        Education                 Modalities

## 2025-04-12 ENCOUNTER — HOSPITAL ENCOUNTER (INPATIENT)
Facility: HOSPITAL | Age: 50
LOS: 2 days | Discharge: HOME | End: 2025-04-15
Attending: EMERGENCY MEDICINE
Payer: MEDICARE

## 2025-04-12 ENCOUNTER — APPOINTMENT (EMERGENCY)
Dept: RADIOLOGY | Facility: HOSPITAL | Age: 50
End: 2025-04-12
Payer: MEDICARE

## 2025-04-12 DIAGNOSIS — G40.919 BREAKTHROUGH SEIZURE (HCC): Primary | ICD-10-CM

## 2025-04-12 DIAGNOSIS — J18.9 PNEUMONIA: ICD-10-CM

## 2025-04-12 DIAGNOSIS — F41.9 ANXIETY: ICD-10-CM

## 2025-04-12 DIAGNOSIS — G40.802 REFLEX EPILEPSY (HCC): ICD-10-CM

## 2025-04-12 DIAGNOSIS — R56.9 SEIZURE-LIKE ACTIVITY (HCC): ICD-10-CM

## 2025-04-12 LAB
ANION GAP SERPL CALCULATED.3IONS-SCNC: 10 MMOL/L (ref 4–13)
ATRIAL RATE: 63 BPM
BASOPHILS # BLD AUTO: 0.04 THOUSANDS/ÂΜL (ref 0–0.1)
BASOPHILS NFR BLD AUTO: 0 % (ref 0–1)
BUN SERPL-MCNC: 14 MG/DL (ref 5–25)
CALCIUM SERPL-MCNC: 8.7 MG/DL (ref 8.4–10.2)
CHLORIDE SERPL-SCNC: 102 MMOL/L (ref 96–108)
CO2 SERPL-SCNC: 22 MMOL/L (ref 21–32)
CREAT SERPL-MCNC: 0.76 MG/DL (ref 0.6–1.3)
EOSINOPHIL # BLD AUTO: 0 THOUSAND/ÂΜL (ref 0–0.61)
EOSINOPHIL NFR BLD AUTO: 0 % (ref 0–6)
ERYTHROCYTE [DISTWIDTH] IN BLOOD BY AUTOMATED COUNT: 13.6 % (ref 11.6–15.1)
GFR SERPL CREATININE-BSD FRML MDRD: 90 ML/MIN/1.73SQ M
GLUCOSE SERPL-MCNC: 149 MG/DL (ref 65–140)
HCT VFR BLD AUTO: 48.6 % (ref 34.8–46.1)
HGB BLD-MCNC: 16.6 G/DL (ref 11.5–15.4)
IMM GRANULOCYTES # BLD AUTO: 0.1 THOUSAND/UL (ref 0–0.2)
IMM GRANULOCYTES NFR BLD AUTO: 1 % (ref 0–2)
LYMPHOCYTES # BLD AUTO: 1.97 THOUSANDS/ÂΜL (ref 0.6–4.47)
LYMPHOCYTES NFR BLD AUTO: 22 % (ref 14–44)
MCH RBC QN AUTO: 34.7 PG (ref 26.8–34.3)
MCHC RBC AUTO-ENTMCNC: 34.2 G/DL (ref 31.4–37.4)
MCV RBC AUTO: 102 FL (ref 82–98)
MONOCYTES # BLD AUTO: 0.89 THOUSAND/ÂΜL (ref 0.17–1.22)
MONOCYTES NFR BLD AUTO: 10 % (ref 4–12)
NEUTROPHILS # BLD AUTO: 6.13 THOUSANDS/ÂΜL (ref 1.85–7.62)
NEUTS SEG NFR BLD AUTO: 67 % (ref 43–75)
NRBC BLD AUTO-RTO: 0 /100 WBCS
P AXIS: 67 DEGREES
PLATELET # BLD AUTO: 246 THOUSANDS/UL (ref 149–390)
PMV BLD AUTO: 11 FL (ref 8.9–12.7)
POTASSIUM SERPL-SCNC: 3.6 MMOL/L (ref 3.5–5.3)
PR INTERVAL: 136 MS
QRS AXIS: 22 DEGREES
QRSD INTERVAL: 70 MS
QT INTERVAL: 402 MS
QTC INTERVAL: 411 MS
RBC # BLD AUTO: 4.78 MILLION/UL (ref 3.81–5.12)
SODIUM SERPL-SCNC: 134 MMOL/L (ref 135–147)
T WAVE AXIS: 44 DEGREES
VENTRICULAR RATE: 63 BPM
WBC # BLD AUTO: 9.13 THOUSAND/UL (ref 4.31–10.16)

## 2025-04-12 PROCEDURE — 99285 EMERGENCY DEPT VISIT HI MDM: CPT | Performed by: EMERGENCY MEDICINE

## 2025-04-12 PROCEDURE — 99223 1ST HOSP IP/OBS HIGH 75: CPT | Performed by: INTERNAL MEDICINE

## 2025-04-12 PROCEDURE — 99215 OFFICE O/P EST HI 40 MIN: CPT | Performed by: STUDENT IN AN ORGANIZED HEALTH CARE EDUCATION/TRAINING PROGRAM

## 2025-04-12 PROCEDURE — 96376 TX/PRO/DX INJ SAME DRUG ADON: CPT

## 2025-04-12 PROCEDURE — 93010 ELECTROCARDIOGRAM REPORT: CPT | Performed by: INTERNAL MEDICINE

## 2025-04-12 PROCEDURE — 70450 CT HEAD/BRAIN W/O DYE: CPT

## 2025-04-12 PROCEDURE — 99285 EMERGENCY DEPT VISIT HI MDM: CPT

## 2025-04-12 PROCEDURE — 99205 OFFICE O/P NEW HI 60 MIN: CPT | Performed by: STUDENT IN AN ORGANIZED HEALTH CARE EDUCATION/TRAINING PROGRAM

## 2025-04-12 PROCEDURE — 80048 BASIC METABOLIC PNL TOTAL CA: CPT | Performed by: STUDENT IN AN ORGANIZED HEALTH CARE EDUCATION/TRAINING PROGRAM

## 2025-04-12 PROCEDURE — 85025 COMPLETE CBC W/AUTO DIFF WBC: CPT | Performed by: STUDENT IN AN ORGANIZED HEALTH CARE EDUCATION/TRAINING PROGRAM

## 2025-04-12 PROCEDURE — C9254 INJECTION, LACOSAMIDE: HCPCS | Performed by: STUDENT IN AN ORGANIZED HEALTH CARE EDUCATION/TRAINING PROGRAM

## 2025-04-12 PROCEDURE — 96374 THER/PROPH/DIAG INJ IV PUSH: CPT

## 2025-04-12 PROCEDURE — 93005 ELECTROCARDIOGRAM TRACING: CPT

## 2025-04-12 PROCEDURE — 36415 COLL VENOUS BLD VENIPUNCTURE: CPT | Performed by: STUDENT IN AN ORGANIZED HEALTH CARE EDUCATION/TRAINING PROGRAM

## 2025-04-12 RX ORDER — ATORVASTATIN CALCIUM 10 MG/1
10 TABLET, FILM COATED ORAL DAILY
Status: DISCONTINUED | OUTPATIENT
Start: 2025-04-12 | End: 2025-04-15 | Stop reason: HOSPADM

## 2025-04-12 RX ORDER — XYLITOL/YERBA SANTA
5 AEROSOL, SPRAY WITH PUMP (ML) MUCOUS MEMBRANE 4 TIMES DAILY PRN
Status: DISCONTINUED | OUTPATIENT
Start: 2025-04-12 | End: 2025-04-15 | Stop reason: HOSPADM

## 2025-04-12 RX ORDER — LORAZEPAM 2 MG/ML
1 INJECTION INTRAMUSCULAR ONCE
Status: COMPLETED | OUTPATIENT
Start: 2025-04-12 | End: 2025-04-12

## 2025-04-12 RX ORDER — ASPIRIN 81 MG/1
81 TABLET ORAL 2 TIMES DAILY
Status: DISCONTINUED | OUTPATIENT
Start: 2025-04-12 | End: 2025-04-15 | Stop reason: HOSPADM

## 2025-04-12 RX ORDER — DOCUSATE SODIUM 100 MG/1
100 CAPSULE, LIQUID FILLED ORAL 2 TIMES DAILY
Status: DISCONTINUED | OUTPATIENT
Start: 2025-04-12 | End: 2025-04-15 | Stop reason: HOSPADM

## 2025-04-12 RX ORDER — ONDANSETRON 2 MG/ML
4 INJECTION INTRAMUSCULAR; INTRAVENOUS EVERY 6 HOURS PRN
Status: DISCONTINUED | OUTPATIENT
Start: 2025-04-12 | End: 2025-04-15 | Stop reason: HOSPADM

## 2025-04-12 RX ORDER — LACOSAMIDE 50 MG/1
25 TABLET ORAL
Status: DISCONTINUED | OUTPATIENT
Start: 2025-04-13 | End: 2025-04-13

## 2025-04-12 RX ORDER — LACOSAMIDE 10 MG/ML
50 INJECTION, SOLUTION INTRAVENOUS ONCE
Status: COMPLETED | OUTPATIENT
Start: 2025-04-12 | End: 2025-04-12

## 2025-04-12 RX ORDER — LACOSAMIDE 50 MG/1
50 TABLET ORAL
Status: DISCONTINUED | OUTPATIENT
Start: 2025-04-13 | End: 2025-04-15 | Stop reason: HOSPADM

## 2025-04-12 RX ORDER — SODIUM CHLORIDE, SODIUM GLUCONATE, SODIUM ACETATE, POTASSIUM CHLORIDE, MAGNESIUM CHLORIDE, SODIUM PHOSPHATE, DIBASIC, AND POTASSIUM PHOSPHATE .53; .5; .37; .037; .03; .012; .00082 G/100ML; G/100ML; G/100ML; G/100ML; G/100ML; G/100ML; G/100ML
125 INJECTION, SOLUTION INTRAVENOUS CONTINUOUS
Status: DISCONTINUED | OUTPATIENT
Start: 2025-04-12 | End: 2025-04-14

## 2025-04-12 RX ORDER — LEVOTHYROXINE SODIUM 50 UG/1
50 TABLET ORAL DAILY
Status: DISCONTINUED | OUTPATIENT
Start: 2025-04-13 | End: 2025-04-15 | Stop reason: HOSPADM

## 2025-04-12 RX ORDER — LORAZEPAM 2 MG/ML
2 INJECTION INTRAMUSCULAR ONCE AS NEEDED
Status: DISCONTINUED | OUTPATIENT
Start: 2025-04-12 | End: 2025-04-15 | Stop reason: HOSPADM

## 2025-04-12 RX ORDER — ACETAMINOPHEN 325 MG/1
650 TABLET ORAL EVERY 6 HOURS PRN
Status: DISCONTINUED | OUTPATIENT
Start: 2025-04-12 | End: 2025-04-13

## 2025-04-12 RX ORDER — LORAZEPAM 2 MG/ML
2 INJECTION INTRAMUSCULAR ONCE
Status: COMPLETED | OUTPATIENT
Start: 2025-04-12 | End: 2025-04-12

## 2025-04-12 RX ORDER — LACOSAMIDE 100 MG/1
50 TABLET ORAL
Status: DISCONTINUED | OUTPATIENT
Start: 2025-04-12 | End: 2025-04-12

## 2025-04-12 RX ORDER — FLUORIDE TOOTHPASTE
1 TOOTHPASTE DENTAL 2 TIMES DAILY PRN
Status: DISCONTINUED | OUTPATIENT
Start: 2025-04-12 | End: 2025-04-12

## 2025-04-12 RX ADMIN — LACOSAMIDE 50 MG: 10 INJECTION, SOLUTION INTRAVENOUS at 20:22

## 2025-04-12 RX ADMIN — LORAZEPAM 1 MG: 2 INJECTION INTRAMUSCULAR; INTRAVENOUS at 12:23

## 2025-04-12 RX ADMIN — LORAZEPAM 2 MG: 2 INJECTION INTRAMUSCULAR; INTRAVENOUS at 11:50

## 2025-04-12 RX ADMIN — SODIUM CHLORIDE, SODIUM GLUCONATE, SODIUM ACETATE, POTASSIUM CHLORIDE, MAGNESIUM CHLORIDE, SODIUM PHOSPHATE, DIBASIC, AND POTASSIUM PHOSPHATE 75 ML/HR: .53; .5; .37; .037; .03; .012; .00082 INJECTION, SOLUTION INTRAVENOUS at 18:47

## 2025-04-12 NOTE — ASSESSMENT & PLAN NOTE
Aminta De La Torre is a 50 y.o. female with a pertinent PMH of trisomy 21, hypothyroidism, hyperlipidemia, neurology consulted for c/f for seizure activity.  There were 2 events, first 1 was noted when patient was being lifted by a [place] lift and consisted of generalized convulsions of all 4 extremities lasting for about 20 seconds with spontaneous resolution.  Second 1 was witnessed by ED staff and her sister and , this event also had generalized convulsions of all 4 extremities, with the head tilted back, eyes rolling back.  Afterwards patient appeared to be postictal and not at her typical baseline.  She received 2 mg of Ativan afterwards and was taken for to CT scan which was stable from prior.    Prior History of Seizures:   Outpatient Neurologist: Dr. Savage  Current Home AEDs: Lacosamide (Vimpat) 50 mg nightly, reportedly had sedation with higher dosing  Prior AEDs: Lamotrigine (Lamictal) rash  Levetiracetam (Keppra) sedation  Event/Seizure Semiologies:  Prior episodes of syncope documented in  (looked like she was getting sick, pale and then with LOC).    LOC with stiffening, shaking.  Events occurred in the context of having a bowel movement (10/2023 event occurred while straining to have a BM), or when ambulating to the bathroom.    Epilepsy Risk Factors:     Abnormal pregnancy: No  Abnormal birth/: Born late 1-2 weeks, otherwise uncomplicated, per sister  Abnormal Development: Down syndrome   Febrile seizures, simple: No  Febrile seizures, complex: No  CNS infection: No  Cerebral palsy: No  Head injury (moderate/severe): No  CNS neoplasm: No  CNS malformation: No  Neurosurgical procedure: No  Stroke: No  Alcohol abuse: No  Drug abuse: No  Family history Sz/epilepsy: No    Work Up:  Initial ED Labs: CBC without leukocytosis and BMP only positive for mild hyponatremia 134, otherwise unremarkable for seizure  NC CTH: No acute intracranial abnormalities, Stable chronic  No palpable leg pulses but feet are warm. Will reassess by doppler. ventriculomegaly.        Current AEDs:  Medication Bolus Dose Maintenance Dose Levels            Impression: Spells/Fit NOS, Seizures R56.9  - Patient with history of trisomy 21, hypothyroidism, hyperlipidemia, depression and anxiety, concern for breakthrough seizure vs syncope.  Patient remains on a very low-dose of Vimpat which may not be giving the patient any therapeutic benefit.  These events both seem to have occurred when the patient was upright, so there is concern for possible cardiogenic or orthostatic.  As patient does have underlying risk factor with Down syndrome we will slowly uptitrate the Vimpat with 25 mg in the a.m. and continue with 50 mg in the p.m.  No neurodiagnostics warranted at this time.  This plan was discussed with Dr. Savage as well as Dr. Barron.    Plan:  Frequent Neuro Checks, notify Neurology team if any acute changes in exam and obtain STAT [facility]  Seizure / Fall Precautions  Telemetry Monitoring  AEDs: Increase Vimpat to 25 mg a.m. and 50 mg p.m.  EEG: No indications at this time for EEG studies  Imaging Studies: No indications for additional neuro diagnostic imaging at this time  Rescue Meds: Ativan IV 2mg prn 2 complex partial seizures or 1 GTC seizure  DVT PPx: Per primary, and SCDs  PT/OT Evaluate and Treat  Seizure precautions outpatient: no driving, no operating heavy machinery, no swimming alone, no climbing, no baths only showers, no cooking alone, or any activity that would put them at increased risk of harm if they were to have a seizure.   Rest of care per primary medical team

## 2025-04-12 NOTE — ED ATTENDING ATTESTATION
I, Zhao Oconnor MD, saw and evaluated the patient. I have discussed the patient with the resident and agree with the resident's findings, Plan of Care, and MDM as documented in the resident's note, except where noted. All available labs and Radiology studies were reviewed.  I was present for key portions of any procedure(s) performed by the resident and I was immediately available to provide assistance.    At this point I agree with the current assessment done in the Emergency Department.  I have conducted an independent evaluation of this patient a history and physical is as follows:    49 yo female with a complicated past medical history including trisomy 21, hyperlipidemia, a seizure disorder, severe intellectual disability, dementia, and hypothyroidism brought to the ED by EMS for evaluation s/p a witnessed seizure at home this morning. The patient's home health aid reports roughly 20 seconds of tonic-clonic activity about 30 minutes prior to arrival. Seizure activity stopped spontaneously. No falls, tongue bite, or incontinence. The patient is now resting comfortably in her stretcher. Brother says she is almost back to her mental status baseline. This is her typical post-ictal presentation. No vomiting, diarrhea, fevers, or chills. No recent illnesses. Aid says she has been compliant with her medications. Last seizure occurred about a year ago. No other specific complaints.    ROS: per resident physician note    Gen: NAD, resting comfortably, nonverbal at baseline  HEENT: PERRL, EOMI, no tongue bite  Neck: supple  CV: RRR  Lungs: CTA B/L  Abdomen: soft, NT/ND  Ext: no swelling or deformity  Neuro: Jeff  Skin: no rash    ED Course  The patient is comfortable appearing, resting in her stretcher. Vital signs are stable. (+) Brief episode of seizure-like activity this morning but no further events. Aid and brother both confirm that she has almost returned to her mental status baseline. No falls or history of  trauma. No recent illnesses. Presentation is most consistent with a breakthrough seizure. Plan for discharge to home with close Neurology follow up if she continues to improve and no further epileptic events occur. If further seizure activity occurs during her ED stay then plan for basic labs, head CT, and likely admission for overnight observation/Neurology consult. Will continue to monitor closely in the ED.       Critical Care Time  Procedures    Male

## 2025-04-12 NOTE — CONSULTS
Consultation - Neurology   Name: Aminta De La Torre 50 y.o. female I MRN: [id number]  Unit/Bed#: ED 27 I Date of Admission: 2025   Date of Service: 2025 I Hospital Day: 0   Inpatient consult to Neurology  Consult performed by: Ron Abernathy DO  Consult ordered by: Zhao Leija DO        Physician Requesting Evaluation: Zhao Leija, *   Reason for Evaluation / Principal Problem: Seizures    Assessment & Plan  Breakthrough seizure (HCC)  Aminta De La Torre is a 50 y.o. female with a pertinent PMH of trisomy 21, hypothyroidism, hyperlipidemia, neurology consulted for c/f for seizure activity.  There were 2 events, first 1 was noted when patient was being lifted by a Vernon lift and consisted of generalized convulsions of all 4 extremities lasting for about 20 seconds with spontaneous resolution.  Second 1 was witnessed by ED staff and her sister and , this event also had generalized convulsions of all 4 extremities, with the head tilted back, eyes rolling back.  Afterwards patient appeared to be postictal and not at her typical baseline.  She received 2 mg of Ativan afterwards and was taken for to CT scan which was stable from prior.    Prior History of Seizures:   Outpatient Neurologist: Dr. Savage  Current Home AEDs: Lacosamide (Vimpat) 50 mg nightly , reportedly had sedation with higher dosing  Prior AEDs: Lamotrigine (Lamictal) rash  Levetiracetam (Keppra) sedation  Event/Seizure Semiologies:  Prior episodes of syncope documented in  (looked like she was getting sick, pale and then with LOC).    LOC with stiffening, shaking.  Events occurred in the context of having a bowel movement (10/2023 event occurred while straining to have a BM), or when ambulating to the bathroom.    Epilepsy Risk Factors:     Abnormal pregnancy: No  Abnormal birth/: Born late 1-2 weeks, otherwise uncomplicated, per sister  Abnormal Development: Down syndrome   Febrile  seizures, simple: No  Febrile seizures, complex: No  CNS infection: No  Cerebral palsy: No  Head injury (moderate/severe): No  CNS neoplasm: No  CNS malformation: No  Neurosurgical procedure: No  Stroke: No  Alcohol abuse: No  Drug abuse: No  Family history Sz/epilepsy: No    Work Up:  Initial ED Labs: CBC without leukocytosis and BMP only positive for mild hyponatremia 134, otherwise unremarkable for seizure  NC CTH: No acute intracranial abnormalities, Stable chronic ventriculomegaly.        Current AEDs:  Medication Bolus Dose Maintenance Dose Levels            Impression: Spells/Fit NOS, Seizures R56.9  - Patient with history of trisomy 21, hypothyroidism, hyperlipidemia, depression and anxiety, concern for breakthrough seizure vs syncope.  Patient remains on a very low-dose of Vimpat which may not be giving the patient any therapeutic benefit.  These events both seem to have occurred when the patient was upright, so there is concern for possible cardiogenic or orthostatic.  As patient does have underlying risk factor with Down syndrome we will slowly uptitrate the Vimpat with 25 mg in the a.m. and continue with 50 mg in the p.m.  No neurodiagnostics warranted at this time.  This plan was discussed with Dr. Savage as well as Dr. Barron.    Plan:  Frequent Neuro Checks, notify Neurology team if any acute changes in exam and obtain STAT CTH  Seizure / Fall Precautions  Telemetry Monitoring  AEDs: Increase Vimpat to 25 mg a.m. and 50 mg p.m.  EEG: No indications at this time for EEG studies  Imaging Studies: No indications for additional neuro diagnostic imaging at this time  Rescue Meds: Ativan IV 2mg prn 2 complex partial seizures or 1 GTC seizure  DVT PPx: Per primary, and SCDs  PT/OT Evaluate and Treat  Seizure precautions outpatient: no driving, no operating heavy machinery, no swimming alone, no climbing, no baths only showers, no cooking alone, or any activity that would put them at increased risk of harm if  they were to have a seizure.   Rest of care per primary medical team    Down syndrome    Hypothyroidism    Class 3 severe obesity due to excess calories without serious comorbidity with body mass index (BMI) of 40.0 to 44.9 in adult (HCC)    I have discussed with Dr. Barron the above plan to treat pt. She agrees with the plan.    Patient to follow up with Dr. Savage on 9/3/25    History of Present Illness   Aminta De La Torre is a 50 y.o. female with a past medical history of trisomy 21, depression, and anxiety, hypothyroidism and hyperlipidemia who presents with concern for breakthrough seizure.  She presents from her group home where there was 1 initial seizure-like event.   The first event was noted when patient was being lifted by a Vernon lift and consisted of generalized convulsions of all 4 extremities lasting for about 20 seconds with spontaneous resolution.  Second event was witnessed by ED staff and her sister and  right before they were going to be discharged, this event also had generalized convulsions of all 4 extremities, with the head tilted back, eyes rolling back.  This lasted for approximately about 20 seconds as well and also spontaneously resolved.  The patient typically only gets 1 of these events at a time, concern for possible clustering of seizure-like activity and neurology was consulted afterwards.  Afterwards patient appeared to be postictal and not at her typical baseline.  She received 2 mg of Ativan afterwards and was taken for to CT scan which was stable from prior.  Lab work was generally unremarkable.  The patient sister does note that she was upright during the second episode which is similar to what she usually is when these episodes occur.  She follows with Dr. Savage outpatient and there was some concern of syncope versus true epileptic seizures.  She is only maintained on 50 mg of Vimpat at night and was being considered for possible tapering off of  antiseizure medications completely.  She does get increased lethargy with increased doses of antiseizure medications and has been on higher doses of Vimpat, Keppra and had a rash while on lamotrigine.  No tongue laceration or urinary/bowel incontinence noted.    Review of Systems   seizures  Medical History Review: I have reviewed the patient's PMH, PSH, Social History, Family History, Meds, and Allergies     Objective :  Temp:  [97.9 °F (36.6 °C)] 97.9 °F (36.6 °C)  HR:  [64-88] 78  BP: (100-121)/(62-78) 100/78  Resp:  [12-14] 14  SpO2:  [98 %-100 %] 98 %  O2 Device: None (Room air)    Physical Exam  Vitals reviewed.   Constitutional:       Appearance: She is obese.   HENT:      Mouth/Throat:      Mouth: Mucous membranes are moist.   Eyes:      Pupils: Pupils are equal, round, and reactive to light.     Neurological Exam  Mental Status  Arousable to tactile stimuli. Patient is nonverbal.  Patient is nonverbal at baseline, is able to follow some simple commands and this some mimicking..    Cranial Nerves  CN III, IV, VI: Pupils equal round and reactive to light bilaterally.  Patient did appear altered from receiving 2 mg of Ativan versus postictal state, she did have roving eye movements without forced gaze deviation.  No obvious tongue laceration or blood noted..    Motor    Patient appeared to be agitated and was moving all 4 extremities spontaneously.  At baseline she is nonambulatory for the past year..    Reflexes  Unable to reliably check reflexes as patient continues to move all around..        Lab Results: I have reviewed the following results:I have personally reviewed pertinent reports.    Recent Labs     04/12/25  1152 04/12/25  1230   WBC 9.13  --    HGB 16.6*  --    HCT 48.6*  --      --    SODIUM  --  134*   K  --  3.6   CL  --  102   CO2  --  22   BUN  --  14   CREATININE  --  0.76   GLUC  --  149*     Imaging Results Review: I personally reviewed the following image studies in PACS and  associated radiology reports: CT head. My interpretation of the radiology images/reports is: as above.  Other Study Results Review: No additional pertinent studies reviewed.    VTE Prophylaxis: VTE covered by:    None

## 2025-04-12 NOTE — ASSESSMENT & PLAN NOTE
Presents to the hospital with seizure-like activity, concern for breakthrough seizures  Additional episode witnessed in the ER  CT head and metabolic workup negative    Will need to be monitored overnight  Home antiepileptic regimen: Vimpat 50 mg nightly  Add Vimpat 25 mg every morning, continue Vimpat 50 mg nightly  Seizure precautions  Ativan as needed for seizure activity.

## 2025-04-12 NOTE — ED NOTES
Patient's family member reported patient had 20 second seizure resident at bedside.      Mili Ignacio RN  04/12/25 9904       Mili Ignacio RN  04/12/25 [id number]

## 2025-04-12 NOTE — H&P
H&P - Hospitalist   Name: Aminta De La Torre 50 y.o. female I MRN: [id number]  Unit/Bed#: ED 27 I Date of Admission: 4/12/2025   Date of Service: 4/12/2025 I Hospital Day: 0     Assessment & Plan  Breakthrough seizure (HCC)  Presents to the hospital with seizure-like activity, concern for breakthrough seizures  Additional episode witnessed in the ER  CT head and metabolic workup negative    Will need to be monitored overnight  Home antiepileptic regimen: Vimpat 50 mg nightly  Add Vimpat 25 mg every morning, continue Vimpat 50 mg nightly  Seizure precautions  Ativan as needed for seizure activity.  Down syndrome  History noted  Lives at group home  Supportive care  Hypothyroidism  Continue home levothyroxine  Class 3 severe obesity due to excess calories without serious comorbidity with body mass index (BMI) of 40.0 to 44.9 in adult (HCC)  Affects all levels of care, diet and lifestyle modifications recommended      VTE Pharmacologic Prophylaxis: VTE Score: 2 Low Risk (Score 0-2) - Encourage Ambulation.  Code Status: Level 1 - Full Code   Discussion with family: Updated  (sister) at bedside.    Anticipated Length of Stay: Patient will be admitted on an observation basis with an anticipated length of stay of less than 2 midnights secondary to breakthrough seizures.    History of Present Illness   Chief Complaint: seizures    Aminta De La Torre is a 50 y.o. female with a PMH of anxiety, DEMOND, Down syndrome, constipation, hypothyroidism, hyperlipidemia, seizures, who presents with seizure-like activity.  She has Down syndrome and currently resides at group home.  She is maintained on Vimpat 50 mg nightly.  Initially was being observed in the ER and plan to discharge her home, however she had another witnessed episode in the emergency room.  She was seen by neurology who is recommending overnight observation.  Will be increasing her home Vimpat dosage.  Otherwise patient was resting comfortably in bed.  She is  postictal, and currently somewhat sedated from the Ativan administration.  Sister was at bedside and updated.  Plan of care discussed.  All questions and concerns addressed.    Unable to obtain review of systems given patient's postictal state and sedation from Ativan.    Historical Information   Past Medical History:   Diagnosis Date    Allergic     Anxiety     Arthritis     Community acquired pneumonia     Last Assessed:1/22/2013    Encounter for PPD test 09/26/2023    Encounter for vaccination 09/26/2023    Tdap given at this visit    Leukopenia     Last Assessed:3/5/2014    Osteoporosis     Preop examination 09/29/2019    Sleep apnea     Trisomy 21, Down syndrome      Past Surgical History:   Procedure Laterality Date    AL OPEN TX TRIMALLEOLAR ANKLE FX W/O FIXJ PST LIP Left 8/29/2024    Procedure: ORIF TRIMALLEOLAR FX, OPEN REDUCTION/TREATMENT OF TILLEAUX FRACTURE;  Surgeon: Mahamed Zamorano MD;  Location[facility];  Service: Orthopedics    TONSILLECTOMY AND ADENOIDECTOMY       Social History     Tobacco Use    Smoking status: Never     Passive exposure: Past    Smokeless tobacco: Never   Vaping Use    Vaping status: Never Used   Substance and Sexual Activity    Alcohol use: Not Currently     Comment: PATIENT DOES NOT DRINK    Drug use: Never    Sexual activity: Never     E-Cigarette/Vaping    E-Cigarette Use Never User      E-Cigarette/Vaping Substances    Nicotine No     THC No     CBD No     Flavoring No      Family History   Problem Relation Age of Onset    Cancer Mother     No Known Problems Sister     No Known Problems Brother     No Known Problems Brother      Social History:  Marital Status: Single   Occupation: disabled  Patient Pre-hospital Living Situation: group home  Patient Pre-hospital Level of Mobility: unable to be assessed at time of evaluation  Patient Pre-hospital Diet Restrictions: none    Meds/Allergies   I have reviewed home medications using recent Epic encounter.  Prior to Admission  medications    Medication Sig Start Date End Date Taking? Authorizing Provider   aspirin (Aspirin Low Dose) 81 mg EC tablet Take 1 tablet (81 mg total) by mouth 2 (two) times a day 2/3/25  Yes Radha Olvera MD   atorvastatin (LIPITOR) 10 mg tablet Take 1 tablet (10 mg total) by mouth daily 2/3/25 8/2/25 Yes Radha Olvera MD   bacitracin topical ointment 500 units/g topical ointment Apply 1 large application topically 3 (three) times a day as needed for wound care (skin infection) 2/3/25  Yes Radha Olvera MD   bisacodyl (DULCOLAX) 10 mg suppository Insert 1 suppository (10 mg total) into the rectum daily as needed for constipation 2/3/25  Yes Radha Olvera MD   Carbamide Peroxide (Ear Wax Drops) 6.5 % SOLN INSTILL 5 DROPS INTO EACH EAR, TWICE A WEEK ON TUESDAYS AND FRIDAY. DX: WAX REMOVAL 3/4/25  Yes Radha Olvera MD   cholecalciferol (VITAMIN D3) 1,000 units tablet Take 1 tablet (1,000 Units total) by mouth every morning At 8AM 2/3/25  Yes Radha Olvera MD   Diclofenac Sodium (GoodSense Arthritis Pain) 1 % Apply 2 g topically 4 (four) times a day To left knee 4/8/25  Yes Jamison Arnold PA-C   dimethicone 1 % cream Apply topically daily At 8 PM 3/12/25 11/17/25 Yes Jonathan Cruz DPM   docusate sodium (COLACE) 100 mg capsule Take 1 capsule (100 mg total) by mouth 2 (two) times a day 2/3/25  Yes Radha Olvera MD   Elastic Bandages & Supports (Gait/Transfer Belt) MISC Use in the morning 9/30/24  Yes Radha Olvera MD   ibuprofen (MOTRIN) 600 mg tablet Take 600 mg by mouth every 6 (six) hours as needed   Yes Historical MD Rolf   Incontinence Supply Disposable (RA Adult Wipes) MISC Use daily 10/24/22  Yes STEFANIA Hayes   Incontinence Supply Disposable (Ultra-Soft Plus Briefs XXL) MISC Use if needed (incontinence) 11/15/23  Yes Radha Olvera MD   lacosamide (VIMPAT) 50 mg Take 1 tablet (50 mg total) by mouth daily at bedtime 4/4/25  Yes Hernesto Savage MD   levothyroxine 50 mcg tablet Take 1 tablet (50 mcg total) by mouth daily  2/3/25  Yes Radha Olvera MD   melatonin 3 mg Take 1 tablet (3 mg total) by mouth daily at bedtime 2/3/25  Yes Radha Olvera MD   Mouthwashes (Biotene Dry Mouth) LIQD Apply 1 Swab to the mouth or throat 2 (two) times a day as needed (dry mouth) Use 5ml of solution with each swab 2/3/25  Yes Radha Olvera MD   nystatin powder Apply topically 3 (three) times a day as needed (rash) 2/3/25  Yes Radha Olvera MD   polyethylene glycol (MIRALAX) 17 g Give 17g 3 times a week on Tue, Fri, Sun. Hold for diarrhea 4/9/25  Yes Radha Olvera MD   sodium chloride (OCEAN) 0.65 % nasal spray 2 sprays into each nostril as needed for congestion or rhinitis 2/3/25  Yes Radha Olvera MD   ZINC OXIDE, TOPICAL, 10 % CREA Apply topically 3 (three) times a day as needed (rash) 2/3/25  Yes Radha Olvera MD   acetaminophen (TYLENOL) 500 mg tablet Take 1 tablet (500 mg total) by mouth every 6 (six) hours as needed for mild pain or fever With 600 mg ibuprofen 2/3/25   Radha Olvera MD   escitalopram (LEXAPRO) 20 mg tablet TAKE 1 TABLET (20 MG TOTAL) BY MOUTH DAILY  Patient taking differently: Take 15 mg by mouth daily 2/12/25   Radha Olvera MD   Heating Pad PADS by Does not apply route 2 (two) times a day 6/24/19   STEFANIA Hagan     Allergies   Allergen Reactions    Levetiracetam Drowsiness and Hives     And disorientation    Depakote [Valproic Acid] Drowsiness    Lamictal [Lamotrigine] Rash    Dust Mite Extract Other (See Comments)     Per patient's medical appointment form, no reaction listed    Molds & Smuts Other (See Comments)     Per patient's medical appointment form, no reaction listed    Other Other (See Comments)     Volporic acid, levitracetam, Per patient's medical appointment form, no reaction listed    Pollen Extract Sneezing       Objective :  Temp:  [97.9 °F (36.6 °C)] 97.9 °F (36.6 °C)  HR:  [64-88] 78  BP: (100-121)/(62-78) 100/78  Resp:  [12-14] 14  SpO2:  [98 %-100 %] 98 %  O2 Device: None (Room air)    PHYSICAL EXAM:    Vitals signs  "reviewed  Constitutional Sedated.  NAD.   Head/Neck   Normocephalic. Atraumatic.   HEENT   No scleral icterus. EOMI.   Heart   Regular rate and rhythm. No murmurs.   Lungs   Clear to auscultation bilaterally. Respirations unlaboured.   Abdomen   Soft. Nontender. Nondistended.    Skin   Skin color normal. No rashes.   Extremities   No deformities. No peripheral edema.   Neuro Sedated and somnolent.  Moves all extremities spontaneously.   Psych Somnolent.               Lab Results: I have reviewed the following results:  Results from last 7 days   Lab Units 04/12/25  1152   WBC Thousand/uL 9.13   HEMOGLOBIN g/dL 16.6*   HEMATOCRIT % 48.6*   PLATELETS Thousands/uL 246   SEGS PCT % 67   LYMPHO PCT % 22   MONO PCT % 10   EOS PCT % 0     Results from last 7 days   Lab Units 04/12/25  1230   SODIUM mmol/L 134*   POTASSIUM mmol/L 3.6   CHLORIDE mmol/L 102   CO2 mmol/L 22   BUN mg/dL 14   CREATININE mg/dL 0.76   ANION GAP mmol/L 10   CALCIUM mg/dL 8.7   GLUCOSE RANDOM mg/dL 149*             No results found for: \"HGBA1C\"        Imaging Results Review: I reviewed radiology reports from this admission including: CT head.  Other Study Results Review: No additional pertinent studies reviewed.    Administrative Statements       ** Please Note: This note has been constructed using a voice recognition system. **    "

## 2025-04-12 NOTE — DISCHARGE INSTRUCTIONS
Follow up with your neurologist as scheduled. Return to ED for recurrent symptoms or other worrisome symptoms

## 2025-04-12 NOTE — ED PROVIDER NOTES
Time reflects when diagnosis was documented in both MDM as applicable and the Disposition within this note       Time User Action Codes Description Comment    4/12/2025  9:10 AM Swapnil Goyal Add [R56.9] Seizure (HCC)     4/12/2025  9:10 AM Swapnil Goyal Add [G40.919] Breakthrough seizure (HCC)     4/12/2025  9:10 AM ToSwapnil abbasi Modify [G40.919] Breakthrough seizure (HCC)     4/12/2025  9:10 AM Swapnil Goyal Remove [R56.9] Seizure (HCC)           ED Disposition       ED Disposition   Admit    Condition   Stable    Date/Time   Sat Apr 12, 2025  3:09 PM    Comment   Case was discussed with FABY and the patient's admission status was agreed to be Admission Status: observation status to the service of Dr. Leija .               Assessment & Plan       Medical Decision Making  50-year-old female presents to the emergency department today for 20 second seizure witnessed by group home staff.  Patient has known history of seizures and is on appropriate maintenance medication.  On examination patient is hemodynamically stable and appears to be postictal on initial exam.  Vitals are reassuring.  Moving all extremities spontaneously.  ECG appears to be at baseline compared to her old ECGs with a rate of 63 no evidence of ST elevations or depressions consistent with ACS or findings indicative of new pathologic dysrhythmia.  Patient remained tired during during her time in the department which did return to baseline after about 2 hours of monitoring.  Just prior to discharge, patient was reported to have another 20 second tonic-clonic seizure.  Resolved before I was able to get to the room and see the patient.  After she was in her typical postictal state.  CT of the head CBC and CMP were ordered.  CT appeared to be at baseline with no new findings.  Labs were also largely reassuring with no abnormalities that would explain her increased frequency of seizures.  Neurology was consulted and plan to increase dose of Vimpat.  The  patient was admitted to the hospital for further evaluation and treatment.    Amount and/or Complexity of Data Reviewed  Labs: ordered.  Radiology: ordered.    Risk  Prescription drug management.  Decision regarding hospitalization.             Medications   acetaminophen (TYLENOL) tablet 650 mg (has no administration in time range)   ondansetron (ZOFRAN) injection 4 mg (has no administration in time range)   lacosamide (VIMPAT) tablet 50 mg (has no administration in time range)   lacosamide (VIMPAT) tablet 25 mg (has no administration in time range)   aspirin (ECOTRIN LOW STRENGTH) EC tablet 81 mg (has no administration in time range)   atorvastatin (LIPITOR) tablet 10 mg (has no administration in time range)   Cholecalciferol (VITAMIN D3) tablet 1,000 Units (has no administration in time range)   docusate sodium (COLACE) capsule 100 mg (has no administration in time range)   escitalopram (LEXAPRO) tablet 15 mg (has no administration in time range)   levothyroxine tablet 50 mcg (has no administration in time range)   melatonin tablet 3 mg (has no administration in time range)   Biotene Dry Mouth LIQD 1 Swab (has no administration in time range)   LORazepam (ATIVAN) injection 1 mg (1 mg Intravenous Given 4/12/25 1223)   LORazepam (ATIVAN) injection 2 mg (2 mg Intravenous Given 4/12/25 1150)       ED Risk Strat Scores                    No data recorded        SBIRT 20yo+      Flowsheet Row Most Recent Value   Initial Alcohol Screen: US AUDIT-C     1. How often do you have a drink containing alcohol? 0 Filed at: 04/12/2025 0745   2. How many drinks containing alcohol do you have on a typical day you are drinking?  0 Filed at: 04/12/2025 0745   3a. Male UNDER 65: How often do you have five or more drinks on one occasion? 0 Filed at: 04/12/2025 0745   3b. FEMALE Any Age, or MALE 65+: How often do you have 4 or more drinks on one occassion? 0 Filed at: 04/12/2025 0745   Audit-C Score 0 Filed at: 04/12/2025 0745   XIMENA:  How many times in the past year have you...    Used an illegal drug or used a prescription medication for non-medical reasons? Never Filed at: 04/12/2025 0745                            History of Present Illness       Chief Complaint   Patient presents with    Seizure - Prior Hx Of     Patient coming in via EMS post ictal after having seizure for 20 second 30 min ago. Patient has significant history of seizures.        Past Medical History:   Diagnosis Date    Allergic     Anxiety     Arthritis     Community acquired pneumonia     Last Assessed:1/22/2013    Encounter for PPD test 09/26/2023    Encounter for vaccination 09/26/2023    Tdap given at this visit    Leukopenia     Last Assessed:3/5/2014    Osteoporosis     Preop examination 09/29/2019    Sleep apnea     Trisomy 21, Down syndrome       Past Surgical History:   Procedure Laterality Date    OH OPEN TX TRIMALLEOLAR ANKLE FX W/O FIXJ PST LIP Left 8/29/2024    Procedure: ORIF TRIMALLEOLAR FX, OPEN REDUCTION/TREATMENT OF TILLEAUX FRACTURE;  Surgeon: Mahamed Zamorano MD;  Location: [facility] OR;  Service: Orthopedics    TONSILLECTOMY AND ADENOIDECTOMY        Family History   Problem Relation Age of Onset    Cancer Mother     No Known Problems Sister     No Known Problems Brother     No Known Problems Brother       Social History     Tobacco Use    Smoking status: Never     Passive exposure: Past    Smokeless tobacco: Never   Vaping Use    Vaping status: Never Used   Substance Use Topics    Alcohol use: Not Currently     Comment: PATIENT DOES NOT DRINK    Drug use: Never      E-Cigarette/Vaping    E-Cigarette Use Never User       E-Cigarette/Vaping Substances    Nicotine No     THC No     CBD No     Flavoring No       I have reviewed and agree with the history as documented.     50-year-old female with history of Down syndrome, dementia, seizures, presents to the emergency department today for evaluation of seizure witnessed at her facility.  They state they were  lifting a heavy bed with her [place] lift when she began to convulse.  Seizure lasted approximately 20 seconds and no abortive medications were administered as it resolved spontaneously.  The aide who accompanied her here today tells him that she has been given medications on time and compliance has been ensured.  The last time she had seizure was approximately 1 year ago.  She is in her typical postictal state per the worker.  She is not vomited.  There was no fall or recent trauma.  Patient was at baseline yesterday when she went to bed and had been behaving at baseline throughout the day yesterday.  No new history in recent weeks or months per the worker.  Family eventually arrived and confirmed this history.        Review of Systems   Unable to perform ROS: Patient nonverbal           Objective       ED Triage Vitals [04/12/25 0746]   Temperature Pulse Blood Pressure Respirations SpO2 Patient Position - Orthostatic VS   97.9 °F (36.6 °C) 64 115/68 12 100 % Lying      Temp Source Heart Rate Source BP Location FiO2 (%) Pain Score    Axillary Monitor Left arm -- No Pain      Vitals      Date and Time Temp Pulse SpO2 Resp BP Pain Score FACES Pain Rating User   04/12/25 1400 -- 78 98 % 14 100/78 No Pain -- DJS   04/12/25 1300 -- 88 99 % 14 119/64 No Pain -- DJS   04/12/25 1230 -- 88 99 % 14 121/78 No Pain -- DJS   04/12/25 0800 -- 64 98 % 12 103/62 No Pain -- DJS   04/12/25 0746 97.9 °F (36.6 °C) 64 100 % 12 115/68 No Pain -- DJS            Physical Exam  Constitutional:       General: She is not in acute distress.     Appearance: She is not ill-appearing or diaphoretic.   HENT:      Head: Normocephalic and atraumatic.      Mouth/Throat:      Mouth: Mucous membranes are moist.   Eyes:      Pupils: Pupils are equal, round, and reactive to light.   Cardiovascular:      Rate and Rhythm: Normal rate and regular rhythm.      Heart sounds: Normal heart sounds.      No friction rub.   Pulmonary:      Effort: Pulmonary effort  is normal. No respiratory distress.      Breath sounds: Normal breath sounds.   Abdominal:      General: Abdomen is flat. There is no distension.      Palpations: Abdomen is soft.      Tenderness: There is no abdominal tenderness. There is no guarding.   Musculoskeletal:      Right lower leg: No edema.      Left lower leg: No edema.   Skin:     General: Skin is warm and dry.      Findings: No bruising or rash.   Neurological:      Mental Status: Mental status is at baseline.      Comments: Patient nonverbal unable to follow commands to complete full neurologic examination.  She is moving all extremities spontaneously.  Pupils equal round reactive to light.  Extraocular movements appear to be intact.         Results Reviewed       Procedure Component Value Units Date/Time    Basic metabolic panel [[id number]]  (Abnormal) Collected: 04/12/25 1230    Lab Status: Final result Specimen: Blood from Arm, Left Updated: 04/12/25 1256     Sodium 134 mmol/L      Potassium 3.6 mmol/L      Chloride 102 mmol/L      CO2 22 mmol/L      ANION GAP 10 mmol/L      BUN 14 mg/dL      Creatinine 0.76 mg/dL      Glucose 149 mg/dL      Calcium 8.7 mg/dL      eGFR 90 ml/min/1.73sq m     Narrative:      National Kidney Disease Foundation guidelines for Chronic Kidney Disease (CKD):     Stage 1 with normal or high GFR (GFR > 90 mL/min/1.73 square meters)    Stage 2 Mild CKD (GFR = 60-89 mL/min/1.73 square meters)    Stage 3A Moderate CKD (GFR = 45-59 mL/min/1.73 square meters)    Stage 3B Moderate CKD (GFR = 30-44 mL/min/1.73 square meters)    Stage 4 Severe CKD (GFR = 15-29 mL/min/1.73 square meters)    Stage 5 End Stage CKD (GFR <15 mL/min/1.73 square meters)  Note: GFR calculation is accurate only with a steady state creatinine    CBC and differential [[id number]]  (Abnormal) Collected: 04/12/25 1152    Lab Status: Final result Specimen: Blood from Arm, Left Updated: 04/12/25 1204     WBC 9.13 Thousand/uL      RBC 4.78 Million/uL       Hemoglobin 16.6 g/dL      Hematocrit 48.6 %       fL      MCH 34.7 pg      MCHC 34.2 g/dL      RDW 13.6 %      MPV 11.0 fL      Platelets 246 Thousands/uL      nRBC 0 /100 WBCs      Segmented % 67 %      Immature Grans % 1 %      Lymphocytes % 22 %      Monocytes % 10 %      Eosinophils Relative 0 %      Basophils Relative 0 %      Absolute Neutrophils 6.13 Thousands/µL      Absolute Immature Grans 0.10 Thousand/uL      Absolute Lymphocytes 1.97 Thousands/µL      Absolute Monocytes 0.89 Thousand/µL      Eosinophils Absolute 0.00 Thousand/µL      Basophils Absolute 0.04 Thousands/µL             CT head wo contrast   Final Interpretation by Alex Montemayor MD (04/12 1232)      No acute intracranial abnormality. Limited due to motion.   Stable chronic ventriculomegaly.                  Workstation performed: [id number]             Procedures    ED Medication and Procedure Management   Prior to Admission Medications   Prescriptions Last Dose Informant Patient Reported? Taking?   Carbamide Peroxide (Ear Wax Drops) 6.5 % SOLN 4/11/2025  No Yes   Sig: INSTILL 5 DROPS INTO EACH EAR, TWICE A WEEK ON TUESDAYS AND FRIDAY. DX: WAX REMOVAL   Diclofenac Sodium (GoodSense Arthritis Pain) 1 % 4/11/2025  No Yes   Sig: Apply 2 g topically 4 (four) times a day To left knee   Elastic Bandages & Supports (Gait/Transfer Belt) MISC 4/11/2025  No Yes   Sig: Use in the morning   Heating Pad PADS  Care Giver No No   Sig: by Does not apply route 2 (two) times a day   Incontinence Supply Disposable (RA Adult Wipes) MISC 4/11/2025 Care Giver No Yes   Sig: Use daily   Incontinence Supply Disposable (Ultra-Soft Plus Briefs XXL) MISC 4/11/2025 Care Giver No Yes   Sig: Use if needed (incontinence)   Mouthwashes (Biotene Dry Mouth) LIQD 4/11/2025  No Yes   Sig: Apply 1 Swab to the mouth or throat 2 (two) times a day as needed (dry mouth) Use 5ml of solution with each swab   ZINC OXIDE, TOPICAL, 10 % CREA 4/11/2025  No Yes   Sig: Apply  topically 3 (three) times a day as needed (rash)   acetaminophen (TYLENOL) 500 mg tablet Unknown  No No   Sig: Take 1 tablet (500 mg total) by mouth every 6 (six) hours as needed for mild pain or fever With 600 mg ibuprofen   aspirin (Aspirin Low Dose) 81 mg EC tablet 4/11/2025  No Yes   Sig: Take 1 tablet (81 mg total) by mouth 2 (two) times a day   atorvastatin (LIPITOR) 10 mg tablet 4/11/2025  No Yes   Sig: Take 1 tablet (10 mg total) by mouth daily   bacitracin topical ointment 500 units/g topical ointment 4/11/2025  No Yes   Sig: Apply 1 large application topically 3 (three) times a day as needed for wound care (skin infection)   bisacodyl (DULCOLAX) 10 mg suppository 4/11/2025  No Yes   Sig: Insert 1 suppository (10 mg total) into the rectum daily as needed for constipation   cholecalciferol (VITAMIN D3) 1,000 units tablet 4/11/2025  No Yes   Sig: Take 1 tablet (1,000 Units total) by mouth every morning At 8AM   dimethicone 1 % cream 4/11/2025  No Yes   Sig: Apply topically daily At 8 PM   docusate sodium (COLACE) 100 mg capsule 4/11/2025  No Yes   Sig: Take 1 capsule (100 mg total) by mouth 2 (two) times a day   escitalopram (LEXAPRO) 20 mg tablet   No No   Sig: TAKE 1 TABLET (20 MG TOTAL) BY MOUTH DAILY   Patient taking differently: Take 15 mg by mouth daily   ibuprofen (MOTRIN) 600 mg tablet 4/11/2025  Yes Yes   Sig: Take 600 mg by mouth every 6 (six) hours as needed   lacosamide (VIMPAT) 50 mg 4/11/2025  No Yes   Sig: Take 1 tablet (50 mg total) by mouth daily at bedtime   levothyroxine 50 mcg tablet 4/12/2025 Morning  No Yes   Sig: Take 1 tablet (50 mcg total) by mouth daily   melatonin 3 mg 4/11/2025  No Yes   Sig: Take 1 tablet (3 mg total) by mouth daily at bedtime   nystatin powder 4/11/2025  No Yes   Sig: Apply topically 3 (three) times a day as needed (rash)   polyethylene glycol (MIRALAX) 17 g 4/11/2025  No Yes   Sig: Give 17g 3 times a week on Tue, Fri, Sun. Hold for diarrhea   sodium chloride  (OCEAN) 0.65 % nasal spray 2025  No Yes   Si sprays into each nostril as needed for congestion or rhinitis      Facility-Administered Medications: None     Patient's Medications   Discharge Prescriptions    No medications on file     No discharge procedures on file.  ED SEPSIS DOCUMENTATION   Time reflects when diagnosis was documented in both MDM as applicable and the Disposition within this note       Time User Action Codes Description Comment    2025  9:10 AM Swapnil Goyal Add [R56.9] Seizure (HCC)     2025  9:10 AM Swapnil Goyal Add [G40.919] Breakthrough seizure (HCC)     2025  9:10 AM Swapnil Goyal Modify [G40.919] Breakthrough seizure (HCC)     2025  9:10 AM Swapnil Goyal Remove [R56.9] Seizure (HCC)                  Swapnil Goyal MD  25 [id number]       Swapnil Goyal MD  25 [id number]

## 2025-04-13 ENCOUNTER — APPOINTMENT (INPATIENT)
Dept: RADIOLOGY | Facility: HOSPITAL | Age: 50
End: 2025-04-13
Payer: MEDICARE

## 2025-04-13 PROBLEM — E66.9 OBESITY (BMI 35.0-39.9 WITHOUT COMORBIDITY): Status: ACTIVE | Noted: 2019-09-30

## 2025-04-13 PROBLEM — A41.9 SEPSIS (HCC): Status: ACTIVE | Noted: 2025-04-13

## 2025-04-13 LAB
ALBUMIN SERPL BCG-MCNC: 3.2 G/DL (ref 3.5–5)
ALP SERPL-CCNC: 90 U/L (ref 34–104)
ALT SERPL W P-5'-P-CCNC: 31 U/L (ref 7–52)
AMMONIA PLAS-SCNC: 28 UMOL/L (ref 18–72)
ANION GAP SERPL CALCULATED.3IONS-SCNC: 8 MMOL/L (ref 4–13)
ARTERIAL PATENCY WRIST A: YES
AST SERPL W P-5'-P-CCNC: 18 U/L (ref 13–39)
BACTERIA UR QL AUTO: ABNORMAL /HPF
BASE EX.OXY STD BLDV CALC-SCNC: 90.9 % (ref 60–80)
BASE EXCESS BLDV CALC-SCNC: 0.1 MMOL/L
BILIRUB DIRECT SERPL-MCNC: 0.17 MG/DL (ref 0–0.2)
BILIRUB SERPL-MCNC: 0.89 MG/DL (ref 0.2–1)
BILIRUB UR QL STRIP: NEGATIVE
BUN SERPL-MCNC: 13 MG/DL (ref 5–25)
CALCIUM SERPL-MCNC: 8.5 MG/DL (ref 8.4–10.2)
CHLORIDE SERPL-SCNC: 105 MMOL/L (ref 96–108)
CLARITY UR: CLEAR
CO2 SERPL-SCNC: 26 MMOL/L (ref 21–32)
COLOR UR: YELLOW
CREAT SERPL-MCNC: 0.63 MG/DL (ref 0.6–1.3)
ERYTHROCYTE [DISTWIDTH] IN BLOOD BY AUTOMATED COUNT: 13.5 % (ref 11.6–15.1)
FLUAV RNA RESP QL NAA+PROBE: NEGATIVE
FLUBV RNA RESP QL NAA+PROBE: NEGATIVE
GFR SERPL CREATININE-BSD FRML MDRD: 103 ML/MIN/1.73SQ M
GLUCOSE P FAST SERPL-MCNC: 153 MG/DL (ref 65–99)
GLUCOSE SERPL-MCNC: 153 MG/DL (ref 65–140)
GLUCOSE UR STRIP-MCNC: NEGATIVE MG/DL
HCO3 BLDV-SCNC: 23.6 MMOL/L (ref 24–30)
HCT VFR BLD AUTO: 44.2 % (ref 34.8–46.1)
HGB BLD-MCNC: 15.3 G/DL (ref 11.5–15.4)
HGB UR QL STRIP.AUTO: NEGATIVE
KETONES UR STRIP-MCNC: NEGATIVE MG/DL
LACOSAMIDE SERPL-MCNC: 2.17 UG/ML (ref 1–20)
LACTATE SERPL-SCNC: 1.5 MMOL/L (ref 0.5–2)
LEUKOCYTE ESTERASE UR QL STRIP: NEGATIVE
MCH RBC QN AUTO: 34.5 PG (ref 26.8–34.3)
MCHC RBC AUTO-ENTMCNC: 34.6 G/DL (ref 31.4–37.4)
MCV RBC AUTO: 100 FL (ref 82–98)
MUCOUS THREADS UR QL AUTO: ABNORMAL
NITRITE UR QL STRIP: NEGATIVE
NON VENT ROOM AIR: 21 %
NON-SQ EPI CELLS URNS QL MICRO: ABNORMAL /HPF
O2 CT BLDV-SCNC: 18.8 ML/DL
PCO2 BLDV: 34.9 MM HG (ref 42–50)
PH BLDV: 7.45 [PH] (ref 7.3–7.4)
PH UR STRIP.AUTO: 7 [PH]
PLATELET # BLD AUTO: 245 THOUSANDS/UL (ref 149–390)
PMV BLD AUTO: 10.7 FL (ref 8.9–12.7)
PO2 BLDV: 62.2 MM HG (ref 35–45)
POTASSIUM SERPL-SCNC: 3.9 MMOL/L (ref 3.5–5.3)
PROCALCITONIN SERPL-MCNC: <0.05 NG/ML
PROT SERPL-MCNC: 6.8 G/DL (ref 6.4–8.4)
PROT UR STRIP-MCNC: ABNORMAL MG/DL
RBC # BLD AUTO: 4.43 MILLION/UL (ref 3.81–5.12)
RBC #/AREA URNS AUTO: ABNORMAL /HPF
RSV RNA RESP QL NAA+PROBE: NEGATIVE
SARS-COV-2 RNA RESP QL NAA+PROBE: NEGATIVE
SODIUM SERPL-SCNC: 139 MMOL/L (ref 135–147)
SP GR UR STRIP.AUTO: 1.02 (ref 1–1.03)
UROBILINOGEN UR STRIP-ACNC: 2 MG/DL
WBC # BLD AUTO: 15.14 THOUSAND/UL (ref 4.31–10.16)
WBC #/AREA URNS AUTO: ABNORMAL /HPF

## 2025-04-13 PROCEDURE — 82140 ASSAY OF AMMONIA: CPT | Performed by: PSYCHIATRY & NEUROLOGY

## 2025-04-13 PROCEDURE — 85027 COMPLETE CBC AUTOMATED: CPT | Performed by: INTERNAL MEDICINE

## 2025-04-13 PROCEDURE — 84145 PROCALCITONIN (PCT): CPT | Performed by: PHYSICIAN ASSISTANT

## 2025-04-13 PROCEDURE — 83605 ASSAY OF LACTIC ACID: CPT | Performed by: PHYSICIAN ASSISTANT

## 2025-04-13 PROCEDURE — 87086 URINE CULTURE/COLONY COUNT: CPT | Performed by: PHYSICIAN ASSISTANT

## 2025-04-13 PROCEDURE — C9254 INJECTION, LACOSAMIDE: HCPCS | Performed by: STUDENT IN AN ORGANIZED HEALTH CARE EDUCATION/TRAINING PROGRAM

## 2025-04-13 PROCEDURE — 87081 CULTURE SCREEN ONLY: CPT | Performed by: PSYCHIATRY & NEUROLOGY

## 2025-04-13 PROCEDURE — 82805 BLOOD GASES W/O2 SATURATION: CPT | Performed by: PSYCHIATRY & NEUROLOGY

## 2025-04-13 PROCEDURE — 80235 DRUG ASSAY LACOSAMIDE: CPT | Performed by: PSYCHIATRY & NEUROLOGY

## 2025-04-13 PROCEDURE — 71045 X-RAY EXAM CHEST 1 VIEW: CPT

## 2025-04-13 PROCEDURE — 92610 EVALUATE SWALLOWING FUNCTION: CPT

## 2025-04-13 PROCEDURE — 87040 BLOOD CULTURE FOR BACTERIA: CPT | Performed by: PHYSICIAN ASSISTANT

## 2025-04-13 PROCEDURE — 80076 HEPATIC FUNCTION PANEL: CPT | Performed by: PHYSICIAN ASSISTANT

## 2025-04-13 PROCEDURE — 81001 URINALYSIS AUTO W/SCOPE: CPT | Performed by: PHYSICIAN ASSISTANT

## 2025-04-13 PROCEDURE — 80048 BASIC METABOLIC PNL TOTAL CA: CPT | Performed by: INTERNAL MEDICINE

## 2025-04-13 PROCEDURE — 0241U HB NFCT DS VIR RESP RNA 4 TRGT: CPT | Performed by: PHYSICIAN ASSISTANT

## 2025-04-13 PROCEDURE — 87147 CULTURE TYPE IMMUNOLOGIC: CPT | Performed by: PSYCHIATRY & NEUROLOGY

## 2025-04-13 PROCEDURE — 99232 SBSQ HOSP IP/OBS MODERATE 35: CPT | Performed by: PHYSICIAN ASSISTANT

## 2025-04-13 RX ORDER — ACETAMINOPHEN 325 MG/1
650 TABLET ORAL EVERY 6 HOURS PRN
Status: DISCONTINUED | OUTPATIENT
Start: 2025-04-13 | End: 2025-04-15 | Stop reason: HOSPADM

## 2025-04-13 RX ORDER — LACOSAMIDE 50 MG/1
25 TABLET ORAL
Status: DISCONTINUED | OUTPATIENT
Start: 2025-04-14 | End: 2025-04-15 | Stop reason: HOSPADM

## 2025-04-13 RX ORDER — LACOSAMIDE 10 MG/ML
25 INJECTION, SOLUTION INTRAVENOUS ONCE
Status: COMPLETED | OUTPATIENT
Start: 2025-04-13 | End: 2025-04-13

## 2025-04-13 RX ORDER — ALBUMIN HUMAN 50 G/1000ML
25 SOLUTION INTRAVENOUS ONCE
Status: COMPLETED | OUTPATIENT
Start: 2025-04-13 | End: 2025-04-13

## 2025-04-13 RX ADMIN — LACOSAMIDE 25 MG: 10 INJECTION, SOLUTION INTRAVENOUS at 05:33

## 2025-04-13 RX ADMIN — CEFTRIAXONE SODIUM 1000 MG: 10 INJECTION, POWDER, FOR SOLUTION INTRAVENOUS at 12:25

## 2025-04-13 RX ADMIN — ASPIRIN 81 MG: 81 TABLET, COATED ORAL at 20:11

## 2025-04-13 RX ADMIN — LEVOTHYROXINE SODIUM 50 MCG: 0.05 TABLET ORAL at 10:00

## 2025-04-13 RX ADMIN — VANCOMYCIN HYDROCHLORIDE 750 MG: 5 INJECTION, POWDER, LYOPHILIZED, FOR SOLUTION INTRAVENOUS at 20:06

## 2025-04-13 RX ADMIN — Medication 3 MG: at 21:28

## 2025-04-13 RX ADMIN — SODIUM CHLORIDE 1000 ML: 0.9 INJECTION, SOLUTION INTRAVENOUS at 09:24

## 2025-04-13 RX ADMIN — DOCUSATE SODIUM 100 MG: 100 CAPSULE, LIQUID FILLED ORAL at 10:00

## 2025-04-13 RX ADMIN — Medication 1000 UNITS: at 10:00

## 2025-04-13 RX ADMIN — SODIUM CHLORIDE, SODIUM GLUCONATE, SODIUM ACETATE, POTASSIUM CHLORIDE, MAGNESIUM CHLORIDE, SODIUM PHOSPHATE, DIBASIC, AND POTASSIUM PHOSPHATE 125 ML/HR: .53; .5; .37; .037; .03; .012; .00082 INJECTION, SOLUTION INTRAVENOUS at 19:14

## 2025-04-13 RX ADMIN — ALBUMIN (HUMAN) 25 G: 12.5 INJECTION, SOLUTION INTRAVENOUS at 14:00

## 2025-04-13 RX ADMIN — LACOSAMIDE 50 MG: 50 TABLET, FILM COATED ORAL at 21:28

## 2025-04-13 RX ADMIN — VANCOMYCIN HYDROCHLORIDE 1250 MG: 10 INJECTION, POWDER, LYOPHILIZED, FOR SOLUTION INTRAVENOUS at 10:02

## 2025-04-13 RX ADMIN — ESCITALOPRAM OXALATE 15 MG: 5 TABLET, FILM COATED ORAL at 10:00

## 2025-04-13 RX ADMIN — SODIUM CHLORIDE 1000 ML: 0.9 INJECTION, SOLUTION INTRAVENOUS at 09:49

## 2025-04-13 RX ADMIN — ATORVASTATIN CALCIUM 10 MG: 10 TABLET, FILM COATED ORAL at 10:00

## 2025-04-13 RX ADMIN — ASPIRIN 81 MG: 81 TABLET, COATED ORAL at 10:00

## 2025-04-13 NOTE — QUICK NOTE
Patient seen and evaluated at bedside.  Patient meets septic shock criteria with fever, leukocytosis, hypotension.  Sepsis protocol initiated.  Courtesy reach out conducted to critical care.  Clinically patient looks very well.  Sister is at bedside.  I personally obtained manual blood pressure on her right of 68/42 and on left upper extremity 72/40.

## 2025-04-13 NOTE — ASSESSMENT & PLAN NOTE
Presents to the ED w/ 2 episodes of witnessed breakthrough seizures  CT head benign   Ammonia, Lacosamine, & UA benign   Sepsis presentation on 04/13 w/ possible etiology of lower seizure threshold  Neuro consulted, recs appreciated  PTA Vimpat 50 mg qhs   C/w recently increased Vimpat 25 mg q AM & 50 mg qhs    Seizure precautions

## 2025-04-13 NOTE — ASSESSMENT & PLAN NOTE
Met septic shock criteria on 04/13 w/ temp 100.9, leukocytosis, hypotension. Sister at bedside endorsing foul-smelling urine and productive cough.  30 kg/mL bolus administered w/ SBP improvement from 70's to 100's; c/w IVFs  F/u CXR   Initial UA bland; f/u UCx   Flu/COVID/RSV negative  Procal benign, trend   F/u BCx   CC sided on 04/13  Chart check completed and their service ordered scheduled vancomycin   S/p Rocephin x 1 dose  Would consider discontinuing ABX if clinically stable & procal negative on 04/14  Trend leukocytosis, fever curve, hemodynamics    Recent Labs     04/12/25  1152 04/13/25  0538   WBC 9.13 15.14*

## 2025-04-13 NOTE — PROGRESS NOTES
Progress Note - Hospitalist   Name: Aminta De La Torre 50 y.o. female I MRN: [id number]  [facility] I Date of Admission: 4/12/2025   Date of Service: 4/13/2025 I Hospital Day: 0    Assessment & Plan  Breakthrough seizure (HCC)  Presents to the ED w/ 2 episodes of witnessed breakthrough seizures  CT head benign   Ammonia, Lacosamine, & UA benign   Sepsis presentation on 04/13 w/ possible etiology of lower seizure threshold  Neuro consulted, recs appreciated  PTA Vimpat 50 mg qhs   C/w recently increased Vimpat 25 mg q AM & 50 mg qhs    Seizure precautions  Sepsis (HCC)  Met septic shock criteria on 04/13 w/ temp 100.9, leukocytosis, hypotension. Sister at bedside endorsing foul-smelling urine and productive cough.  30 kg/mL bolus administered w/ SBP improvement from 70's to 100's; c/w IVFs  F/u CXR   Initial UA bland; f/u UCx   Flu/COVID/RSV negative  Procal benign, trend   F/u BCx   CC sided on 04/13  Chart check completed and their service ordered scheduled vancomycin   S/p Rocephin x 1 dose  Would consider discontinuing ABX if clinically stable & procal negative on 04/14  Trend leukocytosis, fever curve, hemodynamics    Recent Labs     04/12/25  1152 04/13/25  0538   WBC 9.13 15.14*     Down syndrome  Lives at group home  Supportive care; involved sister  Hypothyroidism  C/w PTA levothyroxine  TSH therapeutic in 11/2024  Obesity (BMI 35.0-39.9 without comorbidity)  Recommend dietary/lifestyle modification  BMI 36    VTE Pharmacologic Prophylaxis: VTE Score: 2 Low Risk (Score 0-2) - Encourage Ambulation.    Mobility:   Basic Mobility Inpatient Raw Score: 6  JH-HLM Goal: 2: Bed activities/Dependent transfer  JH-HLM Achieved: 2: Bed activities/Dependent transfer  JH-HLM Goal NOT achieved. Continue with multidisciplinary rounding and encourage appropriate mobility to improve upon JH-HLM goals.    Patient Centered Rounds: I performed bedside rounds with nursing staff today.   Discussions with Specialists or  Other Care Team Provider: Plan of care reviewed with multiple nurses at bedside, neurology    Education and Discussions with Family / Patient: Updated  (sister) at bedside.    Current Length of Stay: 0 day(s)  Current Patient Status: Observation   Certification Statement: The patient will continue to require additional inpatient hospital stay due to sepsis, seizure  Discharge Plan: Anticipate discharge in 24-48 hrs to group home.    Code Status: Level 1 - Full Code    Subjective   Sisters at bedside and provide subjective.  Sister notes the patient's mentation is near baseline.  She is more fatigued/lethargic to the sister.  Patient has not had any breakthrough seizures since emergency department.    Sister notes a junky cough and foul-smelling urine.    Objective :  Temp:  [97.5 °F (36.4 °C)-100.9 °F (38.3 °C)] 100.9 °F (38.3 °C)  HR:  [72-88] 72  BP: ()/(46-88) 81/46  Resp:  [14-20] 18  SpO2:  [87 %-99 %] 91 %  O2 Device: Nasal cannula  Nasal Cannula O2 Flow Rate (L/min):  [2 L/min] 2 L/min    Body mass index is 36.03 kg/m².     Input and Output Summary (last 24 hours):   No intake or output data in the 24 hours ending 04/13/25 0857    Physical Exam  Constitutional:       General: She is not in acute distress.     Appearance: She is obese. She is not toxic-appearing.      Comments: Lethargic, responsive to physical stimuli including attempts to obtain blood pressure   HENT:      Head: Normocephalic.      Right Ear: External ear normal.      Left Ear: External ear normal.      Nose: Nose normal.      Mouth/Throat:      Mouth: Mucous membranes are dry.      Pharynx: Oropharynx is clear.   Eyes:      Extraocular Movements: Extraocular movements intact.      Conjunctiva/sclera: Conjunctivae normal.   Cardiovascular:      Rate and Rhythm: Normal rate and regular rhythm.      Pulses: Normal pulses.      Heart sounds: Normal heart sounds.   Pulmonary:      Effort: Pulmonary effort is normal. No  respiratory distress.      Breath sounds: Normal breath sounds. No wheezing, rhonchi or rales.      Comments: Coarse breath sounds  Abdominal:      General: Abdomen is flat. Bowel sounds are normal. There is no distension.      Palpations: Abdomen is soft.      Tenderness: There is no abdominal tenderness. There is no guarding or rebound.   Musculoskeletal:         General: No swelling. Normal range of motion.      Cervical back: Normal range of motion.   Skin:     General: Skin is warm and dry.   Neurological:      Comments: Fatigue, unable to fully assess           Lab Results: I have reviewed the following results:   Results from last 7 days   Lab Units 04/13/25  0538 04/12/25  1152   WBC Thousand/uL 15.14* 9.13   HEMOGLOBIN g/dL 15.3 16.6*   HEMATOCRIT % 44.2 48.6*   PLATELETS Thousands/uL 245 246   SEGS PCT %  --  67   LYMPHO PCT %  --  22   MONO PCT %  --  10   EOS PCT %  --  0     Results from last 7 days   Lab Units 04/13/25  0538   SODIUM mmol/L 139   POTASSIUM mmol/L 3.9   CHLORIDE mmol/L 105   CO2 mmol/L 26   BUN mg/dL 13   CREATININE mg/dL 0.63   ANION GAP mmol/L 8   CALCIUM mg/dL 8.5   GLUCOSE RANDOM mg/dL 153*                       Recent Cultures (last 7 days):         Imaging Results Review: I reviewed radiology reports from this admission including: CT head.  Other Study Results Review: EKG was reviewed.     Last 24 Hours Medication List:     Current Facility-Administered Medications:     acetaminophen (TYLENOL) tablet 650 mg, Q6H PRN    aspirin (ECOTRIN LOW STRENGTH) EC tablet 81 mg, BID    atorvastatin (LIPITOR) tablet 10 mg, Daily    cefTRIAXone (ROCEPHIN) 1,000 mg in dextrose 5 % 50 mL IVPB, Once    Cholecalciferol (VITAMIN D3) tablet 1,000 Units, QAM    docusate sodium (COLACE) capsule 100 mg, BID    escitalopram (LEXAPRO) tablet 15 mg, Daily    [START ON 4/14/2025] lacosamide (VIMPAT) tablet 25 mg, Early Morning    lacosamide (VIMPAT) tablet 50 mg, HS    levothyroxine tablet 50 mcg, Daily     LORazepam (ATIVAN) injection 2 mg, Once PRN    melatonin tablet 3 mg, HS    multi-electrolyte (Plasmalyte-A/Isolyte-S PH 7.4/Normosol-R) IV solution, Continuous, Last Rate: 75 mL/hr (04/12/25 1847)    ondansetron (ZOFRAN) injection 4 mg, Q6H PRN    saliva substitute (MOUTH KOTE) mucosal solution 5 spray, 4x Daily PRN    sodium chloride 0.9 % bolus 1,000 mL, Once **FOLLOWED BY** sodium chloride 0.9 % bolus 1,000 mL, Once    Administrative Statements   Today, Patient Was Seen By: Sabina Chan PA-C  I have spent a total time of 65 minutes in caring for this patient on the day of the visit/encounter including Diagnostic results, Prognosis, Risks and benefits of tx options, Instructions for management, Patient and family education, Impressions, Counseling / Coordination of care, Documenting in the medical record, Reviewing/placing orders in the medical record (including tests, medications, and/or procedures), Obtaining or reviewing history  , and Communicating with other healthcare professionals .    **Please Note: This note may have been constructed using a voice recognition system.**

## 2025-04-13 NOTE — SPEECH THERAPY NOTE
Speech-Language Pathology Bedside Swallow Evaluation      Patient Name: Aminta De La Torre    Today's Date: 4/13/2025     Problem List  Principal Problem:    Breakthrough seizure (HCC)  Active Problems:    Down syndrome    Hypothyroidism    Class 3 severe obesity due to excess calories without serious comorbidity with body mass index (BMI) of 40.0 to 44.9 in adult (HCC)    Past Medical History  Past Medical History:   Diagnosis Date    Allergic     Anxiety     Arthritis     Community acquired pneumonia     Last Assessed:1/22/2013    Encounter for PPD test 09/26/2023    Encounter for vaccination 09/26/2023    Tdap given at this visit    Leukopenia     Last Assessed:3/5/2014    Osteoporosis     Preop examination 09/29/2019    Sleep apnea     Trisomy 21, Down syndrome      Past Surgical History  Past Surgical History:   Procedure Laterality Date    [place] OPEN TX TRIMALLEOLAR ANKLE FX W/O FIXJ PST LIP Left 8/29/2024    Procedure: ORIF TRIMALLEOLAR FX, OPEN REDUCTION/TREATMENT OF TILLEAUX FRACTURE;  Surgeon: Mahamed Zamorano MD;  Location: [facility];  Service: Orthopedics    TONSILLECTOMY AND ADENOIDECTOMY         Summary  Pt presented with s/s suggestive of moderate oropharyngeal dysphagia. Symptoms or concerns included decreased mastication, decreased bolus formation, and oral residue with puree, as well as suspected pharyngeal swallow delay. Pt has a rapid rate of intake with thin via straw and benefits from assistance with feeding. Intermittent cough noted, not consistent with swallowing. ST will f/u for diet tolerance.    Risk/s for Aspiration: mild-mod    Recommended Diet: puree/level 1 diet and thin liquids   Recommended Form of Meds: crushed with puree   Aspiration precautions and swallowing strategies: upright posture  Other Recommendations: Continue frequent oral care      Current Medical Status  Aminta De La Torre is a 50 y.o. female with a PMH of anxiety, DEMOND, Down syndrome, constipation, hypothyroidism,  hyperlipidemia, seizures, who presents with seizure-like activity.  She has Down syndrome and currently resides at group home.  She is maintained on Vimpat 50 mg nightly.  Initially was being observed in the ER and plan to discharge her home, however she had another witnessed episode in the emergency room.  She was seen by neurology who is recommending overnight observation.  Will be increasing her home Vimpat dosage.  Otherwise patient was resting comfortably in bed.  She is postictal, and currently somewhat sedated from the Ativan administration.  Sister was at bedside and updated.  Plan of care discussed.  All questions and concerns addressed.       Special Studies:  CT head 4/12/25 IMPRESSION:  No acute intracranial abnormality. Limited due to motion.  Stable chronic ventriculomegaly.  ( 1 year ago) [facility] [place] 4/23/24 Assessment Summary:  Patient presents with mild oral dysphagia and functional pharyngeal stage of the swallow. Pt initially agitated due to having to leave her wheelchair, unfamiliar surrounding, positioning, etc. Pt with observed aspiration with immediate cough observed during the swallow given initial sips of thin liquids. Once patient's behaviors deescalated, pt was able to consume trials of thin liquids, nectar thick liquids, honey thick liquids, pudding, and small piece of a shortbread cookie without penetration/aspiration. Recommend continuing current diet of soft and bite sized (moisten as needed to ease mastication due to limited dentition) with thin liquids. Consider straw aid to maximize safety and limit amount of liquids consumed via straw as pt has a history of taking fast, consecutive sips of liquids     Recommendations:  1. Soft and Bite Size (moisten as needed)  2. Thin Liquids  3. Medications as Tolerated   4. Oral care 2-3x per day  5. Swallow Strategies: upright with oral intake, direct supervision, small bites, small single sips  6. Consider dysphagia straw aid to limit bolus amount due  to impulsivity and fast rate of intake of liquids    Social/Education/Vocational Hx:  Pt lives in group home    Swallow Information   Current Risks for Dysphagia & Aspiration: known history of dysphagia  Current Diet: puree/level 1 diet and thin liquids   Baseline Diet: puree/level 1 diet and thin liquids      Baseline Assessment   Behavior/Cognition: alert  Speech/Language Status: not able to to follow commands consistently, and limited verbal output  Patient Positioning: upright in bed  Pain Status/Interventions/Response to Interventions: No report of or nonverbal indications of pain.       Swallow Mechanism Exam  Facial: symmetrical and masked facies  Labial: decreased strength  Lingual: unable to test 2/2 limited command following  Velum: unable to visualize  Mandible: adequate ROM  Dentition: limited dentition  Vocal quality: clear/adequate   Volitional Cough: unable to initiate volitional cough   Respiratory Status: on 2L O2       Consistencies Assessed and Performance   Consistencies Administered: thin liquids and puree    Oral Stage: moderate, decreased bolus propulsion, decreased bolus formation, and oral residue with puree     Pharyngeal Stage: moderate and suspected pharyngeal swallow delay    Esophageal Concerns: none reported      Summary and Recommendations (see above)    Results Reviewed with: patient, RN, MD, and family     Treatment Recommended: yes    Frequency of treatment: 1-2x/week    Dysphagia LTG  -Patient will demonstrate safe and effective oral intake (without overt s/s significant oral/pharyngeal dysphagia including s/s penetration or aspiration) for the highest appropriate diet level.     Short Term Goals:  -Pt will tolerate Dysphagia 1/pureed diet and thin liquid with no significant s/s oral or pharyngeal dysphagia across 1-3 diagnostic sessions.

## 2025-04-13 NOTE — QUICK NOTE
Patient seen and examined at bedside.  Patient is much more active and responsive than earlier this morning.  She does not answer any questions which is per her baseline.  Patient's brother at bedside was updated on current results/plan of care.

## 2025-04-13 NOTE — PROGRESS NOTES
"Progress Note - Hospitalist   Name: Aminta De La Torre 50 y.o. female I MRN: [id number]  Unit/Bed#: [facility] 713-01 I Date of Admission: 4/12/2025   Date of Service: 4/13/2025 I Hospital Day: 0     This SmartSection is not supported in the current .      Sepsis Note   Aminta De La Torre 50 y.o. female MRN: [id number]  Unit/Bed#: [facility] 713-01 Encounter: [id number]       Initial Sepsis Screening       Row Name 04/13/25 0855                Is the patient's history suggestive of a new or worsening infection? Yes (Proceed) (P)   -NM        Suspected source of infection pneumonia;urinary tract infection (P)   -NM        Indicate SIRS criteria Leukocytosis (WBC > 13231 IJL) OR Leukopenia (WBC <4000 IJL) OR Bandemia (WBC >10% bands);Hyperthemia > 38.3C (100.9F) OR Hypothermia <36C (96.8F) (P)   -NM        Are two or more of the above signs & symptoms of infection both present and new to the patient? Yes (Proceed) (P)   -NM        Assess for evidence of organ dysfunction: Are any of the below criteria present within 6 hours of suspected infection and SIRS criteria that are NOT considered to be chronic conditions? MAP < 65 (P)   -NM        Date of presentation of septic shock 04/13/25 (P)   -NM        Time of presentation of septic shock 0856 (P)   -NM        Fluid Resuscitation: 30 ml/kg IV fluid bolus will be given based on ideal body weight (use if BMI >30) (P)   -NM        Is the patient is persistently hypotensive in the hour after fluid bolus administration? If yes, patient meets criteria for vasopressor use. --        Sepsis Note: Click \"NEXT\" below (NOT \"close\") to generate sepsis note based on above information. --                  User Key  (r) = Recorded By, (t) = Taken By, (c) = Cosigned By      Initials Name Provider Type    CYNDI Chan PA-C Physician Assistant                        Body mass index is 36.03 kg/m².  Wt Readings from Last 1 Encounters:   04/13/25 65.3 kg (143 lb 15.4 oz)     IBW (Ideal Body " Weight): 29.4 kg    Ideal body weight: 39.4 kg (86 lb 12.3 oz)  Adjusted ideal body weight: 49.7 kg (109 lb 10.3 oz)

## 2025-04-14 ENCOUNTER — APPOINTMENT (OUTPATIENT)
Dept: PHYSICAL THERAPY | Facility: CLINIC | Age: 50
End: 2025-04-14
Payer: MEDICARE

## 2025-04-14 DIAGNOSIS — M25.562 CHRONIC PAIN OF LEFT KNEE: ICD-10-CM

## 2025-04-14 DIAGNOSIS — G89.29 CHRONIC PAIN OF LEFT KNEE: ICD-10-CM

## 2025-04-14 LAB
ALBUMIN SERPL BCG-MCNC: 2.9 G/DL (ref 3.5–5)
ALP SERPL-CCNC: 64 U/L (ref 34–104)
ALT SERPL W P-5'-P-CCNC: 19 U/L (ref 7–52)
ANION GAP SERPL CALCULATED.3IONS-SCNC: 9 MMOL/L (ref 4–13)
AST SERPL W P-5'-P-CCNC: 11 U/L (ref 13–39)
BACTERIA UR CULT: NORMAL
BILIRUB SERPL-MCNC: 0.73 MG/DL (ref 0.2–1)
BUN SERPL-MCNC: 7 MG/DL (ref 5–25)
CALCIUM ALBUM COR SERPL-MCNC: 8 MG/DL (ref 8.3–10.1)
CALCIUM SERPL-MCNC: 7.1 MG/DL (ref 8.4–10.2)
CHLORIDE SERPL-SCNC: 106 MMOL/L (ref 96–108)
CO2 SERPL-SCNC: 24 MMOL/L (ref 21–32)
CREAT SERPL-MCNC: 0.54 MG/DL (ref 0.6–1.3)
ERYTHROCYTE [DISTWIDTH] IN BLOOD BY AUTOMATED COUNT: 13.6 % (ref 11.6–15.1)
GFR SERPL CREATININE-BSD FRML MDRD: 108 ML/MIN/1.73SQ M
GLUCOSE SERPL-MCNC: 162 MG/DL (ref 65–140)
HCT VFR BLD AUTO: 40.3 % (ref 34.8–46.1)
HGB BLD-MCNC: 14 G/DL (ref 11.5–15.4)
MAGNESIUM SERPL-MCNC: 2.3 MG/DL (ref 1.9–2.7)
MCH RBC QN AUTO: 35.3 PG (ref 26.8–34.3)
MCHC RBC AUTO-ENTMCNC: 34.7 G/DL (ref 31.4–37.4)
MCV RBC AUTO: 102 FL (ref 82–98)
MRSA NOSE QL CULT: ABNORMAL
MRSA NOSE QL CULT: ABNORMAL
PLATELET # BLD AUTO: 176 THOUSANDS/UL (ref 149–390)
PMV BLD AUTO: 10.6 FL (ref 8.9–12.7)
POTASSIUM SERPL-SCNC: 3.6 MMOL/L (ref 3.5–5.3)
PROCALCITONIN SERPL-MCNC: <0.05 NG/ML
PROT SERPL-MCNC: 5.6 G/DL (ref 6.4–8.4)
RBC # BLD AUTO: 3.97 MILLION/UL (ref 3.81–5.12)
SODIUM SERPL-SCNC: 139 MMOL/L (ref 135–147)
WBC # BLD AUTO: 8.15 THOUSAND/UL (ref 4.31–10.16)

## 2025-04-14 PROCEDURE — 97535 SELF CARE MNGMENT TRAINING: CPT

## 2025-04-14 PROCEDURE — 85027 COMPLETE CBC AUTOMATED: CPT | Performed by: PHYSICIAN ASSISTANT

## 2025-04-14 PROCEDURE — 97530 THERAPEUTIC ACTIVITIES: CPT

## 2025-04-14 PROCEDURE — 99232 SBSQ HOSP IP/OBS MODERATE 35: CPT | Performed by: FAMILY MEDICINE

## 2025-04-14 PROCEDURE — 97167 OT EVAL HIGH COMPLEX 60 MIN: CPT

## 2025-04-14 PROCEDURE — 80053 COMPREHEN METABOLIC PANEL: CPT | Performed by: PHYSICIAN ASSISTANT

## 2025-04-14 PROCEDURE — 84145 PROCALCITONIN (PCT): CPT | Performed by: PHYSICIAN ASSISTANT

## 2025-04-14 PROCEDURE — 83735 ASSAY OF MAGNESIUM: CPT | Performed by: PHYSICIAN ASSISTANT

## 2025-04-14 PROCEDURE — 97163 PT EVAL HIGH COMPLEX 45 MIN: CPT

## 2025-04-14 RX ORDER — VANCOMYCIN HYDROCHLORIDE 1 G/200ML
1000 INJECTION, SOLUTION INTRAVENOUS EVERY 12 HOURS
Status: DISCONTINUED | OUTPATIENT
Start: 2025-04-14 | End: 2025-04-14

## 2025-04-14 RX ORDER — SODIUM CHLORIDE, SODIUM GLUCONATE, SODIUM ACETATE, POTASSIUM CHLORIDE, MAGNESIUM CHLORIDE, SODIUM PHOSPHATE, DIBASIC, AND POTASSIUM PHOSPHATE .53; .5; .37; .037; .03; .012; .00082 G/100ML; G/100ML; G/100ML; G/100ML; G/100ML; G/100ML; G/100ML
75 INJECTION, SOLUTION INTRAVENOUS CONTINUOUS
Status: DISCONTINUED | OUTPATIENT
Start: 2025-04-14 | End: 2025-04-15

## 2025-04-14 RX ADMIN — CEFTRIAXONE SODIUM 1000 MG: 10 INJECTION, POWDER, FOR SOLUTION INTRAVENOUS at 10:53

## 2025-04-14 RX ADMIN — ESCITALOPRAM OXALATE 15 MG: 5 TABLET, FILM COATED ORAL at 09:21

## 2025-04-14 RX ADMIN — VANCOMYCIN HYDROCHLORIDE 1000 MG: 1 INJECTION, SOLUTION INTRAVENOUS at 09:21

## 2025-04-14 RX ADMIN — SODIUM CHLORIDE, SODIUM GLUCONATE, SODIUM ACETATE, POTASSIUM CHLORIDE, MAGNESIUM CHLORIDE, SODIUM PHOSPHATE, DIBASIC, AND POTASSIUM PHOSPHATE 75 ML/HR: .53; .5; .37; .037; .03; .012; .00082 INJECTION, SOLUTION INTRAVENOUS at 10:54

## 2025-04-14 RX ADMIN — LEVOTHYROXINE SODIUM 50 MCG: 0.05 TABLET ORAL at 06:42

## 2025-04-14 RX ADMIN — LACOSAMIDE 50 MG: 50 TABLET, FILM COATED ORAL at 21:31

## 2025-04-14 RX ADMIN — SODIUM CHLORIDE, SODIUM GLUCONATE, SODIUM ACETATE, POTASSIUM CHLORIDE, MAGNESIUM CHLORIDE, SODIUM PHOSPHATE, DIBASIC, AND POTASSIUM PHOSPHATE 75 ML/HR: .53; .5; .37; .037; .03; .012; .00082 INJECTION, SOLUTION INTRAVENOUS at 21:31

## 2025-04-14 RX ADMIN — Medication 3 MG: at 21:31

## 2025-04-14 RX ADMIN — DOCUSATE SODIUM 100 MG: 100 CAPSULE, LIQUID FILLED ORAL at 16:57

## 2025-04-14 RX ADMIN — Medication 1000 UNITS: at 09:21

## 2025-04-14 RX ADMIN — SODIUM CHLORIDE, SODIUM GLUCONATE, SODIUM ACETATE, POTASSIUM CHLORIDE, MAGNESIUM CHLORIDE, SODIUM PHOSPHATE, DIBASIC, AND POTASSIUM PHOSPHATE 125 ML/HR: .53; .5; .37; .037; .03; .012; .00082 INJECTION, SOLUTION INTRAVENOUS at 06:42

## 2025-04-14 RX ADMIN — ATORVASTATIN CALCIUM 10 MG: 10 TABLET, FILM COATED ORAL at 09:21

## 2025-04-14 RX ADMIN — LACOSAMIDE 25 MG: 50 TABLET, FILM COATED ORAL at 06:42

## 2025-04-14 RX ADMIN — DOCUSATE SODIUM 100 MG: 100 CAPSULE, LIQUID FILLED ORAL at 09:21

## 2025-04-14 RX ADMIN — ASPIRIN 81 MG: 81 TABLET, COATED ORAL at 09:21

## 2025-04-14 RX ADMIN — ASPIRIN 81 MG: 81 TABLET, COATED ORAL at 21:31

## 2025-04-14 NOTE — PLAN OF CARE
Problem: OCCUPATIONAL THERAPY ADULT  Goal: Performs self-care activities at highest level of function for planned discharge setting.  See evaluation for individualized goals.  Description: Treatment Interventions: Functional transfer training, ADL retraining, Endurance training, Cognitive reorientation, Patient/family training, Equipment evaluation/education, Compensatory technique education, Continued evaluation, Activityengagement          See flowsheet documentation for full assessment, interventions and recommendations.   Note: Limitation: Decreased ADL status, Decreased Safe judgement during ADL, Decreased endurance, Decreased cognition, Decreased self-care trans, Decreased high-level ADLs, Decreased fine motor control  Prognosis: Fair  Assessment: Pt is a 50 y.o. female who was admitted to [facility] on 4/12/2025 with Breakthrough seizure (HCC) 2 witnessed seizures one at group home and one in the ED . Patient  has a past medical history of Allergic, Anxiety, Arthritis, Community acquired pneumonia, Encounter for PPD test (09/26/2023), Encounter for vaccination (09/26/2023), Leukopenia, Osteoporosis, Preop examination (09/29/2019), Sleep apnea, and Trisomy 21, Down syndrome.  At baseline pt was completing assistance with ALD's/IaDL's, sruthi lift vs sit to stand transfer, working with PT on transfers. Pt lives at Group home with caregiver. Currently pt requires total assist  for overall ADLS and max a x 2 for functional mobility/transfers. Pt currently presents with impairments in the following categories -difficulty performing ADLS, difficulty performing IADLS , limited insight into deficits, compliance, flat affect, and decreased initiation and engagement  activity tolerance, endurance, standing balance/tolerance, sitting balance/tolerance, FMC, GMC, memory, insight, safety , judgement , attention , sequencing , task initiation , communication, and interpersonal skills. These impairments, as well  as pt's fatigue, pain, and risk for falls  limit pt's ability to safely engage in all baseline areas of occupation, includingeating, grooming, bathing, dressing, toileting, functional mobility/transfers, and community mobility From OT standpoint, recommend min level III with facility ability to provide appropriate support upon D/C.  The patient's raw score on the -PAC Daily Activity Inpatient Short Form is 6. A raw score of less than 19 suggests the patient may benefit from discharge to post-acute rehabilitation services. Please refer to the recommendation of the Occupational Therapist for safe discharge planning. OT will continue to follow to address the below stated goals.     Rehab Resource Intensity Level, OT: III (Minimum Resource Intensity)

## 2025-04-14 NOTE — PHYSICAL THERAPY NOTE
Physical Therapy Evaluation and Treatment    Patient Name: Aminta De La Torre    Today's Date: 4/14/2025     Problem List  Principal Problem:    Breakthrough seizure (HCC)  Active Problems:    Down syndrome    Hypothyroidism    Obesity (BMI 35.0-39.9 without comorbidity)    Sepsis (HCC)       Past Medical History  Past Medical History:   Diagnosis Date    Allergic     Anxiety     Arthritis     Community acquired pneumonia     Last Assessed:1/22/2013    Encounter for PPD test 09/26/2023    Encounter for vaccination 09/26/2023    Tdap given at this visit    Leukopenia     Last Assessed:3/5/2014    Osteoporosis     Preop examination 09/29/2019    Sleep apnea     Trisomy 21, Down syndrome         Past Surgical History  Past Surgical History:   Procedure Laterality Date    [place] OPEN TX TRIMALLEOLAR ANKLE FX W/O FIXJ PST LIP Left 8/29/2024    Procedure: ORIF TRIMALLEOLAR FX, OPEN REDUCTION/TREATMENT OF TILLEAUX FRACTURE;  Surgeon: Mahamed Zamorano MD;  Location: [facility];  Service: Orthopedics    TONSILLECTOMY AND ADENOIDECTOMY       PT Eval time: 0833-0850.   PT treatment time: 0851-0906 04/14/25 0906   PT Last Visit   PT Visit Date 04/14/25   Note Type   Note type Evaluation  (+treatment)   Pain Assessment   Pain Assessment Tool FLACC   Pain Rating: FLACC (Rest) - Face 0   Pain Rating: FLACC (Rest) - Legs 0   Pain Rating: FLACC (Rest) - Activity 0   Pain Rating: FLACC (Rest) - Cry 0   Pain Rating: FLACC (Rest) - Consolability 0   Score: FLACC (Rest) 0   Pain Rating: FLACC (Activity) - Face 1   Pain Rating: FLACC (Activity) - Legs 1   Pain Rating: FLACC (Activity) - Activity 0   Pain Rating: FLACC (Activity) - Cry 1   Pain Rating: FLACC (Activity) - Consolability 1   Score: FLACC (Activity) 4   Restrictions/Precautions   Weight Bearing Precautions Per Order No   Other Precautions Cognitive;Chair Alarm;Bed Alarm;Multiple lines;Telemetry;Fall Risk;Pain  (down syndrome,  mostly nonverbal)   Home Living   Type of Home Group Home   Home Layout One level;Ramped entrance   Bathroom Equipment Grab bars in shower;Shower chair   Home Equipment Wheelchair-manual  (sruthi lift)   Additional Comments per pt's sister pt resides in group home with 2 other girls and 24/7 staff.   Prior Function   Level of [place] Needs assistance with functional mobility;Needs assistance with ADLs;Needs assistance with IADLS   Lives With Facility staff   Receives Help From Family;Other (Comment)  (facility staff)   IADLs Family/Friend/Other provides meals;Family/Friend/Other provides medication management;Family/Friend/Other provides transportation   Falls in the last 6 months 0   Vocational Unemployed   Comments pt's sister reports that pt mostly uses sruthi lift to transfer into WC at group home however receives OPPT 1-2x/week and was standing with PT for 3 minutes. up until 1.5 years ago pt was active before breaking her foot   General   Family/Caregiver Present Yes  (sister)   Cognition   Overall Cognitive Status Impaired   Arousal/Participation Arousable   Attention Difficulty attending to directions   Orientation Level Oriented to person;Unable to assess  (responds to name)   Memory Unable to assess   Following Commands Follows one step commands inconsistently   Comments pt with down syndrome at baseline, occasionally grunting to communicate.   RLE Assessment   RLE Assessment X  (grossly 2+/5 functionally)   LLE Assessment   LLE Assessment X  (decreased PROM, stiff, continue to assess strength)   Bed Mobility   Supine to Sit 1  Dependent   Additional items Assist x 2;HOB elevated;Increased time required;Verbal cues;LE management   Sit to Supine 1  Dependent   Additional items Assist x 2;Increased time required;Verbal cues;LE management   Transfers   Sit to Stand 2  Maximal assistance   Additional items Assist x 2;Increased time required;Verbal cues   Stand to Sit 2  Maximal assistance   Additional items  Assist x 2;Increased time required;Verbal cues   Additional Comments b/l HHA and knee block. x2 attempts. >25% clearance from EOB   Ambulation/Elevation   Gait pattern Not appropriate;Not tested   Balance   Static Sitting Poor +   Dynamic Sitting Poor   Static Standing Poor -   Endurance Deficit   Endurance Deficit Yes   Endurance Deficit Description pt limited by cognition, generalized weakness, fatigue and decreased activity tolerance   Activity Tolerance   Activity Tolerance Patient limited by fatigue;Other (Comment)  (cognition)   Medical Staff Made Aware OT Isabel   Nurse Made Aware yes-RN cleared   Assessment   Prognosis Fair   Problem List Decreased strength;Decreased range of motion;Decreased mobility;Decreased endurance;Impaired balance;Decreased coordination;Decreased cognition;Impaired judgement;Decreased safety awareness;Pain   Assessment Pt is an 50 y.o. female presenting to [facility] on 4/12/25 for primary medical dx of breakthrough seizure. Pt  has a past medical history of Allergic, Anxiety, Arthritis, Community acquired pneumonia, Encounter for PPD test, Encounter for vaccination, Leukopenia, Osteoporosis, Preop examination, Sleep apnea, and Trisomy 21, Down syndrome. Pt presents as a high complexity evaluation due to Ongoing medical management for primary dx, Increased reliance on more restrictive AD compared to baseline, Decreased activity tolerance compared to baseline, Fall risk, Increased assistance needed from caregiver at current time, Ongoing telemetry monitoring, Trending lab values, Continuous pulse oximetry monitoring . Pt currently dependent for supine <> sit, max Ax2 for rolling in bed and max Ax2 for attempt to stand with b/l HHA and b/l knee block. Pt only able to clear <25% from EOB. Pt is limited by cognition and deficits in strength, ROM, endurance, balance, mobility and activity tolerance limiting their ability to safely participate in activity and access their community. Pt would benefit  from continued skilled acute care PT services to address impairments and promote functional independence. Recommend level 3 resources to improve mobility and promote PLOF. The patient's AM-PAC Basic Mobility Inpatient Short Form Raw Score is 7. A Raw score of less than 16 suggests the patient may benefit from discharge to post-acute rehabilitation services. Please also refer to the recommendation of the Physical Therapist for safe discharge planning. Pt left supine in bed with bed alarm donned, call bell and personal items within reach and all needs met.   Barriers to Discharge Inaccessible home environment;Decreased caregiver support   Goals   Patient Goals none stated   STG Expiration Date 04/28/25   Short Term Goal #1 In 14 days pt will complete bed mobility at min A to promote independence and decrease caregiver burden. Pt will sit EOB for 25 minutes with S to promote upright tolerance, improve trunk control and participate in activity. Pt will improve b/l LE strength by 1/2 grade to increase efficiency of mobility and transfers. Pt will complete transfers with mod Ax2 to promote safety. PT will continue to assess for further goals.   PT Treatment Day 0   Plan   Treatment/Interventions LE strengthening/ROM;Functional transfer training;Therapeutic exercise;Endurance training;Cognitive reorientation;Patient/family training;Equipment eval/education;Bed mobility;Compensatory technique education;Spoke to nursing;Continued evaluation;OT   PT Frequency 1-2x/wk   Discharge Recommendation   Rehab Resource Intensity Level, PT III (Minimum Resource Intensity)   Additional Comments return to group home and continue OPPT   AM-PAC Basic Mobility Inpatient   Turning in Flat Bed Without Bedrails 2   Lying on Back to Sitting on Edge of Flat Bed Without Bedrails 1   Moving Bed to Chair 1   Standing Up From Chair Using Arms 1   Walk in Room 1   Climb 3-5 Stairs With Railing 1   Basic Mobility Inpatient Raw Score 7   Turning Head  [place] 2   Follow Simple Instructions 2   Low Function Basic Mobility Raw Score  11   Low Function Basic Mobility Standardized Score  16.55   [facility] Highest Level Of Mobility   [facility] Goal 2: Bed activities/Dependent transfer   [facility] Achieved 3: Sit at edge of bed   Modified Kane Scale   Modified Tripp Scale 4   Additional Treatment Session   Start Time 0851   End Time 0906   Treatment Assessment Upon attempting to return pt to supine, pt long sitting in bed with min Ax1 for 10 minutes. Pt incontinent of urine. Attempted to roll pt twice to L with max Ax2 to change pad. Pt moving RLE while in bed however LLE remains in flexed. Pt difficult to redirect to lay down. Pt left supine in bed with sister present, bed alarm donned, call bell and personal items within reach and all needs met.   Danette Hathaway, LIST

## 2025-04-14 NOTE — ASSESSMENT & PLAN NOTE
Met septic shock criteria on 04/13 w/ temp 100.9, leukocytosis, hypotension. Sister at bedside endorsing foul-smelling urine and productive cough.  30 kg/mL bolus administered w/ SBP improvement from 70's to 100's; c/w IVFs  F/u CXR   Initial UA bland; f/u UCx   Flu/COVID/RSV negative  Procal benign, trend   F/u BCx   CC  curbsided on 04/13   Chart check completed and their service ordered scheduled vancomycin   S/p Rocephin x 1 dose  Sepsis ruled out, at this time no source of infection identified, discussed with ID, imaging and blood work unrevealing, fever leukocytosis could be possible reactive to seizure; will discontinue antibiotics  Continue to monitor clinical picture, leukocytosis resolved    Recent Labs     04/12/25  1152 04/13/25  0538 04/14/25  0800   WBC 9.13 15.14* 8.15

## 2025-04-14 NOTE — OCCUPATIONAL THERAPY NOTE
Occupational Therapy Evaluation     Patient Name: Aminta De La Torre  Today's Date: 4/14/2025  Problem List  Principal Problem:    Breakthrough seizure (HCC)  Active Problems:    Down syndrome    Hypothyroidism    Obesity (BMI 35.0-39.9 without comorbidity)    Sepsis (HCC)    Past Medical History  Past Medical History:   Diagnosis Date    Allergic     Anxiety     Arthritis     Community acquired pneumonia     Last Assessed:1/22/2013    Encounter for PPD test 09/26/2023    Encounter for vaccination 09/26/2023    Tdap given at this visit    Leukopenia     Last Assessed:3/5/2014    Osteoporosis     Preop examination 09/29/2019    Sleep apnea     Trisomy 21, Down syndrome      Past Surgical History  Past Surgical History:   Procedure Laterality Date    MA OPEN TX TRIMALLEOLAR ANKLE FX W/O FIXJ PST LIP Left 8/29/2024    Procedure: ORIF TRIMALLEOLAR FX, OPEN REDUCTION/TREATMENT OF TILLEAUX FRACTURE;  Surgeon: Mahamed Zamorano MD;  Location: [facility];  Service: Orthopedics    TONSILLECTOMY AND ADENOIDECTOMY        04/14/25 0833   OT Last Visit   OT Visit Date 04/14/25   Note Type   Note type Evaluation   Pain Assessment   Pain Assessment Tool FLACC   Pain Rating: FLACC (Rest) - Face 0   Pain Rating: FLACC (Rest) - Legs 0   Pain Rating: FLACC (Rest) - Activity 0   Pain Rating: FLACC (Rest) - Cry 0   Pain Rating: FLACC (Rest) - Consolability 0   Score: FLACC (Rest) 0   Pain Rating: FLACC (Activity) - Face 1   Pain Rating: FLACC (Activity) - Legs 1   Pain Rating: FLACC (Activity) - Activity 0   Pain Rating: FLACC (Activity) - Cry 0   Pain Rating: FLACC (Activity) - Consolability 0   Score: FLACC (Activity) 2   Restrictions/Precautions   Weight Bearing Precautions Per Order No   Other Precautions Telemetry;Fall Risk;Cognitive;Chair Alarm;Bed Alarm;Pain  (Down Syndrome At baseline-mostly non-verbal)   Home Living   Type of Home Group Home   Home Layout One level   Prior Function   Level of [place] Needs assistance with  ADLs;Needs assistance with IADLS   Lives With Facility staff   Receives Help From Other (Comment)  (facility staff/caregiver, sister, brother, )   IADLs Family/Friend/Other provides medication management;Family/Friend/Other provides meals;Family/Friend/Other provides transportation   Falls in the last 6 months 0   Vocational Retired   Lifestyle   Autonomy Assistance with ADL's (per sister able to feel self and perform lt grooming tasks at baseline/IADL's, sruthi lift for transfers, has outpt PT and per sister stood 3 minutes last session. 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 [employer]   Intrinsic Gratification continue to assess   General   Family/Caregiver Present Yes  (sister)   ADL   Eating Assistance 1  Total Assistance   Grooming Assistance 1  Total Assistance   UB Bathing Assistance 1  Total Assistance   LB Bathing Assistance 1  Total Assistance   UB Dressing Assistance 1  Total Assistance   LB Dressing Assistance 1  Total Assistance   Toileting Assistance  1  Total Assistance   Bed Mobility   Supine to Sit 1  Dependent   Additional items Assist x 2   Sit to Supine 1  Dependent   Additional items Assist x 2   Additional Comments pt performed EOB sitting with min a   Transfers   Sit to Stand 2  Maximal assistance   Additional items Assist x 2   Stand to Sit 2  Maximal assistance   Additional items Assist x 2   Additional Comments +B/l HHA and rocking method with 2 sit to stand transfers cleared bottom approx~25%   Functional Mobility   Additional Comments unsafe to assess   Balance   Static Sitting Poor +   Dynamic Sitting Poor   Static Standing Poor -   Ambulatory Zero   Activity Tolerance   Activity Tolerance Patient limited by fatigue;Patient limited by pain;Other (Comment)  (confusion)   Medical Staff Made Aware PT Danette due to the patient's co-morbidities,  clinically unstable presentation, and present impairments which are a regression from the patient's baseline.    Nurse Made Aware RN cleared pt for therapy   RUE Assessment   RUE Assessment WFL   LUE Assessment   LUE Assessment WFL   Hand Function   Gross Motor Coordination Impaired   Fine Motor Coordination Impaired   Cognition   Overall Cognitive Status (S)  Impaired   Arousal/Participation Alert;Responsive   Attention Difficulty attending to directions   Orientation Level Oriented to person   Memory Unable to assess   Following Commands Follows one step commands inconsistently   Comments pt with baseline down syndrome, unable to communicate with words this session ~occassional grunts, impaired attention, recognized sister, difficutly following one step functional commands.   Assessment   Limitation Decreased ADL status;Decreased Safe judgement during ADL;Decreased endurance;Decreased cognition;Decreased self-care trans;Decreased high-level ADLs;Decreased fine motor control   Prognosis Fair   Assessment Pt is a 50 y.o. female who was admitted to [facility] on 4/12/[facility]25 with Breakthrough seizure (HCC) 2 witnessed seizures one at group home and one in the ED . Patient  has a past medical history of Allergic, Anxiety, Arthritis, Community acquired pneumonia, Encounter for PPD test (09/26/2023), Encounter for vaccination (09/26/2023), Leukopenia, Osteoporosis, Preop examination (09/29/2019), Sleep apnea, and Trisomy 21, Down syndrome.  At baseline pt was completing assistance with ALD's/IaDL's, sruthi lift vs sit to stand transfer, working with PT on transfers. Pt lives at Group home with caregiver. Currently pt requires total assist  for overall ADLS and max a x 2 for functional mobility/transfers. Pt currently presents with impairments in the following categories -difficulty performing ADLS, difficulty performing IADLS , limited insight into deficits, compliance, flat affect, and decreased initiation and  engagement  activity tolerance, endurance, standing balance/tolerance, sitting balance/tolerance, FMC, GMC, memory, insight, safety , judgement , attention , sequencing , task initiation , communication, and interpersonal skills. These impairments, as well as pt's fatigue, pain, and risk for falls  limit pt's ability to safely engage in all baseline areas of occupation, includingeating, grooming, bathing, dressing, toileting, functional mobility/transfers, and community mobility From OT standpoint, recommend min level III with facility ability to provide appropriate support upon D/C.  The patient's raw score on the AM-PAC Daily Activity Inpatient Short Form is 6. A raw score of less than 19 suggests the patient may benefit from discharge to post-acute rehabilitation services. Please refer to the recommendation of the Occupational Therapist for safe discharge planning. OT will continue to follow to address the below stated goals.   Goals   Patient Goals sit up in bed   LTG Time Frame 10-14   Long Term Goal #1 see goals below   Plan   Treatment Interventions Functional transfer training;ADL retraining;Endurance training;Cognitive reorientation;Patient/family training;Equipment evaluation/education;Compensatory technique education;Continued evaluation;Activityengagement   Goal Expiration Date 04/28/25   OT Frequency 1-2x/wk   Discharge Recommendation   Rehab Resource Intensity Level, OT III (Minimum Resource Intensity) with facility ability to provided appropriate support.   AM-PAC Daily Activity Inpatient   Lower Body Dressing 1   Bathing 1   Toileting 1   Upper Body Dressing 1   Grooming 1   Eating 1   Daily Activity Raw Score 6   Turning Head Towards Sound 2   Follow Simple Instructions 2   Low Function Daily Activity Raw Score 10   Low Function Daily Activity Standardized Score  17.31   AM-PAC Applied Cognition Inpatient   Following a Speech/Presentation 1   Understanding Ordinary Conversation 2   Taking  Medications 1   Remembering Where Things Are Placed or Put Away 1   Remembering List of 4-5 Errands 1   Taking Care of Complicated Tasks 1   Applied Cognition Raw Score 7   Applied Cognition Standardized Score 15.17   End of Consult   Patient Position at End of Consult Bed/Chair alarm activated;All needs within reach;Supine;Other (comment)  (with sister present)   Nurse Communication Nurse aware of consult   Assessment: pt seen for an additional session for focus on toielting/incontinence of urine, rolling max a x 2   to right, long sitting in bed with Supervision for approx~7 minutes. Continue plan of care.    LTG's    *S with bed mobility to engage in functional tasks.  *Mod a Adl's after setup with use of AE PRN  *mod a with  functional transfers to/from all surfaces with Fair + dynamic balance and safety for participation in dynamic adls and iadl tasks   *Assess DME needs   *Increase activity tolerance to 25-30 minutes for participation in adls and enjoyable activities  *Pt to participate in further cognitive testing with good attention and participation to assist with safe d/c recommendations  *Demonstrate good carryover of pt/family education and training with good tolerance for increased safety and independence with ADL's/ADl's.  *Pt will improve standing balance to 4-5 minutes with functional tasks to increase I with toileting/transfers.  *Patient will demonstrate 100% carryover of energy conservation techniques t/o functional I/ADL/leisure tasks w/o cues s/p skilled education to increase endurance during functional tasks  *Pt will follow 100% simple one step verbal commands and be A/Ox4 consistently t/o use of external environmental cues w/ mod I  Khushboo Wilder OTR/L

## 2025-04-14 NOTE — PROGRESS NOTES
Aminta De La Torre is a 50 y.o. female who is currently ordered Vancomycin IV with management by the Pharmacy Consult service.  Relevant clinical data and objective / subjective history reviewed.  Vancomycin Assessment:  Indication and Goal AUC/Trough: Pneumonia (goal -600, trough >10)  Clinical Status: stable  Micro:   Renal Function:  SCr: 0.54 mg/dL  CrCl: 95.8 mL/min  Renal replacement: Not on dialysis  Days of Therapy: 2  Current Dose: 750mg IV Q 12H  Vancomycin Plan:  New Dosinmg IV Q 12H  Estimated AUC: 511 mcg*hr/mL  Estimated Trough: 13.4 mcg/mL  Next Level: Random 4/15 at 0600  Renal Function Monitoring: Daily BMP and UOP  Pharmacy will continue to follow closely for s/sx of nephrotoxicity, infusion reactions and appropriateness of therapy.  BMP and CBC will be ordered per protocol. We will continue to follow the patient’s culture results and clinical progress daily.

## 2025-04-14 NOTE — PLAN OF CARE
Problem: PHYSICAL THERAPY ADULT  Goal: Performs mobility at highest level of function for planned discharge setting.  See evaluation for individualized goals.  Description: Treatment/Interventions: LE strengthening/ROM, Functional transfer training, Therapeutic exercise, Endurance training, Cognitive reorientation, Patient/family training, Equipment eval/education, Bed mobility, Compensatory technique education, Spoke to nursing, Continued evaluation, OT          See flowsheet documentation for full assessment, interventions and recommendations.  Note: Prognosis: Fair  Problem List: Decreased strength, Decreased range of motion, Decreased mobility, Decreased endurance, Impaired balance, Decreased coordination, Decreased cognition, Impaired judgement, Decreased safety awareness, Pain  Assessment: Pt is an 50 y.o. female [facility] for primary medical dx of breakthrough seizure. Pt  has a past medical history of Allergic, Anxiety, Arthritis, Community acquired pneumonia, Encounter for PPD test, Encounter for vaccination, Leukopenia, Osteoporosis, Preop examination, Sleep apnea, and Trisomy 21, Down syndrome. Pt presents as a high complexity evaluation due to Ongoing medical management for primary dx, Increased reliance on more restrictive AD compared to baseline, Decreased activity tolerance compared to baseline, Fall risk, Increased assistance needed from caregiver at current time, Ongoing telemetry monitoring, Trending lab values, Continuous pulse oximetry monitoring . Pt currently dependent for supine <> sit, max Ax2 for rolling in bed and max Ax2 for attempt to stand with b/l HHA and b/l knee block. Pt only able to clear <25% from EOB. Pt is limited by cognition and deficits in strength, ROM, endurance, balance, mobility and activity tolerance limiting their ability to safely participate in activity and access their community. Pt would benefit from continued skilled acute care PT services to  address impairments and promote functional independence. Recommend level 3 resources to improve mobility and promote PLOF. The patient's AM-PAC Basic Mobility Inpatient Short Form Raw Score is 7. A Raw score of less than 16 suggests the patient may benefit from discharge to post-acute rehabilitation services. Please also refer to the recommendation of the Physical Therapist for safe discharge planning. Pt left supine in bed with bed alarm donned, call bell and personal items within reach and all needs met.  Barriers to Discharge: Inaccessible home environment, Decreased caregiver support     Rehab Resource Intensity Level, PT: III (Minimum Resource Intensity)    See flowsheet documentation for full assessment.

## 2025-04-14 NOTE — PROGRESS NOTES
Progress Note - Hospitalist   Name: Aminta De La Torre 50 y.o. female I MRN: [id number]  [facility] I Date of Admission: 4/12/2025   Date of Service: 4/14/2025 I Hospital Day: 1    Assessment & Plan  Breakthrough seizure (HCC)  Presents to the ED w/ 2 episodes of witnessed breakthrough seizures  CT head benign   Ammonia, Lacosamine, & UA benign   Sepsis presentation on 04/13 w/ possible etiology of lower seizure threshold  Neuro consulted, recs appreciated  PTA Vimpat 50 mg qhs   C/w recently increased Vimpat 25 mg q AM & 50 mg qhs    Seizure precautions  Sepsis (HCC)    Met septic shock criteria on 04/13 w/ temp 100.9, leukocytosis, hypotension. Sister at bedside endorsing foul-smelling urine and productive cough.  30 kg/mL bolus administered w/ SBP improvement from 70's to 100's; c/w IVFs  F/u CXR   Initial UA bland; f/u UCx   Flu/COVID/RSV negative  Procal benign, trend   F/u BCx   CC  curbsided on 04/13   Chart check completed and their service ordered scheduled vancomycin   S/p Rocephin x 1 dose  Sepsis ruled out, at this time no source of infection identified, discussed with ID, imaging and blood work unrevealing, fever leukocytosis could be possible reactive to seizure; will discontinue antibiotics  Continue to monitor clinical picture, leukocytosis resolved    Recent Labs     04/12/25  1152 04/13/25  0538 04/14/25  0800   WBC 9.13 15.14* 8.15     Down syndrome  Lives at group home  Supportive care; involved sister  Hypothyroidism  C/w PTA levothyroxine  TSH therapeutic in 11/2024  Obesity (BMI 35.0-39.9 without comorbidity)  Recommend dietary/lifestyle modification  BMI 36    VTE Pharmacologic Prophylaxis: VTE Score: 2 Low Risk (Score 0-2) - Encourage Ambulation.    Mobility:   Basic Mobility Inpatient Raw Score: 7  JH-HLM Goal: 2: Bed activities/Dependent transfer  JH-HLM Achieved: 3: Sit at edge of bed  JH-HLM Goal achieved. Continue to encourage appropriate mobility.    Patient Centered Rounds:  I performed bedside rounds with nursing staff today.   Discussions with Specialists or Other Care Team Provider: case management    Education and Discussions with Family / Patient: Updated  (sister and brother) at bedside.    Current Length of Stay: 1 day(s)  Current Patient Status: Inpatient   Certification Statement: The patient will continue to require additional inpatient hospital stay due to above plan, monitor seizure and vital signs  Discharge Plan: Anticipate discharge in 24-48 hrs to prior assisted or independent living facility.    Code Status: Level 1 - Full Code    Subjective   Patient seen and examined, she is awake.  Sister at bedside and reports patient is at her baseline    Objective :  Temp:  [97.4 °F (36.3 °C)-98.5 °F (36.9 °C)] 97.6 °F (36.4 °C)  HR:  [59-72] 72  BP: ()/(51-56) 113/52  Resp:  [19-22] 22  SpO2:  [96 %-99 %] 99 %  O2 Device: None (Room air)    Body mass index is 36.03 kg/m².     Input and Output Summary (last 24 hours):     Intake/Output Summary (Last 24 hours) at 4/14/2025 1623  Last data filed at 4/14/2025 1300  Gross per 24 hour   Intake 521.58 ml   Output 600 ml   Net -78.42 ml       Physical Exam  Constitutional:       General: She is not in acute distress.     Appearance: She is well-developed.   HENT:      Head: Normocephalic and atraumatic.   Cardiovascular:      Rate and Rhythm: Normal rate and regular rhythm.      Heart sounds: Normal heart sounds. No murmur heard.     No friction rub. No gallop.   Pulmonary:      Effort: Pulmonary effort is normal. No respiratory distress.   Abdominal:      Palpations: Abdomen is soft.   Musculoskeletal:         General: No tenderness or deformity. Normal range of motion.      Cervical back: Normal range of motion and neck supple.   Lymphadenopathy:      Cervical: No cervical adenopathy.   Skin:     General: Skin is warm and dry.      Coloration: Skin is not pale.      Findings: No erythema or rash.   Neurological:       Mental Status: She is alert. Mental status is at baseline.           Lines/Drains:  Lines/Drains/Airways       Active Status       Name Placement date Placement time Site Days    External Urinary Catheter 04/13/25  1243  -- 1                            Lab Results: I have reviewed the following results:   Results from last 7 days   Lab Units 04/14/25  0800 04/13/25  0538 04/12/25  1152   WBC Thousand/uL 8.15   < > 9.13   HEMOGLOBIN g/dL 14.0   < > 16.6*   HEMATOCRIT % 40.3   < > 48.6*   PLATELETS Thousands/uL 176   < > 246   SEGS PCT %  --   --  67   LYMPHO PCT %  --   --  22   MONO PCT %  --   --  10   EOS PCT %  --   --  0    < > = values in this interval not displayed.     Results from last 7 days   Lab Units 04/14/25  0800   SODIUM mmol/L 139   POTASSIUM mmol/L 3.6   CHLORIDE mmol/L 106   CO2 mmol/L 24   BUN mg/dL 7   CREATININE mg/dL 0.54*   ANION GAP mmol/L 9   CALCIUM mg/dL 7.1*   ALBUMIN g/dL 2.9*   TOTAL BILIRUBIN mg/dL 0.73   ALK PHOS U/L 64   ALT U/L 19   AST U/L 11*   GLUCOSE RANDOM mg/dL 162*                 Results from last 7 days   Lab Units 04/14/25  0800 04/13/25  0925   LACTIC ACID mmol/L  --  1.5   PROCALCITONIN ng/ml <0.05 <0.05       Recent Cultures (last 7 days):   Results from last 7 days   Lab Units 04/13/25  0951 04/13/25  0921   BLOOD CULTURE   --  Received in Microbiology Lab. Culture in Progress.  Received in Microbiology Lab. Culture in Progress.   URINE CULTURE  No Growth <1000 cfu/mL  --        Imaging Results Review: I reviewed radiology reports from this admission including: chest xray.      Last 24 Hours Medication List:     Current Facility-Administered Medications:     acetaminophen (TYLENOL) tablet 650 mg, Q6H PRN    aspirin (ECOTRIN LOW STRENGTH) EC tablet 81 mg, BID    atorvastatin (LIPITOR) tablet 10 mg, Daily    Cholecalciferol (VITAMIN D3) tablet 1,000 Units, QAM    docusate sodium (COLACE) capsule 100 mg, BID    escitalopram (LEXAPRO) tablet 15 mg, Daily    lacosamide  (VIMPAT) tablet 25 mg, Early Morning    lacosamide (VIMPAT) tablet 50 mg, HS    levothyroxine tablet 50 mcg, Daily    LORazepam (ATIVAN) injection 2 mg, Once PRN    melatonin tablet 3 mg, HS    multi-electrolyte (Plasmalyte-A/Isolyte-S PH 7.4/Normosol-R) IV solution, Continuous, Last Rate: 75 mL/hr (04/14/25 1054)    ondansetron (ZOFRAN) injection 4 mg, Q6H PRN    saliva substitute (MOUTH KOTE) mucosal solution 5 spray, 4x Daily PRN    Administrative Statements   Today, Patient Was Seen By: Christi Pabon MD      **Please Note: This note may have been constructed using a voice recognition system.**

## 2025-04-15 VITALS
HEART RATE: 95 BPM | SYSTOLIC BLOOD PRESSURE: 92 MMHG | WEIGHT: 143.96 LBS | TEMPERATURE: 97.6 F | RESPIRATION RATE: 20 BRPM | DIASTOLIC BLOOD PRESSURE: 60 MMHG | OXYGEN SATURATION: 97 % | BODY MASS INDEX: 33.32 KG/M2 | HEIGHT: 55 IN

## 2025-04-15 PROBLEM — R65.10 SIRS (SYSTEMIC INFLAMMATORY RESPONSE SYNDROME) (HCC): Status: RESOLVED | Noted: 2025-04-13 | Resolved: 2025-04-15

## 2025-04-15 PROBLEM — R65.10 SIRS (SYSTEMIC INFLAMMATORY RESPONSE SYNDROME) (HCC): Status: ACTIVE | Noted: 2025-04-13

## 2025-04-15 LAB
ANION GAP SERPL CALCULATED.3IONS-SCNC: 5 MMOL/L (ref 4–13)
BASOPHILS # BLD AUTO: 0.03 THOUSANDS/ÂΜL (ref 0–0.1)
BASOPHILS NFR BLD AUTO: 0 % (ref 0–1)
BUN SERPL-MCNC: 9 MG/DL (ref 5–25)
CALCIUM SERPL-MCNC: 8.1 MG/DL (ref 8.4–10.2)
CHLORIDE SERPL-SCNC: 106 MMOL/L (ref 96–108)
CO2 SERPL-SCNC: 27 MMOL/L (ref 21–32)
CREAT SERPL-MCNC: 0.58 MG/DL (ref 0.6–1.3)
EOSINOPHIL # BLD AUTO: 0.04 THOUSAND/ÂΜL (ref 0–0.61)
EOSINOPHIL NFR BLD AUTO: 1 % (ref 0–6)
ERYTHROCYTE [DISTWIDTH] IN BLOOD BY AUTOMATED COUNT: 13.5 % (ref 11.6–15.1)
GFR SERPL CREATININE-BSD FRML MDRD: 106 ML/MIN/1.73SQ M
GLUCOSE SERPL-MCNC: 151 MG/DL (ref 65–140)
HCT VFR BLD AUTO: 40 % (ref 34.8–46.1)
HGB BLD-MCNC: 14 G/DL (ref 11.5–15.4)
IMM GRANULOCYTES # BLD AUTO: 0.07 THOUSAND/UL (ref 0–0.2)
IMM GRANULOCYTES NFR BLD AUTO: 1 % (ref 0–2)
LYMPHOCYTES # BLD AUTO: 1.81 THOUSANDS/ÂΜL (ref 0.6–4.47)
LYMPHOCYTES NFR BLD AUTO: 26 % (ref 14–44)
MCH RBC QN AUTO: 35 PG (ref 26.8–34.3)
MCHC RBC AUTO-ENTMCNC: 35 G/DL (ref 31.4–37.4)
MCV RBC AUTO: 100 FL (ref 82–98)
MONOCYTES # BLD AUTO: 0.71 THOUSAND/ÂΜL (ref 0.17–1.22)
MONOCYTES NFR BLD AUTO: 10 % (ref 4–12)
NEUTROPHILS # BLD AUTO: 4.35 THOUSANDS/ÂΜL (ref 1.85–7.62)
NEUTS SEG NFR BLD AUTO: 62 % (ref 43–75)
NRBC BLD AUTO-RTO: 0 /100 WBCS
PLATELET # BLD AUTO: 184 THOUSANDS/UL (ref 149–390)
PMV BLD AUTO: 10.4 FL (ref 8.9–12.7)
POTASSIUM SERPL-SCNC: 4.1 MMOL/L (ref 3.5–5.3)
RBC # BLD AUTO: 4 MILLION/UL (ref 3.81–5.12)
SODIUM SERPL-SCNC: 138 MMOL/L (ref 135–147)
WBC # BLD AUTO: 7.01 THOUSAND/UL (ref 4.31–10.16)

## 2025-04-15 PROCEDURE — 85025 COMPLETE CBC W/AUTO DIFF WBC: CPT | Performed by: FAMILY MEDICINE

## 2025-04-15 PROCEDURE — 99239 HOSP IP/OBS DSCHRG MGMT >30: CPT | Performed by: INTERNAL MEDICINE

## 2025-04-15 PROCEDURE — 80048 BASIC METABOLIC PNL TOTAL CA: CPT | Performed by: FAMILY MEDICINE

## 2025-04-15 RX ORDER — LACOSAMIDE 50 MG/1
TABLET ORAL
Qty: 45 TABLET | Refills: 0 | Status: SHIPPED | OUTPATIENT
Start: 2025-04-15 | End: 2025-04-15

## 2025-04-15 RX ORDER — ESCITALOPRAM OXALATE 5 MG/1
15 TABLET ORAL DAILY
Start: 2025-04-15

## 2025-04-15 RX ORDER — LACOSAMIDE 50 MG/1
TABLET ORAL
Qty: 45 TABLET | Refills: 0 | Status: SHIPPED | OUTPATIENT
Start: 2025-04-15 | End: 2025-04-16 | Stop reason: SDUPTHER

## 2025-04-15 RX ADMIN — Medication 1000 UNITS: at 09:47

## 2025-04-15 RX ADMIN — ATORVASTATIN CALCIUM 10 MG: 10 TABLET, FILM COATED ORAL at 09:47

## 2025-04-15 RX ADMIN — LACOSAMIDE 25 MG: 50 TABLET, FILM COATED ORAL at 05:19

## 2025-04-15 RX ADMIN — ESCITALOPRAM OXALATE 15 MG: 5 TABLET, FILM COATED ORAL at 09:47

## 2025-04-15 RX ADMIN — LEVOTHYROXINE SODIUM 50 MCG: 0.05 TABLET ORAL at 05:19

## 2025-04-15 RX ADMIN — DOCUSATE SODIUM 100 MG: 100 CAPSULE, LIQUID FILLED ORAL at 09:47

## 2025-04-15 RX ADMIN — ASPIRIN 81 MG: 81 TABLET, COATED ORAL at 09:47

## 2025-04-15 NOTE — ASSESSMENT & PLAN NOTE
1 episode of fever and leukocytosis suspect secondary to seizure.  Infectious workup including chest x-ray, urine cultures, blood cultures with no signs of infection.    Resolved    Recent Labs     04/13/25  0538 04/14/25  0800 04/15/25  0518   WBC 15.14* 8.15 7.01

## 2025-04-15 NOTE — DISCHARGE SUMMARY
Discharge Summary - Hospitalist   Name: Aminta De La Torre 50 y.o. female I MRN: [id number]  Unit/Bed#: [facility] 731-01 I Date of Admission: 4/12/2025   Date of Service: 4/15/2025 I Hospital Day: 2     Assessment & Plan  Breakthrough seizure (HCC)  Presents to the ED w/ 2 episodes of witnessed breakthrough seizures  CT head benign   Ammonia, Lacosamine, & UA benign   Neuro consulted, recs appreciated  PTA Vimpat 50 mg qhs   C/w recently increased Vimpat 25 mg q AM & 50 mg qhs    Neurology cleared the patient for discharge  SIRS (systemic inflammatory response syndrome) (HCC) (Resolved: 4/15/2025)  1 episode of fever and leukocytosis suspect secondary to seizure.  Infectious workup including chest x-ray, urine cultures, blood cultures with no signs of infection.    Resolved    Recent Labs     04/13/25  0538 04/14/25  0800 04/15/25  0518   WBC 15.14* 8.15 7.01     Down syndrome  Lives at group home  Supportive care; involved sister  Hypothyroidism  C/w PTA levothyroxine  TSH therapeutic in 11/2024  Obesity (BMI 35.0-39.9 without comorbidity)  Recommend dietary/lifestyle modification  BMI 36     Medical Problems       Resolved Problems  Date Reviewed: 11/26/2024          Resolved    SIRS (systemic inflammatory response syndrome) (HCC) 4/15/2025     Resolved by  Leon Hampton MD        Discharging Physician / Practitioner: Leon Hampton MD  PCP: Radha Olvera MD  Admission Date:   Admission Orders (From admission, onward)       Ordered        04/13/25 0913  INPATIENT ADMISSION  Once            04/12/25 1505  Place in Observation  Once                          Discharge Date: 04/15/25      Complications:  none    Reason for Admission: Seizure    Hospital Course:   Aminta De La Torre is a 50 y.o. female patient who originally presented to the hospital on 4/12/2025 due to breakthrough seizure.  She was eval by neurology and her Vimpat was adjusted as noted above.  No further seizure activities while hospitalized.  Medically stable for  discharge to group home          Please see above list of diagnoses and related plan for additional information.     Condition at Discharge: good    Discharge Day Visit / Exam:   * Please refer to separate progress note for these details *    Discussion with Family:  Discussed with family and group home director.     Discharge instructions/Information to patient and family:   See after visit summary for information provided to patient and family.      Provisions for Follow-Up Care:  See after visit summary for information related to follow-up care and any pertinent home health orders.      Mobility at time of Discharge:   Basic Mobility Inpatient Raw Score: 7  JH-HLM Goal: 2: Bed activities/Dependent transfer  JH-HLM Achieved: 2: Bed activities/Dependent transfer  HLM Goal achieved. Continue to encourage appropriate mobility.     Disposition:   Group Home    Planned Readmission: no    Discharge Medications:  See after visit summary for reconciled discharge medications provided to patient and/or family.      Administrative Statements   Discharge Statement:  I have spent a total time of 31 minutes in caring for this patient on the day of the visit/encounter. >30 minutes of time was spent on: Diagnostic results, Counseling / Coordination of care, Documenting in the medical record, Reviewing / ordering tests, medicine, procedures  , and Communicating with other healthcare professionals .    **Please Note: This note may have been constructed using a voice recognition system**

## 2025-04-15 NOTE — PROGRESS NOTES
Skilled Maintenance Note     Today's date: 2025  Patient name: Aminta De La Torre  : 1975  MRN: [id number]  Referring provider: Radha Olvera MD  Dx:   Encounter Diagnosis     ICD-10-CM    1. Status post ORIF of fracture of ankle  Z98.890     Z87.81       2. Decreased functional mobility and endurance  Z74.09       3. Balance disorder  R26.89           Start Time: 1315  Stop Time: 1345  Total time in clinic (min): 30 minutes    Subjective: Was combative this morning, started yelling a lot more lately       Objective: See treatment diary below      Assessment: Pt tolerated the treatment poorly. Presented with good behavior and was able to perform STS and 10 ft of walking with no complaints. Upon turning in the // bars, pt screamed in pain and was very emotional labile. Unable to proceed with further exercise due to behavior. Performed joint motion, palpation, and skin inspection on b/l LE with no noted abnormalities. Provided education to follow up with doctor if behavior continues to get worse. Pt demonstrated fatigue post-session. Pt would benefit from further skilled PT sessions to address deficits in endurance, strength, balance, activity tolerance, and overall safety needed to maximize the pt's function and quality of life.       Plan: Continue per plan of care.  Progress treatment as tolerated.       Short Term Goal Expiration Date: (2025)  Long Term Goal Expiration Date: (2025)  POC Expiration Date: (2025)    Visit count: 6 of 10; PN due 2025    POC expires Unit limit Auth Expiration date PT/OT/ST + Visit Limit?   25 N/A N/A BOMN   25 N/A 25 BOMN   25 N/A 25 BOMN               Visit/Unit Tracking  AUTH Status:  Date 11/26 11/29 12/13 12/20 12/23 12/27  PN 12/30 01/03 01/10 01/15 01/17   BOMN Used 1 2 3 4 5 6 1 2 3 1 2    Remaining  9 8 7 6 5 4 9 8 7 9 8     AUTH Status:  Date 01/22 01/24  PN 01/27 01/31 02/03 02/07 02/10 02/14 02/17 02/21  PN     BOMN Used 3 4 1 2 3 4 5 6 7 1 2    Remaining  7 6 9 8 7 6 5 4 3 9 8        AUTH Status:  Date 2/28 3/3 3/7 3/10 3/17 3/21  PN 3/24 3/28 3/31 4/4 4/18   BOMN Used 3 4 5 6 7 8 1 2 3 4 5    Remaining  7 6 5 4 3 2 9 8 7 6 5     Access Code: [email or web address]  URL: [email or web address]  Date: 12/27/2024  Prepared by: Radha Tyler    Program Notes  Passive dorsiflexion and hamstring stretching for caregiversTry to stand at least 3x/day for 1 min or more.  Use pillow as wedge in between thighs to prevent hip ER and place pillow under L ankle to encourage increased knee ext. Do NOT place pillow under knee.     Exercises  - Supine Hamstring Stretch  - 2 x daily - 7 x weekly - 1 sets - 3 reps - 30 hold  - Long Sitting Calf Stretch with Strap  - 1 x daily - 7 x weekly - 1 sets - 3 reps     Precautions T21, Osteoporosis, Neck pain, Hx seizure like activity, Fall risk        Manuals 03/31 04/04 04/18 03/24 03/28                                   Neuro Re-Ed         Transfers  STS  Stair railings   MaxAx1   X 2     STS   // bars   B/l UE   maxAx1   X 2  STS   // bars   B/l UE   maxAx1   X 4 STS   // bars   B/l UE   maxAx1   X 4 STS  // bars   B/l UE   X 4 STS   // bars   B/l UE   X 5    Static balance  Static standing   // bars   B/l UE  5 min x 1  Min-modAx1 Static standing   // bars   B/l UE  2 min x 3   Min-modAx1 Static standing   // bars   B/l UE  5 min x 2   Min-modAx1 Static standing   // bars   B/l UE  5 min x 4  Min-modAx1    Static sitting   W/ beach ball toss   X 6 tosses  W/cone reach   X 5 min  Static standing   // bars   B/l UE  2 min x 4  Min-modAx1    Static sitting   W/ beach ball toss   X 10 tosses   Dynamic balance  Walking and turning in // bars   4 ft x 1   modAx1  Walking and turning in // bars   10 ft x 1   modAx1  Walking   // bars   10ft x 1   20 ft with turn x 1     Walking   // bars   10ft x 1   20 ft with turn x 1                                   Ther Ex Skin inspection/palpalpation of LLE      Education on importance of stretching and transferring for increased HEP compliance and function     Education on ice vs heat for pain management  Education on importance of stretching and transferring for increased HEP compliance and function     Pt education on ice for decreasing inflammation Education on importance of stretching and transferring for increased HEP compliance and function     Pt education on ice for decreasing inflammation    Skin inspection, joint motion to find source of pain  Education on importance of stretching and transferring for increased HEP compliance and function  Education on importance of stretching and transferring for increased HEP compliance and function    Ankle ROM  3 x  1 min L hamstring stretch     3 x 1 min  L ankle dorsiflexion stretch  3 x  1 min L hamstring stretch     2 x 1 min  L ankle dorsiflexion stretch  3 x  1 min L hamstring stretch     2 x 1 min  L ankle dorsiflexion stretch  2 x  1 min L hamstring stretch     2 x 1 min  L ankle dorsiflexion stretch  2 x  1 min L hamstring stretch     2 x 1 min  L ankle dorsiflexion stretch    Nu-step                                                        Ther Activity                        Gait Training        Education                 Modalities

## 2025-04-15 NOTE — CASE MANAGEMENT
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 ([facility]) 2025     Foot pain, left 2024     Constipation 2024     Yeast vaginitis 2024     Intellectual delay 10/01/2024     Status post ORIF of fracture of ankle 2024     Closed fracture of left ankle, initial encounter 2024     Obesity (BMI 30-39.9) 2024     Dysphagia 2024     Ambulatory dysfunction 11/15/2023     Dry mouth 10/18/2023     B12 deficiency 10/13/2023     Reflex epilepsy ([facility]) 10/13/2023     Seizure-like activity ([facility]) 10/12/2023     Rash 10/12/2023     Vitamin D deficiency 10/12/2023     Cognitive decline 2023     Behavior problem, adult 2023     Abnormal laboratory test 2021     Primary insomnia 2021     Class 2 obesity with body mass index (BMI) of 37.0 to 37.9 in adult, unspecified obesity type, unspecified whether serious comorbidity present 2020     Obesity (BMI 35.0-39.9 without comorbidity) 2019     Neck pain 2019     Chronic pain of left knee 2019     Down syndrome 10/06/2016     Hyperlipidemia 10/06/2016     Periodontitis 2016     Osteoporosis 2016     Trisomy 21, Down syndrome 2013     Hypothyroidism 2012     Allergic rhinitis 2012       LOS (days): 2  Geometric Mean LOS (GMLOS) (days):   Days to GMLOS:     OBJECTIVE:  Risk of Unplanned Readmission Score: 11.76         Current admission status: Inpatient   Preferred Pharmacy:   [facility] - [place], [place] - [address]  [address]  [place] [address]  Phone: [phone] Fax:  [phone]    Primary Care Provider: Radha Olvera MD    Primary Insurance: MEDICARE  Secondary Insurance: [place] [employer]    DISCHARGE DETAILS:      Additional Comments: [phone]. She has not spoken with anyone re:pt care. They are the primary caretakers of the patient and need an update. They require 24 hour notice for discharge, updated med list, meds called to pharmacy and reccs for discharge. [facility] like provider to call and update her. BERNARDO passed message to provider

## 2025-04-15 NOTE — ASSESSMENT & PLAN NOTE
Presents to the ED w/ 2 episodes of witnessed breakthrough seizures  CT head benign   Ammonia, Lacosamine, & UA benign   Neuro consulted, recs appreciated  PTA Vimpat 50 mg qhs   C/w recently increased Vimpat 25 mg q AM & 50 mg qhs    Neurology cleared the patient for discharge

## 2025-04-15 NOTE — PROGRESS NOTES
[facility]  Follow Up Visit    Impression/Plan        Assessment & Plan  Recurrent seizures (HCC)  Aminta De La Torre is a 50 y.o. female with history of trisomy 21, hyothyroidism and hyperlipidemia returning for follow up regarding syncope versus seizure. I saw her 2014/2015 for events that were most consistent with syncope. I'm 2023 she had 4 events of loss of consciousness and generalized shaking, some with postictal fatigue. The fact that all events occurred either while straining on the toilet or walking to the bathroom may suggest syncope. However, the duration and severity of the witnessed shaking with at least one event was more than is typically seen with convulsive syncope. Patients with trisomy 21 are at elevated risk for seizure. Repeated EEGs, including prolonged ambulatory EEG, have not revealed evidence of epilepsy. A brain MRI revealed significant diffuse atrophy which is not unexpected.     There were recent events and again both syncope an seizure are possible. The diagnosis remains uncertain, but I have enough concern for seizure that I would like her to remain on an anti seizure medication. If there are additional possible seizures the dose of lacosamide can be increased - but both levetiracetam and lacosamide have caused severe sedation, event at low doses. She had a rash with lamotrigine. She is on a dose of lacosamide that is typically subtherapeutic, but might be effective in some patients.She has nasal midazolam.         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  May be especially over the last year.  She has dementia related to her trisomy 21.  Her MRI confirms significant atrophy with significant progression since her head CT in 2014.  B12 on lower side, but MMA normal in 11/2024.        Syncope         Seizure-like activity (HCC)    Orders:    lacosamide (VIMPAT) 50  mg; Take 0.5 tablets (25 mg total) by mouth every morning AND 1 tablet (50 mg total) daily at bedtime.         Patient Instructions   Continue lacosamide 25 mg morning and 50 mg evening.   Let us know if there are additional seizures.   Contact 911 for seizure greater than 5 minutes, cluster of seizures or failure to return to baseline in 30 minutes.   Return in about 4 months (AP).         Bunny De La Torre is returning to the [facility] for follow up.     Interval Events:   Seizures since last visit: syncope vs seizure   Hospitalizations: yes    Last seen 7/17/2024. Seen in the ED for apparent seizure vs syncope on 4/12/2025. Lacosamide was increased from 50 mg qhs to 25-50 mg.     Being transferred in [facility] and had event in the lift. No injury or incontinence. Shaking 15-20 seconds. Face tightened up, body stretched. Moved back to bed, put on side. Didn't come to and wake up really until after EMS arrived. When moved to stretcher she was still not at baseline. This was shortly after waking her up. Did personal care. Got her dressed as normal.     There was another in the ED. She was sitting up bed. Was a bit emotional which is typical. Did a little scream and then face tightened and then whole body stiff and shaking for 20 seconds. Out of it for a while after.     AED/side effects/compliance:  Lacosamide 25-50 mg (higher doses caused drowsiness)      Event/Seizure semiology:  Prior episodes of syncope documented in 2014 (looked like she was getting sick, pale and then with LOC).    LOC with stiffening, shaking.  Events occurred in the context of having a bowel movement (10/2023 event occurred while straining to have a BM), or when ambulating to the bathroom.     Special Features  Status epilepticus: No  Self Injury Seizures: No  Precipitating Factors: most events have occurred during straining to have a bowel movement, or on her way to the bathroom or from the bathroom.      "  Epilepsy Risk Factors:  Abnormal pregnancy: No  Abnormal birth/: Born late 1-2 weeks, otherwise uncomplicated, per sister  Abnormal Development: Down syndrome   Febrile seizures, simple: No  Febrile seizures, complex: No  CNS infection: No  Cerebral palsy: No  Head injury (moderate/severe): No  CNS neoplasm: No  CNS malformation: No  Neurosurgical procedure: No  Stroke: No  Alcohol abuse: No  Drug abuse: No  Family history Sz/epilepsy: No     Prior AEDs:  Levetiracetam - mood changes, \"zombie\", non-functional   Lamotrigine - rash on foot  Divalproex - \"zombie\", non-functional      Psychiatric History:  Sees psychiatry for depression and behavioral issues      Social History:  Lives with: Group home--[facility]--started living there in Oct 2023.  Previously has lived in other group home environments or with family   Driving: no     Prior workup:    10/12/2023 CTH head wo contrast  No acute abnormalities.  Enlargement of the ventricles and sulci secondary to chronic volume loss      2023 routine EEG ([facility])  The continuous generalized slowing seen throughout the record suggests the presence of a severe diffuse cortical dysfunction.  This is nonspecific but suggests a global encephalopathic process of most any etiology, including toxic/metabolic/anoxic/hypoxic.      2023 routine EEG ([facility])  Moderate bilateral slowing. No focal slowing or epileptiform abnormalities were seen. These findings suggest moderate bilateral cerebral dysfunction.      24 hour AEEG 2023:  This ambulatory EEG (day 1 of , with about 12 hours of interpretable data recorded) recorded during wakefulness, drowsiness, and sleep is abnormal. Background activities are overall too slow, suggesting mild to moderate diffuse cerebral dysfunction of nonspecific etiology. No clinical events were reported and around the times of three push buttons, activated for unclear reasons, there was no electrographic evidence of " seizure. No epileptiform discharges or electrographic seizures were seen.      Brain MRI seizure protocol with and without contrast 5/1/2024:  1.  Remarkable progressive cerebral atrophy when compared to head CT 11/1/2014 with moderate ex vacuo enlargement of the ventricular system most pronounced in the temporal horns.  2.  Significant hippocampal atrophy with ex vacuo enlargement of the adjacent temporal horns.  3.  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 Precocious microangiopathy is within differential consideration.  4.  Minimal supratentorial white matter change may indicate microangiopathy.         Objective    /74 (BP Location: Left arm, Patient Position: Sitting, Cuff Size: Large)   LMP  (LMP Unknown)      General Exam  No acute distress. In wheelchair.     Neurologic Exam  Mental Status:  Alert. Verbalizations, difficult to understand.       I have spent a total time of 35 minutes in caring for this patient on the day of the visit/encounter including Diagnostic results, Prognosis, Risks and benefits of tx options, Instructions for management, Patient and family education, Importance of tx compliance, Risk factor reductions, Impressions, Counseling / Coordination of care, Documenting in the medical record, Reviewing/placing orders in the medical record (including tests, medications, and/or procedures), and Obtaining or reviewing history  .

## 2025-04-15 NOTE — PLAN OF CARE
Problem: Prexisting or High Potential for Compromised Skin Integrity  Goal: Skin integrity is maintained or improved  Description: INTERVENTIONS:- Identify patients at risk for skin breakdown- Assess and monitor skin integrity- Assess and monitor nutrition and hydration status- Monitor labs - Assess for incontinence - Turn and reposition patient- Assist with mobility/ambulation- Relieve pressure over bony prominences- Avoid friction and shearing- Provide appropriate hygiene as needed including keeping skin clean and dry- Evaluate need for skin moisturizer/barrier cream- Collaborate with interdisciplinary team - Patient/family teaching- Consider wound care consult   Outcome: Progressing     Problem: INFECTION - ADULT  Goal: Absence or prevention of progression during hospitalization  Description: INTERVENTIONS:- Assess and monitor for signs and symptoms of infection- Monitor lab/diagnostic results- Monitor all insertion sites, i.e. indwelling lines, tubes, and drains- Monitor endotracheal if appropriate and nasal secretions for changes in amount and color- [place] appropriate cooling/warming therapies per order- Administer medications as ordered- Instruct and encourage patient and family to use good hand hygiene technique- Identify and instruct in appropriate isolation precautions for identified infection/condition  Outcome: Progressing     Problem: SAFETY ADULT  Goal: Patient will remain free of falls  Description: INTERVENTIONS:- Educate patient/family on patient safety including physical limitations- Instruct patient to call for assistance with activity - Consult OT/PT to assist with strengthening/mobility - Keep Call bell within reach- Keep bed low and locked with side rails adjusted as appropriate- Keep care items and personal belongings within reach- Initiate and maintain comfort rounds- Make Fall Risk Sign visible to staff- Offer Toileting every 2 Hours, in advance of need- Initiate/Maintain 2 alarm-  Obtain necessary fall risk management equipment: ALARMS- Apply yellow socks and bracelet for high fall risk patients- Consider moving patient to room near nurses station  Outcome: Progressing     Problem: DISCHARGE PLANNING  Goal: Discharge to home or other facility with appropriate resources  Description: INTERVENTIONS:- Identify barriers to discharge w/patient and caregiver- Arrange for needed discharge resources and transportation as appropriate- Identify discharge learning needs (meds, wound care, etc.)- Arrange for interpretive services to assist at discharge as needed- Refer to Case Management Department for coordinating discharge planning if the patient needs post-hospital services based on physician/advanced practitioner order or complex needs related to functional status, cognitive ability, or social support system  Outcome: Progressing     Problem: Knowledge Deficit  Goal: Patient/family/caregiver demonstrates understanding of disease process, treatment plan, medications, and discharge instructions  Description: Complete learning assessment and assess knowledge base.Interventions:- Provide teaching at level of understanding- Provide teaching via preferred learning methods  Outcome: Progressing     Problem: NEUROSENSORY - ADULT  Goal: Achieves stable or improved neurological status  Description: INTERVENTIONS- Monitor and report changes in neurological status- Monitor vital signs such as temperature, blood pressure, glucose, and any other labs ordered - Initiate measures to prevent increased intracranial pressure- Monitor for seizure activity and implement precautions if appropriate    Outcome: Progressing     Problem: Nutrition/Hydration-ADULT  Goal: Nutrient/Hydration intake appropriate for improving, restoring or maintaining nutritional needs  Description: Monitor and assess patient's nutrition/hydration status for malnutrition. Collaborate with interdisciplinary team and initiate plan and interventions  as ordered.  Monitor patient's weight and dietary intake as ordered or per policy. Utilize nutrition screening tool and intervene as necessary. Determine patient's food preferences and provide high-protein, high-caloric foods as appropriate. INTERVENTIONS:- Monitor oral intake, urinary output, labs, and treatment plans- Assess nutrition and hydration status and recommend course of action- Evaluate amount of meals eaten- Assist patient with eating if necessary - Allow adequate time for meals- Recommend/ encourage appropriate diets, oral nutritional supplements, and vitamin/mineral supplements- Order, calculate, and assess calorie counts as needed- Recommend, monitor, and adjust tube feedings and TPN/PPN based on assessed needs- Assess need for intravenous fluids- Provide specific nutrition/hydration education as appropriate- Include patient/family/caregiver in decisions related to nutrition  Outcome: Progressing

## 2025-04-15 NOTE — PLAN OF CARE
Problem: Prexisting or High Potential for Compromised Skin Integrity  Goal: Skin integrity is maintained or improved  Description: INTERVENTIONS:- Identify patients at risk for skin breakdown- Assess and monitor skin integrity- Assess and monitor nutrition and hydration status- Monitor labs - Assess for incontinence - Turn and reposition patient- Assist with mobility/ambulation- Relieve pressure over bony prominences- Avoid friction and shearing- Provide appropriate hygiene as needed including keeping skin clean and dry- Evaluate need for skin moisturizer/barrier cream- Collaborate with interdisciplinary team - Patient/family teaching- Consider wound care consult   4/15/2025 1103 by Neda Caban RN  Outcome: Progressing  4/15/2025 1103 by Neda Caban RN  Outcome: Progressing     Problem: INFECTION - ADULT  Goal: Absence or prevention of progression during hospitalization  Description: INTERVENTIONS:- Assess and monitor for signs and symptoms of infection- Monitor lab/diagnostic results- Monitor all insertion sites, i.e. indwelling lines, tubes, and drains- Monitor endotracheal if appropriate and nasal secretions for changes in amount and color- [place] appropriate cooling/warming therapies per order- Administer medications as ordered- Instruct and encourage patient and family to use good hand hygiene technique- Identify and instruct in appropriate isolation precautions for identified infection/condition  4/15/2025 1103 by Neda Caban RN  Outcome: Progressing  4/15/2025 1103 by Neda Caban RN  Outcome: Progressing     Problem: SAFETY ADULT  Goal: Patient will remain free of falls  Description: INTERVENTIONS:- Educate patient/family on patient safety including physical limitations- Instruct patient to call for assistance with activity - Consult OT/PT to assist with strengthening/mobility - Keep Call bell within reach- Keep bed low and locked with side rails adjusted as appropriate- Keep  care items and personal belongings within reach- Initiate and maintain comfort rounds- Make Fall Risk Sign visible to staff- Offer Toileting every  Hours, in advance of need- Initiate/Maintain alarm- Obtain necessary fall risk management equipment: - Apply yellow socks and bracelet for high fall risk patients- Consider moving patient to room near nurses station  4/15/2025 1103 by Neda Cabna RN  Outcome: Progressing  4/15/2025 1103 by Neda Caban RN  Outcome: Progressing     Problem: DISCHARGE PLANNING  Goal: Discharge to home or other facility with appropriate resources  Description: INTERVENTIONS:- Identify barriers to discharge w/patient and caregiver- Arrange for needed discharge resources and transportation as appropriate- Identify discharge learning needs (meds, wound care, etc.)- Arrange for interpretive services to assist at discharge as needed- Refer to Case Management Department for coordinating discharge planning if the patient needs post-hospital services based on physician/advanced practitioner order or complex needs related to functional status, cognitive ability, or social support system  4/15/2025 1103 by Neda Caban RN  Outcome: Progressing  4/15/2025 1103 by Neda Caban RN  Outcome: Progressing     Problem: Knowledge Deficit  Goal: Patient/family/caregiver demonstrates understanding of disease process, treatment plan, medications, and discharge instructions  Description: Complete learning assessment and assess knowledge base.Interventions:- Provide teaching at level of understanding- Provide teaching via preferred learning methods  4/15/2025 1103 by Neda Caban RN  Outcome: Progressing  4/15/2025 1103 by Neda Caban RN  Outcome: Progressing     Problem: NEUROSENSORY - ADULT  Goal: Achieves stable or improved neurological status  Description: INTERVENTIONS- Monitor and report changes in neurological status- Monitor vital signs such as temperature, blood  pressure, glucose, and any other labs ordered - Initiate measures to prevent increased intracranial pressure- Monitor for seizure activity and implement precautions if appropriate    4/15/2025 1103 by Neda Caban RN  Outcome: Progressing  4/15/2025 1103 by Neda Caban RN  Outcome: Progressing     Problem: Nutrition/Hydration-ADULT  Goal: Nutrient/Hydration intake appropriate for improving, restoring or maintaining nutritional needs  Description: Monitor and assess patient's nutrition/hydration status for malnutrition. Collaborate with interdisciplinary team and initiate plan and interventions as ordered.  Monitor patient's weight and dietary intake as ordered or per policy. Utilize nutrition screening tool and intervene as necessary. Determine patient's food preferences and provide high-protein, high-caloric foods as appropriate. INTERVENTIONS:- Monitor oral intake, urinary output, labs, and treatment plans- Assess nutrition and hydration status and recommend course of action- Evaluate amount of meals eaten- Assist patient with eating if necessary - Allow adequate time for meals- Recommend/ encourage appropriate diets, oral nutritional supplements, and vitamin/mineral supplements- Order, calculate, and assess calorie counts as needed- Recommend, monitor, and adjust tube feedings and TPN/PPN based on assessed needs- Assess need for intravenous fluids- Provide specific nutrition/hydration education as appropriate- Include patient/family/caregiver in decisions related to nutrition  4/15/2025 1103 by Neda Caban RN  Outcome: Progressing  4/15/2025 1103 by Neda Caban RN  Outcome: Progressing

## 2025-04-15 NOTE — CASE MANAGEMENT
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     Foot pain, left 2024     Constipation 2024     Yeast vaginitis 2024     Intellectual delay 10/01/2024     Status post ORIF of fracture of ankle 2024     Closed fracture of left ankle, initial encounter 2024     Obesity (BMI 30-39.9) 2024     Dysphagia 2024     Ambulatory dysfunction 11/15/2023     Dry mouth 10/18/2023     B12 deficiency 10/13/2023     Reflex epilepsy ([facility]) 10/13/2023     Seizure-like activity ([facility]) 10/12/2023     Rash 10/12/2023     Vitamin D deficiency 10/12/2023     Cognitive decline 2023     Behavior problem, adult 2023     Abnormal laboratory test 2021     Primary insomnia 2021     Class 2 obesity with body mass index (BMI) of 37.0 to 37.9 in adult, unspecified obesity type, unspecified whether serious comorbidity present 2020     Obesity (BMI 35.0-39.9 without comorbidity) 2019     Neck pain 2019     Chronic pain of left knee 2019     Down syndrome 10/06/2016     Hyperlipidemia 10/06/2016     Periodontitis 2016     Osteoporosis 2016     Trisomy 21, Down syndrome 2013     Hypothyroidism 2012     Allergic rhinitis 2012       LOS (days): 2  Geometric Mean LOS (GMLOS) (days): 2.7  Days to GMLOS:0.5     OBJECTIVE:  Risk of Unplanned Readmission Score: 11.66         Current admission status: Inpatient   Preferred Pharmacy:   [facility] - [place], [place] - [address]  [address]  [place] [address]  Phone: [phone]  Fax: [phone]    Primary Care Provider: Radha Olvera MD    Primary Insurance: MEDICARE  Secondary Insurance: [place] [employer]    DISCHARGE DETAILS:    Discharge planning discussed with:: via phone with group home and bedside with family  Freedom of Choice: Yes  Comments - Freedom of Choice: Pt will return to group home  CM contacted family/caregiver?: No- see comments (family was bedside)  Were Treatment Team discharge recommendations reviewed with patient/caregiver?: Yes  Did patient/caregiver verbalize understanding of patient care needs?: N/A- going to facility  Were patient/caregiver advised of the risks associated with not following Treatment Team discharge recommendations?: Yes    Contacts  Patient Contacts: Lily Velazco  Relationship to Patient:: Other (Comment) (group home director)  Contact Method: Phone  Phone Number: [phone]  Reason/Outcome: Continuity of Care, Referral, Emergency Contact, Discharge Planning    Requested Home Health Care         Is the patient interested in HHC at discharge?: No    DME Referral Provided  Referral made for DME?: No         Would you like to participate in our [facility] service program?  : No - Declined    Treatment Team Recommendation: Facility Return, Group Home  Discharge Destination Plan:: Group Home, Facility Return  Transport at Discharge : Other (Comment)           ETA of Transport (Date): 04/15/25  ETA of Transport (Time): 1630        Accepting Facility Name, City & State : [facility] group home  Receiving Facility/Agency Phone Number: [phone]  Facility/Agency Fax Number: [phone]

## 2025-04-15 NOTE — ASSESSMENT & PLAN NOTE
Her gait and language functions have declined over time.  May be especially over the last year.  She has dementia related to her trisomy 21.  Her MRI confirms significant atrophy with significant progression since her head CT in 2014.  B12 on lower side, but MMA normal in 11/2024.

## 2025-04-16 ENCOUNTER — OFFICE VISIT (OUTPATIENT)
Dept: NEUROLOGY | Facility: CLINIC | Age: 50
End: 2025-04-16

## 2025-04-16 ENCOUNTER — TELEPHONE (OUTPATIENT)
Dept: NEUROLOGY | Facility: CLINIC | Age: 50
End: 2025-04-16

## 2025-04-16 VITALS — SYSTOLIC BLOOD PRESSURE: 128 MMHG | DIASTOLIC BLOOD PRESSURE: 74 MMHG

## 2025-04-16 DIAGNOSIS — R56.9 SEIZURE-LIKE ACTIVITY (HCC): ICD-10-CM

## 2025-04-16 DIAGNOSIS — Q90.9 DEMENTIA DUE TO DOWN SYNDROME (HCC): ICD-10-CM

## 2025-04-16 DIAGNOSIS — R55 SYNCOPE: ICD-10-CM

## 2025-04-16 DIAGNOSIS — G40.909 RECURRENT SEIZURES (HCC): Primary | ICD-10-CM

## 2025-04-16 DIAGNOSIS — Q90.9 TRISOMY 21, DOWN SYNDROME: ICD-10-CM

## 2025-04-16 DIAGNOSIS — F02.80 DEMENTIA DUE TO DOWN SYNDROME (HCC): ICD-10-CM

## 2025-04-16 RX ORDER — OXYCODONE HYDROCHLORIDE 5 MG/1
5 CAPSULE ORAL EVERY 4 HOURS PRN
COMMUNITY
End: 2025-05-02

## 2025-04-16 RX ORDER — LACOSAMIDE 50 MG/1
TABLET ORAL
Qty: 45 TABLET | Refills: 5 | Status: SHIPPED | OUTPATIENT
Start: 2025-04-16

## 2025-04-16 NOTE — PROGRESS NOTES
Being transferred in sruthi and had event in the lift. No injury or incontinence. Shaking 15-20 seconds. Face tightened up, body stretched. Moved back to bed, put on side. Didn't come to and wake up really until after EMS arrived. When moved to stretcher she was still not at baseline. This was shortly after waking her up. Did personal care. Got her dressed as normal.     There was another in the ED. She was sitting up bed. Was a bit emotional which is typical. Did a little scream and then face tightened and then whole body stiff and shaking for 20 seconds. Out of it for a while after.

## 2025-04-16 NOTE — PROGRESS NOTES
Review of Systems   Constitutional:  Negative for appetite change, fatigue and fever.   HENT: Negative.  Negative for hearing loss, tinnitus, trouble swallowing and voice change.    Eyes: Negative.  Negative for photophobia, pain and visual disturbance.   Respiratory: Negative.  Negative for shortness of breath.    Cardiovascular: Negative.  Negative for palpitations.   Gastrointestinal: Negative.  Negative for nausea and vomiting.   Endocrine: Negative.  Negative for cold intolerance.   Genitourinary: Negative.  Negative for dysuria, frequency and urgency.   Musculoskeletal:  Negative for back pain, gait problem, myalgias, neck pain and neck stiffness.   Skin: Negative.  Negative for rash.   Allergic/Immunologic: Negative.    Neurological:  Positive for seizures (Saturday was her last one , stated about 5 min and then another while in the ER). Negative for dizziness, tremors, syncope, facial asymmetry, speech difficulty, weakness, light-headedness, numbness and headaches.   Hematological: Negative.  Does not bruise/bleed easily.   Psychiatric/Behavioral: Negative.  Negative for confusion, hallucinations and sleep disturbance.    All other systems reviewed and are negative.

## 2025-04-16 NOTE — PATIENT INSTRUCTIONS
Continue lacosamide 25 mg morning and 50 mg evening.   Let us know if there are additional seizures.   Contact 911 for seizure greater than 5 minutes, cluster of seizures or failure to return to baseline in 30 minutes.   Return in about 4 months (AP).

## 2025-04-18 ENCOUNTER — OFFICE VISIT (OUTPATIENT)
Dept: PHYSICAL THERAPY | Facility: CLINIC | Age: 50
End: 2025-04-18
Payer: MEDICARE

## 2025-04-18 DIAGNOSIS — R26.89 BALANCE DISORDER: ICD-10-CM

## 2025-04-18 DIAGNOSIS — Z74.09 DECREASED FUNCTIONAL MOBILITY AND ENDURANCE: ICD-10-CM

## 2025-04-18 DIAGNOSIS — Z98.890 STATUS POST ORIF OF FRACTURE OF ANKLE: Primary | ICD-10-CM

## 2025-04-18 DIAGNOSIS — Z87.81 STATUS POST ORIF OF FRACTURE OF ANKLE: Primary | ICD-10-CM

## 2025-04-18 LAB
BACTERIA BLD CULT: NORMAL
BACTERIA BLD CULT: NORMAL

## 2025-04-18 PROCEDURE — 97110 THERAPEUTIC EXERCISES: CPT

## 2025-04-18 PROCEDURE — 97112 NEUROMUSCULAR REEDUCATION: CPT

## 2025-04-21 ENCOUNTER — OFFICE VISIT (OUTPATIENT)
Dept: PHYSICAL THERAPY | Facility: CLINIC | Age: 50
End: 2025-04-21
Payer: MEDICARE

## 2025-04-21 DIAGNOSIS — Z74.09 DECREASED FUNCTIONAL MOBILITY AND ENDURANCE: ICD-10-CM

## 2025-04-21 DIAGNOSIS — R26.89 BALANCE DISORDER: ICD-10-CM

## 2025-04-21 DIAGNOSIS — Z98.890 STATUS POST ORIF OF FRACTURE OF ANKLE: Primary | ICD-10-CM

## 2025-04-21 DIAGNOSIS — Z87.81 STATUS POST ORIF OF FRACTURE OF ANKLE: Primary | ICD-10-CM

## 2025-04-21 PROCEDURE — 97110 THERAPEUTIC EXERCISES: CPT

## 2025-04-21 PROCEDURE — 97112 NEUROMUSCULAR REEDUCATION: CPT

## 2025-04-21 NOTE — PROGRESS NOTES
Skilled Maintenance Note     Today's date: 2025  Patient name: Aminta De La Torre  : 1975  MRN: [id number]  Referring provider: Radha Olvera MD  Dx:   Encounter Diagnosis     ICD-10-CM    1. Status post ORIF of fracture of ankle  Z98.890     Z87.81       2. Decreased functional mobility and endurance  Z74.09       3. Balance disorder  R26.89           Start Time: 1021  Stop Time: 1100  Total time in clinic (min): 39 minutes    Subjective: Has been crying a lot more at home      Objective: See treatment diary below      Assessment: Pt tolerated the treatment fair despite some emotional lability. Pt able to tolerate more walking and standing compared to previous sessions. No reports of pain this session. Continues to require maxAx1 for STS, CGA for static standing, and min-modAx1 for walking. Pt demonstrated fatigue post-session. Pt would benefit fr om further skilled PT sessions to address deficits in endurance, strength, balance, activity tolerance, and overall safety needed to maximize the pt's function and quality of life.       Plan: Continue per plan of care.  Progress treatment as tolerated.       Short Term Goal Expiration Date: (2025)  Long Term Goal Expiration Date: (2025)  POC Expiration Date: (2025)    Visit count: 7 of 10; PN due 2025    POC expires Unit limit Auth Expiration date PT/OT/ST + Visit Limit?   25 N/A N/A BOMN   25 N/A 25 BOMN   25 N/A 25 BOMN               Visit/Unit Tracking    AUTH Status:  Date 2/28 3/3 3/7 3/10 3/17 3/21  PN 3/24 3/28 3/31 4/   BOMN Used 3 4 5 6 7 8 1 2 3 4 5    Remaining  7 6 5 4 3 2 9 8 7 6 5     AUTH Status:  Date 4/21             BOMN Used 6              Remaining  4               Access Code: [email or web address]  URL: [email or web address]  Date: 2024  Prepared by: Radha Tyler    Program Notes  Passive dorsiflexion and hamstring stretching for caregiversTry to stand at least 3x/day for 1 min  or more.  Use pillow as wedge in between thighs to prevent hip ER and place pillow under L ankle to encourage increased knee ext. Do NOT place pillow under knee.     Exercises  - Supine Hamstring Stretch  - 2 x daily - 7 x weekly - 1 sets - 3 reps - 30 hold  - Long Sitting Calf Stretch with Strap  - 1 x daily - 7 x weekly - 1 sets - 3 reps     Precautions T21, Osteoporosis, Neck pain, Hx seizure like activity, Fall risk        Manuals 03/31 04/04 04/18 04/21 03/28                                   Neuro Re-Ed         Transfers  STS  Stair railings   MaxAx1   X 2     STS   // bars   B/l UE   maxAx1   X 2  STS   // bars   B/l UE   maxAx1   X 4 STS   // bars   B/l UE   maxAx1   X 4 STS   // bars   B/l UE   maxAx1   X 6 STS   // bars   B/l UE   X 5    Static balance  Static standing   // bars   B/l UE  5 min x 1  Min-modAx1 Static standing   // bars   B/l UE  2 min x 3   Min-modAx1 Static standing   // bars   B/l UE  5 min x 2   Min-modAx1 Static standing   // bars   B/l UE  5 min x 1  CGA  2 min x 6  minAx1 Static standing   // bars   B/l UE  2 min x 4  Min-modAx1    Static sitting   W/ beach ball toss   X 10 tosses   Dynamic balance  Walking and turning in // bars   4 ft x 1   modAx1  Walking and turning in // bars   10 ft x 1   modAx1  Walking and turning in // bars   10 ft x 2   modAx1  Walking   // bars   10ft x 1   20 ft with turn x 1                                   Ther Ex Skin inspection/palpalpation of LLE     Education on importance of stretching and transferring for increased HEP compliance and function     Education on ice vs heat for pain management  Education on importance of stretching and transferring for increased HEP compliance and function     Pt education on ice for decreasing inflammation Education on importance of stretching and transferring for increased HEP compliance and function     Pt education on ice for decreasing inflammation    Skin inspection, joint motion to find source of pain   Education on importance of stretching and transferring for increased HEP compliance and function  Education on importance of stretching and transferring for increased HEP compliance and function    Ankle ROM  3 x  1 min L hamstring stretch     3 x 1 min  L ankle dorsiflexion stretch  3 x  1 min L hamstring stretch     2 x 1 min  L ankle dorsiflexion stretch  3 x  1 min L hamstring stretch     2 x 1 min  L ankle dorsiflexion stretch  3 x  1 min L hamstring stretch     2 x 1 min  L ankle dorsiflexion stretch  2 x  1 min L hamstring stretch     2 x 1 min  L ankle dorsiflexion stretch    Nu-step                                                        Ther Activity                        Gait Training        Education                 Modalities

## 2025-04-22 NOTE — PROGRESS NOTES
Name: Aminta De La Torre      : 1975      MRN: [id number]  Encounter Provider: Radha Olvera MD  Encounter Date: 2025   [facility]  :  Assessment & Plan  Chronic knee pain, unspecified laterality    Orders:    acetaminophen (TYLENOL) 500 mg tablet; Take 1 tablet (500 mg total) by mouth every 6 (six) hours as needed for mild pain or fever With 600 mg ibuprofen    Seizure-like activity (HCC)  Recent hospital for seizure like activity 2 episodes. Vimpat dose adjusted, added morning dose. F/u with neuro.          Behavior problem, adult  Getting more emotional during the hospitalization and since discharge. Yells, cries. No obvious medical cause at this point. Wondering if it's from change of environment and adjustment issue. Hard to communicate. She starts to yell when I touch her but she does not seem to be in physical distress. Continue to monitor for any new physical symptoms. F/u with psych for med adjustment. Advice staff to                 History of Present Illness   HPI  TCM   -4/15  Breakthrough seizure. Seen by neuro. Vimpat dose adjusted.   Vimpat 25 mg q AM & 50 mg qhs      Caregiver reports that pt has been more emotional since discharge, yells, cries for no clear reason. She has otherwise been eating and drinking normally, moves bowel regularly, no trouble with urination, no new skin rash, no fever. Working with PT.     TCM Call (since 2025)       Date and time call was made  2025  9:27 AM    Hospital care reviewed  Records reviewed    Patient was hospitialized at  [facility]    Date of Admission  25    Date of discharge  04/15/25    Diagnosis  Breakthrough seizure (HCC)    Disposition  Home          TCM Call (since 2025)       Scheduled for follow up?  Yes    Did you obtain your prescribed medications  Yes    Do you need help managing your prescriptions or medications  No    I have advised the patient to call PCP with  "any new or worsening symptoms  Mary Kapadia MR            Review of Systems    Objective   /82 (BP Location: Left arm, Patient Position: Sitting, Cuff Size: Standard)   Pulse (!) 53   Ht 4' 5\" (1.346 m)   Wt 64.9 kg (143 lb)   LMP  (LMP Unknown)   SpO2 100%   BMI 35.79 kg/m²      Physical Exam    "

## 2025-04-23 ENCOUNTER — OFFICE VISIT (OUTPATIENT)
Dept: INTERNAL MEDICINE CLINIC | Facility: CLINIC | Age: 50
End: 2025-04-23
Payer: MEDICARE

## 2025-04-23 VITALS
DIASTOLIC BLOOD PRESSURE: 82 MMHG | OXYGEN SATURATION: 100 % | SYSTOLIC BLOOD PRESSURE: 136 MMHG | HEART RATE: 53 BPM | BODY MASS INDEX: 33.09 KG/M2 | WEIGHT: 143 LBS | HEIGHT: 55 IN

## 2025-04-23 DIAGNOSIS — F69 BEHAVIOR PROBLEM, ADULT: ICD-10-CM

## 2025-04-23 DIAGNOSIS — M25.569 CHRONIC KNEE PAIN, UNSPECIFIED LATERALITY: ICD-10-CM

## 2025-04-23 DIAGNOSIS — R56.9 SEIZURE-LIKE ACTIVITY (HCC): Primary | ICD-10-CM

## 2025-04-23 DIAGNOSIS — G89.29 CHRONIC KNEE PAIN, UNSPECIFIED LATERALITY: ICD-10-CM

## 2025-04-23 PROCEDURE — 99495 TRANSJ CARE MGMT MOD F2F 14D: CPT | Performed by: GENERAL ACUTE CARE HOSPITAL

## 2025-04-23 RX ORDER — ACETAMINOPHEN 500 MG
500 TABLET ORAL EVERY 6 HOURS PRN
Qty: 90 TABLET | Refills: 1 | Status: SHIPPED | OUTPATIENT
Start: 2025-04-23

## 2025-04-23 NOTE — ASSESSMENT & PLAN NOTE
Getting more emotional during the hospitalization and since discharge. Yells, cries. No obvious medical cause at this point. Wondering if it's from change of environment and adjustment issue. Hard to communicate. She starts to yell when I touch her but she does not seem to be in physical distress. Continue to monitor for any new physical symptoms. F/u with psych for med adjustment. Advice staff to

## 2025-04-23 NOTE — ASSESSMENT & PLAN NOTE
Recent hospital for seizure like activity 2 episodes. Vimpat dose adjusted, added morning dose. F/u with neuro.

## 2025-04-25 ENCOUNTER — EVALUATION (OUTPATIENT)
Dept: PHYSICAL THERAPY | Facility: CLINIC | Age: 50
End: 2025-04-25
Payer: MEDICARE

## 2025-04-25 DIAGNOSIS — Z98.890 STATUS POST ORIF OF FRACTURE OF ANKLE: Primary | ICD-10-CM

## 2025-04-25 DIAGNOSIS — R26.89 BALANCE DISORDER: ICD-10-CM

## 2025-04-25 DIAGNOSIS — Z87.81 STATUS POST ORIF OF FRACTURE OF ANKLE: Primary | ICD-10-CM

## 2025-04-25 DIAGNOSIS — Z74.09 DECREASED FUNCTIONAL MOBILITY AND ENDURANCE: ICD-10-CM

## 2025-04-25 PROCEDURE — 97110 THERAPEUTIC EXERCISES: CPT

## 2025-04-25 PROCEDURE — 97112 NEUROMUSCULAR REEDUCATION: CPT

## 2025-04-25 NOTE — PROGRESS NOTES
Progress Note     Today's date: 2025  Patient name: Aminta De La Torre  : 1975  MRN: [id number]  Referring provider: Radha Olvera MD  Dx:   Encounter Diagnosis     ICD-10-CM    1. Status post ORIF of fracture of ankle  Z98.890     Z87.81       2. Decreased functional mobility and endurance  Z74.09       3. Balance disorder  R26.89           Start Time: 1015  Stop Time: 1100  Total time in clinic (min): 45 minutes    Assessment: Pt tolerated the session well today. Performed progress update this session to assess progress towards skilled maintenance goals at the [facility] point in POC. Pt has met 4/4 goals at this time demonstrating maintenance of ROM and functional mobility. Pt and caregivers have demonstrated a great improvement in increasing frequency of HEP and transfers as able without the sruthi lift. Pt continues to require maxAx1 for STS at this point due to poor anterior weight shift. However, may be limited by cognitive deficits. Pt has been able to walk up to 20 ft consecutively with minAx1. Pt demonstrated good progress with static standing with CGA and b/l UE support for 1 minute consecutively this session. Pt continues to be limited by behavior and emotional lability in the last few sessions. Discussed transitioning to 1x/wk to see if this aids in overall behavior management since pt has been maintaining function. Also discussed on following up with orthopedics to ensure there is no issues with the RLE since LLE has been cleared since pt is unable to express her own pain and needs. Pt would benefit from further skilled maintenance PT sessions to ensure pt maintains level of safety, ROM, and functional mobility in the home and community.    NEW SKILLED MAINTENANCE GOALS (25):  - Patient will be able to maintain static balance with ability to stand with b/l UE support and CGA for at least 1 minute in 4 weeks.   - Patient will maintain ROM of L ankle dorsiflexion in neutral to aid in ambulation  in 4 weeks.   - Patient and caregivers will maintain compliance with HEP in 4 weeks.   - Patient will be able to maintain aerobic capacity of walking at least 15 ft consecutively with minAx1 and b/l UE support in 4 weeks.       SKILLED MAINTENANCE GOALS (3/21/25):  - Patient will be able to maintain static balance with ability to stand with b/l UE support and CGA for at least 1 minute in 4 weeks. MET  - Patient will maintain ROM of L ankle dorsiflexion in neutral to aid in ambulation in 4 weeks. MET  - Patient and caregivers will maintain compliance with HEP in 4 weeks. MET  - Patient will be able to maintain aerobic capacity of walking at least 15 ft consecutively with minAx1 and b/l UE support in 4 weeks. MET    Plan  Patient would benefit from: skilled maintenance physical therapy  Referral necessary: No    Planned therapy interventions: activity modification, ADL retraining, ADL training, balance, balance/weight bearing training, behavior modification, body mechanics training, strengthening, sensory integrative techniques, postural training, patient/caregiver education, neuromuscular re-education, motor coordination training, flexibility, functional ROM exercises, gait training, graded activity, graded exercise, graded motor, home exercise program, wheelchair management, transfer training, therapeutic training, therapeutic exercise and therapeutic activities    Frequency: 1x week  Duration in weeks: 4  Plan of Care beginning date: 04/25/2025  Plan of Care expiration date: 05/23/2025  Treatment plan discussed with: caregiver and patient    Subjective: Has been able to perform STS at home without sruthi lift as needed with caregivers, has been very emotional    Patient pain: none reported   Patient goals: maintain her current level of function    Objective: See treatment diary below      PROM (L):  02/21: DF 0 deg knee bent (neutral)   Plantar flex: 58 deg   Inversion: 30 deg  Eversion: 10 deg  Knee flexion: 112  deg   Knee extension: lacking 16 deg     3/21: DF 0 deg   Plantar flex: 62 deg   Inversion: 30 deg   Eversion: 10 deg   Knee flexion: 110 deg   Knee extension: lacking 20 deg (pt fighting)    :  DF 0 deg  Knee flexion 108 deg   Knee extension: lacking 18 deg    Static standing and sitting balance:  Standin sec with modAx2 (IE)   Standin min with minAx1 ()  Standin min with modAx1 ()  Standing b/l UE CGA: 8 sec, 13 sec, 3 min (3/21)  Standing b/l UE CGA: 40 sec, 38 sec, 1:05 min       Short Term Goal Expiration Date: (2025)  Long Term Goal Expiration Date: (2025)  POC Expiration Date: (2025)    Visit count: 7 of 10; PN due 2025    POC expires Unit limit Auth Expiration date PT/OT/ST + Visit Limit?   25 N/A N/A BOMN   25 N/A 25 BOMN   25 N/A 25 BOMN               Visit/Unit Tracking    AUTH Status:  Date 2/28 3/3 3/7 3/10 3/17 3/21  PN 3/24 3/28 3/31 4   BOMN Used 3 4 5 6 7 8 1 2 3 4 5    Remaining  7 6 5 4 3 2 9 8 7 6 5     AUTH Status:  Date   PN            BOMN Used 6 7             Remaining  4 3              Access Code: [email or web address]  URL: [email or web address]  Date: 2024  Prepared by: Radha Tyler    Program Notes  Passive dorsiflexion and hamstring stretching for caregiversTry to stand at least 3x/day for 1 min or more.  Use pillow as wedge in between thighs to prevent hip ER and place pillow under L ankle to encourage increased knee ext. Do NOT place pillow under knee.     Exercises  - Supine Hamstring Stretch  - 2 x daily - 7 x weekly - 1 sets - 3 reps - 30 hold  - Long Sitting Calf Stretch with Strap  - 1 x daily - 7 x weekly - 1 sets - 3 reps     Precautions T21, Osteoporosis, Neck pain, Hx seizure like activity, Fall risk        Manuals                                    Neuro Re-Ed         Transfers  STS  Stair railings   MaxAx1   X 2     STS   // bars   B/l UE    maxAx1   X 2  STS   // bars   B/l UE   maxAx1   X 4 STS   // bars   B/l UE   maxAx1   X 4 STS   // bars   B/l UE   maxAx1   X 6 STS   // bars   B/l UE   maxAx1   X 6   Static balance  Static standing   // bars   B/l UE  5 min x 1  Min-modAx1 Static standing   // bars   B/l UE  2 min x 3   Min-modAx1 Static standing   // bars   B/l UE  5 min x 2   Min-modAx1 Static standing   // bars   B/l UE  5 min x 1  CGA  2 min x 6  minAx1 Static standing   // bars   B/l UE  1 min x 1  CGA  2 min x 6  minAx1   Dynamic balance  Walking and turning in // bars   4 ft x 1   modAx1  Walking and turning in // bars   10 ft x 1   modAx1  Walking and turning in // bars   10 ft x 2   modAx1  Walking and turning in // bars   10 ft x 2   modAx1                                    Ther Ex Skin inspection/palpalpation of LLE     Education on importance of stretching and transferring for increased HEP compliance and function     Education on ice vs heat for pain management  Education on importance of stretching and transferring for increased HEP compliance and function     Pt education on ice for decreasing inflammation Education on importance of stretching and transferring for increased HEP compliance and function     Pt education on ice for decreasing inflammation    Skin inspection, joint motion to find source of pain  Education on importance of stretching and transferring for increased HEP compliance and function  Education on importance of stretching and transferring for increased HEP compliance and function    Ankle ROM  3 x  1 min L hamstring stretch     3 x 1 min  L ankle dorsiflexion stretch  3 x  1 min L hamstring stretch     2 x 1 min  L ankle dorsiflexion stretch  3 x  1 min L hamstring stretch     2 x 1 min  L ankle dorsiflexion stretch  3 x  1 min L hamstring stretch     2 x 1 min  L ankle dorsiflexion stretch  Goniometric measurements     2 x 1 min  L ankle dorsiflexion stretch    Nu-step                                                         Ther Activity                        Gait Training        Education                 Modalities

## 2025-04-25 NOTE — PROGRESS NOTES
Skilled Maintenance Note     Today's date: 2025  Patient name: Aminta De La Torre  : 1975  MRN: [id number]  Referring provider: Radha Olvera MD  Dx:   Encounter Diagnosis     ICD-10-CM    1. Status post ORIF of fracture of ankle  Z98.890     Z87.81       2. Decreased functional mobility and endurance  Z74.09       3. Balance disorder  R26.89           Start Time: 1100  Stop Time: 1145  Total time in clinic (min): 45 minutes    Subjective: Very sleepy today      Objective: See treatment diary below      Assessment: Pt tolerated the treatment fair, but limited due to fatigue. STS continue to be maxAx1. Able to perform 2 bouts of walking in 10 ft // bars, but required modAx1. Pt had 1 LOB due to letting go of railings with b/l UE requiring maxAx1 to recover. Pt demonstrated fatigue post-session. Pt would benefit from further skilled PT sessions to address deficits in endurance, strength, balance, activity tolerance, and overall safety needed to maximize the pt's function and quality of life.        Plan: Continue per plan of care.  Progress treatment as tolerated.       Short Term Goal Expiration Date: (2025)  Long Term Goal Expiration Date: (2025)  POC Expiration Date: (2025)    Visit count: 1 of 10; PN due 2025    POC expires Unit limit Auth Expiration date PT/OT/ST + Visit Limit?   25 N/A N/A BOMN   25 N/A 25 BOMN   25 N/A 25 BOMN               Visit/Unit Tracking    AUTH Status:  Date 2/28 3/3 3/7 3/10 3/17 3/21  PN 3/24 3/28 3/31 4/4 4/18   BOMN Used 3 4 5 6 7 8 1 2 3 4 5    Remaining  7 6 5 4 3 2 9 8 7 6 5     AUTH Status:  Date 4/21 4/25  PN            BOMN Used 6 7 1            Remaining  4 3 9             Access Code: [email or web address]  URL: [email or web address]  Date: 2024  Prepared by: Radha Tyler    Program Notes  Passive dorsiflexion and hamstring stretching for caregiversTry to stand at least 3x/day for 1 min or more.  Use  pillow as wedge in between thighs to prevent hip ER and place pillow under L ankle to encourage increased knee ext. Do NOT place pillow under knee.     Exercises  - Supine Hamstring Stretch  - 2 x daily - 7 x weekly - 1 sets - 3 reps - 30 hold  - Long Sitting Calf Stretch with Strap  - 1 x daily - 7 x weekly - 1 sets - 3 reps     Precautions T21, Osteoporosis, Neck pain, Hx seizure like activity, Fall risk        Manuals 04/28 04/04 04/18 04/21 04/25                                   Neuro Re-Ed         Transfers  STS   // bars   B/l UE   maxAx1   X 4 STS   // bars   B/l UE   maxAx1   X 4 STS   // bars   B/l UE   maxAx1   X 4 STS   // bars   B/l UE   maxAx1   X 6 STS   // bars   B/l UE   maxAx1   X 6   Static balance  Static standing   // bars   B/l UE  2 min x 5   Min-modAx1 Static standing   // bars   B/l UE  2 min x 3   Min-modAx1 Static standing   // bars   B/l UE  5 min x 2   Min-modAx1 Static standing   // bars   B/l UE  5 min x 1  CGA  2 min x 6  minAx1 Static standing   // bars   B/l UE  1 min x 1  CGA  2 min x 6  minAx1   Dynamic balance Walking and turning in // bars   10 ft x 2   modAx1  Walking and turning in // bars   4 ft x 1   modAx1  Walking and turning in // bars   10 ft x 1   modAx1  Walking and turning in // bars   10 ft x 2   modAx1  Walking and turning in // bars   10 ft x 2   modAx1                                    Ther Ex Education on importance of stretching and transferring for increased HEP compliance and function  Education on importance of stretching and transferring for increased HEP compliance and function     Pt education on ice for decreasing inflammation Education on importance of stretching and transferring for increased HEP compliance and function     Pt education on ice for decreasing inflammation    Skin inspection, joint motion to find source of pain  Education on importance of stretching and transferring for increased HEP compliance and function  Education on importance of  stretching and transferring for increased HEP compliance and function    Ankle ROM  3 x  1 min L hamstring stretch     2 x 1 min  L ankle dorsiflexion stretch  3 x  1 min L hamstring stretch     2 x 1 min  L ankle dorsiflexion stretch  3 x  1 min L hamstring stretch     2 x 1 min  L ankle dorsiflexion stretch  3 x  1 min L hamstring stretch     2 x 1 min  L ankle dorsiflexion stretch  Goniometric measurements     2 x 1 min  L ankle dorsiflexion stretch    Nu-step                                                        Ther Activity                        Gait Training        Education                 Modalities

## 2025-04-28 ENCOUNTER — OFFICE VISIT (OUTPATIENT)
Dept: PHYSICAL THERAPY | Facility: CLINIC | Age: 50
End: 2025-04-28
Payer: MEDICARE

## 2025-04-28 ENCOUNTER — OFFICE VISIT (OUTPATIENT)
Dept: INTERNAL MEDICINE CLINIC | Facility: CLINIC | Age: 50
End: 2025-04-28
Payer: MEDICARE

## 2025-04-28 VITALS
OXYGEN SATURATION: 100 % | DIASTOLIC BLOOD PRESSURE: 70 MMHG | SYSTOLIC BLOOD PRESSURE: 132 MMHG | HEART RATE: 53 BPM | TEMPERATURE: 96.9 F

## 2025-04-28 DIAGNOSIS — Z98.890 STATUS POST ORIF OF FRACTURE OF ANKLE: Primary | ICD-10-CM

## 2025-04-28 DIAGNOSIS — R10.2 VAGINAL PAIN: Primary | ICD-10-CM

## 2025-04-28 DIAGNOSIS — R26.89 BALANCE DISORDER: ICD-10-CM

## 2025-04-28 DIAGNOSIS — Z87.81 STATUS POST ORIF OF FRACTURE OF ANKLE: Primary | ICD-10-CM

## 2025-04-28 DIAGNOSIS — Z74.09 DECREASED FUNCTIONAL MOBILITY AND ENDURANCE: ICD-10-CM

## 2025-04-28 PROCEDURE — 97112 NEUROMUSCULAR REEDUCATION: CPT

## 2025-04-28 PROCEDURE — 97110 THERAPEUTIC EXERCISES: CPT

## 2025-04-28 PROCEDURE — G2211 COMPLEX E/M VISIT ADD ON: HCPCS | Performed by: GENERAL ACUTE CARE HOSPITAL

## 2025-04-28 PROCEDURE — 99213 OFFICE O/P EST LOW 20 MIN: CPT | Performed by: GENERAL ACUTE CARE HOSPITAL

## 2025-04-28 RX ORDER — CLOTRIMAZOLE 1 %
1 CREAM WITH APPLICATOR VAGINAL 2 TIMES DAILY
Qty: 45 G | Refills: 0 | Status: SHIPPED | OUTPATIENT
Start: 2025-04-28 | End: 2025-05-02 | Stop reason: SDUPTHER

## 2025-04-28 RX ORDER — GUAIFENESIN 200 MG/10ML
200 LIQUID ORAL 3 TIMES DAILY PRN
COMMUNITY

## 2025-04-28 RX ORDER — CALCIUM CARB/VITAMIN D3/VIT K1 500-100-40
TABLET,CHEWABLE ORAL 2 TIMES DAILY
COMMUNITY

## 2025-04-29 ENCOUNTER — OFFICE VISIT (OUTPATIENT)
Dept: OBGYN CLINIC | Facility: CLINIC | Age: 50
End: 2025-04-29
Payer: MEDICARE

## 2025-04-29 ENCOUNTER — TELEPHONE (OUTPATIENT)
Dept: OBGYN CLINIC | Facility: CLINIC | Age: 50
End: 2025-04-29

## 2025-04-29 ENCOUNTER — APPOINTMENT (OUTPATIENT)
Dept: RADIOLOGY | Age: 50
End: 2025-04-29
Payer: MEDICARE

## 2025-04-29 VITALS — BODY MASS INDEX: 33.09 KG/M2 | WEIGHT: 143 LBS | HEIGHT: 55 IN

## 2025-04-29 DIAGNOSIS — Z87.81 STATUS POST ORIF OF FRACTURE OF ANKLE: ICD-10-CM

## 2025-04-29 DIAGNOSIS — Z87.81 STATUS POST ORIF OF FRACTURE OF ANKLE: Primary | ICD-10-CM

## 2025-04-29 DIAGNOSIS — Z98.890 STATUS POST ORIF OF FRACTURE OF ANKLE: ICD-10-CM

## 2025-04-29 DIAGNOSIS — Z98.890 STATUS POST ORIF OF FRACTURE OF ANKLE: Primary | ICD-10-CM

## 2025-04-29 PROBLEM — R10.2 VAGINAL PAIN: Status: ACTIVE | Noted: 2025-04-29

## 2025-04-29 PROCEDURE — 73610 X-RAY EXAM OF ANKLE: CPT

## 2025-04-29 PROCEDURE — 99214 OFFICE O/P EST MOD 30 MIN: CPT | Performed by: ORTHOPAEDIC SURGERY

## 2025-04-29 NOTE — ASSESSMENT & PLAN NOTE
Pt is asleep when I entered room, not in distress. Does not appear to be in pain while sitting. I examined external genitalia that's erythematous, some white discharge and mild skin abrasion. Internal exam not performed since she has an gyn appointment this same week. Given the white discharge yeast infection is likely. Start clotrimazole vaginal cream. Witch hazel pads for pain relief if needed. F/u with gyn as scheduled.       Orders:    witch hazel-glycerin (TUCKS) topical pad; Apply 1 Pad topically as needed for irritation    clotrimazole (GYNE-LOTRIMIN) 1 % vaginal cream; Insert 1 applicator into the vagina 2 (two) times a day

## 2025-04-29 NOTE — PROGRESS NOTES
Sports Medicine and Shoulder Surgery    Aminta De La Torre, 50 y.o. female   MRN# [id number]   : 1975          Subjective   CHIEF COMPLAINT/REASON FOR VISIT  Aminta De La Torre is a 50 y.o. female who presents for  8 month post op follow-up after Orif Trimalleolar Fx, Open Reduction/treatment Of Tilleaux Fracture - Left on 2024     HISTORY OF PRESENT ILLNESS  History obtained by caregiver.  Caregiver mentions she has been doing PT which was going going well but mentioned patient did a turn on her ankle which caused her some pain. Caregiver denies any new injuries at this time    Objective   PHYSICAL EXAMINATION  Musculoskeletal: Left Ankle            Skin Intact . Surgical incisions well healed              Swelling/ecchymosis over nothing              TTP: None            Range of motion and strength intac            Sensation intact throughout Superficial peroneal, Deep peroneal, Tibial, Sural, Saphenous distributions              EHL/TA/PF motor function intact to testing.               BCR              Gait: Antalgic     DIAGNOSTIC IMAGING  Independent interpretation of radiographs obtained today in the office of the left ankle demonstrate status post stable open external fixation of the distal tibia and fibular fractures. Hardware intact   Assessment & Plan  Status post ORIF of fracture of ankle    Orders:    XR ankle 3+ vw left; Future       1. Status Post Orif Trimalleolar Fx, Open Reduction/treatment Of Tilleaux Fracture - Left    Patient is making appropriate progress from the date of their surgery  Reviewed x-rays today which show stable hardware and age-appropriate healing of trimalleolar fracture   WBAT LLE  Recommend PT as tolerated  We will plan to see her back at the as needed   If any issues, questions, or concerns arise between now and the next appointment, we have encouraged the patient contact our team.    Scribe Attestation      I,:  Zander Colvin PA-C am acting as a scribe while  in the presence of the attending physician.:       I,:  Mahamed Zamorano MD personally performed the services described in this documentation    as scribed in my presence.:             The complexity of the medical decision making, including the comprehensive history, detailed examination and moderate risk assessment, and time spent with patient supports coding at a Level 4 for this encounter

## 2025-04-29 NOTE — TELEPHONE ENCOUNTER
Patient called the RX Refill Line. Message is being forwarded to the office.     Patient was just in the office and meant to ask if patient still needs to be taking aspirin or if that needs to be stopped? States it was given for an ankle fracture     Please contact patient at [phone]

## 2025-04-29 NOTE — PROGRESS NOTES
Name: Aminta De La Torre      : 1975      MRN: [id number]  Encounter Provider: Radha Olvera MD  Encounter Date: 2025   Encounter department: [facility]  :  Assessment & Plan  Vaginal pain  Pt is asleep when I entered room, not in distress. Does not appear to be in pain while sitting. I examined external genitalia that's erythematous, some white discharge and mild skin abrasion. Internal exam not performed since she has an gyn appointment this same week. Given the white discharge yeast infection is likely. Start clotrimazole vaginal cream. Witch hazel pads for pain relief if needed. F/u with gyn as scheduled.       Orders:    witch hazel-glycerin (TUCKS) topical pad; Apply 1 Pad topically as needed for irritation    clotrimazole (GYNE-LOTRIMIN) 1 % vaginal cream; Insert 1 applicator into the vagina 2 (two) times a day           History of Present Illness   HPI  Since last visit, staff noticed that when they wipe pt after using bathroom, she is in pain. They notice small vaginal tear/abrasion. Pt at baseline at other times. Staff denies noticing pt scratching/touching that area but pt usually has her hands in her underwear during sleep. Staff denies other abnormal sexual behavior. Pt has dementia not able to provide history.   Review of Systems    Objective   /70 (BP Location: Left arm, Patient Position: Sitting, Cuff Size: Standard)   Pulse (!) 53   Temp (!) 96.9 °F (36.1 °C) (Tympanic)   LMP  (LMP Unknown)   SpO2 100%      Physical Exam

## 2025-04-30 DIAGNOSIS — G89.29 CHRONIC PAIN OF LEFT KNEE: ICD-10-CM

## 2025-04-30 DIAGNOSIS — M25.562 CHRONIC PAIN OF LEFT KNEE: ICD-10-CM

## 2025-05-02 ENCOUNTER — OFFICE VISIT (OUTPATIENT)
Dept: OBGYN CLINIC | Facility: CLINIC | Age: 50
End: 2025-05-02
Payer: MEDICARE

## 2025-05-02 DIAGNOSIS — N90.89 LESION OF VULVA: Primary | ICD-10-CM

## 2025-05-02 DIAGNOSIS — R10.2 VAGINAL PAIN: ICD-10-CM

## 2025-05-02 LAB
HSV1 DNA SPEC QL NAA+PROBE: NOT DETECTED
HSV1 DNA SPEC QL NAA+PROBE: NOT DETECTED

## 2025-05-02 PROCEDURE — 99203 OFFICE O/P NEW LOW 30 MIN: CPT | Performed by: OBSTETRICS & GYNECOLOGY

## 2025-05-02 PROCEDURE — 87529 HSV DNA AMP PROBE: CPT | Performed by: OBSTETRICS & GYNECOLOGY

## 2025-05-02 RX ORDER — CLOTRIMAZOLE 1 %
1 CREAM WITH APPLICATOR VAGINAL
Qty: 45 G | Refills: 0 | Status: SHIPPED | OUTPATIENT
Start: 2025-05-02 | End: 2025-05-06

## 2025-05-02 NOTE — TELEPHONE ENCOUNTER
Caller: [facility]    Doctor: Dr. Zamorano    Reason for call: Requesting a discontinue order and upload in patient Mychart for oxycodone  // Patient no longer takes //Please advise and thank you    Call back#: [phone]

## 2025-05-02 NOTE — ASSESSMENT & PLAN NOTE
Small circular excoriation noted, findings reviewed with caretakers  HSV culture collect, though low suspicion for this given exam findings  Suspect related to scratching/incidental excoriation with finger nails  Encouraged continued monitoring- keeping area clean and dry  Continue clotrimazole as prescribed by PCP- can do once daily instead of BID  Encouraged use of barrier cream- aquaphor, zinc oxide alternating with clotrimazole  RTO if no improvement in symptoms

## 2025-05-02 NOTE — ASSESSMENT & PLAN NOTE
Orders:    clotrimazole (GYNE-LOTRIMIN) 1 % vaginal cream; Insert 1 applicator into the vagina daily at bedtime for 4 days Discard any remaining cream at end of the 4 days.

## 2025-05-02 NOTE — PROGRESS NOTES
"Name: Aminta De La Torre      : 1975      MRN: [id number]  Encounter Provider: Shanna Mcfadden DO  Encounter Date: 2025   Encounter department: [facility] [place]  :  Assessment & Plan  Vaginal pain    Orders:    clotrimazole (GYNE-LOTRIMIN) 1 % vaginal cream; Insert 1 applicator into the vagina daily at bedtime for 4 days Discard any remaining cream at end of the 4 days.    Lesion of vulva  Small circular excoriation noted, findings reviewed with caretakers  HSV culture collect, though low suspicion for this given exam findings  Suspect related to scratching/incidental excoriation with finger nails  Encouraged continued monitoring- keeping area clean and dry  Continue clotrimazole as prescribed by PCP- can do once daily instead of BID  Encouraged use of barrier cream- aquaphor, zinc oxide alternating with clotrimazole  RTO if no improvement in symptoms             History of Present Illness     Vaginal Injury        Aminta De La Torre is a 50 y.o. No obstetric history on file.  who presents today for vaginal \"tear\"/irritation.   She is accompanied today by her caregivers.   Pt is nonverbal and has full time care given her T21.   They noticed that she has been getting excoriations/tears externally on the vagina recently when they are changing her.  They have not noticed any other trauma to the area, no other skin changes or symptoms associated.   No other vaginal discharge.     Was seen by PCP who thought excoriation may have been related to scratching/itching from possible yeast infection. Was given RX for clotrimazole BID. Has been using.   They have noticed that patient has been scratching vaginally.     Some bleeding noted on pad with changes in area of excoriation.       History obtained from: patient    Review of Systems  Negative unless otherwise noted in HPI.     Past Medical History   Past Medical History:   Diagnosis Date    Allergic     Anxiety     Arthritis  "    Community acquired pneumonia     Last Assessed:1/22/2013    Encounter for PPD test 09/26/2023    Encounter for vaccination 09/26/2023    Tdap given at this visit    Leukopenia     Last Assessed:3/5/2014    Osteoporosis     Preop examination 09/29/2019    Sleep apnea     Trisomy 21, Down syndrome      Past Surgical History:   Procedure Laterality Date    [place] OPEN TX TRIMALLEOLAR ANKLE FX W/O FIXJ PST LIP Left 8/29/2024    Procedure: ORIF TRIMALLEOLAR FX, OPEN REDUCTION/TREATMENT OF TILLEAUX FRACTURE;  Surgeon: Mahamed Zamorano MD;  Location: [facility] OR;  Service: Orthopedics    TONSILLECTOMY AND ADENOIDECTOMY       Family History   Problem Relation Age of Onset    Cancer Mother     No Known Problems Sister     No Known Problems Brother     No Known Problems Brother       reports that she has never smoked. She has been exposed to tobacco smoke. She has never used smokeless tobacco. She reports that she does not currently use alcohol. She reports that she does not use drugs.  Current Outpatient Medications   Medication Instructions    acetaminophen (TYLENOL) 500 mg, Oral, Every 6 hours PRN, With 600 mg ibuprofen    aspirin (ASPIRIN LOW DOSE) 81 mg, Oral, 2 times daily    atorvastatin (LIPITOR) 10 mg, Oral, Daily    bacitracin topical ointment 500 units/g topical ointment 1 large application, Topical, 3 times daily PRN    bisacodyl (DULCOLAX) 10 mg, Rectal, Daily PRN    Carbamide Peroxide (Ear Wax Drops) 6.5 % SOLN INSTILL 5 DROPS INTO EACH EAR, TWICE A WEEK ON TUESDAYS AND FRIDAY. DX: WAX REMOVAL    cholecalciferol (VITAMIN D3) 1,000 Units, Oral, Every morning, At 8AM    clotrimazole (GYNE-LOTRIMIN) 1 % vaginal cream 1 applicator, Vaginal, Daily at bedtime, Discard any remaining cream at end of the 4 days.    Diclofenac Sodium (VOLTAREN) 1 % APPLY 2 G TOPICALLY 4 (FOUR) TIMES A DAY TO LEFT KNEE DX: PAIN @ 8AM, 12PM, 4PM & 8PM    dimethicone 1 % cream Topical, Daily, At 8 PM    docusate sodium (COLACE) 100 mg, Oral, 2  times daily    Elastic Bandages & Supports (Gait/Transfer Belt) MISC Does not apply, Daily    escitalopram (LEXAPRO) 15 mg, Oral, Daily    guaiFENesin (ROBITUSSIN) 200 mg, 3 times daily PRN    Heating Pad PADS Does not apply, 2 times daily    Incontinence Supply Disposable (RA Adult Wipes) MISC Does not apply, Daily    Incontinence Supply Disposable (Ultra-Soft Plus Briefs XXL) MISC Does not apply, As needed    lacosamide (VIMPAT) 50 mg Take 0.5 tablets (25 mg total) by mouth every morning AND 1 tablet (50 mg total) daily at bedtime.    levothyroxine 50 mcg, Oral, Daily    melatonin 3 mg, Oral, Daily at bedtime    Mouthwashes (Biotene Dry Mouth) LIQD 1 Swab, Mouth/Throat, 2 times daily PRN, Use 5ml of solution with each swab    nystatin powder Topical, 3 times daily PRN    oxyCODONE (OXY-IR) 5 mg, Every 4 hours PRN    polyethylene glycol (MIRALAX) 17 g Give 17g 3 times a week on Tue, Fri, Sun. Hold for diarrhea    sodium chloride (OCEAN) 0.65 % nasal spray 2 sprays, Nasal, As needed    tolnaftate (TINACTIN) 1 % spray 2 times daily    witch hazel-glycerin (TUCKS) topical pad 1 Pad, Topical, As needed    ZINC OXIDE, TOPICAL, 10 % CREA Apply externally, 3 times daily PRN     Allergies   Allergen Reactions    Levetiracetam Drowsiness and Hives     And disorientation    Depakote [Valproic Acid] Drowsiness    Lamictal [Lamotrigine] Rash    Dust Mite Extract Other (See Comments)     Per patient's medical appointment form, no reaction listed    Molds & Smuts Other (See Comments)     Per patient's medical appointment form, no reaction listed    Other Other (See Comments)     Volporic acid, levitracetam, Per patient's medical appointment form, no reaction listed    Pollen Extract Sneezing         Objective   LMP  (LMP Unknown)      Physical Exam  Constitutional:       General: She is not in acute distress.  HENT:      Head: Normocephalic and atraumatic.      Mouth/Throat:      Mouth: Mucous membranes are moist.    Cardiovascular:      Rate and Rhythm: Normal rate.   Pulmonary:      Effort: Pulmonary effort is normal. No respiratory distress.   Abdominal:      General: There is no distension.      Palpations: Abdomen is soft.   Genitourinary:      Skin:     General: Skin is warm and dry.   Neurological:      Mental Status: She is alert.   Psychiatric:      Comments: Nonverbal but cooperative

## 2025-05-05 ENCOUNTER — RESULTS FOLLOW-UP (OUTPATIENT)
Dept: OBGYN CLINIC | Facility: CLINIC | Age: 50
End: 2025-05-05

## 2025-05-08 NOTE — PROGRESS NOTES
Daily Note     Today's date: 2025  Patient name: Aminta De La Torre  : 1975  MRN: [id number]  Referring provider: Radha Olvera MD  Dx:   Encounter Diagnosis     ICD-10-CM    1. Status post ORIF of fracture of ankle  Z98.890     Z87.81       2. Decreased functional mobility and endurance  Z74.09       3. Balance disorder  R26.89           Start Time: 1031  Stop Time: 1104  Total time in clinic (min): 33 minutes    Subjective: Has been doing good with transferring at home       Objective: See treatment diary below      Assessment: Pt tolerated the treatment fair, but limited due to fatigue. STS continue to be maxAx1. Able to perform 2 bouts of walking in 10 ft // bars, but required min-modAx1. Pt had 1 LOB due to letting go of railings with b/l UE requiring maxAx1 to recover. Did well with standing with CGA today. Pt demonstrated fatigue post-session. Pt would benefit from further skilled PT sessions to address deficits in endurance, strength, balance, activity tolerance, and overall safety needed to maximize the pt's function and quality of life.         Plan: Continue per plan of care.  Progress treatment as tolerated.       Short Term Goal Expiration Date: (2025)  Long Term Goal Expiration Date: (2025)  POC Expiration Date: (2025)    Visit count: 2 of 10; PN due 2025    POC expires Unit limit Auth Expiration date PT/OT/ST + Visit Limit?   25 N/A N/A BOMN   25 N/A 25 BOMN   25 N/A 25 BOMN               Visit/Unit Tracking    AUTH Status:  Date 2/28 3/3 3/7 3/10 3/17 3/21  PN 3/24 3/28 3/31 4/4 4/18   BOMN Used 3 4 5 6 7 8 1 2 3 4 5    Remaining  7 6 5 4 3 2 9 8 7 6 5     AUTH Status:  Date   PN  5          BOMN Used 6 7 1 2           Remaining  4 3 9 8            Access Code: [email or web address]  URL: [email or web address]  Date: 2024  Prepared by: Radha Tyler    Program Notes  Passive dorsiflexion and hamstring stretching for  caregiversTry to stand at least 3x/day for 1 min or more.  Use pillow as wedge in between thighs to prevent hip ER and place pillow under L ankle to encourage increased knee ext. Do NOT place pillow under knee.     Exercises  - Supine Hamstring Stretch  - 2 x daily - 7 x weekly - 1 sets - 3 reps - 30 hold  - Long Sitting Calf Stretch with Strap  - 1 x daily - 7 x weekly - 1 sets - 3 reps     Precautions T21, Osteoporosis, Neck pain, Hx seizure like activity, Fall risk        Manuals 04/28 5/8 04/18 04/21 04/25                                   Neuro Re-Ed         Transfers  STS   // bars   B/l UE   maxAx1   X 4 STS   // bars   B/l UE   maxAx1   X 4 STS   // bars   B/l UE   maxAx1   X 4 STS   // bars   B/l UE   maxAx1   X 6 STS   // bars   B/l UE   maxAx1   X 6   Static balance  Static standing   // bars   B/l UE  2 min x 5   Min-modAx1 Static standing   // bars   B/l UE  2 min x 5   Min-modAx1 Static standing   // bars   B/l UE  5 min x 2   Min-modAx1 Static standing   // bars   B/l UE  5 min x 1  CGA  2 min x 6  minAx1 Static standing   // bars   B/l UE  1 min x 1  CGA  2 min x 6  minAx1   Dynamic balance Walking and turning in // bars   10 ft x 2   modAx1  Walking and turning in // bars   10 ft x 2   Min-modAx1  Walking and turning in // bars   10 ft x 1   modAx1  Walking and turning in // bars   10 ft x 2   modAx1  Walking and turning in // bars   10 ft x 2   modAx1                                    Ther Ex Education on importance of stretching and transferring for increased HEP compliance and function   Education on importance of stretching and transferring for increased HEP compliance and function     Pt education on ice for decreasing inflammation    Skin inspection, joint motion to find source of pain  Education on importance of stretching and transferring for increased HEP compliance and function  Education on importance of stretching and transferring for increased HEP compliance and function    Ankle ROM   3 x  1 min L hamstring stretch     2 x 1 min  L ankle dorsiflexion stretch   3 x  1 min L hamstring stretch     2 x 1 min  L ankle dorsiflexion stretch  3 x  1 min L hamstring stretch     2 x 1 min  L ankle dorsiflexion stretch  Goniometric measurements     2 x 1 min  L ankle dorsiflexion stretch    Nu-step                                                        Ther Activity                        Gait Training        Education                 Modalities

## 2025-05-09 ENCOUNTER — OFFICE VISIT (OUTPATIENT)
Dept: PHYSICAL THERAPY | Facility: CLINIC | Age: 50
End: 2025-05-09
Payer: MEDICARE

## 2025-05-09 DIAGNOSIS — R26.89 BALANCE DISORDER: ICD-10-CM

## 2025-05-09 DIAGNOSIS — Z98.890 STATUS POST ORIF OF FRACTURE OF ANKLE: Primary | ICD-10-CM

## 2025-05-09 DIAGNOSIS — Z87.81 STATUS POST ORIF OF FRACTURE OF ANKLE: Primary | ICD-10-CM

## 2025-05-09 DIAGNOSIS — Z74.09 DECREASED FUNCTIONAL MOBILITY AND ENDURANCE: ICD-10-CM

## 2025-05-09 PROCEDURE — 97112 NEUROMUSCULAR REEDUCATION: CPT

## 2025-05-15 NOTE — PROGRESS NOTES
Skilled Maintenance Note     Today's date: 2025  Patient name: Aminta De La Torre  : 1975  MRN: [id number]  Referring provider: Radha Olvera MD  Dx:   Encounter Diagnosis     ICD-10-CM    1. Status post ORIF of fracture of ankle  Z98.890     Z87.81       2. Decreased functional mobility and endurance  Z74.09           Start Time: 1015  Stop Time: 1055  Total time in clinic (min): 40 minutes    Subjective: Aminta didn't have a nap today and is agitated. Very emotional throughout the week.       Objective: See treatment diary below      Assessment: Pt tolerated the treatment poorly due to decreased agitation and motivation this session. Performed static balance interventions including ball toss and cone passing to aid in behavioral management which seemed to help. Able to perform 2 standing bouts and 5ft of walking in parallel bars. Pt required maxAx1 for STS and mod-maxAx1 for walking this session. Pt demonstrated fatigue post-session. Pt would benefit from further skilled PT sessions to address deficits in endurance, strength, balance, activity tolerance, and overall safety needed to maximize the pt's function and quality of life.       Plan: Continue per plan of care.  Progress note during next visit.  Potential discharge next visit. Progress treatment as tolerated.       Short Term Goal Expiration Date: (2025)  Long Term Goal Expiration Date: (2025)  POC Expiration Date: (2025)    Visit count: 3 of 10; PN due 2025    POC expires Unit limit Auth Expiration date PT/OT/ST + Visit Limit?   25 N/A N/A BOMN   25 N/A 25 BOMN   25 N/A 25 BOMN               Visit/Unit Tracking    AUTH Status:  Date 2/28 3/3 3/7 3/10 3/17 3/21  PN 3/24 3/28 3/31 4/4 4/18   BOMN Used 3 4 5 6 7 8 1 2 3 4 5    Remaining  7 6 5 4 3 2 9 8 7 6 5     AUTH Status:  Date 4/21 4/25  PN  5         BOMN Used 6 7 1 2 3          Remaining  4 3 9 8 7           Access Code:  [email or web address]  URL: [email or web address]  Date: 12/27/2024  Prepared by: Radha Tyler    Program Notes  Passive dorsiflexion and hamstring stretching for caregiversTry to stand at least 3x/day for 1 min or more.  Use pillow as wedge in between thighs to prevent hip ER and place pillow under L ankle to encourage increased knee ext. Do NOT place pillow under knee.     Exercises  - Supine Hamstring Stretch  - 2 x daily - 7 x weekly - 1 sets - 3 reps - 30 hold  - Long Sitting Calf Stretch with Strap  - 1 x daily - 7 x weekly - 1 sets - 3 reps     Precautions T21, Osteoporosis, Neck pain, Hx seizure like activity, Fall risk        Manuals 04/28 5/8 5/16f 04/21 04/25                                   Neuro Re-Ed         Transfers  STS   // bars   B/l UE   maxAx1   X 4 STS   // bars   B/l UE   maxAx1   X 4 STS   // bars   B/l UE   maxAx1   X 5  (3 failed attempts) STS   // bars   B/l UE   maxAx1   X 6 STS   // bars   B/l UE   maxAx1   X 6   Static balance  Static standing   // bars   B/l UE  2 min x 5   Min-modAx1 Static standing   // bars   B/l UE  2 min x 5   Min-modAx1 Static standing   // bars   B/l UE  5 min x 2   Mod-max Ax1 Static standing   // bars   B/l UE  5 min x 1  CGA  2 min x 6  minAx1 Static standing   // bars   B/l UE  1 min x 1  CGA  2 min x 6  minAx1   Dynamic balance Walking and turning in // bars   10 ft x 2   modAx1  Walking and turning in // bars   10 ft x 2   Min-modAx1  Walking and turning in // bars   5 ft x 1   maxAx1  Walking and turning in // bars   10 ft x 2   modAx1  Walking and turning in // bars   10 ft x 2   modAx1                                    Ther Ex Education on importance of stretching and transferring for increased HEP compliance and function   Education on importance of stretching and transferring for increased HEP compliance and function    Education on importance of stretching and transferring for increased HEP compliance and function  Education on importance of  stretching and transferring for increased HEP compliance and function    Ankle ROM  3 x  1 min L hamstring stretch     2 x 1 min  L ankle dorsiflexion stretch   5 x  1 min L hamstring stretch     5 x 1 min  L ankle dorsiflexion stretch  3 x  1 min L hamstring stretch     2 x 1 min  L ankle dorsiflexion stretch  Goniometric measurements     2 x 1 min  L ankle dorsiflexion stretch    Nu-step                                                        Ther Activity                        Gait Training        Education                 Modalities

## 2025-05-15 NOTE — PROGRESS NOTES
Daily Note     Today's date: 2025  Patient name: Aminta De La Torre  : 1975  MRN: [id number]  Referring provider: Radha Olvera MD  Dx: No diagnosis found.               Subjective: ***      Objective: See treatment diary below      Assessment: Tolerated treatment {Tolerated treatment :1480763248}. Patient {assessment:1297425831}      Plan: {PLAN:2394119826}     Short Term Goal Expiration Date: (2025)  Long Term Goal Expiration Date: (2025)  POC Expiration Date: (2025)    Visit count: 2 of 10; PN due 2025    POC expires Unit limit Auth Expiration date PT/OT/ST + Visit Limit?   25 N/A N/A BOMN   25 N/A 25 BOMN   25 N/A 25 BOMN               Visit/Unit Tracking    AUTH Status:  Date 2/28 3/3 3/7 3/10 3/17 3/21  PN 3/24 3/28 3/31 4/   BOMN Used 3 4 5 6 7 8 1 2 3 4 5    Remaining  7 6 5 4 3 2 9 8 7 6 5     AUTH Status:  Date 4/21 4/25  PN           BOMN Used 6 7 1 2           Remaining  4 3 9 8            Access Code: [email or web address]  URL: [email or web address]  Date: 2024  Prepared by: Radha Tyler    Program Notes  Passive dorsiflexion and hamstring stretching for caregiversTry to stand at least 3x/day for 1 min or more.  Use pillow as wedge in between thighs to prevent hip ER and place pillow under L ankle to encourage increased knee ext. Do NOT place pillow under knee.     Exercises  - Supine Hamstring Stretch  - 2 x daily - 7 x weekly - 1 sets - 3 reps - 30 hold  - Long Sitting Calf Stretch with Strap  - 1 x daily - 7 x weekly - 1 sets - 3 reps     Precautions T21, Osteoporosis, Neck pain, Hx seizure like activity, Fall risk        Manuals                                    Neuro Re-Ed         Transfers  STS   // bars   B/l UE   maxAx1   X 4 STS   // bars   B/l UE   maxAx1   X 4 STS   // bars   B/l UE   maxAx1   X 4 STS   // bars   B/l UE   maxAx1   X 6 STS   // bars   B/l UE   maxAx1   X 6   Static  balance  Static standing   // bars   B/l UE  2 min x 5   Min-modAx1 Static standing   // bars   B/l UE  2 min x 5   Min-modAx1 Static standing   // bars   B/l UE  5 min x 2   Min-modAx1 Static standing   // bars   B/l UE  5 min x 1  CGA  2 min x 6  minAx1 Static standing   // bars   B/l UE  1 min x 1  CGA  2 min x 6  minAx1   Dynamic balance Walking and turning in // bars   10 ft x 2   modAx1  Walking and turning in // bars   10 ft x 2   Min-modAx1  Walking and turning in // bars   10 ft x 1   modAx1  Walking and turning in // bars   10 ft x 2   modAx1  Walking and turning in // bars   10 ft x 2   modAx1                                    Ther Ex Education on importance of stretching and transferring for increased HEP compliance and function   Education on importance of stretching and transferring for increased HEP compliance and function     Pt education on ice for decreasing inflammation    Skin inspection, joint motion to find source of pain  Education on importance of stretching and transferring for increased HEP compliance and function  Education on importance of stretching and transferring for increased HEP compliance and function    Ankle ROM  3 x  1 min L hamstring stretch     2 x 1 min  L ankle dorsiflexion stretch   3 x  1 min L hamstring stretch     2 x 1 min  L ankle dorsiflexion stretch  3 x  1 min L hamstring stretch     2 x 1 min  L ankle dorsiflexion stretch  Goniometric measurements     2 x 1 min  L ankle dorsiflexion stretch    Nu-step                                                        Ther Activity                        Gait Training        Education                 Modalities

## 2025-05-16 ENCOUNTER — OFFICE VISIT (OUTPATIENT)
Dept: PHYSICAL THERAPY | Facility: CLINIC | Age: 50
End: 2025-05-16
Payer: MEDICARE

## 2025-05-16 DIAGNOSIS — Z98.890 STATUS POST ORIF OF FRACTURE OF ANKLE: Primary | ICD-10-CM

## 2025-05-16 DIAGNOSIS — Z74.09 DECREASED FUNCTIONAL MOBILITY AND ENDURANCE: ICD-10-CM

## 2025-05-16 DIAGNOSIS — Z87.81 STATUS POST ORIF OF FRACTURE OF ANKLE: Primary | ICD-10-CM

## 2025-05-16 PROCEDURE — 97110 THERAPEUTIC EXERCISES: CPT

## 2025-05-16 PROCEDURE — 97112 NEUROMUSCULAR REEDUCATION: CPT
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
## 2025-05-23 ENCOUNTER — EVALUATION (OUTPATIENT)
Dept: PHYSICAL THERAPY | Facility: CLINIC | Age: 50
End: 2025-05-23
Payer: MEDICARE

## 2025-05-23 DIAGNOSIS — R26.89 BALANCE DISORDER: ICD-10-CM

## 2025-05-23 DIAGNOSIS — Z74.09 DECREASED FUNCTIONAL MOBILITY AND ENDURANCE: ICD-10-CM

## 2025-05-23 DIAGNOSIS — Z98.890 STATUS POST ORIF OF FRACTURE OF ANKLE: Primary | ICD-10-CM

## 2025-05-23 DIAGNOSIS — Z87.81 STATUS POST ORIF OF FRACTURE OF ANKLE: Primary | ICD-10-CM

## 2025-05-23 PROCEDURE — 97110 THERAPEUTIC EXERCISES: CPT

## 2025-05-23 NOTE — ASSESSMENT & PLAN NOTE
Orders:    lacosamide (VIMPAT) 50 mg; Take 0.5 tablets (25 mg total) by mouth every morning AND 1 tablet (50 mg total) daily at bedtime.

## 2025-05-27 ENCOUNTER — OFFICE VISIT (OUTPATIENT)
Dept: INTERNAL MEDICINE CLINIC | Facility: CLINIC | Age: 50
End: 2025-05-27
Payer: MEDICARE

## 2025-05-27 VITALS
WEIGHT: 143 LBS | HEART RATE: 61 BPM | BODY MASS INDEX: 35.79 KG/M2 | OXYGEN SATURATION: 98 % | DIASTOLIC BLOOD PRESSURE: 70 MMHG | SYSTOLIC BLOOD PRESSURE: 122 MMHG

## 2025-05-27 DIAGNOSIS — L03.039 PARONYCHIA OF FIFTH TOE: Primary | ICD-10-CM

## 2025-05-27 DIAGNOSIS — R19.7 DIARRHEA, UNSPECIFIED TYPE: ICD-10-CM

## 2025-05-27 DIAGNOSIS — L08.9 SKIN INFECTION: ICD-10-CM

## 2025-05-27 PROBLEM — L03.032 PARONYCHIA OF FIFTH TOE, LEFT: Status: ACTIVE | Noted: 2025-05-27

## 2025-05-27 PROCEDURE — G2211 COMPLEX E/M VISIT ADD ON: HCPCS

## 2025-05-27 PROCEDURE — 99213 OFFICE O/P EST LOW 20 MIN: CPT

## 2025-05-27 RX ORDER — OXYCODONE HYDROCHLORIDE 5 MG/1
5 CAPSULE ORAL EVERY 4 HOURS PRN
COMMUNITY
End: 2025-06-03

## 2025-05-27 RX ORDER — CEPHALEXIN 500 MG/1
500 CAPSULE ORAL EVERY 6 HOURS SCHEDULED
COMMUNITY
Start: 2025-05-24

## 2025-05-27 RX ORDER — WITCH HAZEL 50 G/100ML
SOLUTION RECTAL
COMMUNITY
Start: 2025-04-29

## 2025-05-27 RX ORDER — GINSENG 100 MG
1 CAPSULE ORAL 3 TIMES DAILY PRN
Qty: 30 G | Refills: 0 | Status: SHIPPED | OUTPATIENT
Start: 2025-05-27
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
## 2025-06-03 ENCOUNTER — OFFICE VISIT (OUTPATIENT)
Dept: SLEEP CENTER | Facility: CLINIC | Age: 50
End: 2025-06-03
Attending: GENERAL ACUTE CARE HOSPITAL
Payer: MEDICARE

## 2025-06-03 VITALS
SYSTOLIC BLOOD PRESSURE: 140 MMHG | HEART RATE: 73 BPM | WEIGHT: 138 LBS | BODY MASS INDEX: 31.94 KG/M2 | DIASTOLIC BLOOD PRESSURE: 91 MMHG | HEIGHT: 55 IN | OXYGEN SATURATION: 95 %

## 2025-06-03 DIAGNOSIS — R06.83 SNORING: ICD-10-CM

## 2025-06-03 DIAGNOSIS — Q90.9 DOWN SYNDROME: ICD-10-CM

## 2025-06-03 DIAGNOSIS — G47.19 EXCESSIVE DAYTIME SLEEPINESS: Primary | ICD-10-CM

## 2025-06-03 PROCEDURE — G2211 COMPLEX E/M VISIT ADD ON: HCPCS | Performed by: STUDENT IN AN ORGANIZED HEALTH CARE EDUCATION/TRAINING PROGRAM

## 2025-06-03 PROCEDURE — 99204 OFFICE O/P NEW MOD 45 MIN: CPT | Performed by: STUDENT IN AN ORGANIZED HEALTH CARE EDUCATION/TRAINING PROGRAM

## 2025-06-03 NOTE — PROGRESS NOTES
Name: Aminta De La Torre      : 1975      MRN: [id number]  Encounter Provider: Zander Aparicio DO  Encounter Date: 6/3/2025   Encounter department: [facility]  :  CONSULTING PROVIDER:  Radha Olvera MD  [address] [place] [address]    Assessment & Plan  Excessive daytime sleepiness  Aminta is a 50-year-old female with PMHx of Down Syndrome with ID, reflex epilepsy (follows with neurology), obesity, vitamin D deficiency, vitamin B12 deficiency who presents for sleep disordered breathing evaluation.  She is accompanied by a caregiver and the  of the group home where she lives; history is obtained primarily from them due to patient's severe ID.  She does not sound to have several symptoms concerning for sleep disordered breathing, not least of these to be significant daytime sleepiness.  On top of that, individuals with Down syndrome do have a higher risk of DEMOND given the facial structure and hypotonia associated with the disorder.  As such, evaluation for and treatment of this if present is merited.    Discussed the pathophysiology of OSAS and medical conditions associated with OSAS such as DM, HTN, CAD, Depression, Stroke, Headache.  Treatment options including surgery, Dental appliances, positional therapy, and CPAP were discussed with the patient's  and caregiver.  I have ordered an in-lab polysomnogram (PSG) to evaluate for sleep disordered breathing .  Advised patient to avoid activities that could harm self or others when tired/sleepy, including driving.  Discussed importance of good sleep hygiene.  We will reach out to the patient via phone with the results of the sleep study and next steps    Orders:    Diagnostic Polysomnogram; Future    Down syndrome  See excessive daytime sleepiness    Orders:    Diagnostic Polysomnogram; Future    Snoring  See Excessive Daytime Sleepiness    Orders:    Diagnostic Polysomnogram;  "Future        Follow-up:  She will Return for Follow-up pending results of sleep study.. Will need a lab with rooms big enough for a portable [place] (facility can provide)    Thank you for allowing me to participate in the care of your patient.  If there are any questions regarding evaluation please feel free to reach out.       ________________________________________________________________________________________________    Subjective:     HPI: Aminta De La Torre is a 50 y.o. female with PMHx of Down Syndrome with ID, reflex epilepsy (follows with neurology), obesity, vitamin D deficiency, vitamin B12 deficiency who presents for sleep disordered breathing evaluation.    History is obtained primarily from caregiver. Patient is nonverbal.     Aminta falls asleep \"all the time\" throughout the day, when she does sleep she snores badly and will wake up gasping for air.    No seizure-like activity overnight or during the day, currently well-controlled.       Pertinent Medications:   -Vimpat 25-50mg (follows with neurology)      SLEEP-WAKE SCHEDULE  Sleep Schedule:  Weekdays:  Bedtime: 2000/2100   Asleep in <15 minutes   Nighttime awakenings: 2; \"fidgets and moves around a lot\"     Wake: 0600    Naps: \"all day\" ([id number], woken up for lunch, then, if no outing, she sleeps again until 1600, then dinner, then doze off again)    Average total sleep time (in a 24 hour period): ~18 hours.    Weekends:  Denies    Sleep-Related Details:  Preferred Sleep Position: upright  SDB Symptoms: snoring, gasping/choking, and waking unrefreshed      Parasomnias:  She denies any parasomnia activity.    Wake-Related Details:  Daytime Sleepiness: Yes,     Caffeine: No  Alcohol Use: No  Substance Use: No  Tobacco Use: No      PAST TREATMENTS:  -None    PRIOR SLEEP STUDIES:  -None    OTHER RELEVANT LABS AND STUDIES:  24-hour ambulatory EEG 12/6-12/7/2023:  EEG Interpretation:  This ambulatory EEG (day 1 of 1, with about 12 hours of interpretable " "data recorded) recorded during wakefulness, drowsiness, and sleep is abnormal. Background activities are overall too slow, suggesting mild to moderate diffuse cerebral dysfunction of nonspecific etiology. No clinical events were reported and around the times of three push buttons, activated for unclear reasons, there was no electrographic evidence of seizure. No epileptiform discharges or electrographic seizures were seen.       Patient accompanied at this vist by : caregiver and       Sitting and reading: Would never doze  Watching TV: High chance of dozing  Sitting, inactive in a public place (e.g. a theatre or a meeting): High chance of dozing  As a passenger in a car for an hour without a break: High chance of dozing  Lying down to rest in the afternoon when circumstances permit: High chance of dozing  Sitting and talking to someone: High chance of dozing  Sitting quietly after a lunch without alcohol: High chance of dozing  In a car, while stopped for a few minutes in traffic: High chance of dozing  Total score: 21     Review of Systems  Pertinent positives/negatives included in HPI and also as noted:     Medical History Reviewed by provider this encounter:  Tobacco  Soc Hx    .  Objective   /91 (BP Location: Left arm, Patient Position: Sitting, Cuff Size: Adult)   Pulse 73   Ht 4' 5\" (1.346 m)   Wt 62.6 kg (138 lb)   LMP  (LMP Unknown)   SpO2 95%   BMI 34.54 kg/m²     Neck Circumference: 14.5  Physical Exam  Visit Vitals  /91 (BP Location: Left arm, Patient Position: Sitting, Cuff Size: Adult)   Pulse 73   Ht 4' 5\" (1.346 m)   Wt 62.6 kg (138 lb)   LMP  (LMP Unknown)   SpO2 95%   BMI 34.54 kg/m²   OB Status Postmenopausal   Smoking Status Never   BSA 1.46 m²       PHYSICAL EXAMINATION:  Vital Signs: /91 (BP Location: Left arm, Patient Position: Sitting, Cuff Size: Adult)   Pulse 73   Ht 4' 5\" (1.346 m)   Wt 62.6 kg (138 lb)   LMP  (LMP Unknown)   SpO2 95%   " "BMI 34.54 kg/m²     Constitutional: NAD, well appearing   Mental Status: Alert, nonverbal. Reactive to stimuli.  Skin: Warm, dry, no rashes noted   Eyes: PERRL, normal conjunctiva  ENT: Nasal congestion absent  Posterior Airspace:   Baker Tongue Position: 4  Retrognathia: absent  Chest: No evidence of respiratory distress, no accessory muscle use; no evidence of peripheral cyanosis  Extremities: No digital clubbing or pedal edema        Data  Lab Results   Component Value Date    HGB 14.0 04/15/2025    HCT 40.0 04/15/2025     (H) 04/15/2025      Lab Results   Component Value Date    GLUCOSE 97 11/01/2014    CALCIUM 8.1 (L) 04/15/2025     08/20/2016    K 4.1 04/15/2025    CO2 27 04/15/2025     04/15/2025    BUN 9 04/15/2025    CREATININE 0.58 (L) 04/15/2025     No results found for: \"IRON\", \"TIBC\", \"FERRITIN\"  Lab Results   Component Value Date    AST 11 (L) 04/14/2025    ALT 19 04/14/2025         Electronically signed by:    Zander Aparicio DO  Board-Certified Neurology and Sleep Medicine  [facility]  06/03/25  "

## 2025-06-03 NOTE — PATIENT INSTRUCTIONS
DEMOND Evaluation:    I suspect you may have sleep apnea.  Sleep apnea is a serious sleep disorder that occurs when a person's breathing is interrupted during sleep. People with untreated sleep apnea stop breathing repeatedly during their sleep, sometimes hundreds of times during the night.  The most common type of sleep apnea is obstructive sleep apnea.  If left untreated, obstructive sleep apnea can result in a number of health problems including hypertension, stroke, arrhythmias, cardiomyopathy (enlargement of the muscle tissue of the heart), heart failure, diabetes, obesity, and heart attacks. It has also been found to make chronic medical conditions (such as high blood pressure, migraine headaches, and seizures (more difficult to manage.  Treatment options for obstructive sleep apnea may include positive airway pressure (PAP) therapy, oral appliance, hypoglossal nerve stimulator, nose/throat surgery, weight loss, side sleeping, or avoidance of medications or substances that can relax the airway muscles (alcohol, benzodiazepines, and opioids).  I have ordered an in-lab polysomnogram (PSG) to evaluate for sleep apnea.  This test should be scheduled at your earliest convenience.   Sleep Clinic: This should be scheduled at the  before you leave today.  Avoid driving if drowsy. We recommend that if you are dozing off while driving, that you do not drive until your sleepiness is appropriately treated.  We encourage a healthy lifestyle with adequate sleep (7-9 hours per night), diet and exercise.  Recommend good sleep hygiene, as outlined below    Myself and/or my team will reach out to you via MyChart and/or phone call with the results and next steps.      Good Sleep Hygiene  Wake up at the same time every day, even on the weekends.  Use your bed for sleep and intimacy only.  If you have been in bed awake for 30 minutes, get up and leave the bedroom. Choose a dull activity not involving a blue screen (TV,  computer, handheld devices). Go back to bed when you feel sleepy.  Avoid caffeine, nicotine and alcohol before you go to bed.  Avoid large meals before you go to bed.  Avoid using screens (computers, tablets, smartphones, etc.) for at least 1 hour before bedtime  Exercise regularly, but do not exercise right before you go to bed.  Avoid daytime naps. If you do take a nap, sleep for 20-40 minutes, and not after 2pm.

## 2025-06-04 DIAGNOSIS — L85.3 XEROSIS OF SKIN: ICD-10-CM

## 2025-06-05 DIAGNOSIS — G89.29 CHRONIC PAIN OF LEFT KNEE: ICD-10-CM

## 2025-06-05 DIAGNOSIS — M25.562 CHRONIC PAIN OF LEFT KNEE: ICD-10-CM

## 2025-06-06 DIAGNOSIS — L03.039 PARONYCHIA OF FIFTH TOE: ICD-10-CM

## 2025-06-06 DIAGNOSIS — L08.9 SKIN INFECTION: ICD-10-CM

## 2025-06-09 DIAGNOSIS — R56.9 SEIZURE-LIKE ACTIVITY (HCC): ICD-10-CM

## 2025-06-09 RX ORDER — GINSENG 100 MG
CAPSULE ORAL
Qty: 30 G | Refills: 0 | Status: SHIPPED | OUTPATIENT
Start: 2025-06-09

## 2025-06-09 NOTE — TELEPHONE ENCOUNTER
Medication: Vimpat   PDMP  05/15/2025 05/15/2025 04/16/2025 Lacosamide (Tablet) 45.0 30 50 MG NA UDAY [facility] Medicare 0 / 5 PA   1 [id number] 04/16/2025 04/15/2025 04/15/2025 Lacosamide (Tablet) 45.0 30 50 MG NA BENJI JOEY [facility] Commercial Insurance 0 / 0 PA   1 [id number] 04/07/2025 04/04/2025 04/04/2025 Lacosamide (Tablet) 90.0 90 50 MG NA [facility] Medicare 0 / 1 PA   2 [id number] 02/07/2025 01/20/2025 11/26/2024 Lacosamide (Tablet) 30.0 30 [facility] Medicare 2 / 1 PA   2 [id number] 12/30/2024 12/27/2024 11/26/2024 Lacosamide (Tablet) 30.0 30 50 [facility] 1 / 1 PA   2 [id number] 12/11/2024 11/26/2024 11/26/2024 Lacosamide (Tablet) 30.0 30 50 [facility] Medicare  Active agreement on file -No

## 2025-06-10 RX ORDER — LACOSAMIDE 50 MG/1
TABLET ORAL
Qty: 45 TABLET | Refills: 0 | Status: SHIPPED | OUTPATIENT
Start: 2025-06-10

## 2025-06-13 ENCOUNTER — CLINICAL SUPPORT (OUTPATIENT)
Dept: INTERNAL MEDICINE CLINIC | Facility: CLINIC | Age: 50
End: 2025-06-13
Payer: MEDICARE

## 2025-06-13 DIAGNOSIS — M81.0 OSTEOPOROSIS, UNSPECIFIED OSTEOPOROSIS TYPE, UNSPECIFIED PATHOLOGICAL FRACTURE PRESENCE: Primary | ICD-10-CM

## 2025-06-13 PROCEDURE — 96372 THER/PROPH/DIAG INJ SC/IM: CPT

## 2025-06-18 ENCOUNTER — PROCEDURE VISIT (OUTPATIENT)
Dept: PODIATRY | Facility: CLINIC | Age: 50
End: 2025-06-18
Payer: MEDICARE

## 2025-06-18 DIAGNOSIS — B35.1 ONYCHOMYCOSIS: Primary | ICD-10-CM

## 2025-06-18 DIAGNOSIS — Q90.9 DOWN SYNDROME: ICD-10-CM

## 2025-06-18 DIAGNOSIS — M79.674 PAIN IN TOES OF BOTH FEET: ICD-10-CM

## 2025-06-18 DIAGNOSIS — I87.2 VENOUS INSUFFICIENCY: ICD-10-CM

## 2025-06-18 DIAGNOSIS — R60.1 GENERALIZED EDEMA: ICD-10-CM

## 2025-06-18 DIAGNOSIS — M79.675 PAIN IN TOES OF BOTH FEET: ICD-10-CM

## 2025-06-18 PROCEDURE — 11721 DEBRIDE NAIL 6 OR MORE: CPT | Performed by: PODIATRIST

## 2025-06-18 NOTE — PROGRESS NOTES
Name: Aminta De La Torre      : 1975      MRN: [id number]  Encounter Provider: Jonathan Cruz DPM  Encounter Date: 2025   Encounter [facility]  :  Assessment & Plan  Onychomycosis         Venous insufficiency         Down syndrome         Generalized edema         Pain in toes of both feet         Reviewed prior PCP's appointment from 2025.    Discussed proper shoe gear, daily inspections of feet, and general foot health with patient. Patient has Q8  findings and is recommended for at risk foot care every 9-10 weeks.    Patients most recent complete clinical foot exam was on: 3/12/2025    Return in about 10 weeks (around 2025).       History of Present Illness   HPI  Aminta De La Torre is a 50 y.o. female who presents with chief complaint of painful thick nails on both feet.  No change in medical history or medications since her last visit when she was seen in her wheelchair with her support staff present in the room.  History obtained from: patient's Legal Guardian and patient's caregiver    Review of Systems  Medical History Reviewed by provider this encounter:     .  Medications Ordered Prior to Encounter[1]   Social History[2]     Objective   LMP  (LMP Unknown)      Physical Exam  Vascular status is 0/4 DP PT negative digital hair delayed capillary refill and normal distal cooling bilaterally.  Capillary refill is approximately 3 to 4 seconds.  There is pitting edema present bilaterally of +2.     Derm nails are brittle elongated hypertrophic white-yellow discoloration with subungual debris x 6.  There is an increased thickness in the nails of approximately 1 to 2 mm.  The white flaky tissue present on the plantar aspect of both feet with no signs of any fissures or dried blood present has slightly improved from last visit..  Dependent rubor is also present bilaterally.  Loss of turgor and thinning of the skin is noted bilaterally.       [1]   Current  Outpatient Medications on File Prior to Visit   Medication Sig Dispense Refill    acetaminophen (TYLENOL) 500 mg tablet Take 1 tablet (500 mg total) by mouth every 6 (six) hours as needed for mild pain or fever With 600 mg ibuprofen 90 tablet 1    aspirin (Aspirin Low Dose) 81 mg EC tablet Take 1 tablet (81 mg total) by mouth 2 (two) times a day (Patient not taking: Reported on 5/2/2025) 180 tablet 3    atorvastatin (LIPITOR) 10 mg tablet Take 1 tablet (10 mg total) by mouth daily 30 tablet 5    bacitracin topical ointment 500 units/g topical ointment APPLY 1 LARGE APPLICATION TOPICALLY THREE TIMES A DAY AS NEEDED FOR WOUND CARE (SKIN INFECTION) 30 g 0    bisacodyl (DULCOLAX) 10 mg suppository Insert 1 suppository (10 mg total) into the rectum daily as needed for constipation 12 suppository 0    Carbamide Peroxide (Ear Wax Drops) 6.5 % SOLN INSTILL 5 DROPS INTO EACH EAR, TWICE A WEEK ON TUESDAYS AND FRIDAY. DX: WAX REMOVAL 15 mL 0    cephalexin (KEFLEX) 500 mg capsule Take 500 mg by mouth every 6 (six) hours      CeraVe Therapeutic Hand Cream 1 % cream APPLY TOPICALLY DAILY AT 8 PM DX: SKIN IRRIATION 85 g 1    cholecalciferol (VITAMIN D3) 1,000 units tablet Take 1 tablet (1,000 Units total) by mouth every morning At 8AM 100 tablet 2    Dentifrices (BIOTENE DRY MOUTH CARE DT) Apply to teeth      Diclofenac Sodium (VOLTAREN) 1 % Apply 2 g topically 4 (four) times a day 100 g 0    docusate sodium (COLACE) 100 mg capsule Take 1 capsule (100 mg total) by mouth 2 (two) times a day 60 capsule 1    Elastic Bandages & Supports (Gait/Transfer Belt) MISC Use in the morning 1 each 0    escitalopram (LEXAPRO) 5 mg tablet Take 3 tablets (15 mg total) by mouth daily      guaiFENesin (ROBITUSSIN) 100 MG/5ML oral liquid Take 200 mg by mouth as needed in the morning and 200 mg as needed at noon and 200 mg as needed in the evening for cough.      Heating Pad PADS by Does not apply route 2 (two) times a day 1 each 0    Incontinence  Supply Disposable (RA Adult Wipes) MISC Use daily 200 each 2    Incontinence Supply Disposable (Ultra-Soft Plus Briefs XXL) MISC Use if needed (incontinence) 200 each 2    lacosamide (VIMPAT) 50 mg Take 0.5 tablets (25 mg total) by mouth every morning AND 1 tablet (50 mg total) daily at bedtime. 45 tablet 0    levothyroxine 50 mcg tablet Take 1 tablet (50 mcg total) by mouth daily 30 tablet 5    melatonin 3 mg Take 1 tablet (3 mg total) by mouth daily at bedtime 30 tablet 5    Mouthwashes (Biotene Dry Mouth) LIQD Apply 1 Swab to the mouth or throat 2 (two) times a day as needed (dry mouth) Use 5ml of solution with each swab 237 mL 0    nystatin powder Apply topically 3 (three) times a day as needed (rash) 60 g 0    polyethylene glycol (MIRALAX) 17 g Give 17g 3 times a week on Tue, Fri, Sun. Hold for diarrhea 30 each 5    sodium chloride (OCEAN) 0.65 % nasal spray 2 sprays into each nostril as needed for congestion or rhinitis 60 mL 0    tolnaftate (TINACTIN) 1 % spray Apply topically 2 (two) times a day (Patient not taking: Reported on 6/3/2025)      Witch Hazel (Hemorrhoidal Hygiene) 50 % PADS APPLY 1 PAD TOPICALLY AS NEEDED FOR IRRITATION DX PELVIC AND PERINEAL PAIN      witch hazel-glycerin (TUCKS) topical pad Apply 1 Pad topically as needed for irritation (Patient not taking: Reported on 6/3/2025) 10 Pad 0    ZINC OXIDE, TOPICAL, 10 % CREA Apply topically 3 (three) times a day as needed (rash) 113 g 3     Current Facility-Administered Medications on File Prior to Visit   Medication Dose Route Frequency Provider Last Rate Last Admin    denosumab (PROLIA) subcutaneous injection 60 mg  60 mg Subcutaneous Q6 Months    60 mg at 06/13/25 1502   [2]   Social History  Tobacco Use    Smoking status: Never     Passive exposure: Past    Smokeless tobacco: Never   Vaping Use    Vaping status: Never Used   Substance and Sexual Activity    Alcohol use: Not Currently     Comment: PATIENT DOES NOT DRINK    Drug use: Never     Sexual activity: Never

## 2025-06-20 DIAGNOSIS — E78.3 MIXED HYPERGLYCERIDEMIA: ICD-10-CM

## 2025-06-20 DIAGNOSIS — J06.9 ACUTE URI: ICD-10-CM

## 2025-06-20 DIAGNOSIS — M25.569 CHRONIC KNEE PAIN, UNSPECIFIED LATERALITY: ICD-10-CM

## 2025-06-20 DIAGNOSIS — G89.29 CHRONIC KNEE PAIN, UNSPECIFIED LATERALITY: ICD-10-CM

## 2025-06-20 DIAGNOSIS — E03.9 HYPOTHYROIDISM, UNSPECIFIED TYPE: ICD-10-CM

## 2025-06-20 RX ORDER — ATORVASTATIN CALCIUM 10 MG/1
TABLET, FILM COATED ORAL
Qty: 30 TABLET | Refills: 5 | Status: SHIPPED | OUTPATIENT
Start: 2025-06-20

## 2025-06-20 RX ORDER — LEVOTHYROXINE SODIUM 50 UG/1
TABLET ORAL
Qty: 30 TABLET | Refills: 5 | Status: SHIPPED | OUTPATIENT
Start: 2025-06-20

## 2025-06-21 DIAGNOSIS — Z86.69 HISTORY OF IMPACTED CERUMEN: ICD-10-CM

## 2025-06-21 DIAGNOSIS — H61.23 BILATERAL IMPACTED CERUMEN: ICD-10-CM

## 2025-06-22 RX ORDER — ACETAMINOPHEN 500 MG
500 TABLET ORAL EVERY 6 HOURS PRN
Qty: 90 TABLET | Refills: 0 | Status: SHIPPED | OUTPATIENT
Start: 2025-06-22

## 2025-06-23 RX ORDER — CARBAMIDE PEROXIDE 6.5 %
DROPS OTIC (EAR)
Qty: 15 ML | Refills: 0 | Status: SHIPPED | OUTPATIENT
Start: 2025-06-23

## 2025-06-24 ENCOUNTER — TELEPHONE (OUTPATIENT)
Age: 50
End: 2025-06-24

## 2025-06-24 DIAGNOSIS — J06.9 ACUTE URI: ICD-10-CM

## 2025-06-24 DIAGNOSIS — L03.039 PARONYCHIA OF FIFTH TOE: ICD-10-CM

## 2025-06-24 DIAGNOSIS — L08.9 SKIN INFECTION: ICD-10-CM

## 2025-06-24 NOTE — PROGRESS NOTES
OPCM SW received voicemail from brother  MARJAN placed call to brother and received voicemail  Message left to follow up on patient's progress with [facility] and the [facility] services provided at the group home  MARJAN provided contact information  Awaiting return all from brother 
[phone]

## 2025-06-24 NOTE — TELEPHONE ENCOUNTER
Done, let them know.  Written and put in the fax area along with this, so can send to both places as requested

## 2025-06-24 NOTE — TELEPHONE ENCOUNTER
Noemy from [facility] calling.  She states that Aminta's wheelchair does not seem to fit her properly, she is leasing to the side or forward in it.  Noemy would like her to get a wheelchair assessment at [facility] and is asking if Dr. Olvera can place a script for this.  Please fax to both [facility] at [phone] and to [facility] at [phone].

## 2025-06-25 DIAGNOSIS — M25.562 CHRONIC PAIN OF LEFT KNEE: ICD-10-CM

## 2025-06-25 DIAGNOSIS — G89.29 CHRONIC PAIN OF LEFT KNEE: ICD-10-CM

## 2025-06-25 RX ORDER — GINSENG 100 MG
CAPSULE ORAL
Qty: 30 G | Refills: 0 | Status: SHIPPED | OUTPATIENT
Start: 2025-06-25

## 2025-06-25 RX ORDER — DICLOFENAC SODIUM 10 MG/G
GEL TOPICAL
Qty: 100 G | Refills: 1 | Status: SHIPPED | OUTPATIENT
Start: 2025-06-25

## 2025-06-25 RX ORDER — SODIUM CHLORIDE 0.65 %
AEROSOL, SPRAY (ML) NASAL
Qty: 60 ML | Refills: 1 | Status: SHIPPED | OUTPATIENT
Start: 2025-06-25

## 2025-06-26 DIAGNOSIS — G89.29 CHRONIC KNEE PAIN, UNSPECIFIED LATERALITY: ICD-10-CM

## 2025-06-26 DIAGNOSIS — M25.569 CHRONIC KNEE PAIN, UNSPECIFIED LATERALITY: ICD-10-CM

## 2025-06-27 RX ORDER — ACETAMINOPHEN 500 MG
TABLET ORAL
Qty: 90 TABLET | Refills: 0 | OUTPATIENT
Start: 2025-06-27

## 2025-07-01 DIAGNOSIS — Z86.69 HISTORY OF IMPACTED CERUMEN: ICD-10-CM

## 2025-07-01 DIAGNOSIS — H61.23 BILATERAL IMPACTED CERUMEN: ICD-10-CM

## 2025-07-02 RX ORDER — CARBAMIDE PEROXIDE 6.5 %
DROPS OTIC (EAR)
Qty: 15 ML | Refills: 0 | Status: SHIPPED | OUTPATIENT
Start: 2025-07-02

## 2025-07-07 DIAGNOSIS — F51.01 PRIMARY INSOMNIA: ICD-10-CM

## 2025-07-07 DIAGNOSIS — K59.04 CHRONIC IDIOPATHIC CONSTIPATION: ICD-10-CM

## 2025-07-08 ENCOUNTER — OFFICE VISIT (OUTPATIENT)
Dept: OBGYN CLINIC | Facility: HOSPITAL | Age: 50
End: 2025-07-08
Payer: MEDICARE

## 2025-07-08 VITALS — HEIGHT: 55 IN | BODY MASS INDEX: 34.54 KG/M2

## 2025-07-08 DIAGNOSIS — R56.9 SEIZURE-LIKE ACTIVITY (HCC): ICD-10-CM

## 2025-07-08 DIAGNOSIS — M17.12 PRIMARY OSTEOARTHRITIS OF LEFT KNEE: Primary | ICD-10-CM

## 2025-07-08 PROCEDURE — 20610 DRAIN/INJ JOINT/BURSA W/O US: CPT | Performed by: ORTHOPAEDIC SURGERY

## 2025-07-08 PROCEDURE — 99213 OFFICE O/P EST LOW 20 MIN: CPT | Performed by: ORTHOPAEDIC SURGERY

## 2025-07-08 RX ORDER — LIDOCAINE HYDROCHLORIDE 10 MG/ML
1 INJECTION, SOLUTION INFILTRATION; PERINEURAL
Status: COMPLETED | OUTPATIENT
Start: 2025-07-08 | End: 2025-07-08

## 2025-07-08 RX ORDER — TRIAMCINOLONE ACETONIDE 40 MG/ML
80 INJECTION, SUSPENSION INTRA-ARTICULAR; INTRAMUSCULAR
Status: COMPLETED | OUTPATIENT
Start: 2025-07-08 | End: 2025-07-08

## 2025-07-08 RX ADMIN — TRIAMCINOLONE ACETONIDE 80 MG: 40 INJECTION, SUSPENSION INTRA-ARTICULAR; INTRAMUSCULAR at 10:15

## 2025-07-08 RX ADMIN — LIDOCAINE HYDROCHLORIDE 1 ML: 10 INJECTION, SOLUTION INFILTRATION; PERINEURAL at 10:15

## 2025-07-08 NOTE — TELEPHONE ENCOUNTER
Patient Id Prescription # Sold Filled Written Drug Label Qty Days Strength MME* Prescriber Pharmacy Payment REFILL #/Auth State Detail  1 [id number] 06/13/2025 06/13/2025 06/10/2025 Lacosamide (Tablet) 45.0 30 50 MG NA CECY POWELL [facility] Medicare 0 / 0 PA   1 [id number] 05/15/2025 05/15/2025 04/16/2025 Lacosamide (Tablet) 45.0 30 50 MG NA [facility] Medicare 0 / 5 PA   1 [id number] 04/16/2025 04/15/2025 04/15/2025 Lacosamide (Tablet) 45.0 30 50 MG NA BENJIROXANNA COOK [facility] Commercial Insurance 0 / 0 PA   1 [id number] 04/07/2025 04/04/2025 04/04/2025 Lacosamide (Tablet) 90.0 90 50 MG NA [facility] Medicare 0 / 1 PA   2 [id number] 02/07/2025 01/20/2025 11/26/2024 Lacosamide (Tablet) 30.0 30 50 MG NA UDAY [facility] Medicare 2 / 1 PA   2 [id number] 12/30/2024 12/27/2024 11/26/2024 Lacosamide (Tablet) 30.0 30 50 MG NA UDAY [facility] Medicare 1 / 1 PA   2 [id number] 12/11/2024 11/26/2024 11/26/2024 Lacosamide (Tablet) 30.0 30 50 MG NA UDAY [facility] [place] Medicare 0 / 1 PA   2 [id number] 11/14/2024 10/23/2024 06/03/2024 Lacosamide (Tablet) 30.0 30 50 [facility] [place] Medicare 5 / 5 PA   3 [id number] ** 09/27/2024 06/03/2024 Lacosamide (Tablet) 30.0 30 50 MG NA UDAY [facility] [place] Commercial Insurance 4 / 5 PA   3 [id number] ** 08/30/2024 08/29/2024 oxyCODONE HCL (Tablet) 15.0 4 5 MG 28.12 [facility] Commercial Insurance 0 / 0 PA

## 2025-07-08 NOTE — PROGRESS NOTES
Assessment & Plan  Primary osteoarthritis of left knee  - Discussed with patient and caregiver that today's physical exam is consistent with flareup of primary osteoarthritis of the left knee  -Patient was provided with Kenalog and lidocaine injection into the left knee for symptomatic relief of pain and inflammation  -Patient tolerated treatment well  -Continue activity as tolerated  -Discussed with caregiver that if patient continues to be symptomatic, we can reexamine and consider for pes anserine bursa injection at time of next visit  -Caregiver expresses understanding and is in agreement with this treatment plan  Orders:    Large joint arthrocentesis: L knee      Subjective:   Patient ID: Aminta De La Torre  1975     HPI  Patient is a 50 y.o. female who presents for follow-up evaluation of chronic left knee pain. She was last seen in regards to this issue on 4/8/2025 at which time she received a Kenalog injection to the left knee. As the patient is nonverbal, she was seen with the assistance of her accompanying caregiver. Patient has not participated in formal physical therapy since the time of her last injection, does not normally ambulate, but does complain of pain in the left knee and shows avoidant behavior regarding weightbearing on the left lower extremity.    The following portions of the patient's history were reviewed and updated as appropriate:  Past medical history, past surgical history, Family history, social history, current medications and allergies    Past Medical History[1]    Past Surgical History[2]    Family History[3]    Social History[4]    Current Medications[5]    Allergies   Allergen Reactions    Levetiracetam Drowsiness and Hives     And disorientation    Depakote [Valproic Acid] Drowsiness    Lamictal [Lamotrigine] Rash    Dust Mite Extract Other (See Comments)     Per patient's medical appointment form, no reaction listed    Molds & Smuts Other (See Comments)     Per patient's  "medical appointment form, no reaction listed    Other Other (See Comments)     Volporic acid, levitracetam, Per patient's medical appointment form, no reaction listed    Pollen Extract Sneezing       Review of Systems   Constitutional:  Negative for chills, fever and unexpected weight change.   HENT:  Negative for hearing loss, nosebleeds and sore throat.    Eyes:  Negative for pain, redness and visual disturbance.   Respiratory:  Negative for cough, shortness of breath and wheezing.    Cardiovascular:  Negative for chest pain, palpitations and leg swelling.   Gastrointestinal:  Negative for abdominal pain, nausea and vomiting.   Endocrine: Negative for polydipsia and polyuria.   Genitourinary:  Negative for dysuria and hematuria.   Musculoskeletal:         As noted in HPI   Skin:  Negative for rash and wound.   Neurological:  Negative for dizziness, numbness and headaches.   Psychiatric/Behavioral:  Negative for decreased concentration and suicidal ideas. The patient is not nervous/anxious.         Objective:  Ht 4' 5\" (1.346 m)   LMP  (LMP Unknown)   BMI 34.54 kg/m²     Ortho Exam  Left knee -   Patient presents in wheelchair  No obvious anatomical deformity present  Skin is warm and dry with no signs of erythema, ecchymosis, infection  TTP over medial joint line  TTP over pes anserine bursa  ROM 40 degrees - 90 degrees  2+ TP and DP pulses were brisk capillary refill to the toes        Physical Exam  Vitals and nursing note reviewed.   Constitutional:       General: She is not in acute distress.     Appearance: She is well-developed.   HENT:      Head: Normocephalic and atraumatic.     Eyes:      Conjunctiva/sclera: Conjunctivae normal.       Cardiovascular:      Rate and Rhythm: Normal rate.   Pulmonary:      Effort: Pulmonary effort is normal.     Musculoskeletal:      Cervical back: Neck supple.     Skin:     General: Skin is warm and dry.      Capillary Refill: Capillary refill takes less than 2 seconds. " "    Neurological:      Mental Status: She is alert and oriented to person, place, and time.     Psychiatric:         Mood and Affect: Mood normal.         Behavior: Behavior normal.          Diagnostic Test Review:  No new imaging reviewed this visit    Large joint arthrocentesis: L knee    Performed by: Galileo Hubbard MD  Authorized by: Galileo Hubbard MD    Universal Protocol:  Consent: Verbal consent obtained  Risks and benefits: risks, benefits and alternatives were discussed  Consent given by: patient  Time out: Immediately prior to procedure a \"time out\" was called to verify the correct patient, procedure, equipment, support staff and site/side marked as required.  Timeout called at: 7/8/2025 10:40 AM.  Patient understanding: patient states understanding of the procedure being performed  Site marked: the operative site was marked  Patient identity confirmed: verbally with patient  Supporting Documentation  Indications: pain and joint swelling     Is this a Visco injection? NoProcedure Details  Location: knee - L knee  Preparation: Patient was prepped and draped in the usual sterile fashion  Needle gauge: 21G.  Ultrasound guidance: no  Approach: anterior  Medications administered: 80 mg triamcinolone acetonide 40 mg/mL; 1 mL lidocaine 1 %    Patient tolerance: patient tolerated the procedure well with no immediate complications  Dressing:  Sterile dressing applied           Scribe Attestation      I,:  Momo Mcintosh am acting as a scribe while in the presence of the attending physician.:       I,:  Galileo Hubbard MD personally performed the services described in this documentation    as scribed in my presence.:                   [1]   Past Medical History:  Diagnosis Date    Allergic     Anxiety     Arthritis     Community acquired pneumonia     Last Assessed:1/22/2013    Encounter for PPD test 09/26/2023    Encounter for vaccination 09/26/2023    Tdap given at this visit    Leukopenia     Last Assessed:3/5/2014 "    Osteoporosis     Preop examination 09/29/2019    Sleep apnea     Trisomy 21, Down syndrome    [2]   Past Surgical History:  Procedure Laterality Date    MO OPEN TX TRIMALLEOLAR ANKLE FX W/O FIXJ PST LIP Left 8/29/2024    Procedure: ORIF TRIMALLEOLAR FX, OPEN REDUCTION/TREATMENT OF TILLEAUX FRACTURE;  Surgeon: Mahamed Zamorano MD;  Location: [facility];  Service: Orthopedics    TONSILLECTOMY AND ADENOIDECTOMY     [3]   Family History  Problem Relation Name Age of Onset    Cancer Mother      No Known Problems Sister      No Known Problems Brother      No Known Problems Brother     [4]   Social History  Socioeconomic History    Marital status: Single   Tobacco Use    Smoking status: Never     Passive exposure: Past    Smokeless tobacco: Never   Vaping Use    Vaping status: Never Used   Substance and Sexual Activity    Alcohol use: Not Currently     Comment: PATIENT DOES NOT DRINK    Drug use: Never    Sexual activity: Never     Social Drivers of Health     Financial Resource Strain: Patient Declined (6/5/2023)    Received from [facility]    Overall Financial Resource Strain (CARDIA)     Difficulty of Paying Living Expenses: Patient declined   Food Insecurity: No Food Insecurity (4/13/2025)    Nursing - Inadequate Food Risk Classification     Worried About Running Out of Food in the Last Year: Never true     Ran Out of Food in the Last Year: Never true     Ran Out of Food in the Last Year: Never true   Transportation Needs: No Transportation Needs (4/13/2025)    Nursing - Transportation Risk Classification     Lack of Transportation: No   Intimate Partner Violence: Unknown (4/13/2025)    Nursing IPS     Physically Hurt by Someone: No     Hurt or Threatened by Someone: No   Housing Stability: Unknown (4/13/2025)    Nursing: Inadequate Housing Risk Classification     Unable to Pay for Housing in the Last Year: No     Has Housing: No   [5]   Current Outpatient Medications:     acetaminophen (TYLENOL)  500 mg tablet, Take 1 tablet (500 mg total) by mouth every 6 (six) hours as needed for mild pain or fever With 600 mg ibuprofen, Disp: 90 tablet, Rfl: 0    Arthritis Pain Reliever 1 %, APPLY 2 G TOPICALLY 4 (FOUR) TIMES A DAY TO LEFT KNEE DX: PAIN @ 8AM, 12PM, 4PM & 8PM, Disp: 100 g, Rfl: 1    atorvastatin (LIPITOR) 10 mg tablet, TAKE 1 TABLET (10 MG TOTAL) BY MOUTH DAILY @ 8AM DX: HYPERCHYLOMICRONEMIA, Disp: 30 tablet, Rfl: 5    bacitracin topical ointment 500 units/g topical ointment, APPLY 1 LARGE APPLICATION TOPICALLY THREE TIMES A DAY AS NEEDED FOR WOUND CARE (SKIN INFECTION), Disp: 30 g, Rfl: 0    bisacodyl (DULCOLAX) 10 mg suppository, Insert 1 suppository (10 mg total) into the rectum daily as needed for constipation, Disp: 12 suppository, Rfl: 0    Carbamide Peroxide (Ear Wax Drops) 6.5 % SOLN, INSTILL 5 DROPS INTO EACH EAR, TWICE A WEEK ON TUESDAYS AND FRIDAY @ 8PM DX: WAX REMOVAL, Disp: 15 mL, Rfl: 0    cephalexin (KEFLEX) 500 mg capsule, Take 500 mg by mouth every 6 (six) hours, Disp: , Rfl:     CeraVe Therapeutic Hand Cream 1 % cream, APPLY TOPICALLY DAILY AT 8 PM DX: SKIN IRRIATION, Disp: 85 g, Rfl: 1    cholecalciferol (VITAMIN D3) 1,000 units tablet, Take 1 tablet (1,000 Units total) by mouth every morning At 8AM, Disp: 100 tablet, Rfl: 2    Dentifrices (BIOTENE DRY MOUTH CARE DT), Apply to teeth, Disp: , Rfl:     docusate sodium (COLACE) 100 mg capsule, Take 1 capsule (100 mg total) by mouth 2 (two) times a day, Disp: 60 capsule, Rfl: 1    Elastic Bandages & Supports (Gait/Transfer Belt) MISC, Use in the morning, Disp: 1 each, Rfl: 0    escitalopram (LEXAPRO) 5 mg tablet, Take 3 tablets (15 mg total) by mouth daily, Disp: , Rfl:     guaiFENesin (ROBITUSSIN) 100 MG/5ML oral liquid, Take 200 mg by mouth as needed in the morning and 200 mg as needed at noon and 200 mg as needed in the evening for cough., Disp: , Rfl:     Heating Pad PADS, by Does not apply route 2 (two) times a day, Disp: 1 each, Rfl:  0    Incontinence Supply Disposable (RA Adult Wipes) MISC, Use daily, Disp: 200 each, Rfl: 2    Incontinence Supply Disposable (Ultra-Soft Plus Briefs XXL) MISC, Use if needed (incontinence), Disp: 200 each, Rfl: 2    lacosamide (VIMPAT) 50 mg, Take 0.5 tablets (25 mg total) by mouth every morning AND 1 tablet (50 mg total) daily at bedtime., Disp: 45 tablet, Rfl: 0    levothyroxine 50 mcg tablet, TAKE ONE TABLET BY MOUTH DAILY @ 6AM DX:HYPOTHYRODISM, Disp: 30 tablet, Rfl: 5    melatonin 3 mg, Take 1 tablet (3 mg total) by mouth daily at bedtime, Disp: 30 tablet, Rfl: 5    Mouthwashes (Biotene Dry Mouth) LIQD, Apply 1 Swab to the mouth or throat 2 (two) times a day as needed (dry mouth) Use 5ml of solution with each swab, Disp: 237 mL, Rfl: 0    nystatin powder, Apply topically 3 (three) times a day as needed (rash), Disp: 60 g, Rfl: 0    polyethylene glycol (MIRALAX) 17 g, Give 17g 3 times a week on Tue, Fri, Sun. Hold for diarrhea, Disp: 30 each, Rfl: 5    V-R NASAL SPRAY SALINE 0.65 % nasal spray, INSTILL TWO SPRAYS INTO EACH NOSTRIL AS NEEDED FOR CONGESTION OR RHINITIS DX: INFECTION, Disp: 60 mL, Rfl: 1    Witch Hazel (Hemorrhoidal Hygiene) 50 % PADS, APPLY 1 PAD TOPICALLY AS NEEDED FOR IRRITATION DX PELVIC AND PERINEAL PAIN, Disp: , Rfl:     ZINC OXIDE, TOPICAL, 10 % CREA, Apply topically 3 (three) times a day as needed (rash), Disp: 113 g, Rfl: 3    aspirin (Aspirin Low Dose) 81 mg EC tablet, Take 1 tablet (81 mg total) by mouth 2 (two) times a day (Patient not taking: Reported on 7/8/2025), Disp: 180 tablet, Rfl: 3    tolnaftate (TINACTIN) 1 % spray, Apply topically 2 (two) times a day (Patient not taking: Reported on 4/29/2025), Disp: , Rfl:     witch hazel-glycerin (TUCKS) topical pad, Apply 1 Pad topically as needed for irritation (Patient not taking: Reported on 4/29/2025), Disp: 10 Pad, Rfl: 0    Current Facility-Administered Medications:     denosumab (PROLIA) subcutaneous injection 60 mg, 60 mg,  Subcutaneous, Q6 Months, , 60 mg at 06/13/25 1504

## 2025-07-09 DIAGNOSIS — L85.3 XEROSIS OF SKIN: ICD-10-CM

## 2025-07-09 RX ORDER — DOCUSATE SODIUM 100 MG/1
CAPSULE, LIQUID FILLED ORAL
Qty: 60 CAPSULE | Refills: 5 | Status: SHIPPED | OUTPATIENT
Start: 2025-07-09

## 2025-07-10 DIAGNOSIS — G89.29 CHRONIC KNEE PAIN, UNSPECIFIED LATERALITY: ICD-10-CM

## 2025-07-10 DIAGNOSIS — L08.9 SKIN INFECTION: ICD-10-CM

## 2025-07-10 DIAGNOSIS — M25.569 CHRONIC KNEE PAIN, UNSPECIFIED LATERALITY: ICD-10-CM

## 2025-07-10 DIAGNOSIS — L03.039 PARONYCHIA OF FIFTH TOE: ICD-10-CM

## 2025-07-10 RX ORDER — LACOSAMIDE 50 MG/1
TABLET ORAL
Qty: 45 TABLET | Refills: 5 | Status: SHIPPED | OUTPATIENT
Start: 2025-07-10

## 2025-07-11 RX ORDER — ACETAMINOPHEN 500 MG
TABLET ORAL
Qty: 90 TABLET | Refills: 0 | Status: SHIPPED | OUTPATIENT
Start: 2025-07-11

## 2025-07-11 RX ORDER — GINSENG 100 MG
CAPSULE ORAL
Qty: 30 G | Refills: 0 | Status: SHIPPED | OUTPATIENT
Start: 2025-07-11

## 2025-07-14 ENCOUNTER — TELEPHONE (OUTPATIENT)
Age: 50
End: 2025-07-14

## 2025-07-14 NOTE — TELEPHONE ENCOUNTER
"Incoming call from caregiver Noemy Desai regarding patient's Lacosamide. Patient's medication gets delivered between 9 am and 9:30 am. Noemy was concerned that the prescription was written, \"Every morning and Daily at bedtime @8AM &8PM.\"     Advised patient can get Lacosamide at 9-9:30 am and 9:00 pm. Noemy voiced clear understanding.             DONNELL Rose  "

## 2025-07-15 ENCOUNTER — TELEPHONE (OUTPATIENT)
Dept: NEUROLOGY | Facility: CLINIC | Age: 50
End: 2025-07-15

## 2025-07-23 ENCOUNTER — TELEPHONE (OUTPATIENT)
Age: 50
End: 2025-07-23

## 2025-07-23 NOTE — TELEPHONE ENCOUNTER
[facility] calling becuase they received and order for eval for  a wheelchair and they did not get a demographics and medical insurance information.  Ana Payton

## 2025-07-28 DIAGNOSIS — M25.562 CHRONIC PAIN OF LEFT KNEE: ICD-10-CM

## 2025-07-28 DIAGNOSIS — G89.29 CHRONIC KNEE PAIN, UNSPECIFIED LATERALITY: ICD-10-CM

## 2025-07-28 DIAGNOSIS — G89.29 CHRONIC PAIN OF LEFT KNEE: ICD-10-CM

## 2025-07-28 DIAGNOSIS — M25.569 CHRONIC KNEE PAIN, UNSPECIFIED LATERALITY: ICD-10-CM

## 2025-07-30 RX ORDER — ACETAMINOPHEN 500 MG
TABLET ORAL
Qty: 90 TABLET | Refills: 0 | Status: SHIPPED | OUTPATIENT
Start: 2025-07-30

## 2025-07-30 RX ORDER — DICLOFENAC SODIUM 10 MG/G
GEL TOPICAL
Qty: 100 G | Refills: 1 | Status: SHIPPED | OUTPATIENT
Start: 2025-07-30

## 2025-08-01 ENCOUNTER — TELEPHONE (OUTPATIENT)
Dept: NEUROLOGY | Facility: CLINIC | Age: 50
End: 2025-08-01

## 2025-08-04 ENCOUNTER — TELEPHONE (OUTPATIENT)
Dept: NEUROLOGY | Facility: CLINIC | Age: 50
End: 2025-08-04

## 2025-08-05 ENCOUNTER — APPOINTMENT (EMERGENCY)
Dept: RADIOLOGY | Facility: HOSPITAL | Age: 50
DRG: 177 | End: 2025-08-05
Payer: MEDICARE

## 2025-08-05 ENCOUNTER — OFFICE VISIT (OUTPATIENT)
Dept: URGENT CARE | Age: 50
End: 2025-08-05
Payer: MEDICARE

## 2025-08-05 ENCOUNTER — HOSPITAL ENCOUNTER (INPATIENT)
Facility: HOSPITAL | Age: 50
LOS: 4 days | Discharge: HOME | DRG: 177 | End: 2025-08-09
Attending: EMERGENCY MEDICINE | Admitting: INTERNAL MEDICINE
Payer: MEDICARE

## 2025-08-05 ENCOUNTER — APPOINTMENT (EMERGENCY)
Dept: RADIOLOGY | Age: 50
DRG: 177 | End: 2025-08-05
Payer: MEDICARE

## 2025-08-05 VITALS
OXYGEN SATURATION: 92 % | SYSTOLIC BLOOD PRESSURE: 128 MMHG | RESPIRATION RATE: 20 BRPM | HEART RATE: 94 BPM | TEMPERATURE: 97.6 F | DIASTOLIC BLOOD PRESSURE: 68 MMHG

## 2025-08-05 DIAGNOSIS — R09.89 CHEST CONGESTION: Primary | ICD-10-CM

## 2025-08-05 PROBLEM — J18.9 PNEUMONIA OF RIGHT LOWER LOBE DUE TO INFECTIOUS ORGANISM: Status: ACTIVE | Noted: 2025-08-05

## 2025-08-05 PROBLEM — G93.40 ACUTE ENCEPHALOPATHY: Status: ACTIVE | Noted: 2025-08-05

## 2025-08-05 PROCEDURE — 99214 OFFICE O/P EST MOD 30 MIN: CPT

## 2025-08-05 PROCEDURE — G0463 HOSPITAL OUTPT CLINIC VISIT: HCPCS

## 2025-08-05 RX ORDER — ESCITALOPRAM OXALATE 10 MG/1
TABLET ORAL DAILY
COMMUNITY
Start: 2025-07-29

## 2025-08-06 ENCOUNTER — APPOINTMENT (INPATIENT)
Dept: NEUROLOGY | Facility: CLINIC | Age: 50
DRG: 177 | End: 2025-08-06
Payer: MEDICARE

## 2025-08-06 ENCOUNTER — TELEPHONE (OUTPATIENT)
Dept: NEUROLOGY | Facility: CLINIC | Age: 50
End: 2025-08-06

## 2025-08-07 ENCOUNTER — APPOINTMENT (INPATIENT)
Dept: RADIOLOGY | Facility: HOSPITAL | Age: 50
DRG: 177 | End: 2025-08-07
Payer: MEDICARE

## 2025-08-08 PROBLEM — E11.9 T2DM (TYPE 2 DIABETES MELLITUS) (HCC): Status: ACTIVE | Noted: 2025-08-08

## 2025-08-11 ENCOUNTER — TRANSITIONAL CARE MANAGEMENT (OUTPATIENT)
Dept: INTERNAL MEDICINE CLINIC | Facility: CLINIC | Age: 50
End: 2025-08-11

## 2025-08-13 ENCOUNTER — APPOINTMENT (EMERGENCY)
Dept: RADIOLOGY | Facility: HOSPITAL | Age: 50
DRG: 100 | End: 2025-08-13
Payer: MEDICARE

## 2025-08-13 ENCOUNTER — HOSPITAL ENCOUNTER (INPATIENT)
Facility: HOSPITAL | Age: 50
LOS: 3 days | Discharge: HOME | DRG: 100 | End: 2025-08-16
Attending: EMERGENCY MEDICINE
Payer: MEDICARE

## 2025-08-13 DIAGNOSIS — T17.908A ASPIRATION INTO AIRWAY, INITIAL ENCOUNTER: ICD-10-CM

## 2025-08-13 DIAGNOSIS — G40.919 BREAKTHROUGH SEIZURE (HCC): Primary | ICD-10-CM

## 2025-08-13 PROBLEM — J69.0 ASPIRATION PNEUMONIA (HCC): Status: ACTIVE | Noted: 2025-08-13

## 2025-08-13 LAB
ALBUMIN SERPL BCG-MCNC: 2.9 G/DL (ref 3.5–5)
ALP SERPL-CCNC: 75 U/L (ref 34–104)
ALT SERPL W P-5'-P-CCNC: 24 U/L (ref 7–52)
ANION GAP SERPL CALCULATED.3IONS-SCNC: 7 MMOL/L (ref 4–13)
AST SERPL W P-5'-P-CCNC: 26 U/L (ref 13–39)
ATRIAL RATE: 84 BPM
ATRIAL RATE: 87 BPM
BASOPHILS # BLD AUTO: 0.07 THOUSANDS/ÂΜL (ref 0–0.1)
BASOPHILS NFR BLD AUTO: 1 % (ref 0–1)
BILIRUB SERPL-MCNC: 0.67 MG/DL (ref 0.2–1)
BILIRUB UR QL STRIP: NEGATIVE
BUN SERPL-MCNC: 14 MG/DL (ref 5–25)
CALCIUM ALBUM COR SERPL-MCNC: 9.2 MG/DL (ref 8.3–10.1)
CALCIUM SERPL-MCNC: 8.3 MG/DL (ref 8.4–10.2)
CHLORIDE SERPL-SCNC: 106 MMOL/L (ref 96–108)
CLARITY UR: CLEAR
CO2 SERPL-SCNC: 25 MMOL/L (ref 21–32)
COLOR UR: YELLOW
CREAT SERPL-MCNC: 0.7 MG/DL (ref 0.6–1.3)
EOSINOPHIL # BLD AUTO: 0.02 THOUSAND/ÂΜL (ref 0–0.61)
EOSINOPHIL NFR BLD AUTO: 0 % (ref 0–6)
ERYTHROCYTE [DISTWIDTH] IN BLOOD BY AUTOMATED COUNT: 14 % (ref 11.6–15.1)
GFR SERPL CREATININE-BSD FRML MDRD: 99 ML/MIN/1.73SQ M
GLUCOSE SERPL-MCNC: 107 MG/DL (ref 65–140)
GLUCOSE UR STRIP-MCNC: NEGATIVE MG/DL
HCT VFR BLD AUTO: 47.2 % (ref 34.8–46.1)
HGB BLD-MCNC: 15.7 G/DL (ref 11.5–15.4)
HGB UR QL STRIP.AUTO: NEGATIVE
IMM GRANULOCYTES # BLD AUTO: 0.03 THOUSAND/UL (ref 0–0.2)
IMM GRANULOCYTES NFR BLD AUTO: 0 % (ref 0–2)
KETONES UR STRIP-MCNC: NEGATIVE MG/DL
LACOSAMIDE SERPL-MCNC: 3.26 UG/ML (ref 1–20)
LACTATE SERPL-SCNC: 1.9 MMOL/L (ref 0.5–2)
LEUKOCYTE ESTERASE UR QL STRIP: NEGATIVE
LYMPHOCYTES # BLD AUTO: 1.7 THOUSANDS/ÂΜL (ref 0.6–4.47)
LYMPHOCYTES NFR BLD AUTO: 25 % (ref 14–44)
MCH RBC QN AUTO: 34.1 PG (ref 26.8–34.3)
MCHC RBC AUTO-ENTMCNC: 33.3 G/DL (ref 31.4–37.4)
MCV RBC AUTO: 102 FL (ref 82–98)
MONOCYTES # BLD AUTO: 0.78 THOUSAND/ÂΜL (ref 0.17–1.22)
MONOCYTES NFR BLD AUTO: 12 % (ref 4–12)
NEUTROPHILS # BLD AUTO: 4.18 THOUSANDS/ÂΜL (ref 1.85–7.62)
NEUTS SEG NFR BLD AUTO: 62 % (ref 43–75)
NITRITE UR QL STRIP: NEGATIVE
NRBC BLD AUTO-RTO: 0 /100 WBCS
P AXIS: 60 DEGREES
P AXIS: 63 DEGREES
PH UR STRIP.AUTO: 6 [PH]
PLATELET # BLD AUTO: 174 THOUSANDS/UL (ref 149–390)
PMV BLD AUTO: 10.9 FL (ref 8.9–12.7)
POTASSIUM SERPL-SCNC: 4.4 MMOL/L (ref 3.5–5.3)
PR INTERVAL: 160 MS
PR INTERVAL: 162 MS
PROCALCITONIN SERPL-MCNC: <0.05 NG/ML
PROT SERPL-MCNC: 6.6 G/DL (ref 6.4–8.4)
PROT UR STRIP-MCNC: NEGATIVE MG/DL
QRS AXIS: 101 DEGREES
QRS AXIS: 95 DEGREES
QRSD INTERVAL: 74 MS
QRSD INTERVAL: 78 MS
QT INTERVAL: 408 MS
QT INTERVAL: 412 MS
QTC INTERVAL: 482 MS
QTC INTERVAL: 495 MS
RBC # BLD AUTO: 4.61 MILLION/UL (ref 3.81–5.12)
SODIUM SERPL-SCNC: 138 MMOL/L (ref 135–147)
SP GR UR STRIP.AUTO: 1.02 (ref 1–1.03)
T WAVE AXIS: 34 DEGREES
T WAVE AXIS: 37 DEGREES
UROBILINOGEN UR STRIP-ACNC: <2 MG/DL
VENTRICULAR RATE: 84 BPM
VENTRICULAR RATE: 87 BPM
WBC # BLD AUTO: 6.78 THOUSAND/UL (ref 4.31–10.16)

## 2025-08-13 PROCEDURE — 99285 EMERGENCY DEPT VISIT HI MDM: CPT | Performed by: STUDENT IN AN ORGANIZED HEALTH CARE EDUCATION/TRAINING PROGRAM

## 2025-08-13 PROCEDURE — 81003 URINALYSIS AUTO W/O SCOPE: CPT

## 2025-08-13 PROCEDURE — 36415 COLL VENOUS BLD VENIPUNCTURE: CPT

## 2025-08-13 PROCEDURE — 94760 N-INVAS EAR/PLS OXIMETRY 1: CPT

## 2025-08-13 PROCEDURE — 85025 COMPLETE CBC W/AUTO DIFF WBC: CPT

## 2025-08-13 PROCEDURE — 99285 EMERGENCY DEPT VISIT HI MDM: CPT

## 2025-08-13 PROCEDURE — 99223 1ST HOSP IP/OBS HIGH 75: CPT

## 2025-08-13 PROCEDURE — 93010 ELECTROCARDIOGRAM REPORT: CPT | Performed by: INTERNAL MEDICINE

## 2025-08-13 PROCEDURE — 80235 DRUG ASSAY LACOSAMIDE: CPT

## 2025-08-13 PROCEDURE — 80053 COMPREHEN METABOLIC PANEL: CPT

## 2025-08-13 PROCEDURE — 93005 ELECTROCARDIOGRAM TRACING: CPT

## 2025-08-13 PROCEDURE — 83605 ASSAY OF LACTIC ACID: CPT

## 2025-08-13 PROCEDURE — 87040 BLOOD CULTURE FOR BACTERIA: CPT

## 2025-08-13 PROCEDURE — 71046 X-RAY EXAM CHEST 2 VIEWS: CPT

## 2025-08-13 PROCEDURE — 94664 DEMO&/EVAL PT USE INHALER: CPT

## 2025-08-13 PROCEDURE — 94640 AIRWAY INHALATION TREATMENT: CPT

## 2025-08-13 PROCEDURE — 84145 PROCALCITONIN (PCT): CPT

## 2025-08-13 PROCEDURE — 99285 EMERGENCY DEPT VISIT HI MDM: CPT | Performed by: EMERGENCY MEDICINE

## 2025-08-13 RX ORDER — LACOSAMIDE 50 MG/1
50 TABLET ORAL EVERY 12 HOURS SCHEDULED
Status: DISCONTINUED | OUTPATIENT
Start: 2025-08-13 | End: 2025-08-16 | Stop reason: HOSPADM

## 2025-08-13 RX ORDER — POLYETHYLENE GLYCOL 3350 17 G/17G
17 POWDER, FOR SOLUTION ORAL DAILY
Status: DISCONTINUED | OUTPATIENT
Start: 2025-08-14 | End: 2025-08-16 | Stop reason: HOSPADM

## 2025-08-13 RX ORDER — SODIUM CHLORIDE, SODIUM GLUCONATE, SODIUM ACETATE, POTASSIUM CHLORIDE, MAGNESIUM CHLORIDE, SODIUM PHOSPHATE, DIBASIC, AND POTASSIUM PHOSPHATE .53; .5; .37; .037; .03; .012; .00082 G/100ML; G/100ML; G/100ML; G/100ML; G/100ML; G/100ML; G/100ML
500 INJECTION, SOLUTION INTRAVENOUS ONCE
Status: COMPLETED | OUTPATIENT
Start: 2025-08-13 | End: 2025-08-14

## 2025-08-13 RX ORDER — ATORVASTATIN CALCIUM 10 MG/1
10 TABLET, FILM COATED ORAL
Status: DISCONTINUED | OUTPATIENT
Start: 2025-08-14 | End: 2025-08-16 | Stop reason: HOSPADM

## 2025-08-13 RX ORDER — GUAIFENESIN/DEXTROMETHORPHAN 100-10MG/5
10 SYRUP ORAL EVERY 4 HOURS PRN
Status: DISCONTINUED | OUTPATIENT
Start: 2025-08-13 | End: 2025-08-16 | Stop reason: HOSPADM

## 2025-08-13 RX ORDER — LEVALBUTEROL INHALATION SOLUTION 1.25 MG/3ML
1.25 SOLUTION RESPIRATORY (INHALATION)
Status: DISCONTINUED | OUTPATIENT
Start: 2025-08-13 | End: 2025-08-15

## 2025-08-13 RX ORDER — LEVOTHYROXINE SODIUM 50 UG/1
50 TABLET ORAL
Status: DISCONTINUED | OUTPATIENT
Start: 2025-08-14 | End: 2025-08-16 | Stop reason: HOSPADM

## 2025-08-13 RX ORDER — ENOXAPARIN SODIUM 100 MG/ML
40 INJECTION SUBCUTANEOUS DAILY
Status: DISCONTINUED | OUTPATIENT
Start: 2025-08-14 | End: 2025-08-16 | Stop reason: HOSPADM

## 2025-08-13 RX ORDER — DOCUSATE SODIUM 100 MG/1
100 CAPSULE, LIQUID FILLED ORAL 2 TIMES DAILY
Status: DISCONTINUED | OUTPATIENT
Start: 2025-08-13 | End: 2025-08-16 | Stop reason: HOSPADM

## 2025-08-13 RX ORDER — LEVALBUTEROL INHALATION SOLUTION 1.25 MG/3ML
SOLUTION RESPIRATORY (INHALATION)
Status: COMPLETED
Start: 2025-08-13 | End: 2025-08-13

## 2025-08-13 RX ORDER — LACOSAMIDE 10 MG/ML
25 SOLUTION ORAL ONCE
Status: COMPLETED | OUTPATIENT
Start: 2025-08-13 | End: 2025-08-13

## 2025-08-13 RX ORDER — ACETAMINOPHEN 325 MG/1
650 TABLET ORAL EVERY 6 HOURS PRN
Status: DISCONTINUED | OUTPATIENT
Start: 2025-08-13 | End: 2025-08-16 | Stop reason: HOSPADM

## 2025-08-13 RX ADMIN — LACOSAMIDE ORAL SOLUTION 25 MG: 10 SOLUTION ORAL at 14:11

## 2025-08-13 RX ADMIN — SODIUM CHLORIDE, SODIUM GLUCONATE, SODIUM ACETATE, POTASSIUM CHLORIDE, MAGNESIUM CHLORIDE, SODIUM PHOSPHATE, DIBASIC, AND POTASSIUM PHOSPHATE 500 ML: .53; .5; .37; .037; .03; .012; .00082 INJECTION, SOLUTION INTRAVENOUS at 23:13

## 2025-08-13 RX ADMIN — DICLOFENAC SODIUM 2 G: 10 GEL TOPICAL at 21:10

## 2025-08-13 RX ADMIN — LEVALBUTEROL HYDROCHLORIDE 1.25 MG: 1.25 SOLUTION RESPIRATORY (INHALATION) at 17:50

## 2025-08-13 RX ADMIN — DOCUSATE SODIUM 100 MG: 100 CAPSULE, LIQUID FILLED ORAL at 18:04

## 2025-08-13 RX ADMIN — Medication 3 MG: at 21:09

## 2025-08-13 RX ADMIN — AZITHROMYCIN MONOHYDRATE 500 MG: 500 INJECTION, POWDER, LYOPHILIZED, FOR SOLUTION INTRAVENOUS at 18:01

## 2025-08-13 RX ADMIN — SODIUM CHLORIDE, SODIUM LACTATE, POTASSIUM CHLORIDE, AND CALCIUM CHLORIDE 1000 ML: .6; .31; .03; .02 INJECTION, SOLUTION INTRAVENOUS at 16:38

## 2025-08-13 RX ADMIN — CEFTRIAXONE SODIUM 1000 MG: 10 INJECTION, POWDER, FOR SOLUTION INTRAVENOUS at 17:05

## 2025-08-13 RX ADMIN — LACOSAMIDE 50 MG: 50 TABLET, FILM COATED ORAL at 21:09

## 2025-08-13 RX ADMIN — DICLOFENAC SODIUM 2 G: 10 GEL TOPICAL at 18:06

## 2025-08-14 ENCOUNTER — TELEPHONE (OUTPATIENT)
Age: 50
End: 2025-08-14

## 2025-08-14 LAB
ALBUMIN SERPL BCG-MCNC: 2.4 G/DL (ref 3.5–5)
ALP SERPL-CCNC: 61 U/L (ref 34–104)
ALT SERPL W P-5'-P-CCNC: 17 U/L (ref 7–52)
ANION GAP SERPL CALCULATED.3IONS-SCNC: 9 MMOL/L (ref 4–13)
AST SERPL W P-5'-P-CCNC: 23 U/L (ref 13–39)
BILIRUB SERPL-MCNC: 0.64 MG/DL (ref 0.2–1)
BUN SERPL-MCNC: 10 MG/DL (ref 5–25)
CALCIUM ALBUM COR SERPL-MCNC: 8.2 MG/DL (ref 8.3–10.1)
CALCIUM SERPL-MCNC: 6.9 MG/DL (ref 8.4–10.2)
CHLORIDE SERPL-SCNC: 111 MMOL/L (ref 96–108)
CO2 SERPL-SCNC: 21 MMOL/L (ref 21–32)
CREAT SERPL-MCNC: 0.43 MG/DL (ref 0.6–1.3)
ERYTHROCYTE [DISTWIDTH] IN BLOOD BY AUTOMATED COUNT: 13.7 % (ref 11.6–15.1)
GFR SERPL CREATININE-BSD FRML MDRD: 117 ML/MIN/1.73SQ M
GLUCOSE SERPL-MCNC: 86 MG/DL (ref 65–140)
HCT VFR BLD AUTO: 41.6 % (ref 34.8–46.1)
HGB BLD-MCNC: 14 G/DL (ref 11.5–15.4)
MAGNESIUM SERPL-MCNC: 1.9 MG/DL (ref 1.9–2.7)
MCH RBC QN AUTO: 34.6 PG (ref 26.8–34.3)
MCHC RBC AUTO-ENTMCNC: 33.7 G/DL (ref 31.4–37.4)
MCV RBC AUTO: 103 FL (ref 82–98)
PHOSPHATE SERPL-MCNC: 2.2 MG/DL (ref 2.7–4.5)
PLATELET # BLD AUTO: 158 THOUSANDS/UL (ref 149–390)
PMV BLD AUTO: 10.7 FL (ref 8.9–12.7)
POTASSIUM SERPL-SCNC: 3.6 MMOL/L (ref 3.5–5.3)
PROCALCITONIN SERPL-MCNC: <0.05 NG/ML
PROT SERPL-MCNC: 5.3 G/DL (ref 6.4–8.4)
RBC # BLD AUTO: 4.05 MILLION/UL (ref 3.81–5.12)
SODIUM SERPL-SCNC: 141 MMOL/L (ref 135–147)
WBC # BLD AUTO: 4.66 THOUSAND/UL (ref 4.31–10.16)

## 2025-08-14 PROCEDURE — 94664 DEMO&/EVAL PT USE INHALER: CPT

## 2025-08-14 PROCEDURE — 92610 EVALUATE SWALLOWING FUNCTION: CPT

## 2025-08-14 PROCEDURE — 83735 ASSAY OF MAGNESIUM: CPT

## 2025-08-14 PROCEDURE — 99232 SBSQ HOSP IP/OBS MODERATE 35: CPT | Performed by: INTERNAL MEDICINE

## 2025-08-14 PROCEDURE — 85027 COMPLETE CBC AUTOMATED: CPT

## 2025-08-14 PROCEDURE — 94640 AIRWAY INHALATION TREATMENT: CPT

## 2025-08-14 PROCEDURE — 94760 N-INVAS EAR/PLS OXIMETRY 1: CPT

## 2025-08-14 PROCEDURE — 36415 COLL VENOUS BLD VENIPUNCTURE: CPT

## 2025-08-14 PROCEDURE — 84145 PROCALCITONIN (PCT): CPT

## 2025-08-14 PROCEDURE — 80053 COMPREHEN METABOLIC PANEL: CPT

## 2025-08-14 PROCEDURE — 84100 ASSAY OF PHOSPHORUS: CPT

## 2025-08-14 PROCEDURE — 87081 CULTURE SCREEN ONLY: CPT | Performed by: INTERNAL MEDICINE

## 2025-08-14 RX ORDER — SODIUM CHLORIDE 9 MG/ML
50 INJECTION, SOLUTION INTRAVENOUS CONTINUOUS
Status: DISCONTINUED | OUTPATIENT
Start: 2025-08-14 | End: 2025-08-16 | Stop reason: HOSPADM

## 2025-08-14 RX ADMIN — SODIUM CHLORIDE 100 ML/HR: 0.9 INJECTION, SOLUTION INTRAVENOUS at 12:40

## 2025-08-14 RX ADMIN — ESCITALOPRAM OXALATE 15 MG: 5 TABLET, FILM COATED ORAL at 08:08

## 2025-08-14 RX ADMIN — LACOSAMIDE 50 MG: 50 TABLET, FILM COATED ORAL at 21:36

## 2025-08-14 RX ADMIN — DICLOFENAC SODIUM 2 G: 10 GEL TOPICAL at 18:19

## 2025-08-14 RX ADMIN — ENOXAPARIN SODIUM 40 MG: 40 INJECTION SUBCUTANEOUS at 08:09

## 2025-08-14 RX ADMIN — AZITHROMYCIN MONOHYDRATE 500 MG: 500 INJECTION, POWDER, LYOPHILIZED, FOR SOLUTION INTRAVENOUS at 17:12

## 2025-08-14 RX ADMIN — DICLOFENAC SODIUM 2 G: 10 GEL TOPICAL at 08:35

## 2025-08-14 RX ADMIN — DOCUSATE SODIUM 100 MG: 100 CAPSULE, LIQUID FILLED ORAL at 08:09

## 2025-08-14 RX ADMIN — ATORVASTATIN CALCIUM 10 MG: 10 TABLET, FILM COATED ORAL at 08:08

## 2025-08-14 RX ADMIN — LEVOTHYROXINE SODIUM 50 MCG: 0.05 TABLET ORAL at 05:01

## 2025-08-14 RX ADMIN — LEVALBUTEROL HYDROCHLORIDE 1.25 MG: 1.25 SOLUTION RESPIRATORY (INHALATION) at 09:20

## 2025-08-14 RX ADMIN — Medication 1000 UNITS: at 08:09

## 2025-08-14 RX ADMIN — DOCUSATE SODIUM 100 MG: 100 CAPSULE, LIQUID FILLED ORAL at 18:10

## 2025-08-14 RX ADMIN — LEVALBUTEROL HYDROCHLORIDE 1.25 MG: 1.25 SOLUTION RESPIRATORY (INHALATION) at 13:39

## 2025-08-14 RX ADMIN — DICLOFENAC SODIUM 2 G: 10 GEL TOPICAL at 15:30

## 2025-08-14 RX ADMIN — LACOSAMIDE 50 MG: 50 TABLET, FILM COATED ORAL at 08:08

## 2025-08-14 RX ADMIN — DICLOFENAC SODIUM 2 G: 10 GEL TOPICAL at 21:38

## 2025-08-14 RX ADMIN — CEFTRIAXONE SODIUM 1000 MG: 10 INJECTION, POWDER, FOR SOLUTION INTRAVENOUS at 16:26

## 2025-08-14 RX ADMIN — Medication 3 MG: at 21:36

## 2025-08-14 RX ADMIN — POLYETHYLENE GLYCOL 3350 17 G: 17 POWDER, FOR SOLUTION ORAL at 08:23

## 2025-08-14 RX ADMIN — LEVALBUTEROL HYDROCHLORIDE 1.25 MG: 1.25 SOLUTION RESPIRATORY (INHALATION) at 20:15

## 2025-08-15 LAB
ANION GAP SERPL CALCULATED.3IONS-SCNC: 6 MMOL/L (ref 4–13)
BASOPHILS # BLD AUTO: 0.07 THOUSANDS/ÂΜL (ref 0–0.1)
BASOPHILS NFR BLD AUTO: 2 % (ref 0–1)
BUN SERPL-MCNC: 9 MG/DL (ref 5–25)
CALCIUM SERPL-MCNC: 8.2 MG/DL (ref 8.4–10.2)
CHLORIDE SERPL-SCNC: 109 MMOL/L (ref 96–108)
CO2 SERPL-SCNC: 26 MMOL/L (ref 21–32)
CREAT SERPL-MCNC: 0.65 MG/DL (ref 0.6–1.3)
EOSINOPHIL # BLD AUTO: 0.04 THOUSAND/ÂΜL (ref 0–0.61)
EOSINOPHIL NFR BLD AUTO: 1 % (ref 0–6)
ERYTHROCYTE [DISTWIDTH] IN BLOOD BY AUTOMATED COUNT: 13.9 % (ref 11.6–15.1)
GFR SERPL CREATININE-BSD FRML MDRD: 102 ML/MIN/1.73SQ M
GLUCOSE SERPL-MCNC: 96 MG/DL (ref 65–140)
HCT VFR BLD AUTO: 39.9 % (ref 34.8–46.1)
HGB BLD-MCNC: 13.7 G/DL (ref 11.5–15.4)
IMM GRANULOCYTES # BLD AUTO: 0.01 THOUSAND/UL (ref 0–0.2)
IMM GRANULOCYTES NFR BLD AUTO: 0 % (ref 0–2)
LYMPHOCYTES # BLD AUTO: 2.01 THOUSANDS/ÂΜL (ref 0.6–4.47)
LYMPHOCYTES NFR BLD AUTO: 49 % (ref 14–44)
MCH RBC QN AUTO: 34.5 PG (ref 26.8–34.3)
MCHC RBC AUTO-ENTMCNC: 34.3 G/DL (ref 31.4–37.4)
MCV RBC AUTO: 101 FL (ref 82–98)
MONOCYTES # BLD AUTO: 0.54 THOUSAND/ÂΜL (ref 0.17–1.22)
MONOCYTES NFR BLD AUTO: 13 % (ref 4–12)
MRSA NOSE QL CULT: NORMAL
NEUTROPHILS # BLD AUTO: 1.43 THOUSANDS/ÂΜL (ref 1.85–7.62)
NEUTS SEG NFR BLD AUTO: 35 % (ref 43–75)
NRBC BLD AUTO-RTO: 0 /100 WBCS
PLATELET # BLD AUTO: 174 THOUSANDS/UL (ref 149–390)
PMV BLD AUTO: 10.7 FL (ref 8.9–12.7)
POTASSIUM SERPL-SCNC: 4.3 MMOL/L (ref 3.5–5.3)
RBC # BLD AUTO: 3.97 MILLION/UL (ref 3.81–5.12)
SODIUM SERPL-SCNC: 141 MMOL/L (ref 135–147)
WBC # BLD AUTO: 4.1 THOUSAND/UL (ref 4.31–10.16)

## 2025-08-15 PROCEDURE — 85025 COMPLETE CBC W/AUTO DIFF WBC: CPT | Performed by: INTERNAL MEDICINE

## 2025-08-15 PROCEDURE — 94664 DEMO&/EVAL PT USE INHALER: CPT

## 2025-08-15 PROCEDURE — 97163 PT EVAL HIGH COMPLEX 45 MIN: CPT

## 2025-08-15 PROCEDURE — 97167 OT EVAL HIGH COMPLEX 60 MIN: CPT

## 2025-08-15 PROCEDURE — 80048 BASIC METABOLIC PNL TOTAL CA: CPT | Performed by: INTERNAL MEDICINE

## 2025-08-15 PROCEDURE — 99232 SBSQ HOSP IP/OBS MODERATE 35: CPT | Performed by: INTERNAL MEDICINE

## 2025-08-15 RX ORDER — AZITHROMYCIN 500 MG/1
500 TABLET, FILM COATED ORAL DAILY
Qty: 5 TABLET | Refills: 0 | Status: SHIPPED | OUTPATIENT
Start: 2025-08-15 | End: 2025-08-16

## 2025-08-15 RX ORDER — CEFPODOXIME PROXETIL 200 MG/1
200 TABLET, FILM COATED ORAL 2 TIMES DAILY
Qty: 10 TABLET | Refills: 0 | Status: SHIPPED | OUTPATIENT
Start: 2025-08-15 | End: 2025-08-16

## 2025-08-15 RX ADMIN — LEVOTHYROXINE SODIUM 50 MCG: 0.05 TABLET ORAL at 05:23

## 2025-08-15 RX ADMIN — ESCITALOPRAM OXALATE 15 MG: 5 TABLET, FILM COATED ORAL at 09:51

## 2025-08-15 RX ADMIN — CEFTRIAXONE SODIUM 1000 MG: 10 INJECTION, POWDER, FOR SOLUTION INTRAVENOUS at 16:44

## 2025-08-15 RX ADMIN — LACOSAMIDE 50 MG: 50 TABLET, FILM COATED ORAL at 21:47

## 2025-08-15 RX ADMIN — Medication 1000 UNITS: at 09:50

## 2025-08-15 RX ADMIN — DOCUSATE SODIUM 100 MG: 100 CAPSULE, LIQUID FILLED ORAL at 09:50

## 2025-08-15 RX ADMIN — DICLOFENAC SODIUM 2 G: 10 GEL TOPICAL at 17:26

## 2025-08-15 RX ADMIN — LACOSAMIDE 50 MG: 50 TABLET, FILM COATED ORAL at 09:50

## 2025-08-15 RX ADMIN — ENOXAPARIN SODIUM 40 MG: 40 INJECTION SUBCUTANEOUS at 09:50

## 2025-08-15 RX ADMIN — DICLOFENAC SODIUM 2 G: 10 GEL TOPICAL at 11:58

## 2025-08-15 RX ADMIN — DOCUSATE SODIUM 100 MG: 100 CAPSULE, LIQUID FILLED ORAL at 17:26

## 2025-08-15 RX ADMIN — SODIUM CHLORIDE 100 ML/HR: 0.9 INJECTION, SOLUTION INTRAVENOUS at 05:25

## 2025-08-15 RX ADMIN — Medication 3 MG: at 21:47

## 2025-08-15 RX ADMIN — DICLOFENAC SODIUM 2 G: 10 GEL TOPICAL at 21:47

## 2025-08-15 RX ADMIN — AZITHROMYCIN MONOHYDRATE 500 MG: 500 INJECTION, POWDER, LYOPHILIZED, FOR SOLUTION INTRAVENOUS at 17:44

## 2025-08-15 RX ADMIN — ATORVASTATIN CALCIUM 10 MG: 10 TABLET, FILM COATED ORAL at 09:51

## 2025-08-15 RX ADMIN — DICLOFENAC SODIUM 2 G: 10 GEL TOPICAL at 09:52

## 2025-08-15 RX ADMIN — SODIUM CHLORIDE 50 ML/HR: 0.9 INJECTION, SOLUTION INTRAVENOUS at 20:27

## 2025-08-16 VITALS
BODY MASS INDEX: 35.04 KG/M2 | DIASTOLIC BLOOD PRESSURE: 66 MMHG | HEART RATE: 81 BPM | WEIGHT: 140 LBS | SYSTOLIC BLOOD PRESSURE: 110 MMHG | RESPIRATION RATE: 18 BRPM | OXYGEN SATURATION: 92 % | TEMPERATURE: 98.8 F

## 2025-08-16 LAB
ANION GAP SERPL CALCULATED.3IONS-SCNC: 6 MMOL/L (ref 4–13)
BASOPHILS # BLD AUTO: 0.06 THOUSANDS/ÂΜL (ref 0–0.1)
BASOPHILS NFR BLD AUTO: 1 % (ref 0–1)
BUN SERPL-MCNC: 9 MG/DL (ref 5–25)
CALCIUM SERPL-MCNC: 8.1 MG/DL (ref 8.4–10.2)
CHLORIDE SERPL-SCNC: 108 MMOL/L (ref 96–108)
CO2 SERPL-SCNC: 26 MMOL/L (ref 21–32)
CREAT SERPL-MCNC: 0.6 MG/DL (ref 0.6–1.3)
EOSINOPHIL # BLD AUTO: 0.07 THOUSAND/ÂΜL (ref 0–0.61)
EOSINOPHIL NFR BLD AUTO: 2 % (ref 0–6)
ERYTHROCYTE [DISTWIDTH] IN BLOOD BY AUTOMATED COUNT: 13.8 % (ref 11.6–15.1)
GFR SERPL CREATININE-BSD FRML MDRD: 105 ML/MIN/1.73SQ M
GLUCOSE SERPL-MCNC: 101 MG/DL (ref 65–140)
HCT VFR BLD AUTO: 40.7 % (ref 34.8–46.1)
HGB BLD-MCNC: 13.5 G/DL (ref 11.5–15.4)
IMM GRANULOCYTES # BLD AUTO: 0.02 THOUSAND/UL (ref 0–0.2)
IMM GRANULOCYTES NFR BLD AUTO: 1 % (ref 0–2)
LYMPHOCYTES # BLD AUTO: 2.28 THOUSANDS/ÂΜL (ref 0.6–4.47)
LYMPHOCYTES NFR BLD AUTO: 51 % (ref 14–44)
MCH RBC QN AUTO: 33.9 PG (ref 26.8–34.3)
MCHC RBC AUTO-ENTMCNC: 33.2 G/DL (ref 31.4–37.4)
MCV RBC AUTO: 102 FL (ref 82–98)
MONOCYTES # BLD AUTO: 0.61 THOUSAND/ÂΜL (ref 0.17–1.22)
MONOCYTES NFR BLD AUTO: 14 % (ref 4–12)
NEUTROPHILS # BLD AUTO: 1.38 THOUSANDS/ÂΜL (ref 1.85–7.62)
NEUTS SEG NFR BLD AUTO: 31 % (ref 43–75)
NRBC BLD AUTO-RTO: 0 /100 WBCS
PLATELET # BLD AUTO: 178 THOUSANDS/UL (ref 149–390)
PMV BLD AUTO: 10.8 FL (ref 8.9–12.7)
POTASSIUM SERPL-SCNC: 3.8 MMOL/L (ref 3.5–5.3)
RBC # BLD AUTO: 3.98 MILLION/UL (ref 3.81–5.12)
SODIUM SERPL-SCNC: 140 MMOL/L (ref 135–147)
WBC # BLD AUTO: 4.42 THOUSAND/UL (ref 4.31–10.16)

## 2025-08-16 PROCEDURE — 94664 DEMO&/EVAL PT USE INHALER: CPT

## 2025-08-16 PROCEDURE — 85025 COMPLETE CBC W/AUTO DIFF WBC: CPT | Performed by: INTERNAL MEDICINE

## 2025-08-16 PROCEDURE — 99239 HOSP IP/OBS DSCHRG MGMT >30: CPT | Performed by: INTERNAL MEDICINE

## 2025-08-16 PROCEDURE — 80048 BASIC METABOLIC PNL TOTAL CA: CPT | Performed by: INTERNAL MEDICINE

## 2025-08-16 RX ORDER — CEFPODOXIME PROXETIL 200 MG/1
200 TABLET, FILM COATED ORAL 2 TIMES DAILY
Qty: 10 TABLET | Refills: 0 | Status: SHIPPED | OUTPATIENT
Start: 2025-08-16 | End: 2025-08-21

## 2025-08-16 RX ORDER — LACOSAMIDE 50 MG/1
50 TABLET ORAL EVERY 12 HOURS SCHEDULED
Qty: 20 TABLET | Refills: 0 | Status: SHIPPED | OUTPATIENT
Start: 2025-08-16 | End: 2025-08-21 | Stop reason: SDUPTHER

## 2025-08-16 RX ORDER — AZITHROMYCIN 500 MG/1
500 TABLET, FILM COATED ORAL DAILY
Qty: 5 TABLET | Refills: 0 | Status: SHIPPED | OUTPATIENT
Start: 2025-08-16 | End: 2025-08-16

## 2025-08-16 RX ORDER — LACOSAMIDE 50 MG/1
50 TABLET ORAL EVERY 12 HOURS SCHEDULED
Qty: 60 TABLET | Refills: 0 | Status: SHIPPED | OUTPATIENT
Start: 2025-08-16 | End: 2025-08-16

## 2025-08-16 RX ORDER — CEFPODOXIME PROXETIL 200 MG/1
200 TABLET, FILM COATED ORAL 2 TIMES DAILY
Qty: 10 TABLET | Refills: 0 | Status: SHIPPED | OUTPATIENT
Start: 2025-08-16 | End: 2025-08-16

## 2025-08-16 RX ORDER — AZITHROMYCIN 500 MG/1
500 TABLET, FILM COATED ORAL DAILY
Qty: 5 TABLET | Refills: 0 | Status: SHIPPED | OUTPATIENT
Start: 2025-08-16 | End: 2025-08-21

## 2025-08-16 RX ORDER — LACOSAMIDE 50 MG/1
50 TABLET ORAL EVERY 12 HOURS SCHEDULED
Qty: 20 TABLET | Refills: 0 | Status: SHIPPED | OUTPATIENT
Start: 2025-08-16 | End: 2025-08-16

## 2025-08-16 RX ADMIN — ENOXAPARIN SODIUM 40 MG: 40 INJECTION SUBCUTANEOUS at 09:51

## 2025-08-16 RX ADMIN — DICLOFENAC SODIUM 2 G: 10 GEL TOPICAL at 09:52

## 2025-08-16 RX ADMIN — ATORVASTATIN CALCIUM 10 MG: 10 TABLET, FILM COATED ORAL at 09:51

## 2025-08-16 RX ADMIN — DICLOFENAC SODIUM 2 G: 10 GEL TOPICAL at 13:35

## 2025-08-16 RX ADMIN — LEVOTHYROXINE SODIUM 50 MCG: 0.05 TABLET ORAL at 05:23

## 2025-08-16 RX ADMIN — Medication 1000 UNITS: at 09:51

## 2025-08-16 RX ADMIN — ESCITALOPRAM OXALATE 15 MG: 5 TABLET, FILM COATED ORAL at 09:52

## 2025-08-16 RX ADMIN — LACOSAMIDE 50 MG: 50 TABLET, FILM COATED ORAL at 09:51

## 2025-08-18 ENCOUNTER — OFFICE VISIT (OUTPATIENT)
Dept: INTERNAL MEDICINE CLINIC | Facility: CLINIC | Age: 50
End: 2025-08-18
Payer: MEDICARE

## 2025-08-18 ENCOUNTER — TELEPHONE (OUTPATIENT)
Dept: NEUROLOGY | Facility: CLINIC | Age: 50
End: 2025-08-18

## 2025-08-18 VITALS
DIASTOLIC BLOOD PRESSURE: 66 MMHG | HEART RATE: 64 BPM | TEMPERATURE: 97 F | OXYGEN SATURATION: 94 % | SYSTOLIC BLOOD PRESSURE: 104 MMHG

## 2025-08-18 DIAGNOSIS — E55.9 VITAMIN D DEFICIENCY: ICD-10-CM

## 2025-08-18 DIAGNOSIS — E78.2 MIXED HYPERLIPIDEMIA: ICD-10-CM

## 2025-08-18 DIAGNOSIS — E53.8 FOLATE DEFICIENCY: ICD-10-CM

## 2025-08-18 DIAGNOSIS — M81.0 OSTEOPOROSIS, UNSPECIFIED OSTEOPOROSIS TYPE, UNSPECIFIED PATHOLOGICAL FRACTURE PRESENCE: ICD-10-CM

## 2025-08-18 DIAGNOSIS — R56.9 SEIZURE-LIKE ACTIVITY (HCC): ICD-10-CM

## 2025-08-18 DIAGNOSIS — E53.8 B12 DEFICIENCY: ICD-10-CM

## 2025-08-18 DIAGNOSIS — J18.9 PNEUMONIA OF RIGHT LOWER LOBE DUE TO INFECTIOUS ORGANISM: Primary | ICD-10-CM

## 2025-08-18 DIAGNOSIS — E83.39 LOW PHOSPHATE LEVELS: ICD-10-CM

## 2025-08-18 LAB
BACTERIA BLD CULT: NORMAL
BACTERIA BLD CULT: NORMAL

## 2025-08-18 PROCEDURE — 99496 TRANSJ CARE MGMT HIGH F2F 7D: CPT | Performed by: GENERAL ACUTE CARE HOSPITAL

## 2025-08-19 DIAGNOSIS — M25.569 CHRONIC KNEE PAIN, UNSPECIFIED LATERALITY: ICD-10-CM

## 2025-08-19 DIAGNOSIS — G89.29 CHRONIC KNEE PAIN, UNSPECIFIED LATERALITY: ICD-10-CM

## 2025-08-19 PROBLEM — J96.01 ACUTE HYPOXEMIC RESPIRATORY FAILURE (HCC): Status: ACTIVE | Noted: 2025-08-19

## 2025-08-21 DIAGNOSIS — G40.919 BREAKTHROUGH SEIZURE (HCC): ICD-10-CM

## 2025-08-21 RX ORDER — ACETAMINOPHEN 500 MG
TABLET ORAL
Qty: 90 TABLET | Refills: 0 | Status: ON HOLD | OUTPATIENT
Start: 2025-08-21

## 2025-08-22 RX ORDER — LACOSAMIDE 50 MG/1
50 TABLET ORAL EVERY 12 HOURS SCHEDULED
Qty: 60 TABLET | Refills: 2 | Status: ON HOLD | OUTPATIENT
Start: 2025-08-22

## 2025-08-26 PROBLEM — R65.20 SEVERE SEPSIS (HCC): Status: ACTIVE | Noted: 2025-08-26

## 2025-08-26 PROBLEM — T17.908A ASPIRATION INTO AIRWAY: Status: ACTIVE | Noted: 2025-08-26

## 2025-08-26 PROBLEM — A41.9 SEVERE SEPSIS (HCC): Status: ACTIVE | Noted: 2025-08-26

## 2025-08-26 PROBLEM — E87.0 HYPERNATREMIA: Status: ACTIVE | Noted: 2025-08-26

## 2025-08-27 PROBLEM — G92.8 TOXIC METABOLIC ENCEPHALOPATHY: Status: ACTIVE | Noted: 2025-08-05

## 2025-08-27 PROBLEM — Z51.5 PALLIATIVE CARE ENCOUNTER: Status: ACTIVE | Noted: 2025-08-27

## (undated) DEVICE — CYSTO TUBING TUR Y IRRIGATION

## (undated) DEVICE — PADDING CAST 4 IN  COTTON STRL

## (undated) DEVICE — CAST PLASTER 4 IN ROLL

## (undated) DEVICE — SUT ETHILON 3-0 PS-1 18 IN 1663G

## (undated) DEVICE — ACE WRAP 4 IN UNSTERILE

## (undated) DEVICE — SUT VICRYL 2-0 CT-2 27 IN J269H

## (undated) DEVICE — SURGICAL GOWN, XL SMARTSLEEVE: Brand: CONVERTORS

## (undated) DEVICE — CAST PADDING 4 IN SYNTHETIC NON-STRL

## (undated) DEVICE — PLUMEPEN PRO 10FT

## (undated) DEVICE — COBAN 4 IN STERILE

## (undated) DEVICE — CAST PLASTER 3 IN ROLL

## (undated) DEVICE — ACE WRAP 6 IN UNSTERILE

## (undated) DEVICE — WEBRIL 6 IN UNSTERILE

## (undated) DEVICE — NEEDLE 18 G X 1 1/2

## (undated) DEVICE — C-ARM: Brand: UNBRANDED

## (undated) DEVICE — TIBURON SPLIT SHEET: Brand: CONVERTORS

## (undated) DEVICE — TUBING SUCTION 5MM X 12 FT

## (undated) DEVICE — SPONGE LAP 18 X 18 IN STRL RFD

## (undated) DEVICE — 10FR FRAZIER SUCTION HANDLE: Brand: CARDINAL HEALTH

## (undated) DEVICE — OCCLUSIVE GAUZE STRIP,3% BISMUTH TRIBROMOPHENATE IN PETROLATUM BLEND: Brand: XEROFORM

## (undated) DEVICE — SURGICEL SNOW 4 X 4 IN

## (undated) DEVICE — GLOVE SRG BIOGEL 7.5

## (undated) DEVICE — CHLORAPREP HI-LITE 26ML ORANGE

## (undated) DEVICE — DRAPE SHEET THREE QUARTER

## (undated) DEVICE — DRAPE C-ARMOUR

## (undated) DEVICE — 3M™ STERI-STRIP™ REINFORCED ADHESIVE SKIN CLOSURES, R1547, 1/2 IN X 4 IN (12 MM X 100 MM), 6 STRIPS/ENVELOPE: Brand: 3M™ STERI-STRIP™

## (undated) DEVICE — GLOVE INDICATOR PI UNDERGLOVE SZ 8 BLUE

## (undated) DEVICE — UNDYED BRAIDED (POLYGLACTIN 910), SYNTHETIC ABSORBABLE SUTURE: Brand: COATED VICRYL

## (undated) DEVICE — ARTHREX DRILL BIT 2.5MM

## (undated) DEVICE — SINGLE PORT MANIFOLD: Brand: NEPTUNE 2

## (undated) DEVICE — BETHLEHEM UNIVERSAL  MIONR EXT: Brand: CARDINAL HEALTH

## (undated) DEVICE — INTENDED FOR TISSUE SEPARATION, AND OTHER PROCEDURES THAT REQUIRE A SHARP SURGICAL BLADE TO PUNCTURE OR CUT.: Brand: BARD-PARKER ® CARBON RIB-BACK BLADES

## (undated) DEVICE — CUFF TOURNIQUET 24 X 4 IN QUICK CONNECT DISP 1BLA

## (undated) DEVICE — SYRINGE 20ML LL

## (undated) DEVICE — IMPLANTABLE DEVICE: Type: IMPLANTABLE DEVICE | Site: ANKLE | Status: NON-FUNCTIONAL

## (undated) DEVICE — ABDOMINAL PAD: Brand: DERMACEA

## (undated) DEVICE — 3M™ STERI-DRAPE™ U-DRAPE 1015: Brand: STERI-DRAPE™

## (undated) DEVICE — SCD SEQUENTIAL COMPRESSION COMFORT SLEEVE MEDIUM KNEE LENGTH: Brand: KENDALL SCD